# Patient Record
Sex: FEMALE | NOT HISPANIC OR LATINO | Employment: OTHER | URBAN - METROPOLITAN AREA
[De-identification: names, ages, dates, MRNs, and addresses within clinical notes are randomized per-mention and may not be internally consistent; named-entity substitution may affect disease eponyms.]

---

## 2017-01-23 ENCOUNTER — TRANSFERRED RECORDS (OUTPATIENT)
Dept: HEALTH INFORMATION MANAGEMENT | Facility: CLINIC | Age: 57
End: 2017-01-23

## 2017-03-21 ENCOUNTER — TRANSFERRED RECORDS (OUTPATIENT)
Dept: HEALTH INFORMATION MANAGEMENT | Facility: CLINIC | Age: 57
End: 2017-03-21

## 2017-04-11 ENCOUNTER — MEDICAL CORRESPONDENCE (OUTPATIENT)
Dept: HEALTH INFORMATION MANAGEMENT | Facility: CLINIC | Age: 57
End: 2017-04-11

## 2017-04-24 ENCOUNTER — TRANSFERRED RECORDS (OUTPATIENT)
Dept: HEALTH INFORMATION MANAGEMENT | Facility: CLINIC | Age: 57
End: 2017-04-24

## 2017-09-06 ENCOUNTER — TRANSFERRED RECORDS (OUTPATIENT)
Dept: HEALTH INFORMATION MANAGEMENT | Facility: CLINIC | Age: 57
End: 2017-09-06

## 2017-11-20 ENCOUNTER — MEDICAL CORRESPONDENCE (OUTPATIENT)
Dept: HEALTH INFORMATION MANAGEMENT | Facility: CLINIC | Age: 57
End: 2017-11-20

## 2017-12-05 ENCOUNTER — TRANSFERRED RECORDS (OUTPATIENT)
Dept: HEALTH INFORMATION MANAGEMENT | Facility: CLINIC | Age: 57
End: 2017-12-05

## 2018-01-15 ENCOUNTER — TRANSFERRED RECORDS (OUTPATIENT)
Dept: HEALTH INFORMATION MANAGEMENT | Facility: CLINIC | Age: 58
End: 2018-01-15

## 2018-01-27 ENCOUNTER — MEDICAL CORRESPONDENCE (OUTPATIENT)
Dept: HEALTH INFORMATION MANAGEMENT | Facility: CLINIC | Age: 58
End: 2018-01-27

## 2018-03-23 ENCOUNTER — TRANSFERRED RECORDS (OUTPATIENT)
Dept: HEALTH INFORMATION MANAGEMENT | Facility: CLINIC | Age: 58
End: 2018-03-23
Payer: MEDICARE

## 2018-03-23 LAB — NEGATIVE: NORMAL

## 2018-06-15 ENCOUNTER — TRANSFERRED RECORDS (OUTPATIENT)
Dept: HEALTH INFORMATION MANAGEMENT | Facility: CLINIC | Age: 58
End: 2018-06-15

## 2018-09-28 ENCOUNTER — TRANSFERRED RECORDS (OUTPATIENT)
Dept: HEALTH INFORMATION MANAGEMENT | Facility: CLINIC | Age: 58
End: 2018-09-28

## 2018-12-20 ENCOUNTER — TRANSFERRED RECORDS (OUTPATIENT)
Dept: HEALTH INFORMATION MANAGEMENT | Facility: CLINIC | Age: 58
End: 2018-12-20

## 2019-01-07 ENCOUNTER — TRANSFERRED RECORDS (OUTPATIENT)
Dept: HEALTH INFORMATION MANAGEMENT | Facility: CLINIC | Age: 59
End: 2019-01-07

## 2019-01-15 ENCOUNTER — TRANSFERRED RECORDS (OUTPATIENT)
Dept: HEALTH INFORMATION MANAGEMENT | Facility: CLINIC | Age: 59
End: 2019-01-15

## 2019-01-29 ENCOUNTER — TRANSFERRED RECORDS (OUTPATIENT)
Dept: HEALTH INFORMATION MANAGEMENT | Facility: CLINIC | Age: 59
End: 2019-01-29

## 2019-04-30 ENCOUNTER — TELEPHONE (OUTPATIENT)
Dept: PSYCHIATRY | Facility: CLINIC | Age: 59
End: 2019-04-30

## 2019-04-30 NOTE — TELEPHONE ENCOUNTER
"PSYCHIATRY CLINIC PHONE INTAKE     SERVICES REQUESTED / INTERESTED IN          Med Management    Presenting Problem and Brief History                              What would you like to be seen for? (brief description):  Since about 2008. Symptoms - usually sees psychologist every 2 weeks, hasn't been able to lately, was doing really well until she had a rash and dermatologist said it was from [facility]. Psychologist put her on something else and then something else, was on olanzapine, clonazpam, celexa, and vitamin D. When on olanzapine and clonazepam started seeing med manager at [facility] - didn't want to talk about clonazepam at all and then one day said they were going to put her on lithium instead of olanzapine (provider allowed half olanzapine but didn't tell her while going off olanzapine that she wouln't sleep even with clonazapem). In process on liquid clonazepam to start weaning off (started a few days ago). Not happy with lithium and is nervous to be offf of clonzazepam for sleep, melatonin sometimes helps. Gets \"people-y\" like going to a store and now today is tired. Gives herself 4 days to be \"lazy\", worked out a plan if she becomes manicy and find something constructive (yard work, clean house), used to dance. Hasn't seen psychiatrist since [place] (moved to [place] in October). Has been extremely stable for a few years but has moments and days.  Have you received a mental health diagnosis? Yes   Which one (s): Bipolar  Is there any history of developmental delay?  No   Are you currently seeing a mental health provider?  Yes            Who / month last seen:  On 5/9 appt with psychologist (private practice), 5/10 Dr. Chew in [facility] (first appt), [facility] has records too  Do you have mental health records elsewhere?  Yes  Will you sign a release so we can obtain them?  Yes    Have you ever been hospitalized for psychiatric reasons?  Yes  Describe:  " "Around 2007 - health and family stress, went to hospital and was diagnosed with bipolar and manic depressive    Do you have current thoughts of self-harm?  No    Do you currently have thoughts of harming others?  No       Substance Use History     Do you have any history of alcohol / illicit drug use?  No  Describe:    Have you ever received treatment for this?  No    Describe:       Social History     Does the patient have a guardian?  No    Name / number:   Have you had an ACT team in last 12 months?  No  Describe:    Do you have any current or past legal issues?  No  Describe:    OK to leave a detailed voicemail?  Yes    Medical/ Surgical History                                 There is no problem list on file for this patient.         Medications             No current outpatient medications on file.     Lithium  Weaning off clonazepam  celexa  Vitamin D  calcium    DISPOSITION      Completed phone screen with patient. Added to wait list, wants someone who is \"really good\", doesn't matter if resident or NP.     Melissa Finley,   "

## 2019-07-24 ENCOUNTER — TRANSFERRED RECORDS (OUTPATIENT)
Dept: HEALTH INFORMATION MANAGEMENT | Facility: CLINIC | Age: 59
End: 2019-07-24

## 2019-09-12 NOTE — TELEPHONE ENCOUNTER
Spoke to pt who provided updated information:    Pt stated she had a rash at one point, and her doctors felt it was the depakote she was taking. She was taken off of the depakote and tried several other medications, to replace the depakote, but nothing was working. She now take s olanzapine 25mg, but she's not happy with it. She took clonazepam in the past, which worked well, but was taken off of the clonazepam by her doctors since they were going to stop using this medication at that clinic. She takes hydroxyzine 1-2 25mg at night, and usually takes 2, because one isn't enough. She also takes citalopram 40mg.    Scheduled with Enzo No on 10/23/19 at 9:30am.

## 2019-10-01 ENCOUNTER — TRANSFERRED RECORDS (OUTPATIENT)
Dept: HEALTH INFORMATION MANAGEMENT | Facility: CLINIC | Age: 59
End: 2019-10-01

## 2019-10-23 ENCOUNTER — ALLIED HEALTH/NURSE VISIT (OUTPATIENT)
Dept: PSYCHIATRY | Facility: CLINIC | Age: 59
End: 2019-10-23
Attending: SOCIAL WORKER
Payer: MEDICARE

## 2019-10-23 ENCOUNTER — OFFICE VISIT (OUTPATIENT)
Dept: PSYCHIATRY | Facility: CLINIC | Age: 59
End: 2019-10-23
Attending: NURSE PRACTITIONER
Payer: MEDICARE

## 2019-10-23 VITALS — HEART RATE: 83 BPM | WEIGHT: 155 LBS | DIASTOLIC BLOOD PRESSURE: 78 MMHG | SYSTOLIC BLOOD PRESSURE: 114 MMHG

## 2019-10-23 DIAGNOSIS — F39 MOOD DISORDER (H): Primary | ICD-10-CM

## 2019-10-23 DIAGNOSIS — Z71.89 COUNSELING AND COORDINATION OF CARE: Primary | ICD-10-CM

## 2019-10-23 PROCEDURE — G0463 HOSPITAL OUTPT CLINIC VISIT: HCPCS | Mod: ZF

## 2019-10-23 RX ORDER — OLANZAPINE 2.5 MG/1
2.5 TABLET, FILM COATED ORAL AT BEDTIME
COMMUNITY
End: 2019-11-13

## 2019-10-23 RX ORDER — HYDROXYZINE HYDROCHLORIDE 25 MG/1
50 TABLET, FILM COATED ORAL AT BEDTIME
COMMUNITY
End: 2019-11-27

## 2019-10-23 RX ORDER — ALBUTEROL SULFATE 90 UG/1
2 AEROSOL, METERED RESPIRATORY (INHALATION) PRN
COMMUNITY
End: 2020-03-18

## 2019-10-23 RX ORDER — CITALOPRAM HYDROBROMIDE 40 MG/1
40 TABLET ORAL DAILY
COMMUNITY
End: 2019-11-27

## 2019-10-23 ASSESSMENT — PAIN SCALES - GENERAL: PAINLEVEL: MODERATE PAIN (5)

## 2019-10-23 NOTE — PATIENT INSTRUCTIONS
Thank you for coming to the PSYCHIATRY CLINIC.    Lab Testing:  If you had lab testing today and your results are reassuring or normal they will be mailed to you or sent through [employer] within 7 days.   If the lab tests need quick action we will call you with the results.  The phone number we will call with results is # [phone] (home) . If this is not the best number please call our clinic and change the number.    Medication Refills:  If you need any refills please call your pharmacy and they will contact us. Our fax number for refills is [phone]. Please allow three business for refill processing.   If you need to  your refill at a new pharmacy, please contact the new pharmacy directly. The new pharmacy will help you get your medications transferred.     Scheduling:  If you have any concerns about today's visit or wish to schedule another appointment please call our office during normal business hours [phone] (8-5:00 M-F)    Contact Us:  Please call [phone] during business hours (8-5:00 M-F).  If after clinic hours, or on the weekend, please call  [phone].    Financial Assistance [phone]  [facility] Billing [phone]  Central Billing Office, [facility]: [phone]  [facility] Billing [phone]  Medical Records [phone]      MENTAL HEALTH CRISIS NUMBERS:  [facility]:   [facility] - [phone]   Crisis Residence [facility] - [phone]   Walk-In Counseling [facility] [phone]   COPE 24/7 [place] Mobile Team for Adults - [[phone]]; Child - [[phone]]        [facility]:   [facility] - [phone]   Walk-in counseling [facility] - [phone]   Walk-in counseling [facility] - [phone]   Crisis Residence [facility] - [phone]   Urgent Care Adult Mental Health:   --Drop-in, 24/7 crisis line, and [place] Mobile Team  [[phone]]    CRISIS TEXT LINE: Text [phone] from anywhere, anytime, any crisis 24/7;    OR SEE [email or web address].org     Poison Control Center - [phone]    CHILD: [facility] needs assessment team - [phone]     [facility] - [phone]; or [facility] Lif[facility] - [phone]    If you have a medical emergency please call 911or go to the nearest ER.                    _____________________________________________    Again thank you for choosing PSYCHIATRY CLINIC and please let us know how we can best partner with you to improve you and your family's health.  You may be receiving a survey in the mail regarding this appointment. We would love to have your feedback, both positive and negative, so please fill out the survey and return it using the provided envelope. The survey is done by an external company, so your answers are anonymous.

## 2019-10-23 NOTE — PROGRESS NOTES
"     [facility] [place]  Psychiatry Clinic  MEDICAL DIAGNOSTIC ASSESSMENT (Incomplete.  See 11/13/19)     Referred by self-referred for evaluation of depression, anxiety and possible bipolar disorder.     History was provided by patient who was a fair and vague historian.    CARE TEAM:  PCP- Buddy Nolan   Therapist- Amirah Tejeda @ Private Practice                 Katie Griffiths is a 59 year old female who prefers the name Katie & pronouns she, her.      CHIEF COMPLAINT                                                           \" Cannot stay on medications.  Don't have the same get up and go \"     HISTORY OF PRESENT ILLNESS   [4, 4]      Psych critical item history: Hospitalized 3 times with most recent occurring in 2007 after overdose attempt.  Numerous medication trials.  Possible bipolar diagnosis    Unable to complete assessment due to complexity of history, her difficulty in recalling her psychiatric history, and the tangential nature of her thought process.  Katie also spent time sharing concerns about limited resources.  Social work was called for consultation.  ASSESSMENT    [m2, h3]     Bipolar I Disorder (Hx)     PLAN        [m2, h3]     1) PSYCHOTROPIC MEDICATIONS:  - continue with current medications.  Will request records for confirmation.      2) [place]  was [place] today:  will be checked next visit.    3) RTC: 2-3 weeks for completion of evaluation.  Will request records for medication reconciliation and collateral information    7) CRISIS NUMBERS:   Provided routinely in AVS   ONLY if a LILA PT: [facility] [phone] (clinic), [phone] (after hours)     TREATMENT RISK STATEMENT:  The risks, benefits, alternatives and potential adverse effects have been discussed and are understood by the pt. The pt understands the risks of using street drugs or alcohol. There are no medical contraindications, the pt agrees to treatment with the ability to do so. " The pt knows to call the clinic for any problems or to access emergency care if needed.  Medical and substance use concerns are documented above.  Psychotropic drug interaction check was done, including changes made today.    PROVIDER: ADELINA Lieberman CNP

## 2019-10-24 ENCOUNTER — TELEPHONE (OUTPATIENT)
Dept: PSYCHIATRY | Facility: CLINIC | Age: 59
End: 2019-10-24

## 2019-10-24 NOTE — TELEPHONE ENCOUNTER
Social Work   Incoming/Outgoing Call  [facility] Psychiatry Clinic    Outgoing Call To: Katie Griffiths    Reason for Call:  AGGIE following up on request for SW assistance. SW met briefly with patient on 10/23/19.    Response/Plan:  SW briefly assessed current situation and needs.     Katie is  from her  of 33 years. About 1 year ago, she moved to [place] to stay with her sister in [place]. Sometime in the past year, she moved down to the [facility] to assist her son. Her son was going through a divorce. Katie assisted with finances (paid back-rent and utilities of approximately 3 months) and provides childcare for their children. It appears divorce was finalized yesterday (10/23). Katie expressed frustration because her ex-daughter-in-law had previously expressed a need for her to watch the kids while she went to school and is now saying due to Katie's bipolar diagnosis, she is unstable and should not be watching the children. It appears there is currently 50/50 custody and as of today, Katie was watching the children.     There are multiple financial stressors for the family. Her son is paying child support based on current earnings. He works in construction and has periods where he is laid off. He also does not have a vehicle. He told Katie that she needs to pay rent or he will need to tell his ex that he can't care for the kids and get a small apartment for himself. Katie is currently paying the van loan to the vehicle the ex is driving. She also used her credit card to pay for  fees for the divorce.    Reviewed Katie's current finances. Katie provided information on current income, which includes alimony (ends in about 5 years or when her ex retires), social security, and part of her ex's  FPC. She has a large amount of credit card debt that she set up on a payment plan, medicare, and other insurances.     SW reviewed options. Patient could remind her son that she is paying money towards  household bills. SW suggested a sit down with her son to talk about finances. Katie finds it very difficult to talk with her son. She could also review medicare and identify a plan that covers more services in [place]. AGGIE explained [employer] as another option for health insurance. SW also suggested applying for subsidized housing in case she needs housing in the future.    Katie applied for [employer]. She has her assessment scheduled for next Wednesday at 9:00 am.    Patient requested phone follow up in 1-2 weeks. She will think about options in the mean time.      Will route to patient's current psychiatric provider(s) as an FYI.   Please call or EPIC message with any questions or concerns.    Kaley Kelley, [facility]  [phone]

## 2019-11-04 ENCOUNTER — TELEPHONE (OUTPATIENT)
Dept: PSYCHIATRY | Facility: CLINIC | Age: 59
End: 2019-11-04

## 2019-11-05 NOTE — PROGRESS NOTES
Social Work Consultation  [facility]ry Clinic      Patient Name:  Katie Griffiths  /Age:  1960 (59 year old)    Presenting SW Need(s)/ Reason for visit:  Requested by provider. Patient in need of supports.     Collaborated With:    -Katie Griffiths  -Enzo No, provider    Intervention:  Participated in the patient's psychiatry appt with Enzo No. SW intern Fannie Mariscal also present at meeting to observe.      SW met very briefly with Katie to introduce self. Katie reported spending $23 one way to get to clinic for appointment. She is trying to get [employer]. She is in need of multiple supports. SW made arrangements to call patient following day to further assess needs and identify next steps.    Resources Provided:  -none during today's meeting.    Social Work Assessment:  SW only met briefly with Katie and does not have enough information to make assessment or recommendations at this time.    Plan:  SW will call patient 10/24 to assess needs and identify resources. SW provided contact information.      Will route to patient's psychiatric provider(s) as an FYI.    Kaley Kelley, AMANDA, [facility]    This is a non-billable encounter as it was solely for the purposes of outreach and/or care coordination.

## 2019-11-07 NOTE — TELEPHONE ENCOUNTER
Social Work   Incoming Voicemail  [facility]ry Clinic    Incoming Voicemail From:  Katie Griffiths    Content of Voicemail:    Patient requesting call back.    Response/Plan:    SW returned call. She was able to call [employer]. Unfortunately she found out that if she switched health plans, she would likely have copays for services with specialists. She will likely keep current plan for at least one year. She did the [employer] assessment and was approved, which is a big relief for her transportation needs. She is interested in finding a 5 point restraint child seat so her 4 year old granddaughter can go with her on the [employer] bus.  AGGIE briefly searched for options and found 1 phone number of [phone] (with [employer]). Katie will continue to look into this option.    SW will attempt to check in with patient next time she is in clinic on 11/14/19.      Will route to patient's current psychiatric provider(s) as an FYI.   Please call or EPIC message with any questions or concerns.    Kaley Kelley, AMANDA, LICSW

## 2019-11-13 ENCOUNTER — ALLIED HEALTH/NURSE VISIT (OUTPATIENT)
Dept: PSYCHIATRY | Facility: CLINIC | Age: 59
End: 2019-11-13
Attending: SOCIAL WORKER
Payer: MEDICARE

## 2019-11-13 ENCOUNTER — OFFICE VISIT (OUTPATIENT)
Dept: PSYCHIATRY | Facility: CLINIC | Age: 59
End: 2019-11-13
Attending: NURSE PRACTITIONER
Payer: MEDICARE

## 2019-11-13 VITALS — SYSTOLIC BLOOD PRESSURE: 114 MMHG | HEART RATE: 106 BPM | WEIGHT: 157.2 LBS | DIASTOLIC BLOOD PRESSURE: 77 MMHG

## 2019-11-13 DIAGNOSIS — Z71.89 COUNSELING AND COORDINATION OF CARE: Primary | ICD-10-CM

## 2019-11-13 DIAGNOSIS — F39 MOOD DISORDER (H): Primary | ICD-10-CM

## 2019-11-13 PROCEDURE — G0463 HOSPITAL OUTPT CLINIC VISIT: HCPCS | Mod: ZF

## 2019-11-13 RX ORDER — OLANZAPINE 5 MG/1
5 TABLET ORAL AT BEDTIME
Qty: 30 TABLET | Refills: 0 | Status: SHIPPED | OUTPATIENT
Start: 2019-11-13 | End: 2019-12-11

## 2019-11-13 ASSESSMENT — PAIN SCALES - GENERAL: PAINLEVEL: NO PAIN (0)

## 2019-11-14 ENCOUNTER — TELEPHONE (OUTPATIENT)
Dept: PSYCHIATRY | Facility: CLINIC | Age: 59
End: 2019-11-14

## 2019-11-14 NOTE — TELEPHONE ENCOUNTER
On 11/13/2019 the patient signed an SEBASTIAN authorizing medical records to be released from [facility] to [facility]  for the purpose of continuing care. I faxed the request to [phone]. I sent the original to SEBASTIAN to scanning and kept a copy in psychiatry until scanning is complete.  Yee Mcfarland, CMA

## 2019-11-18 ENCOUNTER — DOCUMENTATION ONLY (OUTPATIENT)
Dept: CARE COORDINATION | Facility: CLINIC | Age: 59
End: 2019-11-18

## 2019-11-18 ENCOUNTER — TELEPHONE (OUTPATIENT)
Dept: PSYCHIATRY | Facility: CLINIC | Age: 59
End: 2019-11-18

## 2019-11-18 NOTE — TELEPHONE ENCOUNTER
Social Work   Incoming Voicemail  [facility] Psychiatry Clinic    Incoming Voicemail From:  Katie Griffiths    Content of Voicemail:    Patient requesting a call back.    Response/Plan:    SW returned call and left message with contact information and availability.      Will route to patient's current psychiatric provider(s) as an FYI.   Please call or EPIC message with any questions or concerns.    Kaley Kelley, AMANDA, LICSW

## 2019-11-19 NOTE — PROGRESS NOTES
Social Work Follow-Up  [facility]ry [facility]    Patient Name:  Katie Griffiths  /Age:  1960 (59 year old)    Reason for Follow-Up:  Patient left voicemail on writer's phone wondering if there were any primary care providers in same building as Psychiatry Clinic.    Data/Intervention:  -Met with patient during psychiatry appointment with Enzo No.  -Provided handouts on 3 possible PCP clinics: at [facility] on [facility] [place], [facility] [place], and [facility] across [place] from [facility]. SW reviewed pros and cons of each option. [facility] is within current system and also has specialty providers on site but is not at this site and farther from home. [facility] is within system (improved coordination) and close to home, but may not have as many specialty providers. [facility] is very close to Psychiatry clinic, but is not in [place] system. Katie is most interested in [facility] clinic and is able to follow up with making an appointment on her own.  -Patient did not identify other needs at this time.    Assessment:  Katie is able to call and make an appointment independently. She has demonstrated an ability to reach out to writer when she needs assistance.    Plan:  Katie will reach out to writer when she needs support or assistance.    Will route to patient's psychiatric provider(s) as an FYI.    Please call or EPIC message with any questions or concerns.    Kaley Kelley (Patty) MS, LICSW  [phone]    This is a non-billable encounter as it was solely for the purposes of outreach and/or care coordination.
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
## 2019-11-27 ENCOUNTER — OFFICE VISIT (OUTPATIENT)
Dept: PSYCHIATRY | Facility: CLINIC | Age: 59
End: 2019-11-27
Attending: NURSE PRACTITIONER
Payer: MEDICARE

## 2019-11-27 VITALS — SYSTOLIC BLOOD PRESSURE: 108 MMHG | DIASTOLIC BLOOD PRESSURE: 75 MMHG | WEIGHT: 162.2 LBS | HEART RATE: 103 BPM

## 2019-11-27 DIAGNOSIS — F39 MOOD DISORDER (H): Primary | ICD-10-CM

## 2019-11-27 PROCEDURE — G0463 HOSPITAL OUTPT CLINIC VISIT: HCPCS | Mod: ZF

## 2019-11-27 RX ORDER — HYDROXYZINE HYDROCHLORIDE 25 MG/1
50 TABLET, FILM COATED ORAL AT BEDTIME
Qty: 60 TABLET | Refills: 1 | Status: SHIPPED | OUTPATIENT
Start: 2019-11-27 | End: 2020-01-08

## 2019-11-27 RX ORDER — CITALOPRAM HYDROBROMIDE 40 MG/1
40 TABLET ORAL DAILY
Qty: 30 TABLET | Refills: 1 | Status: SHIPPED | OUTPATIENT
Start: 2019-11-27 | End: 2020-02-12

## 2019-11-27 ASSESSMENT — PAIN SCALES - GENERAL: PAINLEVEL: NO PAIN (0)

## 2019-11-27 NOTE — PROGRESS NOTES
[facility]  Psychiatry Clinic  PSYCHIATRIC PROGRESS NOTE       Katie Griffiths is a 59 year old female who prefers the name Katie and pronoun she, her.  Therapist: Amirah Tejeda @ Private Practice               PCP: Buddy Nolan  Other Providers: None    Pertinent Background:  See previous notes.  Psych critical item history includes Hospitalized 3 times with most recent occurring in 2007 after overdose attempt.  Numerous medication trials.  Possible bipolar diagnosis.     Interim History     [4, 4]     The patient is a good historian, reports good treatment adherence and was last seen 11/13/19.  Since the last visit, Increased HS Olanzapine to 5mg and Katie appears much more grounded.  Speech less pressured and mood more stable.  Thoughts more organized and linear.  Katie reports she also has noticed a significant difference.  She also reports she is sleeping much better.  Concerned about weight gain but reports she is very sedentary.      Recent Symptoms:   Depression:  depressed mood, anhedonia, low energy, appetite changes and weight changes  Elevated:  none  Psychosis:  none  Anxiety:  periodic worry  Panic Attack:  none  Trauma Related:  none     Recent Substance Use:  No changes reported        Social/ Family History      [1ea,1ea]            [per patient report]               Financial support-  social security disability  Children-  2 adult children  Living situation- Lives in house in [place] with son   Social/spiritial support-  limited/none  Feels safe at home-  Yes   Family history- None      Medical / Surgical History                               There is no problem list on file for this patient.      No past surgical history on file.     Medical Review of Systems         [2,10]   The remainder of the review of systems is noncontributory  Thyroid removed  Allergy    Quetiapine  Current Medications        Current Outpatient Medications   Medication Sig  Dispense Refill     albuterol (PROAIR HFA/PROVENTIL HFA/VENTOLIN HFA) 108 (90 Base) MCG/ACT inhaler Inhale 2 puffs into the lungs as needed for shortness of breath / dyspnea or wheezing       citalopram (CELEXA) 40 MG tablet Take 40 mg by mouth daily       hydrOXYzine (ATARAX) 25 MG tablet Take 50 mg by mouth At Bedtime Take two tabs at bedtime       OLANZapine (ZYPREXA) 5 MG tablet Take 1 tablet (5 mg) by mouth At Bedtime 30 tablet 0     thyroid (ARMOUR) 32.5 MG tablet Take 32.5 mg by mouth daily Nature-thyroid       Vitals         [3, 3]   /75   Pulse 103   Wt 73.6 kg (162 lb 3.2 oz)    Mental Status Exam        [9, 14 cog gs]     Alertness: alert  and oriented  Appearance: casually groomed  Behavior/Demeanor: cooperative, pleasant and calm, with good  eye contact   Speech: normal  Language: intact  Psychomotor: normal or unremarkable  Mood: 'ok  Affect: appropriate; was congruent to mood; was congruent to content  Thought Process/Associations: unremarkable  Thought Content:  Reports none;  Denies suicidal ideation and violent ideation  Perception:  Reports none;  Denies auditory hallucinations and visual hallucinations  Insight: adequate  Judgment: intact  Cognition: (6) does  appear grossly intact; formal cognitive testing was not done  Gait/Station and/or Muscle Strength/Tone: unremarkable    Labs and Data                          Rating Scales:    PHQ9    PHQ9 Today:  Not completed  No flowsheet data found.     Assessment      [m2, h3]     Unspecified Mood Disorder  Bipolar I Disorder (Hx)    [place] Prescription [place] [] was not checked today:  provider not managing any controlled medications.        Plan                                                                                                                     m2, h3     1) MEDICATION:  Continue Olanazapine 5mg at bedtime   Continue Celexa 40mg.  Consider taper/discontinue due to Bipolar Diagnosis  Continue [place] 32.5mg  (thyroid)  Continue Hydroxyzine 50mg at bedtime     2) THERAPY:  Continue with current therapist      RTC: 2 weeks    CRISIS NUMBERS:   Provided routinely in AVS.    Treatment Risk Statement:  The patient understands the risks, benefits, adverse effects and alternatives. Agrees to treatment with the capacity to do so. No medical contraindications to treatment. Agrees to call clinic for any problems. The patient understands to call 911 or go to the nearest ED if life threatening or urgent symptoms occur.     WHODAS 2.0  TODAY total score = N/A; [a 12-item WHODAS 2.0 assessment was not completed by the pt today and/or recorded in EPIC].       PROVIDER:  ADELINA Lieberman CNP

## 2019-12-11 ENCOUNTER — OFFICE VISIT (OUTPATIENT)
Dept: PSYCHIATRY | Facility: CLINIC | Age: 59
End: 2019-12-11
Attending: NURSE PRACTITIONER
Payer: MEDICARE

## 2019-12-11 VITALS — HEART RATE: 88 BPM | WEIGHT: 167 LBS | DIASTOLIC BLOOD PRESSURE: 74 MMHG | SYSTOLIC BLOOD PRESSURE: 116 MMHG

## 2019-12-11 DIAGNOSIS — F39 MOOD DISORDER (H): ICD-10-CM

## 2019-12-11 PROCEDURE — 93010 ELECTROCARDIOGRAM REPORT: CPT | Performed by: INTERNAL MEDICINE

## 2019-12-11 PROCEDURE — 93005 ELECTROCARDIOGRAM TRACING: CPT | Mod: ZF | Performed by: NURSE PRACTITIONER

## 2019-12-11 PROCEDURE — G0463 HOSPITAL OUTPT CLINIC VISIT: HCPCS | Mod: ZF

## 2019-12-11 RX ORDER — ZIPRASIDONE HYDROCHLORIDE 40 MG/1
40 CAPSULE ORAL 2 TIMES DAILY WITH MEALS
Qty: 60 CAPSULE | Refills: 0 | Status: SHIPPED | OUTPATIENT
Start: 2019-12-11 | End: 2020-01-08

## 2019-12-11 RX ORDER — OLANZAPINE 5 MG/1
2.5 TABLET ORAL AT BEDTIME
Qty: 30 TABLET | Refills: 0 | COMMUNITY
Start: 2019-12-11 | End: 2020-01-08 | Stop reason: ALTCHOICE

## 2019-12-11 ASSESSMENT — PAIN SCALES - GENERAL: PAINLEVEL: MILD PAIN (3)

## 2019-12-11 NOTE — PROGRESS NOTES
"         [facility]  Psychiatry Clinic  PSYCHIATRIC PROGRESS NOTE       Katie Griffiths is a 59 year old female who prefers the name Katie and pronoun she, her.  Therapist: Amirah Tejeda @ Private Practice               PCP: Buddy Nolan  Other Providers: None    Pertinent Background:  See previous notes.  Psych critical item history includes Hospitalized 3 times with most recent occurring in 2007 after overdose attempt.  Numerous medication trials.  Possible bipolar diagnosis.     Interim History     [4, 4]     The patient is a good historian, reports good treatment adherence and was last seen 11/27/19.  Since the last visit, Katie reports her mood is stable but she is describes \"not feeling good or bad.\"  Difficult to discern if emotional blunting or lack of hypomania.  Thoughts continue to be linear and organized.  Speech normal.  Also reports sleeping continues to improve. Previous trials include Lithium (not effective?), Depakote (rash), and Abilify (side effects).  Katie would like to change medications today due to possible emotional blunting and weight gain (5lbs in past 2 weeks).    11/27/19: Increased HS Olanzapine to 5mg and Katie appears much more grounded.  Speech less pressured and mood more stable.  Thoughts more organized and linear.  Katie reports she also has noticed a significant difference.  She also reports she is sleeping much better.  Concerned about weight gain but reports she is very sedentary.      Recent Symptoms:   Depression:  depressed mood, anhedonia, low energy, insomnia, appetite changes, weight changes, poor concentration /memory and feeling worthless  Elevated:  none  Psychosis:  none  Anxiety:  periodic worry  Panic Attack:  none  Trauma Related:  none     Recent Substance Use:  No changes reported        Social/ Family History      [1ea,1ea]            [per patient report]               Financial support-  social security " "disability  Children-  2 adult children  Living situation- Lives in house in [place] with son   Social/spiritial support-  limited/none  Feels safe at home-  Yes   Family history- None      Medical / Surgical History                                 Patient Active Problem List   Diagnosis     Bipolar disorder (H)     Hashimoto's thyroiditis     Incontinence of urine in female     Osteopenia     Reactive airway disease     Vitamin D deficiency     Overweight     Dizziness     Hyperlipidemia LDL goal <100     Pelvic floor dysfunction       Past Surgical History:   Procedure Laterality Date     partial thyroidectomy       TONSILLECTOMY       TUBAL LIGATION          Medical Review of Systems         [2,10]   The remainder of the review of systems is noncontributory  Thyroid removed  Allergy    Quetiapine  Current Medications        Current Outpatient Medications   Medication Sig Dispense Refill     albuterol (PROAIR HFA/PROVENTIL HFA/VENTOLIN HFA) 108 (90 Base) MCG/ACT inhaler Inhale 2 puffs into the lungs as needed for shortness of breath / dyspnea or wheezing       citalopram (CELEXA) 40 MG tablet Take 1 tablet (40 mg) by mouth daily 30 tablet 1     hydrOXYzine (ATARAX) 25 MG tablet Take 2 tablets (50 mg) by mouth At Bedtime Take two tabs at bedtime 60 tablet 1     OLANZapine (ZYPREXA) 5 MG tablet Take 0.5 tablets (2.5 mg) by mouth At Bedtime 30 tablet 0     thyroid (ARMOUR) 32.5 MG tablet Take 32.5 mg by mouth daily Nature-thyroid       ziprasidone (GEODON) 40 MG capsule Take 1 capsule (40 mg) by mouth 2 times daily (with meals) 60 capsule 0     Vitals         [3, 3]   /74   Pulse 88   Wt 75.8 kg (167 lb)    Mental Status Exam        [9, 14 cog gs]     Alertness: alert  and oriented  Appearance: casually groomed  Behavior/Demeanor: cooperative, pleasant and calm, with good  eye contact   Speech: regular rate and rhythm  Language: no problems  Psychomotor: normal or unremarkable  Mood: \"not good, not " "bad\"  Affect: appropriate; was congruent to mood; was congruent to content  Thought Process/Associations: unremarkable  Thought Content:  Reports none;  Denies suicidal ideation and violent ideation  Perception:  Reports none;  Denies auditory hallucinations and visual hallucinations  Insight: adequate  Judgment: intact  Cognition: (6) does  appear grossly intact; formal cognitive testing was not done  Gait/Station and/or Muscle Strength/Tone: unremarkable    Labs and Data                          Rating Scales:    PHQ9    PHQ9 Today:  21  PHQ-9 SCORE 12/18/2019   PHQ-9 Total Score MyChart 17 (Moderately severe depression)   PHQ-9 Total Score 17        Assessment      [m2, h3]     Unspecified Mood Disorder  Bipolar I Disorder (Hx)    [place] Prescription [place] [] was not checked today:  provider not managing any controlled medications.        Plan                                                                                                                     m2, h3     1) MEDICATION:  Decrease Olanazapine to 2.5mg at bedtime with plan to discontinue if Geodon effective  Continue Celexa 40mg.  Consider taper/discontinue due to Bipolar Diagnosis  Continue [place] 32.5mg (thyroid)  Continue Hydroxyzine 50mg at bedtime   Start Geodon 40mg BID with food    2) THERAPY:  Continue with current therapist      RTC: 1 month    CRISIS NUMBERS:   Provided routinely in AVS.    Treatment Risk Statement:  The patient understands the risks, benefits, adverse effects and alternatives. Agrees to treatment with the capacity to do so. No medical contraindications to treatment. Agrees to call clinic for any problems. The patient understands to call 911 or go to the nearest ED if life threatening or urgent symptoms occur.     WHODAS 2.0  TODAY total score = N/A; [a 12-item WHODAS 2.0 assessment was not completed by the pt today and/or recorded in EPIC].       PROVIDER:  ADELINA Lieberman CNP    "

## 2019-12-11 NOTE — PATIENT INSTRUCTIONS
Thank you for coming to the PSYCHIATRY CLINIC.    Lab Testing:  If you had lab testing today and your results are reassuring or normal they will be mailed to you or sent through [employer] within 7 days.   If the lab tests need quick action we will call you with the results.  The phone number we will call with results is # [phone] (home) . If this is not the best number please call our clinic and change the number.    Medication Refills:  If you need any refills please call your pharmacy and they will contact us. Our fax number for refills is [phone]. Please allow three business for refill processing.   If you need to  your refill at a new pharmacy, please contact the new pharmacy directly. The new pharmacy will help you get your medications transferred.     Scheduling:  If you have any concerns about today's visit or wish to schedule another appointment please call our office during normal business hours [phone] (8-5:00 M-F)    Contact Us:  Please call [phone] during business hours (8-5:00 M-F).  If after clinic hours, or on the weekend, please call  [phone].    Financial Assistance [phone]  [facility] Billing [phone]  Central Billing Office, [facility]: [phone]  [facility] Billing [phone]  Medical Records [phone]      MENTAL HEALTH CRISIS NUMBERS:  [facility]:   [facility] - [phone]   Crisis Residence [facility] - [phone]   Walk-In Counseling [facility] [phone]   COPE 24/7 [facility] Mobile Team for Adults - [[phone]]; Child - [[phone]]        [facility]:   [facility] - [phone]   Walk-in counseling [facility] - [phone]   Walk-in counseling [facility] - [phone]   Crisis Residence [facility] - [phone]   Urgent Care Adult Mental Health:   --Drop-in, 24/7 crisis line, and [place] [employer] Mobile Team  [[phone]]    CRISIS TEXT LINE: Text [phone] from anywhere, anytime, any crisis 24/7;    OR SEE www.crisistextline.org     Poison Control Center - [phone]    CHILD: [facility] needs assessment team - [phone]     [facility] - [phone]; or [facility] - [phone]    If you have a medical emergency please call 911or go to the nearest ER.                    _____________________________________________    Again thank you for choosing PSYCHIATRY CLINIC and please let us know how we can best partner with you to improve you and your family's health.  You may be receiving a survey in the mail regarding this appointment. We would love to have your feedback, both positive and negative, so please fill out the survey and return it using the provided envelope. The survey is done by an external company, so your answers are anonymous.

## 2019-12-12 LAB — INTERPRETATION ECG - MUSE: NORMAL

## 2019-12-15 ASSESSMENT — ENCOUNTER SYMPTOMS
DEPRESSION: 1
INCREASED ENERGY: 1
HOARSE VOICE: 1
ORTHOPNEA: 1
HEADACHES: 0
POLYDIPSIA: 1
POSTURAL DYSPNEA: 1
BLOOD IN STOOL: 0
STIFFNESS: 1
MYALGIAS: 1
HEMATURIA: 0
SORE THROAT: 1
DECREASED APPETITE: 0
SLEEP DISTURBANCES DUE TO BREATHING: 0
HEARTBURN: 0
SPEECH CHANGE: 1
SEIZURES: 0
SHORTNESS OF BREATH: 0
NECK MASS: 0
WEIGHT LOSS: 0
WHEEZING: 0
NAUSEA: 1
DISTURBANCES IN COORDINATION: 1
SINUS PAIN: 0
DECREASED LIBIDO: 1
DIARRHEA: 0
DIFFICULTY URINATING: 0
HALLUCINATIONS: 0
NECK PAIN: 0
VOMITING: 0
RECTAL PAIN: 0
NUMBNESS: 1
SYNCOPE: 0
BOWEL INCONTINENCE: 1
NERVOUS/ANXIOUS: 1
EXERCISE INTOLERANCE: 1
FATIGUE: 1
EYE PAIN: 1
TINGLING: 1
PANIC: 1
DIZZINESS: 1
CONSTIPATION: 1
HYPERTENSION: 1
MEMORY LOSS: 1
MUSCLE WEAKNESS: 1
DYSURIA: 0
ABDOMINAL PAIN: 1
WEAKNESS: 1
COUGH: 1
DYSPNEA ON EXERTION: 1
SMELL DISTURBANCE: 0
FEVER: 0
INSOMNIA: 1
TREMORS: 0
LIGHT-HEADEDNESS: 1
TASTE DISTURBANCE: 0
LEG PAIN: 1
BLOATING: 1
TROUBLE SWALLOWING: 0
NAIL CHANGES: 1
HYPOTENSION: 0
LOSS OF CONSCIOUSNESS: 0
SPUTUM PRODUCTION: 0
EYE IRRITATION: 1
JOINT SWELLING: 0
WEIGHT GAIN: 1
BACK PAIN: 1
HOT FLASHES: 0
HEMOPTYSIS: 0
ALTERED TEMPERATURE REGULATION: 1
DOUBLE VISION: 0
SINUS CONGESTION: 1
EYE REDNESS: 0
SKIN CHANGES: 0
COUGH DISTURBING SLEEP: 1
NIGHT SWEATS: 0
PALPITATIONS: 0
POOR WOUND HEALING: 0
FLANK PAIN: 1
SNORES LOUDLY: 0
POLYPHAGIA: 1
CHILLS: 0
EYE WATERING: 0
DECREASED CONCENTRATION: 1
JAUNDICE: 0
MUSCLE CRAMPS: 1
ARTHRALGIAS: 1
PARALYSIS: 0

## 2019-12-16 NOTE — PROGRESS NOTES
[facility]  Primary Care Center   Kim MCKEONADELINA Nassar CNP  12/18/2019      Chief Complaint:   Establish Care       History of Present Illness:   Katie Griffiths is a 59 year old female with a history of asthma, bipolar disorder, and Hashimoto's thyroiditis who presents alone to establish care. She has been following with Enzo No in psychiatry clinic here for her bipolar disorder. She moved from [place] to [facility] this past summer. She was previously seeing Dr. Nolan in [place].     Urinary incontinence: She has notes a problem with her bladder leaking. This has been worsening lately, noting she had to change her clothes three times yesterday because she was unable to make it to the bathroom in time. She does not feel the need to urinate until her bladder is overfull. She notes having the urge to urinate but sometimes cannot make it to the bathroom in time to empty her bladder. She denies pain with urination. She denies blood in her urine. She has tried Kegel exercises, but admits that she's not sure if she's doing these correctly so she hasn't done them regularly.. She notes some stool leakage but feels she is able to control this for the most part.    Weight: She would like to work on losing weight. She knows she should eat less but also knows some of her bipolar medications caused her to gain weight. She has not been exercising much lately. Of note, labs from earlier this year showed elevated cholesterol.     Rash: She previously had a rash that lasted over a year (while living in [place]). She saw a dermatologist and had a biopsy done in [place], [place]. It was thought the rash was due to an allergy to Depakote. Her medication was subsequently changed but the rash persisted. In [place], she was told she had scabies and was given some medication. The rash eventually resolved on its own.     Asthma: He asthma is triggered by smoke, perfumes, chemicals, pollen, and dust. She is able to treat her  "breathing problems with her inhaler but generally tries to avoid these triggers and doesn't need to use the albuterol.    Other concerns discussed:  1. Has Vitamin D supplement   2. Would like full skin check due to family history of \"things removed,\" she is unsure if these were actually skin cancer  3. Normal colonoscopy over 5 years ago in [place], [place] - no blood in her stool  4. Decreased libido d/t Celexa  5. Pap smear done in January 2019 - NIL with neg HPV (Care Everywhere)     Review of Systems:   Pertinent items are noted in HPI or as in patient entered ROS below, remainder of complete ROS is negative.   Answers for HPI/ROS submitted by the patient on 12/15/2019   General Symptoms: Yes  Skin Symptoms: Yes  HENT Symptoms: Yes  EYE SYMPTOMS: Yes  HEART SYMPTOMS: Yes  LUNG SYMPTOMS: Yes  INTESTINAL SYMPTOMS: Yes  URINARY SYMPTOMS: Yes  GYNECOLOGIC SYMPTOMS: Yes  BREAST SYMPTOMS: No  SKELETAL SYMPTOMS: Yes  BLOOD SYMPTOMS: No  NERVOUS SYSTEM SYMPTOMS: Yes  MENTAL HEALTH SYMPTOMS: Yes  Fever: No  Loss of appetite: No  Weight loss: No  Weight gain: Yes  Fatigue: Yes  Night sweats: No  Chills: No  Increased stress: Yes  Excessive hunger: Yes  Excessive thirst: Yes  Feeling hot or cold when others believe the temperature is normal: Yes  Loss of height: No  Post-operative complications: No  Surgical site pain: No  Hallucinations: No  Change in or Loss of Energy: Yes  Hyperactivity: Yes  Confusion: Yes  Changes in hair: No  Changes in moles/birth marks: No  Itching: Yes  Rashes: No  Changes in nails: Yes  Acne: Yes  Hair in places you don't want it: No  Change in facial hair: No  Warts: No  Non-healing sores: No  Scarring: Yes  Flaking of skin: No  Color changes of hands/feet in cold : No  Sun sensitivity: Yes  Skin thickening: No  Ear pain: No  Ear discharge: No  Hearing loss: No  Tinnitus: No  Nosebleeds: Yes  Congestion: Yes  Sinus pain: No  Trouble swallowing: No   Voice hoarseness: Yes  Mouth sores: Yes  Sore " throat: Yes  Tooth pain: No  Gum tenderness: No  Bleeding gums: No  Change in taste: No  Change in sense of smell: No  Dry mouth: Yes  Hearing aid used: No  Neck lump: No  Eye pain: Yes  Vision loss: Yes  Dry eyes: Yes  Watery eyes: No  Eye bulging: No  Double vision: No  Flashing of lights: No  Spots: No  Floaters: No  Redness: No  Crossed eyes: No  Tunnel Vision: No  Yellowing of eyes: No  Eye irritation: Yes  Cough: Yes  Sputum or phlegm: No  Coughing up blood: No  Difficulty breating or shortness of breath: No  Snoring: No  Wheezing: No  Difficulty breathing on exertion: Yes  Nighttime Cough: Yes  Difficulty breathing when lying flat: Yes  Chest pain or pressure: No  Fast or irregular heartbeat: No  Pain in legs with walking: Yes  Trouble breathing while lying down: Yes  Fingers or toes appear blue: No  High blood pressure: Yes  Low blood pressure: No  Fainting: No  Murmurs: No  Pacemaker: No  Varicose veins: Yes  Edema or swelling: No  Wake up at night with shortness of breath: No  Light-headedness: Yes  Exercise intolerance: Yes  Heart burn or indigestion: No  Nausea: Yes  Vomiting: No  Abdominal pain: Yes  Bloating: Yes  Constipation: Yes  Diarrhea: No  Blood in stool: No  Black stools: No  Rectal or Anal pain: No  Fecal incontinence: Yes  Yellowing of skin or eyes: No  Vomit with blood: No  Change in stools: Yes  Trouble holding urine or incontinence: Yes  Pain or burning: No  Trouble starting or stopping: Yes  Increased frequency of urination: Yes  Blood in urine: No  Decreased frequency of urination: No  Frequent nighttime urination: Yes  Flank pain: Yes  Difficulty emptying bladder: No  Back pain: Yes  Muscle aches: Yes  Neck pain: No  Swollen joints: No  Joint pain: Yes  Bone pain: No  Muscle cramps: Yes  Muscle weakness: Yes  Joint stiffness: Yes  Bone fracture: No  Trouble with coordination: Yes  Dizziness or trouble with balance: Yes  Fainting or black-out spells: No  Memory loss: Yes  Headache:  No  Seizures: No  Speech problems: Yes  Tingling: Yes  Tremor: No  Weakness: Yes  Difficulty walking: Yes  Paralysis: No  Numbness: Yes  Bleeding or spotting between periods: No  Heavy or painful periods: No  Irregular periods: No  Vaginal discharge: No  Hot flashes: No  Vaginal dryness: Yes  Genital ulcers: No  Reduced libido: Yes  Difficulty with sexual arousal: Yes  Post-menopausal bleeding: No  Nervous or Anxious: Yes  Depression: Yes  Trouble sleeping: Yes  Trouble thinking or concentrating: Yes  Mood changes: Yes  Panic attacks: Yes  If you checked off any problems, how difficult have these problems made it for you to do your work, take care of things at home, or get along with other people?: Very difficult  PHQ9 TOTAL SCORE: 17      Active Medications:      albuterol (PROAIR HFA/PROVENTIL HFA/VENTOLIN HFA) 108 (90 Base) MCG/ACT inhaler, Inhale 2 puffs into the lungs as needed for shortness of breath / dyspnea or wheezing, Disp: , Rfl:      citalopram (CELEXA) 40 MG tablet, Take 1 tablet (40 mg) by mouth daily, Disp: 30 tablet, Rfl: 1     hydrOXYzine (ATARAX) 25 MG tablet, Take 2 tablets (50 mg) by mouth At Bedtime Take two tabs at bedtime, Disp: 60 tablet, Rfl: 1     OLANZapine (ZYPREXA) 5 MG tablet, Take 0.5 tablets (2.5 mg) by mouth At Bedtime, Disp: 30 tablet, Rfl: 0     thyroid (ARMOUR) 32.5 MG tablet, Take 32.5 mg by mouth daily Nature-thyroid, Disp: , Rfl:      ziprasidone (GEODON) 40 MG capsule, Take 1 capsule (40 mg) by mouth 2 times daily (with meals), Disp: 60 capsule, Rfl: 0      Allergies:   Quetiapine      Past Medical History:  Bipolar disorder  Reactive airway disease  Vitamin D deficiency   Osteopenia  Hashimoto's thyroiditis  Degenerative arthritis of thoracic spine  Depression  Anxiety   Cardiac arrhythmia   Asthma  Hyperlipidemia    Pruritus   Superficial perivascular dermatitis  Allergic rhinitis      Past Surgical History:  Partial thyroidectomy, left   Tonsillectomy  Tubal  ligation    Family History:   Colon polyps - mother  Hyperlipidemia - father  Factor V Leiden deficiency - father (patient tested negative)  Eye disorder - father       Social History:   The patient is  with 2 children, a nonsmoker, and does consume alcohol (monthly or less).  She currently lives with her oldest son.      Physical Exam:   /73 (BP Location: Right arm, Patient Position: Sitting, Cuff Size: Adult Large)   Pulse 95   Wt 75.8 kg (167 lb)   SpO2 94%    Constitutional: Alert, oriented, pleasant, no acute distress  Head: Normocephalic, atraumatic  Eyes: Extra-ocular movements intact, pupils equally round and reactive bilaterally, no scleral icterus  Ears: tympanic membranes pearly gray with positive light reflex  ENT: Oropharynx clear, moist mucus membranes, good dentition, bluish discoloration over mid-lower lip  Neck: Supple, no lymphadenopathy, no thyromegaly  Cardiovascular: Regular rate and rhythm, no murmurs, rubs or gallops  Respiratory: Good air movement bilaterally, lungs clear, no wheezes/rales/rhonchi  GI: Abdomen soft, bowel sounds present, nondistended, nontender, no organomegaly or masses, no rebound/guarding  Psychiatric: normal mentation, affect and mood      Assessment and Plan:  Urinary incontinence, mixed  She has been experiencing worsening bladder leakage with urge and overflow incontinence. Will check labs to ensure no urinary tract infection, though suspect this will be negative. Encouraged her to continue with Kegel exercises to strength muscles. Educated her on how to do the exercises properly. Also suggested she try pelvic floor physical therapy, which she is interested in trying. If no improvement, will refer to Urology.   - UA with Micro reflex to Culture  - PHYSICAL THERAPY REFERRAL    Special screening for malignant neoplasms, colon  She had a normal colonoscopy over 5 years ago but expresses some concern as her mother had some polyps found previously.  "Discussed colonoscopy vs FIT test. She will try to find her old colonoscopy records for us. In the interim she will go ahead with the FIT test.   - Fecal cancer screen FIT    Encounter to establish care  Records reviewed from [facility].     Lip lesion  She has a family history of \"skin problems\" and would like to have a full skin exam. She was previously seen by ENT for a venous lake of lip, who recommended f/u with derm for possible laser therapy, referral to dermatology for further evaluation.  - DERMATOLOGY REFERRAL     Overweight  She would like to lose weight. Suggested she try incorporating exercise into her life as she admits to being very sedentary. Advised her to start slow with 10 minutes of walking daily and subsequently increase as she tolerates. Also suggested she limit her food intake and make healthy diet choices. She does have a family history of high cholesterol and had elevated lipids on labs from earlier this year. We will reassess her cholesterol levels in about 3-6 months and can consider adding medication at that time if needed.       Follow-up: Return to clinic in 3 months (around 3/18/2020).     Scribe Disclosure:  I, Syeda Chopra, am serving as a scribe to document services personally performed by ADELINA Rudolph CNP at this visit, based upon the provider's statements to me. All documentation has been reviewed by the aforementioned provider prior to being entered into the official medical record.     Portions of this medical record were completed by a scribe. UPON MY REVIEW AND AUTHENTICATION BY ELECTRONIC SIGNATURE, this confirms (a) I performed the applicable clinical services, and (b) the record is accurate.     ADELINA Rudolph CNP    "

## 2019-12-18 ENCOUNTER — OFFICE VISIT (OUTPATIENT)
Dept: INTERNAL MEDICINE | Facility: CLINIC | Age: 59
End: 2019-12-18
Payer: MEDICARE

## 2019-12-18 VITALS
SYSTOLIC BLOOD PRESSURE: 112 MMHG | WEIGHT: 167 LBS | OXYGEN SATURATION: 94 % | DIASTOLIC BLOOD PRESSURE: 73 MMHG | HEART RATE: 95 BPM

## 2019-12-18 DIAGNOSIS — E78.5 HYPERLIPIDEMIA LDL GOAL <100: ICD-10-CM

## 2019-12-18 DIAGNOSIS — K13.0 LIP LESION: ICD-10-CM

## 2019-12-18 DIAGNOSIS — N39.46 URINARY INCONTINENCE, MIXED: ICD-10-CM

## 2019-12-18 DIAGNOSIS — Z76.89 ENCOUNTER TO ESTABLISH CARE: ICD-10-CM

## 2019-12-18 DIAGNOSIS — Z12.11 SPECIAL SCREENING FOR MALIGNANT NEOPLASMS, COLON: ICD-10-CM

## 2019-12-18 DIAGNOSIS — N39.46 URINARY INCONTINENCE, MIXED: Primary | ICD-10-CM

## 2019-12-18 DIAGNOSIS — E66.3 OVERWEIGHT: ICD-10-CM

## 2019-12-18 PROBLEM — E55.9 VITAMIN D DEFICIENCY: Status: ACTIVE | Noted: 2019-12-18

## 2019-12-18 PROBLEM — F31.9 BIPOLAR DISORDER (H): Status: ACTIVE | Noted: 2019-12-18

## 2019-12-18 PROBLEM — J45.909 REACTIVE AIRWAY DISEASE: Status: ACTIVE | Noted: 2019-12-18

## 2019-12-18 PROBLEM — R42 DIZZINESS: Status: ACTIVE | Noted: 2019-12-18

## 2019-12-18 PROBLEM — E06.3 HASHIMOTO'S THYROIDITIS: Status: ACTIVE | Noted: 2019-12-18

## 2019-12-18 PROBLEM — R32 INCONTINENCE OF URINE IN FEMALE: Status: ACTIVE | Noted: 2019-12-18

## 2019-12-18 LAB
ALBUMIN UR-MCNC: NEGATIVE MG/DL
APPEARANCE UR: CLEAR
BILIRUB UR QL STRIP: NEGATIVE
COLOR UR AUTO: NORMAL
GLUCOSE UR STRIP-MCNC: NEGATIVE MG/DL
HGB UR QL STRIP: NEGATIVE
KETONES UR STRIP-MCNC: NEGATIVE MG/DL
LEUKOCYTE ESTERASE UR QL STRIP: NEGATIVE
NITRATE UR QL: NEGATIVE
PH UR STRIP: 6 PH (ref 5–7)
RBC #/AREA URNS AUTO: <1 /HPF (ref 0–2)
SOURCE: NORMAL
SP GR UR STRIP: 1.01 (ref 1–1.03)
UROBILINOGEN UR STRIP-MCNC: 0 MG/DL (ref 0–2)
WBC #/AREA URNS AUTO: <1 /HPF (ref 0–5)

## 2019-12-18 ASSESSMENT — PATIENT HEALTH QUESTIONNAIRE - PHQ9
10. IF YOU CHECKED OFF ANY PROBLEMS, HOW DIFFICULT HAVE THESE PROBLEMS MADE IT FOR YOU TO DO YOUR WORK, TAKE CARE OF THINGS AT HOME, OR GET ALONG WITH OTHER PEOPLE: VERY DIFFICULT
SUM OF ALL RESPONSES TO PHQ QUESTIONS 1-9: 17
SUM OF ALL RESPONSES TO PHQ QUESTIONS 1-9: 17

## 2019-12-18 ASSESSMENT — PAIN SCALES - GENERAL: PAINLEVEL: NO PAIN (0)

## 2019-12-18 NOTE — PATIENT INSTRUCTIONS
[facility] Medication Refill Request Information:  * Please contact your pharmacy regarding ANY request for medication refills.  ** [facility] Prescription Fax = [phone]  * Please allow 3 business days for routine medication refills.  * Please allow 5 business days for controlled substance medication refills.     [facility] Test Result notification information:  *You will be notified with in 7-10 days of your appointment day regarding the results of your test.  If you are on MyChart you will be notified as soon as the provider has reviewed the results and signed off on them.    [facility]: [phone]

## 2019-12-18 NOTE — NURSING NOTE
Chief Complaint   Patient presents with     Establish Care     pt here to establish care, possible referrals       Segundo Odonnell CMA, EMT at 8:39 AM on 12/18/2019.

## 2019-12-24 ENCOUNTER — THERAPY VISIT (OUTPATIENT)
Dept: PHYSICAL THERAPY | Facility: CLINIC | Age: 59
End: 2019-12-24
Attending: NURSE PRACTITIONER
Payer: MEDICARE

## 2019-12-24 ENCOUNTER — TELEPHONE (OUTPATIENT)
Dept: PSYCHIATRY | Facility: CLINIC | Age: 59
End: 2019-12-24

## 2019-12-24 ENCOUNTER — MEDICAL CORRESPONDENCE (OUTPATIENT)
Dept: HEALTH INFORMATION MANAGEMENT | Facility: CLINIC | Age: 59
End: 2019-12-24

## 2019-12-24 DIAGNOSIS — N39.46 URINARY INCONTINENCE, MIXED: ICD-10-CM

## 2019-12-24 DIAGNOSIS — M62.89 PELVIC FLOOR DYSFUNCTION: ICD-10-CM

## 2019-12-24 PROCEDURE — 97112 NEUROMUSCULAR REEDUCATION: CPT | Mod: GP | Performed by: PHYSICAL THERAPIST

## 2019-12-24 PROCEDURE — 97161 PT EVAL LOW COMPLEX 20 MIN: CPT | Mod: GP | Performed by: PHYSICAL THERAPIST

## 2019-12-24 PROCEDURE — 97535 SELF CARE MNGMENT TRAINING: CPT | Mod: GP | Performed by: PHYSICAL THERAPIST

## 2019-12-24 NOTE — TELEPHONE ENCOUNTER
On 12/24/2019, 16 pages of records were received from [facility]. This writer put a copy of the records in Enzo MICHAELThorsby's folder upfront and this was routed to Enzo, please shred your copy when finished.  I sent the original documents to scanning on 12/24/2019 and held a copy in Psychiatry until scanning is confirmed. Yee Mcfarland, CMA

## 2019-12-24 NOTE — PROGRESS NOTES
[facility] Initial Evaluation  Subjective:  The history is provided by the patient. No  was used.   Katie Griffiths being seen for incontinence .   Date of Onset: 12/24/19. Problem occurred: not sure  and reported as 1/10 on pain scale. General health as reported by patient is good. Health conditions: overweight, asthma, depression, incontinence, mnopausal, mental illness, osteoarthritis, overweight, thyroid problems, changes in bowel/bladder.  Medical allergies: none.  Surgeries include:  Other (tonsils, tubal ligation, lobe of thyroid out).  Current medications:  Anti-depressants and thyroid medication (anti-shayla).   Primary job tasks include:  Lifting/carrying and prolonged sitting.           Patient is Unemployed.                         History of current episode:    Onset date/MD order: 12/18/19, date of order.    CC/Present symptoms: Pt presents today with urinary urgency and incontinence. She reports the inability to stop her flow of urine. Pt repots urinary leakage starting a year ago. Her incontinence got worse with her change in medications. She notes some stool incontinence that happens 2-3x a week.  HPI/SMHx/Childbirth hx::2 vaginal deliveries, no complications  Conditioning is improving/unchanging/worsening: worsening    Current activity: minimal  Goals: reduce incontinence    Urination:  Do you leak on the way to the bathroom or with a strong urge to void? yes   Do you leak with cough,sneeze, jumping, running? yes  Any other activities that cause leaking?  Walking, laughing, coughing, lifting  Do you have triggers that make you feel you can't wait to go to the bathroom?  What are they? Not sure.  Type of pad and number used per day? Pt will occasionally need to change clothes 3x a day  When you leak what is the amount? large  How long can you delay the need to urinate? can't.   Do you feel excessive pressure in pelvic floor:no.  When? n/a  Frequency of  daytime urination:going every hour to 30 minutes  Frequency of nighttime urination:2-3x  Can you stop the flow of urine when on the toilet? no  Is the volume of urine passed usually:  (8sec rule= 250ml with average bladder storing 400-600ml) large  Do you strain to pass urine? no  Do you have a slow or hesitant urinary stream? no  Do you have difficulty initiating the urine stream? no  Is urination painful? no  How many bladder infections have you had in last 12 months? 0  Fluid intake(one glass is 8oz or one cup) 6 glasses/day, 4 caffinated glasses/day  0 alcohol glasses/day.    Bowel habits:  Frequency of bowel movements? 2 or more times a day  Consistancy of stool?  Charlotte Stool Scale type 1,2,3 and 5  Do you ignore the urge to defecate? no  Do you strain to pass stool? sometimes    Pelvic Pain:  Do you have any pelvic pain? Occasional low back pain  Are you sexually active? No, would like to be  Have you ever been worried for your physical safety? no  Have you practiced the PF(kegel) exercises for 4 or more weeks?no  Are you having regular exercise? no    Treatment/Education provided this session: please see flow sheet for details        Objective:  System                                 Pelvic Dysfunction Evaluation:        Flexibility:    Tightness present at:Adductors and Iliopsoas    Abdominal Wall:    Diastasis Recti:  Normal  Trigger Points:  NA    Pelvic Clock Exam:    Ischiocavernosis pain:  +  Bulbocavernosis pain:  -  Transverse Perineal:  -  Levator ANI:  -    SI Provocation:    Positive for: ASLR        External Assessment:    Skin Condition:  Normal    Bearing Down/Coughing:  Normal      Muscle Contraction/Perineal Mobility:  Slight lift, no urogential triangle descent  Internal Assessment:  Internal assessment pelvic: palpation: ttp bulbocavernosus, ischiocavernosus, and obterator internus bilaterally.  Sensory Exam:  Normal  Contraction/Grade:  Fair squeeze, definite lift (3)  Accessory Muscle  use-Abdominals:  Present  Accessory Muscle use-Gluteals:  Present  Accessory Muscle use-Adductors:  Absent  Symmetry of Contraction Response:  Symmetrical  SEMG Biofeedback:    Equipment:  Surface EMG  Surface electrode placement--Abdominals: transverse abdominis  Suraface electrode placement--Perianal:  Levator ani  Baseline EMG PM:  Supine and seated: 1.1mV, standin.1mV  Baseline EMG Abdominals:  Supine and seated: 1.1mV, standin-6.0mV  Peak pelvic muscle contraction:  Supine: 17mV, seated: 6.0mV, standin.0mV    EMG interpretation to fatigue:  3-5 seconds  Position:  Sitting, standing and supine                     General     ROS    Assessment/Plan:    Patient is a 59 year old female with pelvic complaints.    Patient has the following significant findings with corresponding treatment plan.                Diagnosis 1:  Pelvic floor dysfunction  Pain -  hot/cold therapy, US, electric stimulation, mechanical traction, manual therapy, STS, splint/taping/bracing/orthotics, self management, education, directional preference exercise and home program  Decreased ROM/flexibility - manual therapy, therapeutic exercise, therapeutic activity and home program  Decreased strength - therapeutic exercise, therapeutic activities and home program  Impaired muscle performance - biofeedback, electric stimulation, neuro re-education and home program    Therapy Evaluation Codes:   Cumulative Therapy Evaluation is: Low complexity.    Previous and current functional limitations:  (See Goal Flow Sheet for this information)    Short term and Long term goals: (See Goal Flow Sheet for this information)     Communication ability:  Patient appears to be able to clearly communicate and understand verbal and written communication and follow directions correctly.  Treatment Explanation - The following has been discussed with the patient:   RX ordered/plan of care  Anticipated outcomes  Possible risks and side effects  This patient  would benefit from PT intervention to resume normal activities.   Rehab potential is good.    Frequency:  1 X week, once daily  Duration:  for 8 weeks  Discharge Plan:  Achieve all LTG.  Independent in home treatment program.  Reach maximal therapeutic benefit.    Please refer to the daily flowsheet for treatment today, total treatment time and time spent performing 1:1 timed codes.

## 2019-12-24 NOTE — LETTER
DEPARTMENT OF HEALTH AND HUMAN SERVICES  CENTERS FOR MEDICARE & MEDICAID SERVICES    PLAN/UPDATED PLAN OF PROGRESS FOR OUTPATIENT REHABILITATION    PATIENTS NAME:  Katie Griffiths   : 1960  PROVIDER NUMBER:    [id number]  HICN:  [id number]   PROVIDER NAME: [place] [facility] PHYSICAL THERAPY  MEDICAL RECORD NUMBER: [id number]   START OF CARE DATE:  SOC Date: 19   TYPE:  PT  PRIMARY/TREATMENT DIAGNOSIS: (Pertinent Medical Diagnosis)     Pelvic floor dysfunction  Urinary incontinence, mixed    VISITS FROM START OF CARE:  Rxs Used: 1     [place] [facility] Initial Evaluation    Subjective:  The history is provided by the patient. No  was used.   Katie Griffiths being seen for incontinence .   Date of Onset: 19. Problem occurred: not sure  and reported as 1/10 on pain scale. General health as reported by patient is good. Health conditions: overweight, asthma, depression, incontinence, mnopausal, mental illness, osteoarthritis, overweight, thyroid problems, changes in bowel/bladder.  Medical allergies: none.  Surgeries include:  Other (tonsils, tubal ligation, lobe of thyroid out).  Current medications:  Anti-depressants and thyroid medication (anti-shayla).   Primary job tasks include:  Lifting/carrying and prolonged sitting.           Patient is Unemployed.              History of current episode:    Onset date/MD order: 19, date of order.    CC/Present symptoms: Pt presents today with urinary urgency and incontinence. She reports the inability to stop her flow of urine. Pt repots urinary leakage starting a year ago. Her incontinence got worse with her change in medications. She notes some stool incontinence that happens 2-3x a week.  HPI/SMHx/Childbirth hx::2 vaginal deliveries, no complications  Conditioning is improving/unchanging/worsening: worsening    Current activity: minimal  Goals: reduce  incontinence    Urination:  Do you leak on the way to the bathroom or with a strong urge to void? yes   Do you leak with cough,sneeze, jumping, running? yes  Any other activities that cause leaking?  Walking, laughing, coughing, lifting  Do you have triggers that make you feel you can't wait to go to the bathroom?  What are they? Not sure.  Type of pad and number used per day? Pt will occasionally need to change clothes 3x a day    PATIENTS NAME:  Katie Griffiths   : 1960        When you leak what is the amount? large  How long can you delay the need to urinate? can't.   Do you feel excessive pressure in pelvic floor:no.  When? n/a  Frequency of daytime urination:going every hour to 30 minutes  Frequency of nighttime urination:2-3x  Can you stop the flow of urine when on the toilet? no  Is the volume of urine passed usually:  (8sec rule= 250ml with average bladder storing 400-600ml) large  Do you strain to pass urine? no  Do you have a slow or hesitant urinary stream? no  Do you have difficulty initiating the urine stream? no  Is urination painful? no  How many bladder infections have you had in last 12 months? 0  Fluid intake(one glass is 8oz or one cup) 6 glasses/day, 4 caffinated glasses/day  0 alcohol glasses/day.    Bowel habits:  Frequency of bowel movements? 2 or more times a day  Consistancy of stool?  Marianna Stool Scale type 1,2,3 and 5  Do you ignore the urge to defecate? no  Do you strain to pass stool? sometimes    Pelvic Pain:  Do you have any pelvic pain? Occasional low back pain  Are you sexually active? No, would like to be  Have you ever been worried for your physical safety? no  Have you practiced the PF(kegel) exercises for 4 or more weeks?no  Are you having regular exercise? no    Treatment/Education provided this session: please see flow sheet for details    Objective:  System    Pelvic Dysfunction Evaluation:      Flexibility:    Tightness present at:Adductors and  Iliopsoas    Abdominal Wall:    Diastasis Recti:  Normal  Trigger Points:  NA    Pelvic Clock Exam:    Ischiocavernosis pain:  +  Bulbocavernosis pain:  -  Transverse Perineal:  -  Levator ANI:  -    PATIENTS NAME:  Katie Griffiths   : 1960      SI Provocation:    Positive for: ASLR    External Assessment:    Skin Condition:  Normal  Bearing Down/Coughing:  Normal  Muscle Contraction/Perineal Mobility:  Slight lift, no urogential triangle descent    Internal Assessment:  Internal assessment pelvic: palpation: ttp bulbocavernosus, ischiocavernosus, and obterator internus bilaterally.  Sensory Exam:  Normal  Contraction/Grade:  Fair squeeze, definite lift (3)  Accessory Muscle use-Abdominals:  Present  Accessory Muscle use-Gluteals:  Present  Accessory Muscle use-Adductors:  Absent  Symmetry of Contraction Response:  Symmetrical    SEMG Biofeedback:    Equipment:  Surface EMG  Surface electrode placement--Abdominals: transverse abdominis  Suraface electrode placement--Perianal:  Levator ani  Baseline EMG PM:  Supine and seated: 1.1mV, standin.1mV  Baseline EMG Abdominals:  Supine and seated: 1.1mV, standin-6.0mV  Peak pelvic muscle contraction:  Supine: 17mV, seated: 6.0mV, standin.0mV    EMG interpretation to fatigue:  3-5 seconds  Position:  Sitting, standing and supine         Assessment/Plan:    Patient is a 59 year old female with pelvic complaints.    Patient has the following significant findings with corresponding treatment plan.                Diagnosis 1:  Pelvic floor dysfunction  Pain -  hot/cold therapy, US, electric stimulation, mechanical traction, manual therapy, STS, splint/taping/bracing/orthotics, self management, education, directional preference exercise and home program  Decreased ROM/flexibility - manual therapy, therapeutic exercise, therapeutic activity and home program  Decreased strength - therapeutic exercise, therapeutic activities and home program  Impaired muscle  "performance - biofeedback, electric stimulation, neuro re-education and home program                        PATIENTS NAME:  Katie Griffiths   : 1960      Therapy Evaluation Codes:   Cumulative Therapy Evaluation is: Low complexity.    Previous and current functional limitations:  (See Goal Flow Sheet for this information)    Short term and Long term goals: (See Goal Flow Sheet for this information)     Communication ability:  Patient appears to be able to clearly communicate and understand verbal and written communication and follow directions correctly.  Treatment Explanation - The following has been discussed with the patient:   RX ordered/plan of care  Anticipated outcomes  Possible risks and side effects  This patient would benefit from PT intervention to resume normal activities.   Rehab potential is good.    Frequency:  1 X week, once daily  Duration:  for 8 weeks  Discharge Plan:  Achieve all LTG.  Independent in home treatment program.  Reach maximal therapeutic benefit.    Please refer to the daily flowsheet for treatment today, total treatment time and time spent performing 1:1 timed codes.               Caregiver Signature/Credentials _____________________________ Date ________        Chel Jerez DPT   I have reviewed and certified the need for these services and plan of treatment while under my care.              PHYSICIAN'S SIGNATURE:   _____________________________________  Date___________    ADELINA Ross CNP    Certification period:  Beginning of Cert date period: 19 to  End of Cert period date: 20     Functional Level Progress Report: Please see attached \"Goal Flow sheet for Functional level.\"    ____X____ Continue Services or       ________ DC Services                Service dates: From  SOC Date: 19 date to present  "

## 2020-01-08 ENCOUNTER — TELEPHONE (OUTPATIENT)
Dept: PSYCHIATRY | Facility: CLINIC | Age: 60
End: 2020-01-08

## 2020-01-08 ENCOUNTER — OFFICE VISIT (OUTPATIENT)
Dept: PSYCHIATRY | Facility: CLINIC | Age: 60
End: 2020-01-08
Attending: NURSE PRACTITIONER
Payer: MEDICARE

## 2020-01-08 VITALS — SYSTOLIC BLOOD PRESSURE: 97 MMHG | WEIGHT: 168 LBS | DIASTOLIC BLOOD PRESSURE: 67 MMHG | HEART RATE: 92 BPM

## 2020-01-08 DIAGNOSIS — F39 MOOD DISORDER (H): ICD-10-CM

## 2020-01-08 PROCEDURE — G0463 HOSPITAL OUTPT CLINIC VISIT: HCPCS | Mod: ZF

## 2020-01-08 RX ORDER — ZIPRASIDONE HYDROCHLORIDE 60 MG/1
60 CAPSULE ORAL 2 TIMES DAILY WITH MEALS
Qty: 60 CAPSULE | Refills: 0 | Status: SHIPPED | OUTPATIENT
Start: 2020-01-08 | End: 2020-02-04

## 2020-01-08 RX ORDER — HYDROXYZINE HYDROCHLORIDE 25 MG/1
50 TABLET, FILM COATED ORAL AT BEDTIME
Qty: 60 TABLET | Refills: 1 | Status: SHIPPED | OUTPATIENT
Start: 2020-01-08 | End: 2020-06-09

## 2020-01-08 ASSESSMENT — PAIN SCALES - GENERAL: PAINLEVEL: NO PAIN (0)

## 2020-01-08 NOTE — PATIENT INSTRUCTIONS
Thank you for coming to the PSYCHIATRY CLINIC.    Lab Testing:  If you had lab testing today and your results are reassuring or normal they will be mailed to you or sent through [employer] within 7 days.   If the lab tests need quick action we will call you with the results.  The phone number we will call with results is # [phone] (home) . If this is not the best number please call our clinic and change the number.    Medication Refills:  If you need any refills please call your pharmacy and they will contact us. Our fax number for refills is [phone]. Please allow three business for refill processing.   If you need to  your refill at a new pharmacy, please contact the new pharmacy directly. The new pharmacy will help you get your medications transferred.     Scheduling:  If you have any concerns about today's visit or wish to schedule another appointment please call our office during normal business hours [phone] (8-5:00 M-F)    Contact Us:  Please call [phone] during business hours (8-5:00 M-F).  If after clinic hours, or on the weekend, please call  [phone].    Financial Assistance [phone]  [facility] Billing [phone]  Central Billing Office, [facility]: [phone]  [place] Billing [phone]  Medical Records [phone]      MENTAL HEALTH CRISIS NUMBERS:  [facility]:   [facility] - [phone]   Crisis Residence [facility] - [phone]   Walk-In Counseling [facility] [phone]   COPE 24/7 [place] Mobile Team for Adults - [[phone]]; Child - [[phone]]        [facility]:   [facility] - [phone]   Walk-in counseling [facility] - [phone]   Walk-in counseling [facility] - [phone]   Crisis Residence [facility] - [phone]   Urgent Care Adult Mental Health:   --Drop-in, 24/7 crisis line, and [place] Mobile Team  [[phone]]    CRISIS TEXT LINE: Text [phone] from anywhere, anytime, any crisis 24/7;    OR SEE www.crisistextline.org     Poison Control Center - [phone]    CHILD: [facility] needs assessment team - [phone]     [facility] - [phone]; or [facility] Lif[facility] - [phone]    If you have a medical emergency please call 911or go to the nearest ER.                    _____________________________________________    Again thank you for choosing PSYCHIATRY CLINIC and please let us know how we can best partner with you to improve you and your family's health.  You may be receiving a survey in the mail regarding this appointment. We would love to have your feedback, both positive and negative, so please fill out the survey and return it using the provided envelope. The survey is done by an external company, so your answers are anonymous.

## 2020-01-08 NOTE — PROGRESS NOTES
"         [facility]  Psychiatry Clinic  PSYCHIATRIC PROGRESS NOTE       Katie Griffiths is a 59 year old female who prefers the name Katie and pronoun she, her.  Therapist: Amirah Tejeda @ Private Practice               PCP: Buddy Nolan  Other Providers: None    Pertinent Background:  See previous notes.  Psych critical item history includes Hospitalized 3 times with most recent occurring in 2007 after overdose attempt.  Numerous medication trials.  Possible bipolar diagnosis.     Interim History     [4, 4]     The patient is a good historian, reports good treatment adherence and was last seen 12/11/19.  Since the last visit, Katie's main concern today was weight gain.  Affect was quite bright today in spite of high PHQ-9 and reported persistent low mood. Reports positive holiday with her family.  Sleep continues to be inconsistent.  Mood stable with introduction of Geodon.  Will increase and discontinue Olanzapine.  Katie continues to consider a retrial of Depakote which was discontinued in past due to development of rash.      12/11/19: Katie reports her mood is stable but she is describes \"not feeling good or bad.\"  Difficult to discern if emotional blunting or lack of hypomania.  Thoughts continue to be linear and organized.  Speech normal.  Also reports sleeping continues to improve. Previous trials include Lithium (not effective?), Depakote (rash), and Abilify (side effects).  Katie would like to change medications today due to possible emotional blunting and weight gain (5lbs in past 2 weeks).      11/27/19: Increased HS Olanzapine to 5mg and Katie appears much more grounded.  Speech less pressured and mood more stable.  Thoughts more organized and linear.  Katie reports she also has noticed a significant difference.  She also reports she is sleeping much better.  Concerned about weight gain but reports she is very sedentary.      Recent Symptoms:   Depression:  depressed " mood, anhedonia, low energy, insomnia, appetite changes, weight changes, poor concentration /memory and feeling worthless  Elevated:  none  Psychosis:  none  Anxiety:  periodic worry  Panic Attack:  none  Trauma Related:  none     Recent Substance Use:  No changes reported        Social/ Family History      [1ea,1ea]            [per patient report]               Financial support-  social security disability  Children-  2 adult children  Living situation- Lives in house in [place] with son   Social/spiritial support-  limited/none  Feels safe at home-  Yes   Family history- None      Medical / Surgical History                                 Patient Active Problem List   Diagnosis     Bipolar disorder (H)     Hashimoto's thyroiditis     Incontinence of urine in female     Osteopenia     Reactive airway disease     Vitamin D deficiency     Overweight     Dizziness     Hyperlipidemia LDL goal <100     Pelvic floor dysfunction       Past Surgical History:   Procedure Laterality Date     partial thyroidectomy       TONSILLECTOMY       TUBAL LIGATION          Medical Review of Systems         [2,10]   The remainder of the review of systems is noncontributory  Thyroid removed  Allergy    Quetiapine  Current Medications        Current Outpatient Medications   Medication Sig Dispense Refill     albuterol (PROAIR HFA/PROVENTIL HFA/VENTOLIN HFA) 108 (90 Base) MCG/ACT inhaler Inhale 2 puffs into the lungs as needed for shortness of breath / dyspnea or wheezing       citalopram (CELEXA) 40 MG tablet Take 1 tablet (40 mg) by mouth daily 30 tablet 1     hydrOXYzine (ATARAX) 25 MG tablet Take 2 tablets (50 mg) by mouth At Bedtime Take two tabs at bedtime 60 tablet 1     OLANZapine (ZYPREXA) 5 MG tablet Take 0.5 tablets (2.5 mg) by mouth At Bedtime 30 tablet 0     thyroid (ARMOUR) 32.5 MG tablet Take 32.5 mg by mouth daily Nature-thyroid       ziprasidone (GEODON) 40 MG capsule Take 1 capsule (40 mg) by mouth 2 times daily (with  "meals) 60 capsule 0     Vitals         [3, 3]   BP 97/67   Pulse 92   Wt 76.2 kg (168 lb)    Mental Status Exam        [9, 14 cog gs]     Alertness: alert  and oriented  Appearance: casually groomed  Behavior/Demeanor: cooperative, pleasant and calm, with good  eye contact   Speech: normal.  Language: good  Psychomotor: normal or unremarkable  Mood: \"not good, not bad\"  Affect: appropriate; was congruent to mood; was congruent to content  Thought Process/Associations: unremarkable  Thought Content:  Reports none;  Denies suicidal ideation and violent ideation  Perception:  Reports none;  Denies auditory hallucinations and visual hallucinations  Insight: adequate  Judgment: intact  Cognition: (6) does  appear grossly intact; formal cognitive testing was not done  Gait/Station and/or Muscle Strength/Tone: unremarkable    Labs and Data                          Rating Scales:    PHQ9    PHQ9 Today:  21  PHQ-9 SCORE 12/18/2019   PHQ-9 Total Score MyChart 17 (Moderately severe depression)   PHQ-9 Total Score 17        Assessment      [m2, h3]     Unspecified Mood Disorder  Bipolar I Disorder (Hx)    [place] Prescription Monitoring Program [] was not checked today:  provider not managing any controlled medications.        Plan                                                                                                                     m2, h3     1) MEDICATION:  Disconitnue Olanazapine 2.5mg at bedtime due to weight gain.  Continue Celexa 40mg.  Consider taper/discontinue due to Bipolar Diagnosis.  Consider Wellbutrin trial  Continue [place] 32.5mg (thyroid)  Continue Hydroxyzine 50mg at bedtime   Increase Geodon to 60mg BID with food    2) THERAPY:  Continue with current therapist      RTC: 1 month    CRISIS NUMBERS:   Provided routinely in AVS.    Treatment Risk Statement:  The patient understands the risks, benefits, adverse effects and alternatives. Agrees to treatment with the capacity to do so. No medical " contraindications to treatment. Agrees to call clinic for any problems. The patient understands to call 911 or go to the nearest ED if life threatening or urgent symptoms occur.     WHODAS 2.0  TODAY total score = N/A; [a 12-item WHODAS 2.0 assessment was not completed by the pt today and/or recorded in EPIC].       PROVIDER:  ADELINA Lieberman CNP

## 2020-01-08 NOTE — TELEPHONE ENCOUNTER
On 10/23/2019 the patient signed an SEBASTIAN authorizing medical records to be released from [facility] and [facility]  to [facility]  for the purpose of continuing care. I faxed the request to [phone] and [phone]. I sent the original  SEBASTIAN to scanning and kept a copy in psychiatry until scanning is complete/ confirmed.

## 2020-01-09 ENCOUNTER — TELEPHONE (OUTPATIENT)
Dept: INTERNAL MEDICINE | Facility: CLINIC | Age: 60
End: 2020-01-09

## 2020-01-09 DIAGNOSIS — E06.3 HASHIMOTO'S THYROIDITIS: Primary | ICD-10-CM

## 2020-01-09 NOTE — TELEPHONE ENCOUNTER
[facility] Call Center    Phone Message    May a detailed message be left on voicemail: yes    Reason for Call: Medication Question or concern regarding medication   Prescription Clarification  Name of Medication: nature throid  Prescribing Provider: unknown outside provider in [place] - moving to another clinic    Pharmacy:    [id number] - [place], [place] - [address] AT [facility] [address]  [id number] - [place], [place] - [address] [place] [address] B   What on the order needs clarification? Sofía mentioned to patient she does not prescribe this medication - who can she ask to have this refilled? -  Pt does not need any now but need to identify a physician who can prescribe this.          Action Taken: Message routed to:  [facility]

## 2020-01-10 NOTE — TELEPHONE ENCOUNTER
Spoke to the patient to relay providers message. Patient states that she understands that she will need to make a change to levothyroxine in the future, however she is hesitant to make a change in that medication right now, as she is currently in the process of changing her pshyc medications. Patient is agreeable to scheduling with endocrinology to discuss what she will need to do for changes in her thyroid medications for the near future. Rowan Copeland LPN 1/10/2020 9:40 AM

## 2020-01-10 NOTE — TELEPHONE ENCOUNTER
"Referral provided to endocrinology; I previously discussed with Katie that I would not prescribe [place] or \"nature thyroid\", but can prescribe levothyroxine for her instead. Will need updated TSH to ensure appropriate dosing conversion.   Kim Gore, APRN CNP    "

## 2020-01-13 ENCOUNTER — THERAPY VISIT (OUTPATIENT)
Dept: PHYSICAL THERAPY | Facility: CLINIC | Age: 60
End: 2020-01-13
Payer: MEDICARE

## 2020-01-13 ENCOUNTER — TELEPHONE (OUTPATIENT)
Dept: PSYCHIATRY | Facility: CLINIC | Age: 60
End: 2020-01-13

## 2020-01-13 DIAGNOSIS — M62.89 PELVIC FLOOR DYSFUNCTION: Primary | ICD-10-CM

## 2020-01-13 PROCEDURE — 97140 MANUAL THERAPY 1/> REGIONS: CPT | Mod: GP | Performed by: PHYSICAL THERAPIST

## 2020-01-13 PROCEDURE — 97112 NEUROMUSCULAR REEDUCATION: CPT | Mod: GP | Performed by: PHYSICAL THERAPIST

## 2020-01-13 NOTE — TELEPHONE ENCOUNTER
Writer called pt to gather more information. She confirmed that she stopped the Zyprexa and increased her Geodon to 120 mg daily on Wednesday evening.     Since then, the pt reports feeling heavy and tired, but said she's not able to sleep. She will just lay in bed for hours and only sleeps approximately 4 hours per night. Pt confirms this all started with the discontinuation of Zyprexa and start of Geodon.    Pt is currently taking hydroxyzyine 50 mg nightly. She is not taking melatonin of Benadryl for sleep.    The pt denies any other possible SEs, such as tremor, GI upset, akathisia, dizziness or other neurologic concerns.     She denies increased energy, impulsive/risky behaviors, mood swings/irritability, or other shayla symptoms. Pt feels she has good insight as to when she's having a manic episode.    Writer agreed to discuss this with the provider further for recommendations. Suggested the use of melatonin in the meantime. Pt voiced understanding.

## 2020-01-13 NOTE — TELEPHONE ENCOUNTER
Social Work   Incoming/Outgoing Call  [facility]    Outgoing Call To: Katie Griffiths    Reason for Call:  AGGIE following up on request from Enzo No (see below).    Response/Plan:  Katie notes Enzo is recommending she get out more often and try to exercise. She would like to, but paying for transportation can be really difficult. She is using [employer] regularly (3 x today to get to medical appointments). She does have a car, but does not feel comfortable driving into the cities and mostly will use it for trips to a nearby grocery store. In addition, the car recently needed work and this cut into her monthly budget.     With Katie's permission, writer did review a few exercise options that were close to her home and were no to low cost. Katie requested that writer email her information on that activities. During conversation, Katie also identified she used to be very active in ballroom dance while living in [place].    AGGIE will also email Katie information on her local food shelf. Due to income from her and her son, she may not qualify for services. However, she often ends of feeding her grandchildren when she watches them (ex daughter in law has full custody), which also makes it difficult to manage financially each month.      Will route to patient's current psychiatric provider(s) as an FYI.   Please call or EPIC message with any questions or concerns.    AMANDA Schneider, [facility]  [phone]    ----- Message from ADELINA Coker CNP sent at 1/8/2020 10:55 AM [facility] -----  Hello!    Is there anyway we can help Katie find resources that may help her get to the gym or be more active on limited budget?      Thanks in advance    Enzo

## 2020-01-13 NOTE — TELEPHONE ENCOUNTER
[facility] Call Center    Phone Message    May a detailed message be left on voicemail: yes    Reason for Call: Symptoms or Concerns     If patient has red-flag symptoms, warm transfer to triage line    Current symptom or concern: Pt isn't sleeping    Symptoms have been present for:  Since the increase in dosage of the zyprasidone. She also stopped taking her olanzapine.     Are there any new or worsening symptoms? Yes: Pt needs suggestions on how to manage.       Action Taken: Other: [facility]

## 2020-01-13 NOTE — TELEPHONE ENCOUNTER
RECORDS RECEIVED FROM: [facility] / [facility]   DATE RECEIVED: 10/13/2020   NOTES (FOR ALL VISITS) STATUS DETAILS   OFFICE NOTES from referring provider Internal 12/18/2019 - ADELINA Guo CNP   OFFICE NOTES from other specialist N/A    ED NOTES N/A    OPERATIVE REPORT  (thyroid, pituitary, adrenal, parathyroid) N/A    MEDICATION LIST Internal    IMAGING      DEXASCAN N/A    MRI (BRAIN) N/A    XR (Chest) N/A    CT (HEAD/NECK/CHEST/ABDOMEN) N/A    NUCLEAR  N/A    ULTRASOUND (HEAD/NECK) N/A    LABS     DIABETES: HBGA1C, CREATININE, FASTING LIPIDS, MICROALBUMIN URINE, POTASSIUM, TSH, T4    THYROID: TSH, T4, CBC, THYRODLONULIN, TOTAL T3, FREE T4, CALCITONIN, CEA Internal   01/22/2020

## 2020-01-14 NOTE — TELEPHONE ENCOUNTER
Social Work   Incoming/Outgoing Email  [facility]    Correspondence with: Katie Verdugoradha    Reason for contact:  SW following up on community resources. Email requested by Katie.    Content:      Hi Katie,    Thanks for talking with me today.    Here are some of the resources I thought might be helpful:    [facility] options    Over 50 & Fit  looks like it meets Mon, Wed, Fri 9-10 am and cost is $16/year (but i would encourage you to check this out to make sure this is accurate)    https://[employer] mobile/senior-programs/      Free walking around the [place] indoor skating arena, it looks like times vary    https://www.[employer]/580/Indoor-Walking-Track  Indoor Walking Track  [place], [place] - Official Website  Overview The [facility] offers a free indoor walking track. The track is located around the indoor ice arena. Seven laps around the track is equal to one mile.  www.[employer]      You are correct that there are some income guidelines for food support, but you may be within range if also counting the kids.    Here's a link to a table that shows 200% fpg for different family sizes  http://www.healthreformbeyondthebasics.org/wp-content/uploads/2019/10/HDQJHENPZ-HUPVZ_Lleusd-Xaocjgvvu-and-Thresholds_CoverageYear2020.pdf  Coverage Year 2020 - Beyond the Basics    and here's info on food support in your area:    https://[employer].org/food-shelves/  Food Shelves - [place] [facility]  [facility] [email or web address]    I hope these help. Feel free to check in if you need to.        Will route to patient's current psychiatric provider(s) as an FYI.   Please call or EPIC message with any questions or concerns.    Kaley Kelley, MSW, LICSW

## 2020-01-15 NOTE — TELEPHONE ENCOUNTER
Discussed situation with the provider further. He agrees to the recommendation of melatonin and can prescribe this if needed.    Called the pt. She has tried melatonin 3 mg for the last two nights. Both times she took the melatonin at 7 pm and tried sleeping at 9 pm, but it was not effective. She's still only getting at most 4 hours of sleep per night. Pt is open to increasing the melatonin dose, but is not interested in other medication options at this time. Agreed to discuss this with the provider and will then call her back with additional recommendations.

## 2020-01-15 NOTE — TELEPHONE ENCOUNTER
Enzo No APRN CNP Labossiere, Laura, RN   Caller: Unspecified (2 days ago,  9:15 AM)             Yes.  Lets try 6mg        Writer called the pt and relayed the provider's recommendation. Requested a c/b on Friday after trying the increased dose for 2 nights. Pt agreed to this plan.

## 2020-01-17 ENCOUNTER — TELEPHONE (OUTPATIENT)
Dept: PSYCHIATRY | Facility: CLINIC | Age: 60
End: 2020-01-17

## 2020-01-17 NOTE — TELEPHONE ENCOUNTER
On 1/15/20, 29 faxed pages were received from [facility]. Original copies given to Enzo No. Message routed to provider.ALEJANDRO Mcgilll, EMT

## 2020-01-20 ENCOUNTER — THERAPY VISIT (OUTPATIENT)
Dept: PHYSICAL THERAPY | Facility: CLINIC | Age: 60
End: 2020-01-20
Payer: MEDICARE

## 2020-01-20 ENCOUNTER — TELEPHONE (OUTPATIENT)
Dept: PSYCHIATRY | Facility: CLINIC | Age: 60
End: 2020-01-20

## 2020-01-20 DIAGNOSIS — M62.89 PELVIC FLOOR DYSFUNCTION: Primary | ICD-10-CM

## 2020-01-20 PROCEDURE — 97530 THERAPEUTIC ACTIVITIES: CPT | Mod: GP | Performed by: PHYSICAL THERAPIST

## 2020-01-20 PROCEDURE — 97112 NEUROMUSCULAR REEDUCATION: CPT | Mod: GP | Performed by: PHYSICAL THERAPIST

## 2020-01-20 PROCEDURE — 97110 THERAPEUTIC EXERCISES: CPT | Mod: GP | Performed by: PHYSICAL THERAPIST

## 2020-01-20 ASSESSMENT — ENCOUNTER SYMPTOMS
ABDOMINAL PAIN: 1
EYE IRRITATION: 0
DECREASED LIBIDO: 1
FLANK PAIN: 0
VOMITING: 0
PALPITATIONS: 0
CHILLS: 0
NERVOUS/ANXIOUS: 1
STIFFNESS: 1
EYE PAIN: 1
EYE REDNESS: 0
HOARSE VOICE: 0
HYPERTENSION: 0
FATIGUE: 1
HALLUCINATIONS: 0
BLOATING: 0
MUSCLE CRAMPS: 1
FEVER: 0
CONSTIPATION: 1
DYSURIA: 0
BOWEL INCONTINENCE: 1
EXERCISE INTOLERANCE: 0
SINUS CONGESTION: 1
WEIGHT GAIN: 1
JOINT SWELLING: 0
DIFFICULTY URINATING: 0
SINUS PAIN: 0
POLYDIPSIA: 1
NIGHT SWEATS: 0
SYNCOPE: 0
HOT FLASHES: 0
LIGHT-HEADEDNESS: 1
ALTERED TEMPERATURE REGULATION: 0
MUSCLE WEAKNESS: 1
INCREASED ENERGY: 1
ARTHRALGIAS: 1
PANIC: 1
NECK MASS: 0
MYALGIAS: 1
POLYPHAGIA: 1
WEIGHT LOSS: 0
NAUSEA: 0
INSOMNIA: 1
DIARRHEA: 1
JAUNDICE: 0
SKIN CHANGES: 0
SMELL DISTURBANCE: 0
DECREASED APPETITE: 0
LEG PAIN: 1
TASTE DISTURBANCE: 0
HYPOTENSION: 0
ORTHOPNEA: 0
DECREASED CONCENTRATION: 1
NAIL CHANGES: 0
TROUBLE SWALLOWING: 0
RECTAL PAIN: 0
SORE THROAT: 0
BLOOD IN STOOL: 0
HEMATURIA: 0
HEARTBURN: 0
BACK PAIN: 0
DOUBLE VISION: 0
DEPRESSION: 1
EYE WATERING: 0
NECK PAIN: 0
POOR WOUND HEALING: 0
SLEEP DISTURBANCES DUE TO BREATHING: 0

## 2020-01-20 NOTE — TELEPHONE ENCOUNTER
On 1/15/20, 94 faxed pages were received from [facility]. Original copies were given to Enzo No. Message routed to provider.

## 2020-01-21 ENCOUNTER — TELEPHONE (OUTPATIENT)
Dept: PSYCHIATRY | Facility: CLINIC | Age: 60
End: 2020-01-21

## 2020-01-21 NOTE — TELEPHONE ENCOUNTER
[facility] Call Center    Phone Message    May a detailed message be left on voicemail: yes    Reason for Call: Other: Pt requested to leave message: She is only getting 4 hours of sleep while taking 6mg of  Melatonin, and she wants to know if she can increase to 9mg of melatonin, so she can get more sleep.     Action Taken: Other: [facility]

## 2020-01-21 NOTE — TELEPHONE ENCOUNTER
"Placed phone call to pt in order to gather additional information. She reports the following:    --Her primary concern at this point is poor sleep. She is experiencing difficulty falling asleep and staying asleep throughout the night. She is concerned that a continued lack of sleep will lead to worsening mental health concerns, including further depression. She takes hydroxyzine 50mg and melatonin 6mg at about 7p, but is still awake by 10p most night. She has a consistent bedtime routine. No caffeine use in the late afternoon/evening.     --Her mood is low at the moment. Her energy level during the day is \"non-existent.\" She denies concerns for shayla. She denies safety concerns.    --Olanzapine was discontinued at the previous appointment, which has improved her issues with incontinence. She is interested in Enzo's input regarding medication options. Writer agreed to discuss this with provider and follow-up with pt.  "

## 2020-01-21 NOTE — TELEPHONE ENCOUNTER
Message   Received: Today   Message Contents   Enzo No APRN CNP Moccio, Emily, RN   Caller: Unspecified (Today,  8:52 AM)             She can try 9mg.  Also, is she going to sleep at 10pm?  If 9mg does not work, we can try another medication      -Returned phone call to pt. She is agreeable with increasing the melatonin to 9mg and will contact clinic if things don't seem to be improving. She falls asleep between the 10:30-11p timeframe and usually is awake after about 4 hours of sleep.

## 2020-01-22 ENCOUNTER — PRE VISIT (OUTPATIENT)
Dept: ENDOCRINOLOGY | Facility: CLINIC | Age: 60
End: 2020-01-22

## 2020-01-22 ENCOUNTER — OFFICE VISIT (OUTPATIENT)
Dept: ENDOCRINOLOGY | Facility: CLINIC | Age: 60
End: 2020-01-22
Payer: MEDICARE

## 2020-01-22 VITALS — DIASTOLIC BLOOD PRESSURE: 71 MMHG | HEART RATE: 90 BPM | WEIGHT: 169.4 LBS | SYSTOLIC BLOOD PRESSURE: 106 MMHG

## 2020-01-22 DIAGNOSIS — E89.0 POSTOPERATIVE HYPOTHYROIDISM: Primary | ICD-10-CM

## 2020-01-22 DIAGNOSIS — E06.3 HASHIMOTO'S THYROIDITIS: ICD-10-CM

## 2020-01-22 DIAGNOSIS — F31.9 BIPOLAR AFFECTIVE DISORDER, REMISSION STATUS UNSPECIFIED (H): ICD-10-CM

## 2020-01-22 LAB — TSH SERPL DL<=0.005 MIU/L-ACNC: 3.21 MU/L (ref 0.4–4)

## 2020-01-22 ASSESSMENT — PAIN SCALES - GENERAL: PAINLEVEL: NO PAIN (0)

## 2020-01-22 NOTE — PROGRESS NOTES
- Endocrinology Initial Consultation -    Reason for visit/consult: hypothyroidism    Primary care provider: Kim Gore    HPI: A 60 yo female here for the evaluation for her hypothryodism.  Patient is a self-referral came here today.   Patient was diagnosed thyroid nodule in 2006 when she was in [place] she underwent left hemithyroidectomy at [facility] in [place].  Since then she has been on levothyroxine and she cannot recall the dose.   She was switched to the nature thyroid currently taking 32.5 mg daily for 5 years and she cannot recall why and where she started.   She moved to [place] in October 2018 and try to find a physician who can prescribe nature thyroid.  She also has bipolar disorder diagnosed 2006 and currently has been changing multiple medications.     Psychiatry medications has been changing, celexa, geodon, and hydroxyzine.   Dr. Geronimo in [facility].     She went to PMD, and was told not to Rx of Nature Thyroid, and found places potentially prescribe nature thyroid and found here.     Energy level: hard to say and not good, due to changing psychiatry medication  Dry skin: she used to have olanzapine, then recetnly changed to geodon, and currently dry skin.   Hair loss: no  Weight changes: gained weight with olanzapine, but not stopped olanzapine for 4 month  BM: regular last few days  Concentration: no  Family history of thyroid disease: mother+      Past Medical/Surgical History:  Past Medical History:   Diagnosis Date     Bipolar disorder (H)      Depression with anxiety      Hashimoto's thyroiditis      Hyperlipidemia      Mild intermittent asthma      S/P partial thyroidectomy      Superficial perivascular dermatitis      Past Surgical History:   Procedure Laterality Date     partial thyroidectomy       TONSILLECTOMY       TUBAL LIGATION         Allergies:  Allergies   Allergen Reactions      Quetiapine Rash       Current Medications   Current Outpatient Medications   Medication     albuterol (PROAIR HFA/PROVENTIL HFA/VENTOLIN HFA) 108 (90 Base) MCG/ACT inhaler     citalopram (CELEXA) 40 MG tablet     hydrOXYzine (ATARAX) 25 MG tablet     thyroid (ARMOUR) 32.5 MG tablet     ziprasidone (GEODON) 60 MG capsule     No current facility-administered medications for this visit.        Family History:  Family History   Problem Relation Age of Onset     Colon Polyps Mother      Eye Disorder Father      Factor V Leiden deficiency Father         pt tested negative     Hyperlipidemia Father        Social History:  Social History     Tobacco Use     Smoking status: Never Smoker     Smokeless tobacco: Never Used   Substance Use Topics     Alcohol use: Yes     Comment: occasional   lives with her son, Job: no job, on sliding scale insulin for mental     ROS:  Full review of systems taken with the help of the intake sheet. Otherwise a complete 14 point review of systems was taken and is negative unless stated in the history above.      Physical Exam:   Vitals: /71   Pulse 90   Wt 76.8 kg (169 lb 6.4 oz)   BMI= There is no height or weight on file to calculate BMI.   General: well appearing, no acute distress, pleasant and conversant,   Mental Status/neuro: alert and oriented  Face: symmetrical, normal facial color  Eyes: anicteric, PERRL, no proptosis or lid lag  Neck: suppler, no lymphadenopahty  Thyroid: palpable thryoid on the left, no nodule palpable, no bruits  Heart: regular rhythm, S1S2, no murmur appreciated  Lung: clear to auscultation bilaterally  Abdomen: soft, NT/ND, no hepatomegaly  Legs: no swelling or edema      Labs : I reviewed data from epic and extract and summarize the pertinent data here.     Lab Results   Component Value Date    TSH 3.21 01/22/2020         Assessment and Plan  59 year old female with hypothyroidism     # Hypothyrodism  Post operative,   Currently undercotrolled with Nature  thryodi 32.5 mg.  We had discussed that oru standard treatment is levothryxoine not nature thryoid. However given her psychiatry medication has been changing and she is concerned about changing thyroid medication, which I understand her point. Therefore I will rx her current dose of Nature thyroid 32.5 mg daily and we will consider to switch levothyroxine down the road.    RTC with me in 1 year    I spent 45 minutes with this patient face to face and explained the conditions and plans (more than 50% of time was counseling/coordination of care) . The patient understood and is satisfied with today's visit. Return to clinic with me in 1 year.         Mary Sawyer MD  Staff Physician  Endocrinology and Metabolism  License: [id number]

## 2020-01-22 NOTE — LETTER
1/22/2020       RE: Katie Griffiths  [address] [id number]     Dear Colleague,    Thank you for referring your patient, Katie Griffiths, to the [facility] ENDOCRINOLOGY at [facility]. Please see a copy of my visit note below.                                                                               - Endocrinology Initial Consultation -    Reason for visit/consult: hypothyroidism    Primary care provider: Kim Gore    HPI: A 60 yo female here for the evaluation for her hypothryodism.  Patient is a self-referral came here today.   Patient was diagnosed thyroid nodule in 2006 when she was in [place] she underwent left hemithyroidectomy at [facility] in [place].  Since then she has been on levothyroxine and she cannot recall the dose.   She was switched to the nature thyroid currently taking 32.5 mg daily for 5 years and she cannot recall why and where she started.   She moved to [place] in October 2018 and try to find a physician who can prescribe nature thyroid.  She also has bipolar disorder diagnosed 2006 and currently has been changing multiple medications.     Psychiatry medications has been changing, celexa, geodon, and hydroxyzine.   Dr. Geronimo in [facility].     She went to PMD, and was told not to Rx of Nature Thyroid, and found places potentially prescribe nature thyroid and found here.     Energy level: hard to say and not good, due to changing psychiatry medication  Dry skin: she used to have olanzapine, then recetnly changed to geodon, and currently dry skin.   Hair loss: no  Weight changes: gained weight with olanzapine, but not stopped olanzapine for 4 month  BM: regular last few days  Concentration: no  Family history of thyroid disease: mother+      Past Medical/Surgical History:  Past Medical History:   Diagnosis Date     Bipolar disorder (H)      Depression with anxiety      Hashimoto's thyroiditis       Hyperlipidemia      Mild intermittent asthma      S/P partial thyroidectomy      Superficial perivascular dermatitis      Past Surgical History:   Procedure Laterality Date     partial thyroidectomy       TONSILLECTOMY       TUBAL LIGATION         Allergies:  Allergies   Allergen Reactions     Quetiapine Rash       Current Medications   Current Outpatient Medications   Medication     albuterol (PROAIR HFA/PROVENTIL HFA/VENTOLIN HFA) 108 (90 Base) MCG/ACT inhaler     citalopram (CELEXA) 40 MG tablet     hydrOXYzine (ATARAX) 25 MG tablet     thyroid (ARMOUR) 32.5 MG tablet     ziprasidone (GEODON) 60 MG capsule     No current facility-administered medications for this visit.        Family History:  Family History   Problem Relation Age of Onset     Colon Polyps Mother      Eye Disorder Father      Factor V Leiden deficiency Father         pt tested negative     Hyperlipidemia Father        Social History:  Social History     Tobacco Use     Smoking status: Never Smoker     Smokeless tobacco: Never Used   Substance Use Topics     Alcohol use: Yes     Comment: occasional   lives with her son, Job: no job, on sliding scale insulin for mental     ROS:  Full review of systems taken with the help of the intake sheet. Otherwise a complete 14 point review of systems was taken and is negative unless stated in the history above.      Physical Exam:   Vitals: /71   Pulse 90   Wt 76.8 kg (169 lb 6.4 oz)   BMI= There is no height or weight on file to calculate BMI.   General: well appearing, no acute distress, pleasant and conversant,   Mental Status/neuro: alert and oriented  Face: symmetrical, normal facial color  Eyes: anicteric, PERRL, no proptosis or lid lag  Neck: suppler, no lymphadenopahty  Thyroid: palpable thryoid on the left, no nodule palpable, no bruits  Heart: regular rhythm, S1S2, no murmur appreciated  Lung: clear to auscultation bilaterally  Abdomen: soft, NT/ND, no hepatomegaly  Legs: no swelling or  edema      Labs : I reviewed data from epic and extract and summarize the pertinent data here.     Lab Results   Component Value Date    TSH 3.21 01/22/2020         Assessment and Plan  59 year old female with hypothyroidism     # Hypothyrodism  Post operative,   Currently undercotrolled with Nature thryodi 32.5 mg.  We had discussed that oru standard treatment is levothryxoine not nature thryoid. However given her psychiatry medication has been changing and she is concerned about changing thyroid medication, which I understand her point. Therefore I will rx her current dose of Nature thyroid 32.5 mg daily and we will consider to switch levothyroxine down the road.    RTC with me in 1 year    I spent 45 minutes with this patient face to face and explained the conditions and plans (more than 50% of time was counseling/coordination of care) . The patient understood and is satisfied with today's visit. Return to clinic with me in 1 year.         Mary Sawyer MD  Staff Physician  Endocrinology and Metabolism  License: [id number]

## 2020-01-29 ENCOUNTER — OFFICE VISIT (OUTPATIENT)
Dept: DERMATOLOGY | Facility: CLINIC | Age: 60
End: 2020-01-29
Payer: MEDICARE

## 2020-01-29 DIAGNOSIS — D22.9 MULTIPLE BENIGN NEVI: ICD-10-CM

## 2020-01-29 DIAGNOSIS — L81.4 SOLAR LENTIGO: Primary | ICD-10-CM

## 2020-01-29 DIAGNOSIS — L82.0 INFLAMED SEBORRHEIC KERATOSIS: ICD-10-CM

## 2020-01-29 DIAGNOSIS — L82.1 SEBORRHEIC KERATOSIS: ICD-10-CM

## 2020-01-29 DIAGNOSIS — R23.8 VENOUS LAKE: ICD-10-CM

## 2020-01-29 ASSESSMENT — PAIN SCALES - GENERAL: PAINLEVEL: NO PAIN (0)

## 2020-01-29 NOTE — PROGRESS NOTES
[facility] Dermatology Note    Dermatology Problem List:  1. Suspected Venous Lake of the Lip   - s/p cryo 1/29/2020  - consider biopsy at follow up if not resolved  2. Inflamed SK s/p cryo    Encounter Date: Jan 29, 2020    CC:  Chief Complaint   Patient presents with     Skin Check     Katie is here today for a skin check. Area of concern on lower lip-sometimes swells and becomes irritated.      History of Present Illness:  Ms. Katie Griffiths is a 59 year old female who is a new patient and presents for a skin check. She was seen at [facility] 2/2019 for a Venous Lake of the Lip and Dyshidrotic Eczema (started on triamcinolone 0.1% ointment). Today, she is concerned about the lesion on her lower lip which has been present for several years. It frequently puffs up, throbs with pain and then subsides. She rarely gets cold sores. There is another dark lesion on her left shoulder that is itchy and bothersome. The patient reports that she is regularly taking citalopram and hydroxyzine.     The patient otherwise denies any new or concerning lesions. No bleeding, painful, pruritic, or changing lesions. They report no personal history of skin cancer. There is no family history of skin cancer (AKs in grandparents).  No history of immunosuppression. Prior history of indoor tanning. They do use sunscreen and protective clothing when outdoors for sun protection. No occupational exposure to ultraviolet light or other forms of radiation. Patient worked indoor.  Health otherwise stable. No other skin concerns.     Past Medical History:   Patient Active Problem List   Diagnosis     Bipolar disorder (H)     Hashimoto's thyroiditis     Incontinence of urine in female     Osteopenia     Reactive airway disease     Vitamin D deficiency     Overweight     Dizziness     Hyperlipidemia LDL goal <100     Pelvic floor dysfunction     Past Medical History:   Diagnosis Date      Bipolar disorder (H)      Depression with anxiety      Hashimoto's thyroiditis      Hyperlipidemia      Mild intermittent asthma      S/P partial thyroidectomy      Superficial perivascular dermatitis      Past Surgical History:   Procedure Laterality Date     partial thyroidectomy       TONSILLECTOMY       TUBAL LIGATION         Social History:  Patient reports that she has never smoked. She has never used smokeless tobacco. She reports current alcohol use. She reports that she does not use drugs.    Family History:  Family History   Problem Relation Age of Onset     Colon Polyps Mother      Eye Disorder Father      Factor V Leiden deficiency Father         pt tested negative     Hyperlipidemia Father        Medications:  Current Outpatient Medications   Medication Sig Dispense Refill     albuterol (PROAIR HFA/PROVENTIL HFA/VENTOLIN HFA) 108 (90 Base) MCG/ACT inhaler Inhale 2 puffs into the lungs as needed for shortness of breath / dyspnea or wheezing       citalopram (CELEXA) 40 MG tablet Take 1 tablet (40 mg) by mouth daily 30 tablet 1     hydrOXYzine (ATARAX) 25 MG tablet Take 2 tablets (50 mg) by mouth At Bedtime Take two tabs at bedtime 60 tablet 1     thyroid (ARMOUR) 32.5 MG tablet Take 1 tablet (32.5 mg) by mouth daily Nature-thyroid 90 tablet 3     ziprasidone (GEODON) 60 MG capsule Take 1 capsule (60 mg) by mouth 2 times daily (with meals) 60 capsule 0       Allergies   Allergen Reactions     Quetiapine Rash       Review of Systems:  -Constitutional: Patient is otherwise feeling well, in usual state of health.   -Skin: As above in HPI. No additional skin concerns.    Physical exam:  Vitals: There were no vitals taken for this visit.  GEN: This is a well developed, well-nourished female in no acute distress, in a pleasant mood.    SKIN: Full skin, which includes the head/face, both arms, chest, back, abdomen,both legs, genitalia and/or groin buttocks, digits and/or nails, was examined.  - Lopez Type  II  - Scattered brown macules on sun exposed areas.  - Dome shaped bright red papules on the trunk and extremities.  - Multiple regular brown pigmented macules and papules are identified on the trunk and extremities.   - Waxy stuck on tan to brown papules on the trunk and extremities  - Tan to brown waxy stuck on papule with surrounding erythema on the left shoulder.   - Blue papule on the central lower lip, non-tender to palpation.   - No other lesions of concern on areas examined.     Impression/Plan:    1. Multiple clinically benign lesions: solar lentigines, nevi    Recommend sunscreens SPF #30 or greater, protective clothing and avoidance of tanning beds.    ABCDs of melanoma were discussed and self skin checks were advised.     2. Benign findings including: seborrheic keratoses, cherry angioma    No further intervention required. Patient to report changes.     Patient reassured of the benign nature of these lesions.    3. Suspected venous lake of the lip    Cryotherapy procedure note: After verbal consent and discussion of risks and benefits including but no limited to dyspigmentation/scar, blister, and pain, 1 was(were) treated with 1-2mm freeze border for 2 cycles with liquid nitrogen. Post cryotherapy instructions were provided.    If not resolved, will consider a biopsy at 6 week follow up.    4. Inflamed Seborrheic Keratosis    Cryotherapy procedure note: After verbal consent and discussion of risks and benefits including but no limited to dyspigmentation/scar, blister, and pain, 1 was(were) treated with 1-2mm freeze border for 2 cycles with liquid nitrogen. Post cryotherapy instructions were provided.    Follow-up in 6 week to recheck the lesion on the lip, earlier for new or changing lesions.     Staff Involved:  Scribe/Staff    Scribe Disclosure  I, Richa Flores, am serving as a scribe to document services personally performed by Dr. Oscar Ji MD, based on data collection and the provider's statements  to me.     Provider Disclosure:   The documentation recorded by the scribe accurately reflects the services I personally performed and the decisions made by me.    Oscar Ji MD    Department of Dermatology  [facility]: Phone: [phone], Fax:[phone]  [facility]: Phone: [phone] Fax: [phone]

## 2020-01-29 NOTE — LETTER
1/29/2020       RE: Katie Griffiths  [address] [id number]     Dear Colleague,    Thank you for referring your patient, Katie Griffiths, to the [facility] DERMATOLOGY at [facility]. Please see a copy of my visit note below.    [facility] Dermatology Note    Dermatology Problem List:  1. Suspected Venous Lake of the Lip   - s/p cryo 1/29/2020  - consider biopsy at follow up if not resolved  2. Inflamed SK s/p cryo    Encounter Date: Jan 29, 2020    CC:  Chief Complaint   Patient presents with     Skin Check     Katie is here today for a skin check. Area of concern on lower lip-sometimes swells and becomes irritated.      History of Present Illness:  Ms. Katie Griffiths is a 59 year old female who is a new patient and presents for a skin check. She was seen at [facility] 2/2019 for a Venous Lake of the Lip and Dyshidrotic Eczema (started on triamcinolone 0.1% ointment). Today, she is concerned about the lesion on her lower lip which has been present for several years. It frequently puffs up, throbs with pain and then subsides. She rarely gets cold sores. There is another dark lesion on her left shoulder that is itchy and bothersome. The patient reports that she is regularly taking citalopram and hydroxyzine.     The patient otherwise denies any new or concerning lesions. No bleeding, painful, pruritic, or changing lesions. They report no personal history of skin cancer. There is no family history of skin cancer (AKs in grandparents).  No history of immunosuppression. Prior history of indoor tanning. They do use sunscreen and protective clothing when outdoors for sun protection. No occupational exposure to ultraviolet light or other forms of radiation. Patient worked indoor.  Health otherwise stable. No other skin concerns.     Past Medical History:   Patient Active Problem List   Diagnosis      Bipolar disorder (H)     Hashimoto's thyroiditis     Incontinence of urine in female     Osteopenia     Reactive airway disease     Vitamin D deficiency     Overweight     Dizziness     Hyperlipidemia LDL goal <100     Pelvic floor dysfunction     Past Medical History:   Diagnosis Date     Bipolar disorder (H)      Depression with anxiety      Hashimoto's thyroiditis      Hyperlipidemia      Mild intermittent asthma      S/P partial thyroidectomy      Superficial perivascular dermatitis      Past Surgical History:   Procedure Laterality Date     partial thyroidectomy       TONSILLECTOMY       TUBAL LIGATION         Social History:  Patient reports that she has never smoked. She has never used smokeless tobacco. She reports current alcohol use. She reports that she does not use drugs.    Family History:  Family History   Problem Relation Age of Onset     Colon Polyps Mother      Eye Disorder Father      Factor V Leiden deficiency Father         pt tested negative     Hyperlipidemia Father        Medications:  Current Outpatient Medications   Medication Sig Dispense Refill     albuterol (PROAIR HFA/PROVENTIL HFA/VENTOLIN HFA) 108 (90 Base) MCG/ACT inhaler Inhale 2 puffs into the lungs as needed for shortness of breath / dyspnea or wheezing       citalopram (CELEXA) 40 MG tablet Take 1 tablet (40 mg) by mouth daily 30 tablet 1     hydrOXYzine (ATARAX) 25 MG tablet Take 2 tablets (50 mg) by mouth At Bedtime Take two tabs at bedtime 60 tablet 1     thyroid (ARMOUR) 32.5 MG tablet Take 1 tablet (32.5 mg) by mouth daily Nature-thyroid 90 tablet 3     ziprasidone (GEODON) 60 MG capsule Take 1 capsule (60 mg) by mouth 2 times daily (with meals) 60 capsule 0       Allergies   Allergen Reactions     Quetiapine Rash       Review of Systems:  -Constitutional: Patient is otherwise feeling well, in usual state of health.   -Skin: As above in HPI. No additional skin concerns.    Physical exam:  Vitals: There were no vitals taken  for this visit.  GEN: This is a well developed, well-nourished female in no acute distress, in a pleasant mood.    SKIN: Full skin, which includes the head/face, both arms, chest, back, abdomen,both legs, genitalia and/or groin buttocks, digits and/or nails, was examined.  - Lopez Type II  - Scattered brown macules on sun exposed areas.  - Dome shaped bright red papules on the trunk and extremities.  - Multiple regular brown pigmented macules and papules are identified on the trunk and extremities.   - Waxy stuck on tan to brown papules on the trunk and extremities  - Tan to brown waxy stuck on papule with surrounding erythema on the left shoulder.   - Blue papule on the central lower lip, non-tender to palpation.   - No other lesions of concern on areas examined.     Impression/Plan:    1. Multiple clinically benign lesions: solar lentigines, nevi    Recommend sunscreens SPF #30 or greater, protective clothing and avoidance of tanning beds.    ABCDs of melanoma were discussed and self skin checks were advised.     2. Benign findings including: seborrheic keratoses, cherry angioma    No further intervention required. Patient to report changes.     Patient reassured of the benign nature of these lesions.    3. Suspected venous lake of the lip    Cryotherapy procedure note: After verbal consent and discussion of risks and benefits including but no limited to dyspigmentation/scar, blister, and pain, 1 was(were) treated with 1-2mm freeze border for 2 cycles with liquid nitrogen. Post cryotherapy instructions were provided.    If not resolved, will consider a biopsy at 6 week follow up.    4. Inflamed Seborrheic Keratosis    Cryotherapy procedure note: After verbal consent and discussion of risks and benefits including but no limited to dyspigmentation/scar, blister, and pain, 1 was(were) treated with 1-2mm freeze border for 2 cycles with liquid nitrogen. Post cryotherapy instructions were provided.    Follow-up in  6 week to recheck the lesion on the lip, earlier for new or changing lesions.     Staff Involved:  Scribe/Staff    Scribe Disclosure  I, Richa Flores, am serving as a scribe to document services personally performed by Dr. Oscar Ji MD, based on data collection and the provider's statements to me.     Provider Disclosure:   The documentation recorded by the scribe accurately reflects the services I personally performed and the decisions made by me.    Oscar Ji MD    Department of Dermatology  [facility]: Phone: [phone], Fax:[phone]  [facility]: Phone: [phone] Fax: [phone]

## 2020-01-29 NOTE — NURSING NOTE
Chief Complaint   Patient presents with     Skin Check     Katie is here today for a skin check. Area of concern on lower lip-sometimes swells and becomes irritated.      Zuleima Terrazas LPN

## 2020-01-29 NOTE — PATIENT INSTRUCTIONS
Cryotherapy    What is it?    Use of a very cold liquid, such as liquid nitrogen, to freeze and destroy abnormal skin cells that need to be removed    What should I expect?    Tenderness and redness    A small blister that might grow and fill with dark purple blood. There may be crusting.    More than one treatment may be needed if the lesions do not go away.    How do I care for the treated area?    Gently wash the area with your hands when bathing.    Use a thin layer of Vaseline to help with healing. You may use a Band-Aid.     The area should heal within 7-10 days and may leave behind a pink or lighter color.     Do not use an antibiotic or Neosporin ointment.     You may take acetaminophen (Tylenol) for pain.     Call your Doctor if you have:    Severe pain    Signs of infection (warmth, redness, cloudy yellow drainage, and or a bad smell)    Questions or concerns    Who should I call with questions?       [facility]: [phone]       [facility]: [phone]       For urgent needs outside of business hours call the [facility] at [phone]        and ask for the dermatology resident on call

## 2020-02-03 ENCOUNTER — THERAPY VISIT (OUTPATIENT)
Dept: PHYSICAL THERAPY | Facility: CLINIC | Age: 60
End: 2020-02-03
Payer: MEDICARE

## 2020-02-03 ENCOUNTER — TELEPHONE (OUTPATIENT)
Dept: PSYCHIATRY | Facility: CLINIC | Age: 60
End: 2020-02-03

## 2020-02-03 DIAGNOSIS — M62.89 PELVIC FLOOR DYSFUNCTION: Primary | ICD-10-CM

## 2020-02-03 DIAGNOSIS — F39 MOOD DISORDER (H): Primary | ICD-10-CM

## 2020-02-03 PROCEDURE — 97110 THERAPEUTIC EXERCISES: CPT | Mod: GP | Performed by: PHYSICAL THERAPIST

## 2020-02-03 PROCEDURE — 97530 THERAPEUTIC ACTIVITIES: CPT | Mod: GP | Performed by: PHYSICAL THERAPIST

## 2020-02-03 NOTE — TELEPHONE ENCOUNTER
Writer called pt to gather more information. As recommended in the 1/21/20 encounter, pt increased her melatonin to 9 mg at bedtime, but this has not been effective.    Pt reports the increased melatonin dose was initially effective, but has not been effective the last three days. She continues to take this with hydroxyzine 50 mg at bedtime, but only gets at most 4 hours of sleep per day. She reports it's starting to drain her mentally.     The pt said she's feeling very sad and down, but denies any SI or safety concerns. She's open to trying another medication, as suggested in the 1/21 encounter of melatonin is not effective. Pt would like Rx sent to [facility] pharmacy in [place].     Message routed to provider

## 2020-02-03 NOTE — TELEPHONE ENCOUNTER
[facility] Call Center    Phone Message    May a detailed message be left on voicemail: no    Reason for Call: Symptoms or Concerns     If patient has red-flag symptoms, warm transfer to triage line    Current symptom or concern: Pt not sleeping    Symptoms have been present for:  3 day(s)    Has patient previously been seen for this? Yes    Are there any new or worsening symptoms? Yes: Pt reported having this problem for a long time, but its gotten worse in the last 3 days. She wants to sleep and can't keep going without it.       Action Taken: Other: [facility] Psych Pool   unable to perform/not tested secondary to pain

## 2020-02-04 DIAGNOSIS — F39 MOOD DISORDER (H): ICD-10-CM

## 2020-02-04 RX ORDER — TRAZODONE HYDROCHLORIDE 50 MG/1
50 TABLET, FILM COATED ORAL AT BEDTIME
Qty: 30 TABLET | Refills: 0 | Status: SHIPPED | OUTPATIENT
Start: 2020-02-04 | End: 2020-02-12 | Stop reason: ALTCHOICE

## 2020-02-04 RX ORDER — ZIPRASIDONE HYDROCHLORIDE 60 MG/1
60 CAPSULE ORAL 2 TIMES DAILY WITH MEALS
Qty: 60 CAPSULE | Refills: 0 | Status: SHIPPED | OUTPATIENT
Start: 2020-02-04 | End: 2020-03-04

## 2020-02-04 NOTE — TELEPHONE ENCOUNTER
Writer received VORB from the provider to start trazodone 50 mg nightly. Provider asked that writer tell the pt she should closely monitor for changes to mood and energy.    Called pt and relayed the provider's recommendation and provided education. She voiced understanding and agreed with this plan.

## 2020-02-04 NOTE — TELEPHONE ENCOUNTER
Received incoming phone call from the pt looking for an update. Asked that intake staff tell her that writer will try reaching out to the provider again for recommendations.

## 2020-02-05 ENCOUNTER — TELEPHONE (OUTPATIENT)
Dept: PSYCHIATRY | Facility: CLINIC | Age: 60
End: 2020-02-05

## 2020-02-05 ENCOUNTER — PATIENT OUTREACH (OUTPATIENT)
Dept: ENDOCRINOLOGY | Facility: CLINIC | Age: 60
End: 2020-02-05

## 2020-02-05 NOTE — TELEPHONE ENCOUNTER
"Patient called with a message for her care team:    \"I picked up trazodone last night, and took one and got zero sleep.\"  "

## 2020-02-05 NOTE — TELEPHONE ENCOUNTER
Enzo No APRN CNP Labossiere, Laura, RN   Caller: Unspecified (Today,  8:18 AM)             OK.  Now I may have made a mistake.  She felt more activated after taking Trazodone?   If so, increasing may make things worse.  Let me look at her medication history and I'll get back to you.  May switch to hydroxyzine        Discussed with the provider further. He would like to give 100 mg a try tonight. If it's not effective, he would then like to try hydroxyzine. Will wait for pt's update tomorrow.

## 2020-02-05 NOTE — TELEPHONE ENCOUNTER
Enzo No APRN CNP Labossiere, Laura, RN   Caller: Unspecified (Today,  8:18 AM)             She can try 100mg      Provider would also like to remind pt that exercise and sleep hygiene is very important as well. If the increase is not effective, provider will discuss the option of a sleep study with the pt.    Called pt and relayed all of the provider's recommendations. She voiced understanding and will increase the dose tonight. Pt said she didn't sleep one minute last night. She denies an increase in energy and continues to report depressive symptoms, no safety concerns. Asked that she c/b either way with an update. Pt agreed to this.

## 2020-02-05 NOTE — PROGRESS NOTES
Katie calls concerned that her thyroid  caused  her not  to sleep last night. She was here 1/20 to see Dr Sawyer  and is on  Thyroid 32.5 daily with normal TSH. She started the medication Trazadone 50 mg last night  and did not sleep all night. She did fine before this medication was added . She will call the prescriber of the  Trazadone  as the thyroid is within normal limits otherwise fine. Carrie Herrera RN on 2/5/2020 at 10:17 AM

## 2020-02-06 ENCOUNTER — TELEPHONE (OUTPATIENT)
Dept: PSYCHIATRY | Facility: CLINIC | Age: 60
End: 2020-02-06

## 2020-02-06 NOTE — TELEPHONE ENCOUNTER
"Enzo No APRN CNP Labossiere, Laura RN   Caller: Unspecified (Today,  8:12 AM)             Lets try 25-50mg PRN at bedtime     Writer called pt and confirmed she's still taking hydroxyzine 50 mg at bedtime plus the trazodone. Last night, she remembers feeling tired and may have slept, but cannot remember for sure. She remembers looking at the clock multiple times throughout the night. Agreed to discuss this with the provider further to see if he has any recommendations and will then call her back. Pt mentioned that her \"brain feels happier\" today.   "

## 2020-02-06 NOTE — TELEPHONE ENCOUNTER
See yesterday's telephone encounter (2/5) for additional information. Plan was to try trazodone 100 mg one night. If not effective, provider plans on switching to hydroxyzine. Will reach out to provider at this time and then f/up with the pt.

## 2020-02-06 NOTE — TELEPHONE ENCOUNTER
"-Writer received the following response from the provider:    \"Do we know how she is evaluating her sleep?  I think I am going to her stay at current medications until she comes in to the clinic. \"    -Called pt and relayed the provider's recommendation of waiting until the appt on 2/12. She voiced understanding and will try to start tracking her sleep consistently.   "

## 2020-02-06 NOTE — TELEPHONE ENCOUNTER
[facility] Call Center    Phone Message    May a detailed message be left on voicemail: yes     Reason for Call: Other: Pt stated she's still not sleeping. Please call her to discuss.      Action Taken: Other: [facility]    Travel Screening: Not Applicable

## 2020-02-10 ENCOUNTER — THERAPY VISIT (OUTPATIENT)
Dept: PHYSICAL THERAPY | Facility: CLINIC | Age: 60
End: 2020-02-10
Payer: MEDICARE

## 2020-02-10 DIAGNOSIS — M62.89 PELVIC FLOOR DYSFUNCTION: Primary | ICD-10-CM

## 2020-02-10 PROCEDURE — 97530 THERAPEUTIC ACTIVITIES: CPT | Mod: GP | Performed by: PHYSICAL THERAPIST

## 2020-02-10 PROCEDURE — 97110 THERAPEUTIC EXERCISES: CPT | Mod: GP | Performed by: PHYSICAL THERAPIST

## 2020-02-10 NOTE — PROGRESS NOTES
DISCHARGE REPORT    Progress reporting period is from 01132020 to [id number].      SUBJECTIVE  Pt. states that hot cocoa makes her void more often. Incontinence is ocassional and only enough to wet her underwear. The urge is no longer strong since her med change. nocturia 1 x last night which was the second best night she has had. she has too many appts. so would like to hold on PT. and continue with the exercises at home.    Current Pain level: 0/10.     Initial Pain level: 1/10.   Changes in function:  Yes (See Goal flowsheet attached for changes in current functional level)  Adverse reaction to treatment or activity: None    OBJECTIVE  Objective: PERF scale-4/75/7.  TrA 2/5.       ASSESSMENT/PLAN  Updated problem list and treatment plan:   Diagnosis 1:  LAINEY    STG/LTGs have been met or progress has been made towards goals:  Yes (See Goal flow sheet completed today.)  Assessment of Progress: The patient has met all of their long term goals.  Self Management Plans:  Patient is independent in a home treatment program.  Patient is independent in self management of symptoms.    Katie continues to require the following intervention to meet STG and LTG's:  PT intervention is no longer required to meet STG/LTG.    Recommendations:  This patient is ready to be discharged from therapy and continue their home treatment program.

## 2020-02-12 ENCOUNTER — OFFICE VISIT (OUTPATIENT)
Dept: PSYCHIATRY | Facility: CLINIC | Age: 60
End: 2020-02-12
Attending: NURSE PRACTITIONER
Payer: MEDICARE

## 2020-02-12 VITALS — SYSTOLIC BLOOD PRESSURE: 127 MMHG | HEART RATE: 111 BPM | DIASTOLIC BLOOD PRESSURE: 74 MMHG | WEIGHT: 168 LBS

## 2020-02-12 DIAGNOSIS — F39 MOOD DISORDER (H): Primary | ICD-10-CM

## 2020-02-12 PROCEDURE — G0463 HOSPITAL OUTPT CLINIC VISIT: HCPCS | Mod: ZF

## 2020-02-12 RX ORDER — CITALOPRAM HYDROBROMIDE 40 MG/1
40 TABLET ORAL DAILY
Qty: 30 TABLET | Refills: 1 | Status: SHIPPED | OUTPATIENT
Start: 2020-02-12 | End: 2020-03-04

## 2020-02-12 RX ORDER — MIRTAZAPINE 7.5 MG/1
7.5 TABLET, FILM COATED ORAL AT BEDTIME
Qty: 30 TABLET | Refills: 0 | Status: SHIPPED | OUTPATIENT
Start: 2020-02-12 | End: 2020-03-04

## 2020-02-12 ASSESSMENT — PAIN SCALES - GENERAL: PAINLEVEL: NO PAIN (0)

## 2020-02-12 NOTE — PROGRESS NOTES
"         [facility]  Psychiatry Clinic  PSYCHIATRIC PROGRESS NOTE       Katie Griffiths is a 59 year old female who prefers the name Katie and pronoun she, her.  Therapist: Amirah Tejeda [facility]               PCP: Buddy Nolan  Other Providers: None    Pertinent Background:  See previous notes.  Psych critical item history includes Hospitalized 3 times with most recent occurring in 2007 after overdose attempt.  Numerous medication trials.  Possible bipolar diagnosis.     Interim History     [4, 4]     The patient is a good historian, reports good treatment adherence and was last seen 1/8/20.  Since the last visit,  Katie shared that she is not sleeping.  She reports that she is getting 3 hours of sleep per night for past couple weeks. However, Katie did not look overly fatigued during appointment.  She also reports that her mood is quite low.  No concerns with shayla.  No goal directed behavior or impulsivity.  Katie\"s affect continues to be incongruent to her reported mood.  She is struggling to get household chores completed.  Continues to report sedentary lifestyle.  Educated on how exercise and activity can help with sleep.  Will stop Trazodone and start Mirtazapine for sleep.  If continues to be a problem, will refer to sleep medicine as sleep has been an issue for several years.  Also plan to switch Celexa as it does not appear to be managing her depression.      1/8/20: Katie's main concern today was weight gain.  Affect was quite bright today in spite of high PHQ-9 and reported persistent low mood. Reports positive holiday with her family.  Sleep continues to be inconsistent.  Mood stable with introduction of Geodon.  Will increase and discontinue Olanzapine.  Katie continues to consider a retrial of Depakote which was discontinued in past due to development of rash.      12/11/19: Katie reports her mood is stable but she is describes \"not feeling good or bad.\"  " Difficult to discern if emotional blunting or lack of hypomania.  Thoughts continue to be linear and organized.  Speech normal.  Also reports sleeping continues to improve. Previous trials include Lithium (not effective?), Depakote (rash), and Abilify (side effects).  Katie would like to change medications today due to possible emotional blunting and weight gain (5lbs in past 2 weeks).      11/27/19: Increased HS Olanzapine to 5mg and Katie appears much more grounded.  Speech less pressured and mood more stable.  Thoughts more organized and linear.  Katie reports she also has noticed a significant difference.  She also reports she is sleeping much better.  Concerned about weight gain but reports she is very sedentary.      Recent Symptoms:   Depression:  depressed mood, anhedonia, low energy, insomnia, appetite changes, weight changes, poor concentration /memory and feeling worthless  Elevated:  none  Psychosis:  none  Anxiety:  periodic worry  Panic Attack:  none  Trauma Related:  none     Recent Substance Use:  No changes reported        Social/ Family History      [1ea,1ea]            [per patient report]               Financial support-  social security disability  Children-  2 adult children  Living situation- Lives in house in [place] with son   Social/spiritial support-  limited/none  Feels safe at home-  Yes   Family history- None      Medical / Surgical History                                 Patient Active Problem List   Diagnosis     Bipolar disorder (H)     Hashimoto's thyroiditis     Incontinence of urine in female     Osteopenia     Reactive airway disease     Vitamin D deficiency     Overweight     Dizziness     Hyperlipidemia LDL goal <100       Past Surgical History:   Procedure Laterality Date     partial thyroidectomy       TONSILLECTOMY       TUBAL LIGATION          Medical Review of Systems         [2,10]   The remainder of the review of systems is noncontributory  Thyroid removed  Allergy     Quetiapine  Current Medications        Current Outpatient Medications   Medication Sig Dispense Refill     albuterol (PROAIR HFA/PROVENTIL HFA/VENTOLIN HFA) 108 (90 Base) MCG/ACT inhaler Inhale 2 puffs into the lungs as needed for shortness of breath / dyspnea or wheezing       citalopram (CELEXA) 40 MG tablet Take 1 tablet (40 mg) by mouth daily 30 tablet 1     hydrOXYzine (ATARAX) 25 MG tablet Take 2 tablets (50 mg) by mouth At Bedtime Take two tabs at bedtime 60 tablet 1     thyroid (ARMOUR) 32.5 MG tablet Take 1 tablet (32.5 mg) by mouth daily Nature-thyroid 90 tablet 3     traZODone (DESYREL) 50 MG tablet Take 1 tablet (50 mg) by mouth At Bedtime 30 tablet 0     ziprasidone (GEODON) 60 MG capsule Take 1 capsule (60 mg) by mouth 2 times daily (with meals). 60 capsule 0     Vitals         [3, 3]   /74   Pulse 111   Wt 76.2 kg (168 lb)    Mental Status Exam        [9, 14 cog gs]     Alertness: alert  and oriented  Appearance: casually groomed  Behavior/Demeanor: cooperative, pleasant and calm, with good  eye contact   Speech: regular rate and rhythm.  Language: good  Psychomotor: normal or unremarkable  Mood: depressed  Affect: appropriate; was congruent to mood; was congruent to content  Thought Process/Associations: unremarkable  Thought Content:  Reports none;  Denies suicidal ideation and violent ideation  Perception:  Reports none;  Denies auditory hallucinations and visual hallucinations  Insight: adequate  Judgment: intact  Cognition: (6) does  appear grossly intact; formal cognitive testing was not done  Gait/Station and/or Muscle Strength/Tone: unremarkable    Labs and Data                          Rating Scales:    PHQ9    PHQ9 Today:  18  PHQ-9 SCORE 12/18/2019   PHQ-9 Total Score MyChart 17 (Moderately severe depression)   PHQ-9 Total Score 17        Assessment      [m2, h3]     Unspecified Mood Disorder  Bipolar I Disorder (Hx)    [place] Prescription Monitoring Program [] was not checked  today:  provider not managing any controlled medications.        Plan                                                                                                                     m2, h3     1) MEDICATION:  Continue Celexa 40mg.  Consider taper/discontinue due to Bipolar Diagnosis.  Consider Wellbutrin trial  Continue [place] 32.5mg (thyroid)  Continue Hydroxyzine 50mg at bedtime   Continue Geodon 60mg BID with food  Start Mirtazapine 7.5mg at bedtime for sleep and mood.  Monitor for mood elevation   Discontinue Trazodone    2) THERAPY:  Continue with current therapist      RTC: 2 weeks    CRISIS NUMBERS:   Provided routinely in AVS.    Treatment Risk Statement:  The patient understands the risks, benefits, adverse effects and alternatives. Agrees to treatment with the capacity to do so. No medical contraindications to treatment. Agrees to call clinic for any problems. The patient understands to call 911 or go to the nearest ED if life threatening or urgent symptoms occur.     WHODAS 2.0  TODAY total score = N/A; [a 12-item WHODAS 2.0 assessment was not completed by the pt today and/or recorded in EPIC].       PROVIDER:  ADELINA Lieberman CNP

## 2020-03-04 ENCOUNTER — OFFICE VISIT (OUTPATIENT)
Dept: PSYCHIATRY | Facility: CLINIC | Age: 60
End: 2020-03-04
Attending: NURSE PRACTITIONER
Payer: MEDICARE

## 2020-03-04 ENCOUNTER — OFFICE VISIT (OUTPATIENT)
Dept: DERMATOLOGY | Facility: CLINIC | Age: 60
End: 2020-03-04
Payer: MEDICARE

## 2020-03-04 VITALS — WEIGHT: 169.4 LBS | SYSTOLIC BLOOD PRESSURE: 107 MMHG | DIASTOLIC BLOOD PRESSURE: 73 MMHG | HEART RATE: 87 BPM

## 2020-03-04 DIAGNOSIS — F39 MOOD DISORDER (H): ICD-10-CM

## 2020-03-04 DIAGNOSIS — R23.8 VENOUS LAKE: Primary | ICD-10-CM

## 2020-03-04 PROCEDURE — G0463 HOSPITAL OUTPT CLINIC VISIT: HCPCS | Mod: ZF

## 2020-03-04 RX ORDER — CITALOPRAM HYDROBROMIDE 10 MG/1
TABLET ORAL
Qty: 7 TABLET | Refills: 0 | Status: SHIPPED | OUTPATIENT
Start: 2020-03-04 | End: 2020-03-31 | Stop reason: ALTCHOICE

## 2020-03-04 RX ORDER — ZIPRASIDONE HYDROCHLORIDE 60 MG/1
60 CAPSULE ORAL 2 TIMES DAILY WITH MEALS
Qty: 60 CAPSULE | Refills: 0 | Status: SHIPPED | OUTPATIENT
Start: 2020-03-04 | End: 2020-03-31

## 2020-03-04 RX ORDER — MIRTAZAPINE 7.5 MG/1
7.5 TABLET, FILM COATED ORAL AT BEDTIME
Qty: 30 TABLET | Refills: 0 | Status: SHIPPED | OUTPATIENT
Start: 2020-03-04 | End: 2020-03-31

## 2020-03-04 RX ORDER — CITALOPRAM HYDROBROMIDE 40 MG/1
TABLET ORAL
Qty: 30 TABLET | Refills: 1 | COMMUNITY
Start: 2020-03-04 | End: 2020-03-31 | Stop reason: ALTCHOICE

## 2020-03-04 RX ORDER — BUPROPION HYDROCHLORIDE 150 MG/1
150 TABLET ORAL EVERY MORNING
Qty: 30 TABLET | Refills: 0 | Status: SHIPPED | OUTPATIENT
Start: 2020-03-04 | End: 2020-03-31

## 2020-03-04 ASSESSMENT — PAIN SCALES - GENERAL
PAINLEVEL: NO PAIN (0)
PAINLEVEL: NO PAIN (0)

## 2020-03-04 NOTE — LETTER
3/4/2020       RE: Katie Griffiths  [address] [address]     Dear Colleague,    Thank you for referring your patient, Katie Griffiths, to the [facility] DERMATOLOGY at [facility]. Please see a copy of my visit note below.    [facility] Dermatology Note    Dermatology Problem List:  1. Suspected venous lake of the lip   - s/p cryo 1/29/2020  - will follow up for biopsy  2. Inflamed SK s/p cryo    Encounter Date: Mar 4, 2020    CC:  Chief Complaint   Patient presents with     Derm Problem     Katie is here to get a spot on the upoer lip rechecked - states that it has stayed the same     History of Present Illness:  Ms. Katie Griffiths is a 59 year old female who presents as a follow up. Last seen one month ago for cryotherapy of a suspected venous lake of the lip.     Today, the patient reports no changes to the lip lesion following her last cryotherapy treatment. She has not seen any drainage in the past month but does report throbbing pain and drainage in the past which is the most bothersome aspect of the lesion. The patient is unsure as to whether she would like the lesion removed and will follow up at a later date to pursue further treatment. Health otherwise stable. No other skin concerns.     Past Medical History:   Patient Active Problem List   Diagnosis     Bipolar disorder (H)     Hashimoto's thyroiditis     Incontinence of urine in female     Osteopenia     Reactive airway disease     Vitamin D deficiency     Overweight     Dizziness     Hyperlipidemia LDL goal <100     Past Medical History:   Diagnosis Date     Bipolar disorder (H)      Depression with anxiety      Hashimoto's thyroiditis      Hyperlipidemia      Mild intermittent asthma      S/P partial thyroidectomy      Superficial perivascular dermatitis      Past Surgical History:   Procedure Laterality Date     partial thyroidectomy       TONSILLECTOMY        TUBAL LIGATION         Social History:  Patient reports that she has never smoked. She has never used smokeless tobacco. She reports current alcohol use. She reports that she does not use drugs.    Family History:  Family History   Problem Relation Age of Onset     Colon Polyps Mother      Eye Disorder Father      Factor V Leiden deficiency Father         pt tested negative     Hyperlipidemia Father        Medications:  Current Outpatient Medications   Medication Sig Dispense Refill     albuterol (PROAIR HFA/PROVENTIL HFA/VENTOLIN HFA) 108 (90 Base) MCG/ACT inhaler Inhale 2 puffs into the lungs as needed for shortness of breath / dyspnea or wheezing       buPROPion (WELLBUTRIN XL) 150 MG 24 hr tablet Take 1 tablet (150 mg) by mouth every morning 30 tablet 0     citalopram (CELEXA) 10 MG tablet Take 1 tablet (10mg) for a week then discontinue 7 tablet 0     citalopram (CELEXA) 40 MG tablet Take 1/2 tablet (20mg) for week then decrease to 10mg tabs 30 tablet 1     hydrOXYzine (ATARAX) 25 MG tablet Take 2 tablets (50 mg) by mouth At Bedtime Take two tabs at bedtime 60 tablet 1     mirtazapine (REMERON) 7.5 MG tablet Take 1 tablet (7.5 mg) by mouth At Bedtime 30 tablet 0     thyroid (ARMOUR) 32.5 MG tablet Take 1 tablet (32.5 mg) by mouth daily Nature-thyroid 90 tablet 3     ziprasidone (GEODON) 60 MG capsule Take 1 capsule (60 mg) by mouth 2 times daily (with meals) 60 capsule 0       Allergies   Allergen Reactions     Quetiapine Rash       Review of Systems:  -Constitutional: Patient is otherwise feeling well, in usual state of health.   -Skin: As above in HPI. No additional skin concerns.    Physical exam:  Vitals: There were no vitals taken for this visit.  GEN: This is a well developed, well-nourished female in no acute distress, in a pleasant mood.    SKIN: Full skin, which includes the head/face, both arms, chest, back, abdomen,both legs, genitalia and/or groin buttocks, digits and/or nails, was examined.  -  Lopez Type II  - Blue papule on the central lower lip, non-tender to palpation.   - No other lesions of concern on areas examined.     Impression/Plan:    1. Suspected venous lake of the lip    Discussed removal of the lesion via biopsy but the patient deferred today and will follow up in two weeks if she elects to pursue the procedure.      Follow-up in two weeks, earlier for new or changing lesions.     Staff Involved:  Scribe/Staff    Scribe Disclosure  I, Richa Flores, am serving as a scribe to document services personally performed by Dr. Oscar Ji MD, based on data collection and the provider's statements to me.     Provider Disclosure:   The documentation recorded by the scribe accurately reflects the services I personally performed and the decisions made by me.    Oscar Ji MD    Department of Dermatology  [facility]: Phone: [phone], Fax:[phone]  [facility]: Phone: [phone] Fax: [phone]

## 2020-03-04 NOTE — PROGRESS NOTES
"         [facility]  Psychiatry Clinic  PSYCHIATRIC PROGRESS NOTE       Katie Griffiths is a 59 year old female who prefers the name Katie and pronoun she, her.  Therapist: Amirah Tejeda @ Private Practice               PCP: Buddy Nolan  Other Providers: None    Pertinent Background:  See previous notes.  Psych critical item history includes Hospitalized 3 times with most recent occurring in 2007 after overdose attempt.  Numerous medication trials.  Possible bipolar diagnosis.     Interim History     [4, 4]     The patient is a good historian, reports good treatment adherence and was last seen 2/12/20.  Since the last visit, when asked how she is doing, Katie again starts laughing.  Mood is stable overall but low.  Continues to endorse anhedonia, energy, and low mood. Sleep has improved since starting Remeron.  Reports sleeping 5 hours per night which results in continued daytime tiredness.    Continues to report that Celexa is not helpful and possible causing sedation    2/12/20: Katie shared that she is not sleeping.  She reports that she is getting 3 hours of sleep per night for past couple weeks. However, Katie did not look overly fatigued during appointment.  She also reports that her mood is quite low.  No concerns with shayla.  No goal directed behavior or impulsivity.  Katie\"s affect continues to be incongruent to her reported mood.  She is struggling to get household chores completed.  Continues to report sedentary lifestyle.  Educated on how exercise and activity can help with sleep.  Will stop Trazodone and start Mirtazapine for sleep.  If continues to be a problem, will refer to sleep medicine as sleep has been an issue for several years.  Also plan to switch Celexa as it does not appear to be managing her depression.      1/8/20: Katie's main concern today was weight gain.  Affect was quite bright today in spite of high PHQ-9 and reported persistent low mood. Reports " "positive holiday with her family.  Sleep continues to be inconsistent.  Mood stable with introduction of Geodon.  Will increase and discontinue Olanzapine.  Katie continues to consider a retrial of Depakote which was discontinued in past due to development of rash.      12/11/19: Katie reports her mood is stable but she is describes \"not feeling good or bad.\"  Difficult to discern if emotional blunting or lack of hypomania.  Thoughts continue to be linear and organized.  Speech normal.  Also reports sleeping continues to improve. Previous trials include Lithium (not effective?), Depakote (rash), and Abilify (side effects).  Katie would like to change medications today due to possible emotional blunting and weight gain (5lbs in past 2 weeks).      11/27/19: Increased HS Olanzapine to 5mg and Katie appears much more grounded.  Speech less pressured and mood more stable.  Thoughts more organized and linear.  Katie reports she also has noticed a significant difference.  She also reports she is sleeping much better.  Concerned about weight gain but reports she is very sedentary.      Recent Symptoms:   Depression:  depressed mood, anhedonia, low energy, insomnia, appetite changes, weight changes and poor concentration /memory  Elevated:  none  Psychosis:  none  Anxiety:  periodic worry  Panic Attack:  none  Trauma Related:  none     Recent Substance Use:  No changes reported        Social/ Family History      [1ea,1ea]            [per patient report]               Financial support-  social security disability  Children-  2 adult children  Living situation- Lives in house in [place] with son   Social/spiritial support-  limited/none  Feels safe at home-  Yes   Family history- None      Medical / Surgical History                                 Patient Active Problem List   Diagnosis     Bipolar disorder (H)     Hashimoto's thyroiditis     Incontinence of urine in female     Osteopenia     Reactive airway disease     Vitamin " D deficiency     Overweight     Dizziness     Hyperlipidemia LDL goal <100       Past Surgical History:   Procedure Laterality Date     partial thyroidectomy       TONSILLECTOMY       TUBAL LIGATION          Medical Review of Systems         [2,10]   The remainder of the review of systems is noncontributory  Thyroid removed  Allergy    Quetiapine  Current Medications        Current Outpatient Medications   Medication Sig Dispense Refill     albuterol (PROAIR HFA/PROVENTIL HFA/VENTOLIN HFA) 108 (90 Base) MCG/ACT inhaler Inhale 2 puffs into the lungs as needed for shortness of breath / dyspnea or wheezing       citalopram (CELEXA) 40 MG tablet Take 1 tablet (40 mg) by mouth daily 30 tablet 1     hydrOXYzine (ATARAX) 25 MG tablet Take 2 tablets (50 mg) by mouth At Bedtime Take two tabs at bedtime 60 tablet 1     mirtazapine (REMERON) 7.5 MG tablet Take 1 tablet (7.5 mg) by mouth At Bedtime 30 tablet 0     thyroid (ARMOUR) 32.5 MG tablet Take 1 tablet (32.5 mg) by mouth daily Nature-thyroid 90 tablet 3     ziprasidone (GEODON) 60 MG capsule Take 1 capsule (60 mg) by mouth 2 times daily (with meals). 60 capsule 0     Vitals         [3, 3]   /73   Pulse 87   Wt 76.8 kg (169 lb 6.4 oz)    Mental Status Exam        [9, 14 cog gs]     Alertness: alert  and oriented  Appearance: casually groomed  Behavior/Demeanor: cooperative, pleasant and calm, with good  eye contact   Speech: normal.  Language: good  Psychomotor: normal or unremarkable  Mood: depressed  Affect: full range; was congruent to mood; was congruent to content  Thought Process/Associations: unremarkable  Thought Content:  Reports none;  Denies suicidal ideation and violent ideation  Perception:  Reports none;  Denies auditory hallucinations and visual hallucinations  Insight: adequate  Judgment: intact  Cognition: (6) does  appear grossly intact; formal cognitive testing was not done  Gait/Station and/or Muscle Strength/Tone: unremarkable    Labs and Data                           Rating Scales:    PHQ9    PHQ9 Today:  17  PHQ-9 SCORE 12/18/2019   PHQ-9 Total Score MyChart 17 (Moderately severe depression)   PHQ-9 Total Score 17        Assessment      [m2, h3]     Unspecified Mood Disorder  Bipolar I Disorder (Hx)    [place] Prescription Monitoring Program [] was not checked today:  provider not managing any controlled medications.        Plan                                                                                                                     m2, h3     1) MEDICATION:  Decrease Celexa to 20mg for a week, 10mg the following week then discotinue  Continue [place] 32.5mg (thyroid)  Discontinue Hydroxyzine 50mg at bedtime   Continue Geodon 60mg BID with food  Continue Mirtazapine 7.5mg at bedtime for sleep and mood.  Monitor for mood elevation   Start Wellbutrin XL 150mg daily    2) THERAPY:  Continue with current therapist      RTC: 1 month    CRISIS NUMBERS:   Provided routinely in AVS.    Treatment Risk Statement:  The patient understands the risks, benefits, adverse effects and alternatives. Agrees to treatment with the capacity to do so. No medical contraindications to treatment. Agrees to call clinic for any problems. The patient understands to call 911 or go to the nearest ED if life threatening or urgent symptoms occur.     WHODAS 2.0  TODAY total score = N/A; [a 12-item WHODAS 2.0 assessment was not completed by the pt today and/or recorded in EPIC].       PROVIDER:  ADELINA Lieberman CNP

## 2020-03-04 NOTE — PATIENT INSTRUCTIONS
Thank you for coming to the PSYCHIATRY CLINIC.    Lab Testing:  If you had lab testing today and your results are reassuring or normal they will be mailed to you or sent through [facility] within 7 days.   If the lab tests need quick action we will call you with the results.  The phone number we will call with results is # [phone] (home) . If this is not the best number please call our clinic and change the number.    Medication Refills:  If you need any refills please call your pharmacy and they will contact us. Our fax number for refills is [phone]. Please allow three business for refill processing.   If you need to  your refill at a new pharmacy, please contact the new pharmacy directly. The new pharmacy will help you get your medications transferred.     Scheduling:  If you have any concerns about today's visit or wish to schedule another appointment please call our office during normal business hours [phone] (8-5:00 M-F)    Contact Us:  Please call [phone] during business hours (8-5:00 M-F).  If after clinic hours, or on the weekend, please call  [phone].    Financial Assistance [phone]  [facility] Billing [phone]  Central Billing Office, [facility]: [phone]  [facility]illing [phone]  Medical Records [phone]      MENTAL HEALTH CRISIS NUMBERS:  [facility]:   [facility] - [phone]   Crisis Residence [facility] - [phone]   Walk-In Counseling [facility] [phone]   COPE 24/7 [place] Mobile Team for Adults - [[phone]]; Child - [[phone]]        [facility]:   [facility] - [phone]   Walk-in counseling [facility] - [phone]   Walk-in counseling [facility] - [phone]   Crisis Residence [facility] - [phone]   Urgent Care Adult Mental Health:   --Drop-in, 24/7 crisis line, and [place] Mobile Team  [[phone]]    CRISIS TEXT LINE: Text [phone] from anywhere, anytime, any crisis 24/7;    OR SEE www.crisistextline.org     National Suicide Prevention Lifeline: 215-125-TSAZ (253-135-2865)    Poison Control Center - [phone]    CHILD: [facility] needs assessment team - [phone]     [facility] Lifeline - [phone]; or [facility] Lifeline - [phone]    If you have a medical emergency please call 911or go to the nearest ER.                    _____________________________________________    Again thank you for choosing PSYCHIATRY CLINIC and please let us know how we can best partner with you to improve you and your family's health.  You may be receiving a survey regarding this appointment. We would love to have your feedback, both positive and negative. The survey is done by an external company, so your answers are anonymous.

## 2020-03-04 NOTE — PROGRESS NOTES
[facility] Dermatology Note    Dermatology Problem List:  1. Suspected venous lake of the lip   - s/p cryo 1/29/2020  - will follow up for biopsy  2. Inflamed SK s/p cryo    Encounter Date: Mar 4, 2020    CC:  Chief Complaint   Patient presents with     Derm Problem     Katie is here to get a spot on the upoer lip rechecked - states that it has stayed the same     History of Present Illness:  Ms. Katie rGiffiths is a 59 year old female who presents as a follow up. Last seen one month ago for cryotherapy of a suspected venous lake of the lip.     Today, the patient reports no changes to the lip lesion following her last cryotherapy treatment. She has not seen any drainage in the past month but does report throbbing pain and drainage in the past which is the most bothersome aspect of the lesion. The patient is unsure as to whether she would like the lesion removed and will follow up at a later date to pursue further treatment. Health otherwise stable. No other skin concerns.     Past Medical History:   Patient Active Problem List   Diagnosis     Bipolar disorder (H)     Hashimoto's thyroiditis     Incontinence of urine in female     Osteopenia     Reactive airway disease     Vitamin D deficiency     Overweight     Dizziness     Hyperlipidemia LDL goal <100     Past Medical History:   Diagnosis Date     Bipolar disorder (H)      Depression with anxiety      Hashimoto's thyroiditis      Hyperlipidemia      Mild intermittent asthma      S/P partial thyroidectomy      Superficial perivascular dermatitis      Past Surgical History:   Procedure Laterality Date     partial thyroidectomy       TONSILLECTOMY       TUBAL LIGATION         Social History:  Patient reports that she has never smoked. She has never used smokeless tobacco. She reports current alcohol use. She reports that she does not use drugs.    Family History:  Family History   Problem Relation Age of Onset     Colon Polyps Mother       Eye Disorder Father      Factor V Leiden deficiency Father         pt tested negative     Hyperlipidemia Father        Medications:  Current Outpatient Medications   Medication Sig Dispense Refill     albuterol (PROAIR HFA/PROVENTIL HFA/VENTOLIN HFA) 108 (90 Base) MCG/ACT inhaler Inhale 2 puffs into the lungs as needed for shortness of breath / dyspnea or wheezing       buPROPion (WELLBUTRIN XL) 150 MG 24 hr tablet Take 1 tablet (150 mg) by mouth every morning 30 tablet 0     citalopram (CELEXA) 10 MG tablet Take 1 tablet (10mg) for a week then discontinue 7 tablet 0     citalopram (CELEXA) 40 MG tablet Take 1/2 tablet (20mg) for week then decrease to 10mg tabs 30 tablet 1     hydrOXYzine (ATARAX) 25 MG tablet Take 2 tablets (50 mg) by mouth At Bedtime Take two tabs at bedtime 60 tablet 1     mirtazapine (REMERON) 7.5 MG tablet Take 1 tablet (7.5 mg) by mouth At Bedtime 30 tablet 0     thyroid (ARMOUR) 32.5 MG tablet Take 1 tablet (32.5 mg) by mouth daily Nature-thyroid 90 tablet 3     ziprasidone (GEODON) 60 MG capsule Take 1 capsule (60 mg) by mouth 2 times daily (with meals) 60 capsule 0       Allergies   Allergen Reactions     Quetiapine Rash       Review of Systems:  -Constitutional: Patient is otherwise feeling well, in usual state of health.   -Skin: As above in HPI. No additional skin concerns.    Physical exam:  Vitals: There were no vitals taken for this visit.  GEN: This is a well developed, well-nourished female in no acute distress, in a pleasant mood.    SKIN: Full skin, which includes the head/face, both arms, chest, back, abdomen,both legs, genitalia and/or groin buttocks, digits and/or nails, was examined.  - Lopez Type II  - Blue papule on the central lower lip, non-tender to palpation.   - No other lesions of concern on areas examined.     Impression/Plan:    1. Suspected venous lake of the lip    Discussed removal of the lesion via biopsy but the patient deferred today and will follow up in  two weeks if she elects to pursue the procedure.      Follow-up in two weeks, earlier for new or changing lesions.     Staff Involved:  Scribe/Staff    Scribe Disclosure  I, Richa Flores, am serving as a scribe to document services personally performed by Dr. Oscar Ji MD, based on data collection and the provider's statements to me.     Provider Disclosure:   The documentation recorded by the scribe accurately reflects the services I personally performed and the decisions made by me.    Oscar Ji MD    Department of Dermatology  [facility]: Phone: [phone], Fax:[phone]  [facility]: Phone: [phone] Fax: [phone]

## 2020-03-11 ENCOUNTER — HEALTH MAINTENANCE LETTER (OUTPATIENT)
Age: 60
End: 2020-03-11

## 2020-03-11 ENCOUNTER — TELEPHONE (OUTPATIENT)
Dept: PSYCHIATRY | Facility: CLINIC | Age: 60
End: 2020-03-11

## 2020-03-11 NOTE — TELEPHONE ENCOUNTER
Health Call Center    Phone Message    May a detailed message be left on voicemail: yes     Reason for Call: Other:   Patient was referred for a sleep study by [facility] No but the patient has not yet received a call from that clinic. Patient is wondering if she can skip the consult and just do the sleep study.      Action Taken: Message routed to:  Other: Nursing pool    Travel Screening: Not Applicable

## 2020-03-16 NOTE — TELEPHONE ENCOUNTER
Writer reviewed pt's chart and confirmed the provider entered the sleep referral in early March. Tried calling the sleep center at [phone]. Sat on hold for approximately 15 mins.    Called the pt and informed her that order has been entered. Provided center's phone number and encouraged her to call to schedule an appt. Explained that a consult is likely needed before the sleep study. She voiced understanding.

## 2020-03-18 ENCOUNTER — OFFICE VISIT (OUTPATIENT)
Dept: DERMATOLOGY | Facility: CLINIC | Age: 60
End: 2020-03-18
Payer: MEDICARE

## 2020-03-18 ENCOUNTER — OFFICE VISIT (OUTPATIENT)
Dept: INTERNAL MEDICINE | Facility: CLINIC | Age: 60
End: 2020-03-18
Payer: MEDICARE

## 2020-03-18 VITALS
DIASTOLIC BLOOD PRESSURE: 79 MMHG | HEART RATE: 85 BPM | SYSTOLIC BLOOD PRESSURE: 126 MMHG | OXYGEN SATURATION: 95 % | WEIGHT: 169 LBS

## 2020-03-18 DIAGNOSIS — D48.5 NEOPLASM OF UNCERTAIN BEHAVIOR OF SKIN: Primary | ICD-10-CM

## 2020-03-18 DIAGNOSIS — J45.20 MILD INTERMITTENT ASTHMA WITHOUT COMPLICATION: ICD-10-CM

## 2020-03-18 DIAGNOSIS — J30.2 SEASONAL ALLERGIC RHINITIS, UNSPECIFIED TRIGGER: Primary | ICD-10-CM

## 2020-03-18 PROCEDURE — 88305 TISSUE EXAM BY PATHOLOGIST: CPT | Mod: TC | Performed by: DERMATOLOGY

## 2020-03-18 PROCEDURE — 88341 IMHCHEM/IMCYTCHM EA ADD ANTB: CPT | Mod: TC | Performed by: DERMATOLOGY

## 2020-03-18 PROCEDURE — 88342 IMHCHEM/IMCYTCHM 1ST ANTB: CPT | Mod: TC | Performed by: DERMATOLOGY

## 2020-03-18 RX ORDER — ALBUTEROL SULFATE 90 UG/1
2 AEROSOL, METERED RESPIRATORY (INHALATION) PRN
Qty: 18 G | Refills: 1 | Status: SHIPPED | OUTPATIENT
Start: 2020-03-18 | End: 2021-10-06

## 2020-03-18 RX ORDER — CETIRIZINE HYDROCHLORIDE 10 MG/1
10 TABLET ORAL DAILY
Qty: 90 TABLET | Refills: 1 | Status: SHIPPED | OUTPATIENT
Start: 2020-03-18 | End: 2020-11-10

## 2020-03-18 ASSESSMENT — PAIN SCALES - GENERAL
PAINLEVEL: NO PAIN (0)
PAINLEVEL: NO PAIN (0)

## 2020-03-18 NOTE — NURSING NOTE
Chief Complaint   Patient presents with     Recheck Medication     pt here for follow up and allergy med       Segundo Odonnell CMA, EMT at 8:20 AM on 3/18/2020.

## 2020-03-18 NOTE — PROGRESS NOTES
[facility] Dermatology Note    Dermatology Problem List:  1. 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 Inflamed SK s/p cryo    Encounter Date: Mar 18, 2020    CC:  Chief Complaint   Patient presents with     Derm Problem     Katie is here today for Lip Biopsy.      History of Present Illness:  Ms. Katie Griffiths is a 59 year old female who presents as a follow up. Last seen one month ago for cryotherapy of a suspected venous lake of the lip.     Today, the patient reports no changes to the lip lesion. She is interested in biopsy and cautery of the lesions today. Health otherwise stable. No other skin concerns.     Past Medical History:   Patient Active Problem List   Diagnosis     Bipolar disorder (H)     Hashimoto's thyroiditis     Incontinence of urine in female     Osteopenia     Reactive airway disease     Vitamin D deficiency     Overweight     Dizziness     Hyperlipidemia LDL goal <100     Past Medical History:   Diagnosis Date     Bipolar disorder (H)      Depression with anxiety      Hashimoto's thyroiditis      Hyperlipidemia      Mild intermittent asthma      S/P partial thyroidectomy      Superficial perivascular dermatitis      Past Surgical History:   Procedure Laterality Date     partial thyroidectomy       TONSILLECTOMY       TUBAL LIGATION         Social History:  Patient reports that she has never smoked. She has never used smokeless tobacco. She reports current alcohol use. She reports that she does not use drugs.    Family History:  Family History   Problem Relation Age of Onset     Colon Polyps Mother      Eye Disorder Father      Factor V Leiden deficiency Father         pt tested negative     Hyperlipidemia Father        Medications:  Current Outpatient Medications   Medication Sig Dispense Refill     albuterol (PROAIR HFA/PROVENTIL HFA/VENTOLIN HFA) 108 (90 Base) MCG/ACT inhaler Inhale 2 puffs into the lungs as  "needed for shortness of breath / dyspnea or wheezing 18 g 1     buPROPion (WELLBUTRIN XL) 150 MG 24 hr tablet Take 1 tablet (150 mg) by mouth every morning 30 tablet 0     cetirizine (ZYRTEC) 10 MG tablet Take 1 tablet (10 mg) by mouth daily 90 tablet 1     citalopram (CELEXA) 10 MG tablet Take 1 tablet (10mg) for a week then discontinue 7 tablet 0     citalopram (CELEXA) 40 MG tablet Take 1/2 tablet (20mg) for week then decrease to 10mg tabs 30 tablet 1     hydrOXYzine (ATARAX) 25 MG tablet Take 2 tablets (50 mg) by mouth At Bedtime Take two tabs at bedtime 60 tablet 1     mirtazapine (REMERON) 7.5 MG tablet Take 1 tablet (7.5 mg) by mouth At Bedtime 30 tablet 0     thyroid (ARMOUR) 32.5 MG tablet Take 1 tablet (32.5 mg) by mouth daily Nature-thyroid 90 tablet 3     ziprasidone (GEODON) 60 MG capsule Take 1 capsule (60 mg) by mouth 2 times daily (with meals) 60 capsule 0       Allergies   Allergen Reactions     Quetiapine Rash       Review of Systems:  -Constitutional: Patient is otherwise feeling well, in usual state of health.   -Skin: As above in HPI. No additional skin concerns.    Physical exam:  Vitals: There were no vitals taken for this visit.  GEN: This is a well developed, well-nourished female in no acute distress, in a pleasant mood.    SKIN: Full skin, which includes the head/face, both arms, chest, back, abdomen,both legs, genitalia and/or groin buttocks, digits and/or nails, was examined.  - Lopez Type II  - Blue papule on the central lower lip, non-tender to palpation.   - No other lesions of concern on areas examined.     Impression/Plan:    1. Neoplasm of uncertain behavior of lip. Suspected venous lake. Discussed risks/benefits of biopsy including but not limited to pain with numbing agent, bleeding/infection, scarring, and possibility that her \"throbbing\" sensation may not improve. She would like to proceed with biopsy today.     PROCEDURE NOTE  Shave biopsy of lip:  After discussion of " benefits and risks including but not limited to bleeding/bruising, pain/swelling, infection, scar, incomplete removal, nerve damage/numbness, recurrence, and non-diagnostic biopsy, written consent, verbal consent and photographs were obtained. Time-out was performed. The area was cleaned with isopropyl alcohol. 0.5mL of 1% lidocaine with epinephrine was injected to obtain adequate anesthesia of the lesion on the central lower mucosal lip. A shave biopsy was performed using a chalazion clamp. Hemostasis was achieved with electrocautery. Vaseline was applied. The patient tolerated the procedure and no complications were noted. The patient was provided with verbal and written post care instructions.     Follow-up in two weeks, earlier for new or changing lesions.     Staff Involved:  Scribe/Staff    Scribe Disclosure  I, Richa Flores, am serving as a scribe to document services personally performed by Dr. Oscar Ji MD, based on data collection and the provider's statements to me.     Provider Disclosure:   The documentation recorded by the scribe accurately reflects the services I personally performed and the decisions made by me.    Oscar Ji MD    Department of Dermatology  [facility]: Phone: [phone], Fax:[phone]  [facility]: Phone: [phone] Fax: [phone]

## 2020-03-18 NOTE — PROGRESS NOTES
Katie Griffiths is a 59 year old female who comes in for    CC: follow-up and seasonal allergies  HPI:    Allergies--nasal congestion and drainage. Hasn't taken anything yet this season. Per chart review, previously took Zyrtec with good results. Would like to restart this. Allergies can worsen her asthma if she is not careful.    Asthma well controlled recently, only used albuterol once since moving here last fall. Tries to stay away from chemicals like bleach, knows triggers of perfumes/colognes, smoke. If she can stay away/avoid triggers, then doesn't need the albuterol.    Urinary incontinence--stopped pelvic floor PT, felt she had too many appointments with everything else, but notes it was helping. Stopped olanzapine and urinary symptoms improved as well. Not completely gone but no where near where it was.     Following with Enzo No in psychiatry. So far meds feel like they're helping, but feels she's not sleeping as much as she needs to be. Sleep study ordered but on hold d/t COVID. Feels like she's sleeping only 4-5 hr, varies by night. Has done different meds to try to help with that. But no longer feeling like she needs to sleep all day d/t depression. Not where she wants it to be, but better than it was. Was previously on Depakote and Clonazapam, which felt like it worked, but given a possible allergic reaction to Depakote and knows clonazapam no longer recommended for long-term treatment, has had trouble getting back to that place in terms of psych med control.    Thyroid--will transition to levothyroxine in ~1 year, working with Dr. Sawyer, wanted to avoid changing to levothyroxine while all the medications with psych.    Seeing derm for lip lesion today.    Other issues discussed today:     Patient Active Problem List   Diagnosis     Bipolar disorder (H)     Hashimoto's thyroiditis     Incontinence of urine in female     Osteopenia     Reactive airway disease     Vitamin D deficiency      Overweight     Dizziness     Hyperlipidemia LDL goal <100       Current Outpatient Medications   Medication Sig Dispense Refill     albuterol (PROAIR HFA/PROVENTIL HFA/VENTOLIN HFA) 108 (90 Base) MCG/ACT inhaler Inhale 2 puffs into the lungs as needed for shortness of breath / dyspnea or wheezing       buPROPion (WELLBUTRIN XL) 150 MG 24 hr tablet Take 1 tablet (150 mg) by mouth every morning 30 tablet 0     citalopram (CELEXA) 10 MG tablet Take 1 tablet (10mg) for a week then discontinue 7 tablet 0     citalopram (CELEXA) 40 MG tablet Take 1/2 tablet (20mg) for week then decrease to 10mg tabs 30 tablet 1     hydrOXYzine (ATARAX) 25 MG tablet Take 2 tablets (50 mg) by mouth At Bedtime Take two tabs at bedtime 60 tablet 1     mirtazapine (REMERON) 7.5 MG tablet Take 1 tablet (7.5 mg) by mouth At Bedtime 30 tablet 0     thyroid (ARMOUR) 32.5 MG tablet Take 1 tablet (32.5 mg) by mouth daily Nature-thyroid 90 tablet 3     ziprasidone (GEODON) 60 MG capsule Take 1 capsule (60 mg) by mouth 2 times daily (with meals) 60 capsule 0         ALLERGIES: Quetiapine    PAST MEDICAL HX:   Past Medical History:   Diagnosis Date     Bipolar disorder (H)      Depression with anxiety      Hashimoto's thyroiditis      Hyperlipidemia      Mild intermittent asthma      S/P partial thyroidectomy      Superficial perivascular dermatitis        PAST SURGICAL HX:   Past Surgical History:   Procedure Laterality Date     partial thyroidectomy       TONSILLECTOMY       TUBAL LIGATION         IMMUNIZATION HX:   There is no immunization history on file for this patient.    SOCIAL HX:   Social History     Social History Narrative    2 children, lives with oldest son in [place].    Youngest son in [employer] in [place] with 2 granddaughters.       ROS:   CONSTITUTIONAL: no fatigue, no unexpected change in weight  SKIN: no worrisome rashes, no worrisome moles, no worrisome lesions  EYES: no acute vision problems or changes  ENT:see HPI  RESP:  no significant cough, no shortness of breath  CV: no chest pain, no palpitations, no new or worsening peripheral edema    OBJECTIVE:  /79 (BP Location: Right arm, Patient Position: Sitting, Cuff Size: Adult Regular)   Pulse 85   Wt 76.7 kg (169 lb)   SpO2 95%    Wt Readings from Last 1 Encounters:   03/18/20 76.7 kg (169 lb)     Constitutional: no distress, comfortable, pleasant, well-groomed  Eyes: anicteric, conjunctiva pink, normal extra-ocular movements   Ears, Nose and Throat:  nose clear and free of lesions, throat clear, mucosa pink and moist.   Neck: supple with full range of motion, no thyromegaly.   Cardiovascular: regular rate and rhythm, normal S1 and S2, no murmurs, rubs or gallops  Respiratory: clear to auscultation with good air movement bilaterally, no wheezes or crackles, non-labored  Psychological: appropriate mood, demonstrates intact judgment and logical thought process  LYMPH: no cervical, supraclavicular, or infraclavicular nodes.    ASSESSMENT/PLAN:    1. Seasonal allergic rhinitis, unspecified trigger  Refill provided for Cetirizine, recommended starting now before peak allergy season.  - cetirizine (ZYRTEC) 10 MG tablet; Take 1 tablet (10 mg) by mouth daily  Dispense: 90 tablet; Refill: 1    2. Mild intermittent asthma without complication  Asthma well controlled as long as she avoids her triggers. Refill provided to have available for future use, though she does not need at this time.   - albuterol (PROAIR HFA/PROVENTIL HFA/VENTOLIN HFA) 108 (90 Base) MCG/ACT inhaler; Inhale 2 puffs into the lungs as needed for shortness of breath / dyspnea or wheezing  Dispense: 18 g; Refill: 1    FOLLOW UP: Return in about 8 months (around 11/18/2020) for Physical Exam. due for pap/pelvic and colonoscopy, sooner as needed for any changes or concerns    ADELINA Rudolph CNP

## 2020-03-18 NOTE — LETTER
3/18/2020       RE: Katie Griffiths  [address] [address]     Dear Colleague,    Thank you for referring your patient, Katie Griffiths, to the [facility]. Please see a copy of my visit note below.    [facility] Dermatology Note    Dermatology Problem List:  1. 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 Inflamed SK s/p cryo    Encounter Date: Mar 18, 2020    CC:  Chief Complaint   Patient presents with     Derm Problem     Katie is here today for Lip Biopsy.      History of Present Illness:  Ms. Katie Griffiths is a 59 year old female who presents as a follow up. Last seen one month ago for cryotherapy of a suspected venous lake of the lip.     Today, the patient reports no changes to the lip lesion. She is interested in biopsy and cautery of the lesions today. Health otherwise stable. No other skin concerns.     Past Medical History:   Patient Active Problem List   Diagnosis     Bipolar disorder (H)     Hashimoto's thyroiditis     Incontinence of urine in female     Osteopenia     Reactive airway disease     Vitamin D deficiency     Overweight     Dizziness     Hyperlipidemia LDL goal <100     Past Medical History:   Diagnosis Date     Bipolar disorder (H)      Depression with anxiety      Hashimoto's thyroiditis      Hyperlipidemia      Mild intermittent asthma      S/P partial thyroidectomy      Superficial perivascular dermatitis      Past Surgical History:   Procedure Laterality Date     partial thyroidectomy       TONSILLECTOMY       TUBAL LIGATION         Social History:  Patient reports that she has never smoked. She has never used smokeless tobacco. She reports current alcohol use. She reports that she does not use drugs.    Family History:  Family History   Problem Relation Age of Onset     Colon Polyps Mother      Eye Disorder  Father      Factor V Leiden deficiency Father         pt tested negative     Hyperlipidemia Father        Medications:  Current Outpatient Medications   Medication Sig Dispense Refill     albuterol (PROAIR HFA/PROVENTIL HFA/VENTOLIN HFA) 108 (90 Base) MCG/ACT inhaler Inhale 2 puffs into the lungs as needed for shortness of breath / dyspnea or wheezing 18 g 1     buPROPion (WELLBUTRIN XL) 150 MG 24 hr tablet Take 1 tablet (150 mg) by mouth every morning 30 tablet 0     cetirizine (ZYRTEC) 10 MG tablet Take 1 tablet (10 mg) by mouth daily 90 tablet 1     citalopram (CELEXA) 10 MG tablet Take 1 tablet (10mg) for a week then discontinue 7 tablet 0     citalopram (CELEXA) 40 MG tablet Take 1/2 tablet (20mg) for week then decrease to 10mg tabs 30 tablet 1     hydrOXYzine (ATARAX) 25 MG tablet Take 2 tablets (50 mg) by mouth At Bedtime Take two tabs at bedtime 60 tablet 1     mirtazapine (REMERON) 7.5 MG tablet Take 1 tablet (7.5 mg) by mouth At Bedtime 30 tablet 0     thyroid (ARMOUR) 32.5 MG tablet Take 1 tablet (32.5 mg) by mouth daily Nature-thyroid 90 tablet 3     ziprasidone (GEODON) 60 MG capsule Take 1 capsule (60 mg) by mouth 2 times daily (with meals) 60 capsule 0       Allergies   Allergen Reactions     Quetiapine Rash       Review of Systems:  -Constitutional: Patient is otherwise feeling well, in usual state of health.   -Skin: As above in HPI. No additional skin concerns.    Physical exam:  Vitals: There were no vitals taken for this visit.  GEN: This is a well developed, well-nourished female in no acute distress, in a pleasant mood.    SKIN: Full skin, which includes the head/face, both arms, chest, back, abdomen,both legs, genitalia and/or groin buttocks, digits and/or nails, was examined.  - Lopez Type II  - Blue papule on the central lower lip, non-tender to palpation.   - No other lesions of concern on areas examined.     Impression/Plan:    1. Neoplasm of uncertain behavior of lip. Suspected  "venous lake. Discussed risks/benefits of biopsy including but not limited to pain with numbing agent, bleeding/infection, scarring, and possibility that her \"throbbing\" sensation may not improve. She would like to proceed with biopsy today.     PROCEDURE NOTE  Shave biopsy of lip:  After discussion of benefits and risks including but not limited to bleeding/bruising, pain/swelling, infection, scar, incomplete removal, nerve damage/numbness, recurrence, and non-diagnostic biopsy, written consent, verbal consent and photographs were obtained. Time-out was performed. The area was cleaned with isopropyl alcohol. 0.5mL of 1% lidocaine with epinephrine was injected to obtain adequate anesthesia of the lesion on the central lower mucosal lip. A shave biopsy was performed using a chalazion clamp. Hemostasis was achieved with electrocautery. Vaseline was applied. The patient tolerated the procedure and no complications were noted. The patient was provided with verbal and written post care instructions.     Follow-up in two weeks, earlier for new or changing lesions.     Staff Involved:  Scribe/Staff    Scribe Disclosure  I, Richa Flores, am serving as a scribe to document services personally performed by Dr. Oscar iJ MD, based on data collection and the provider's statements to me.     Provider Disclosure:   The documentation recorded by the scribe accurately reflects the services I personally performed and the decisions made by me.    Oscar Ji MD    Department of Dermatology  [facility]: Phone: [phone], Fax:[phone]  [facility]: Phone: [phone] Fax: [phone]              "

## 2020-03-18 NOTE — PATIENT INSTRUCTIONS

## 2020-03-18 NOTE — NURSING NOTE
Chief Complaint   Patient presents with     Derm Problem     Katie is here today for Lip Biopsy.      Zuleima Terrazas LPN

## 2020-03-18 NOTE — PATIENT INSTRUCTIONS
[facility] Medication Refill Request Information:  * Please contact your pharmacy regarding ANY request for medication refills.  ** [facility] Prescription Fax = [phone]  * Please allow 3 business days for routine medication refills.  * Please allow 5 business days for controlled substance medication refills.     [facility] Test Result notification information:  *You will be notified with in 7-10 days of your appointment day regarding the results of your test.  If you are on MyChart you will be notified as soon as the provider has reviewed the results and signed off on them.     [facility]: [phone]

## 2020-03-20 LAB — COPATH REPORT: NORMAL

## 2020-03-23 NOTE — RESULT ENCOUNTER NOTE
"Can you call patient and let her know that:    (1) The pathology report was non-specific, but did not show evidence of a vascular lesion like a venous lake on the lip.   (2) I think it certainly possible that your symptoms of a \"pulsating sensation\" could be related to what is called a \"caliber persistent labial artery\" or simply a \"big\" labial artery (the artery that runs through your lower lip).   (3) I'd like you to let things heal up for a few weeks and to schedule a follow-up for about 3 weeks via telephone for us to check-in again on if it symptoms changed.   (4) If you're still experiencing that \"pulsating sensation\", we could then pursue some ultrasound imaging of your lip. We also do have a plastic surgeon at [facility] who can do surgeries for patient with \"caliber persistent labial arteries\" and we could refer you to him if the ultrasound is consistent.     Please make phone follow-up with me in 3 weeks with plan as above.     Thanks!    Oscar Ji MD  Pronouns: he/him/his    Department of Dermatology  [facility]: Phone: [phone], Fax:[phone]  [facility]: Phone: [phone] Fax: [phone]  "

## 2020-03-31 ENCOUNTER — VIRTUAL VISIT (OUTPATIENT)
Dept: PSYCHIATRY | Facility: CLINIC | Age: 60
End: 2020-03-31
Attending: NURSE PRACTITIONER
Payer: MEDICARE

## 2020-03-31 DIAGNOSIS — F39 MOOD DISORDER (H): ICD-10-CM

## 2020-03-31 RX ORDER — BUPROPION HYDROCHLORIDE 300 MG/1
300 TABLET ORAL EVERY MORNING
Qty: 30 TABLET | Refills: 1 | Status: SHIPPED | OUTPATIENT
Start: 2020-03-31 | End: 2020-05-28

## 2020-03-31 RX ORDER — ZIPRASIDONE HYDROCHLORIDE 60 MG/1
60 CAPSULE ORAL 2 TIMES DAILY WITH MEALS
Qty: 60 CAPSULE | Refills: 1 | Status: SHIPPED | OUTPATIENT
Start: 2020-03-31 | End: 2020-06-09

## 2020-03-31 RX ORDER — MIRTAZAPINE 7.5 MG/1
7.5 TABLET, FILM COATED ORAL AT BEDTIME
Qty: 30 TABLET | Refills: 1 | Status: SHIPPED | OUTPATIENT
Start: 2020-03-31 | End: 2020-05-29

## 2020-03-31 NOTE — PROGRESS NOTES
"         [facility]  Psychiatry Clinic  PSYCHIATRIC PROGRESS NOTE       Katie Griffiths is a 59 year old female who prefers the name Katie and pronoun she, her.  Therapist: Amirah Tejeda @ [facility]               PCP: Buddy Nolan  Other Providers: None    Pertinent Background:  See previous notes.  Psych critical item history includes Hospitalized 3 times with most recent occurring in 2007 after overdose attempt.  Numerous medication trials.  Possible bipolar diagnosis.     Interim History     [4, 4]     The patient is a good historian, reports good treatment adherence and was last seen 3/4/20.  Since the last visit, when asked how she is doing, Katie reports that switch from Celexa to Wellbutrin has been beneficial in regards to mood, energy, and motivation.  No concerns with elevated mood.  Sleep continues to be an issue.  Did not worsen with addition of Wellbutrin.  Has sleep study in 3 months.  Regarding psychosocial stressors, Katie is babysitting her grandchildren as their mother is sick and is going through bankruptcy.       3/4/20: Katie again starts laughing.  Mood is stable overall but low.  Continues to endorse anhedonia, energy, and low mood. Sleep has improved since starting Remeron.  Reports sleeping 5 hours per night which results in continued daytime tiredness.    Continues to report that Celexa is not helpful and possible causing sedation    2/12/20: Katie shared that she is not sleeping.  She reports that she is getting 3 hours of sleep per night for past couple weeks. However, Katie did not look overly fatigued during appointment.  She also reports that her mood is quite low.  No concerns with shayla.  No goal directed behavior or impulsivity.  Katie\"s affect continues to be incongruent to her reported mood.  She is struggling to get household chores completed.  Continues to report sedentary lifestyle.  Educated on how exercise and activity can help with " "sleep.  Will stop Trazodone and start Mirtazapine for sleep.  If continues to be a problem, will refer to sleep medicine as sleep has been an issue for several years.  Also plan to switch Celexa as it does not appear to be managing her depression.      1/8/20: Katie's main concern today was weight gain.  Affect was quite bright today in spite of high PHQ-9 and reported persistent low mood. Reports positive holiday with her family.  Sleep continues to be inconsistent.  Mood stable with introduction of Geodon.  Will increase and discontinue Olanzapine.  Katie continues to consider a retrial of Depakote which was discontinued in past due to development of rash.      12/11/19: Katie reports her mood is stable but she is describes \"not feeling good or bad.\"  Difficult to discern if emotional blunting or lack of hypomania.  Thoughts continue to be linear and organized.  Speech normal.  Also reports sleeping continues to improve. Previous trials include Lithium (not effective?), Depakote (rash), and Abilify (side effects).  Katie would like to change medications today due to possible emotional blunting and weight gain (5lbs in past 2 weeks).      11/27/19: Increased HS Olanzapine to 5mg and Katie appears much more grounded.  Speech less pressured and mood more stable.  Thoughts more organized and linear.  Katie reports she also has noticed a significant difference.  She also reports she is sleeping much better.  Concerned about weight gain but reports she is very sedentary.      Recent Symptoms:   Depression:  depressed mood, anhedonia, low energy and insomnia  Elevated:  none  Psychosis:  none  Anxiety:  periodic worry  Panic Attack:  none  Trauma Related:  none     Recent Substance Use:  No changes reported        Social/ Family History      [1ea,1ea]            [per patient report]               Financial support-  social security disability  Children-  2 adult children  Living situation- Lives in house in [place] with son "   Social/spiritial support-  limited/none  Feels safe at home-  Yes   Family history- None      Medical / Surgical History                                 Patient Active Problem List   Diagnosis     Bipolar disorder (H)     Hashimoto's thyroiditis     Incontinence of urine in female     Osteopenia     Reactive airway disease     Vitamin D deficiency     Overweight     Dizziness     Hyperlipidemia LDL goal <100       Past Surgical History:   Procedure Laterality Date     partial thyroidectomy       TONSILLECTOMY       TUBAL LIGATION          Medical Review of Systems         [2,10]   The remainder of the review of systems is noncontributory  Thyroid removed  Allergy    Quetiapine  Current Medications        Current Outpatient Medications   Medication Sig Dispense Refill     albuterol (PROAIR HFA/PROVENTIL HFA/VENTOLIN HFA) 108 (90 Base) MCG/ACT inhaler Inhale 2 puffs into the lungs as needed for shortness of breath / dyspnea or wheezing 18 g 1     buPROPion (WELLBUTRIN XL) 150 MG 24 hr tablet Take 1 tablet (150 mg) by mouth every morning 30 tablet 0     cetirizine (ZYRTEC) 10 MG tablet Take 1 tablet (10 mg) by mouth daily 90 tablet 1     citalopram (CELEXA) 10 MG tablet Take 1 tablet (10mg) for a week then discontinue 7 tablet 0     citalopram (CELEXA) 40 MG tablet Take 1/2 tablet (20mg) for week then decrease to 10mg tabs 30 tablet 1     hydrOXYzine (ATARAX) 25 MG tablet Take 2 tablets (50 mg) by mouth At Bedtime Take two tabs at bedtime 60 tablet 1     mirtazapine (REMERON) 7.5 MG tablet Take 1 tablet (7.5 mg) by mouth At Bedtime 30 tablet 0     thyroid (ARMOUR) 32.5 MG tablet Take 1 tablet (32.5 mg) by mouth daily Nature-thyroid 90 tablet 3     ziprasidone (GEODON) 60 MG capsule Take 1 capsule (60 mg) by mouth 2 times daily (with meals) 60 capsule 0     Vitals         [3, 3]   There were no vitals taken for this visit.   Mental Status Exam        [9, 14 cog gs]     Alertness: alert  and oriented  Appearance:  "unable to asses  Behavior/Demeanor: cooperative and pleasant, with unable to assess eye contact   Speech: normal.  Language: good  Psychomotor: normal or unremarkable  Mood: \"ok, stable\"  Affect: unable to assess; was congruent to mood; was congruent to content  Thought Process/Associations: unremarkable  Thought Content:  Reports none;  Denies suicidal ideation and violent ideation  Perception:  Reports none;  Denies auditory hallucinations and visual hallucinations  Insight: adequate  Judgment: intact  Cognition: (6) does  appear grossly intact; formal cognitive testing was not done  Gait/Station and/or Muscle Strength/Tone: unable to assess    Labs and Data                          Rating Scales:    PHQ9    PHQ9 Today:  Not completed  PHQ-9 SCORE 12/18/2019   PHQ-9 Total Score MyChart 17 (Moderately severe depression)   PHQ-9 Total Score 17        Assessment      [m2, h3]     Unspecified Mood Disorder  Bipolar I Disorder (Hx)    [place] Prescription Monitoring Program [] was not checked today:  provider not managing any controlled medications.        Plan                                                                                                                     m2, h3     1) MEDICATION:  Continue [place] 32.5mg (thyroid)  Discontinue Hydroxyzine 50mg at bedtime   Continue Geodon 60mg BID with food  Continue Mirtazapine 7.5mg at bedtime for sleep and mood.  Monitor for mood elevation and weight gain  Increase Wellbutrin XL to 300mg daily    2) THERAPY:  Continue with current therapist      RTC: 1 month    CRISIS NUMBERS:   Provided routinely in AVS.    Treatment Risk Statement:  The patient understands the risks, benefits, adverse effects and alternatives. Agrees to treatment with the capacity to do so. No medical contraindications to treatment. Agrees to call clinic for any problems. The patient understands to call 911 or go to the nearest ED if life threatening or urgent symptoms occur.     WHODAS 2.0  " "TODAY total score = N/A; [a 12-item WHODAS 2.0 assessment was not completed by the pt today and/or recorded in EPIC].       PROVIDER:  ADELINA Lieberman CNP    TELEPHONE VISIT  Katie Griffiths is a 59 year old pt. who is being evaluated via a billable telephone visit.      The patient has been notified of the following:    We have found that certain health care needs can be provided without the need for a physical exam. This service lets us provide the care you need with a short phone conversation. If a prescription is necessary we can send it directly to your pharmacy. If lab work is needed we can place an order for that and you can then stop by our lab to have the test done at a later time.     Telephone visits are billed at different rates depending on your insurance coverage. During this emergency period, for some insurers they may be billed the same as an in-person visit. Please reach out to your insurance provider with any questions.   If during the course of the call the it appears that a telephone visit is not appropriate, you will not be charged for this service.\"    Patient has given verbal consent for a telephone visit?:  Yes   How would the pt like to obtain the AVS?:  not requested  AVS SmartPhrase [PsychAVS] has been placed in 'Patient Instructions':  unknown     Start Time:  1:45pm          End Time:  2:00pm            "

## 2020-04-02 ENCOUNTER — DOCUMENTATION ONLY (OUTPATIENT)
Dept: CARE COORDINATION | Facility: CLINIC | Age: 60
End: 2020-04-02

## 2020-04-10 ENCOUNTER — TELEPHONE (OUTPATIENT)
Dept: DERMATOLOGY | Facility: CLINIC | Age: 60
End: 2020-04-10

## 2020-04-10 NOTE — TELEPHONE ENCOUNTER
Informed patient that due to the COVID-19 virus, we are minimizing all non-emergent appointments and would like to offer her a teledermatology visit.     Patient declined a teledermatology visit, stating she has no concerns at this time. Patient will call back at a later time to reschedule. Advised her to contact clinic with any questions or concerns.   Clear

## 2020-05-06 ENCOUNTER — VIRTUAL VISIT (OUTPATIENT)
Dept: PSYCHIATRY | Facility: CLINIC | Age: 60
End: 2020-05-06
Attending: NURSE PRACTITIONER
Payer: MEDICARE

## 2020-05-06 DIAGNOSIS — F39 MOOD DISORDER (H): Primary | ICD-10-CM

## 2020-05-06 NOTE — PROGRESS NOTES
[facility]  Psychiatry Clinic  PSYCHIATRIC PROGRESS NOTE       Katie Griffiths is a 59 year old female who prefers the name Katie and pronoun she, her.  Therapist: Amirah Tejeda @ [facility]               PCP: Buddy Nolan  Other Providers: None    Pertinent Background:  See previous notes.  Psych critical item history includes Hospitalized 3 times with most recent occurring in 2007 after overdose attempt.  Numerous medication trials.  Possible bipolar diagnosis.     Interim History     [4, 4]     The patient is a good historian, reports good treatment adherence and was last seen 3/31/20.  Since the last visit, Katie continues to endorse stress regarding bankruptcy.  She has phone hearing with  at the end of the month of May.  Katie continues to report lack of activity which may be attributable to lifestyle changes required during pandemic, but does report overall improvement with increase in Wellbutrin.  Sleep also continues to be an issue but she also reports it continues to improve.  Sleep medicine consultation in 2 months. No concerns with elevated mood.  Katie did not want to adjust her medications     3/31/20: when asked how she is doing, Katie reports that switch from Celexa to Wellbutrin has been beneficial in regards to mood, energy, and motivation.  No concerns with elevated mood.  Sleep continues to be an issue.  Did not worsen with addition of Wellbutrin.  Has sleep study in 3 months.  Regarding psychosocial stressors, Katie is babysitting her grandchildren as their mother is sick and is going through bankruptcy.       3/4/20: Katie again starts laughing.  Mood is stable overall but low.  Continues to endorse anhedonia, energy, and low mood. Sleep has improved since starting Remeron.  Reports sleeping 5 hours per night which results in continued daytime tiredness.    Continues to report that Celexa is not helpful and possible causing  "sedation    2/12/20: Katie shared that she is not sleeping.  She reports that she is getting 3 hours of sleep per night for past couple weeks. However, Katie did not look overly fatigued during appointment.  She also reports that her mood is quite low.  No concerns with shayla.  No goal directed behavior or impulsivity.  Katie\"s affect continues to be incongruent to her reported mood.  She is struggling to get household chores completed.  Continues to report sedentary lifestyle.  Educated on how exercise and activity can help with sleep.  Will stop Trazodone and start Mirtazapine for sleep.  If continues to be a problem, will refer to sleep medicine as sleep has been an issue for several years.  Also plan to switch Celexa as it does not appear to be managing her depression.      1/8/20: Katie's main concern today was weight gain.  Affect was quite bright today in spite of high PHQ-9 and reported persistent low mood. Reports positive holiday with her family.  Sleep continues to be inconsistent.  Mood stable with introduction of Geodon.  Will increase and discontinue Olanzapine.  Katie continues to consider a retrial of Depakote which was discontinued in past due to development of rash.      12/11/19: Katie reports her mood is stable but she is describes \"not feeling good or bad.\"  Difficult to discern if emotional blunting or lack of hypomania.  Thoughts continue to be linear and organized.  Speech normal.  Also reports sleeping continues to improve. Previous trials include Lithium (not effective?), Depakote (rash), and Abilify (side effects).  Katie would like to change medications today due to possible emotional blunting and weight gain (5lbs in past 2 weeks).      11/27/19: Increased HS Olanzapine to 5mg and Katie appears much more grounded.  Speech less pressured and mood more stable.  Thoughts more organized and linear.  Katie reports she also has noticed a significant difference.  She also reports she is sleeping much " better.  Concerned about weight gain but reports she is very sedentary.      Recent Symptoms:   Depression:  depressed mood, anhedonia, low energy and insomnia  Elevated:  none  Psychosis:  none  Anxiety:  periodic worry  Panic Attack:  none  Trauma Related:  none     Recent Substance Use:  No changes reported        Social/ Family History      [1ea,1ea]            [per patient report]               Financial support-  social security disability  Children-  2 adult children  Living situation- Lives in house in [place] with son   Social/spiritial support-  limited/none  Feels safe at home-  Yes   Family history- None      Medical / Surgical History                                 Patient Active Problem List   Diagnosis     Bipolar disorder (H)     Hashimoto's thyroiditis     Incontinence of urine in female     Osteopenia     Reactive airway disease     Vitamin D deficiency     Overweight     Dizziness     Hyperlipidemia LDL goal <100       Past Surgical History:   Procedure Laterality Date     partial thyroidectomy       TONSILLECTOMY       TUBAL LIGATION          Medical Review of Systems         [2,10]   The remainder of the review of systems is noncontributory  Thyroid removed  Allergy    Quetiapine  Current Medications        Current Outpatient Medications   Medication Sig Dispense Refill     albuterol (PROAIR HFA/PROVENTIL HFA/VENTOLIN HFA) 108 (90 Base) MCG/ACT inhaler Inhale 2 puffs into the lungs as needed for shortness of breath / dyspnea or wheezing 18 g 1     buPROPion (WELLBUTRIN XL) 300 MG 24 hr tablet Take 1 tablet (300 mg) by mouth every morning 30 tablet 1     cetirizine (ZYRTEC) 10 MG tablet Take 1 tablet (10 mg) by mouth daily 90 tablet 1     hydrOXYzine (ATARAX) 25 MG tablet Take 2 tablets (50 mg) by mouth At Bedtime Take two tabs at bedtime 60 tablet 1     mirtazapine (REMERON) 7.5 MG tablet Take 1 tablet (7.5 mg) by mouth At Bedtime 30 tablet 1     thyroid (ARMOUR) 32.5 MG tablet Take 1 tablet  "(32.5 mg) by mouth daily Nature-thyroid 90 tablet 3     ziprasidone (GEODON) 60 MG capsule Take 1 capsule (60 mg) by mouth 2 times daily (with meals) 60 capsule 1     Vitals         [3, 3]   There were no vitals taken for this visit.   Mental Status Exam        [9, 14 cog gs]     Alertness: alert  and oriented  Appearance: unable to asses  Behavior/Demeanor: cooperative and pleasant, with unable to assess eye contact   Speech: regular rate and rhythm.  Language: no obvious problem  Psychomotor: normal or unremarkable  Mood: \"ok\"  Affect: unable to assess; was congruent to mood; was congruent to content  Thought Process/Associations: unremarkable  Thought Content:  Reports none;  Denies suicidal ideation and violent ideation  Perception:  Reports none;  Denies auditory hallucinations and visual hallucinations  Insight: adequate  Judgment: intact  Cognition: (6) does  appear grossly intact; formal cognitive testing was not done  Gait/Station and/or Muscle Strength/Tone: unable to assess    Labs and Data                          Rating Scales:    PHQ9    PHQ9 Today:  Not completed  PHQ-9 SCORE 12/18/2019   PHQ-9 Total Score MyChart 17 (Moderately severe depression)   PHQ-9 Total Score 17        Assessment      [m2, h3]     Unspecified Mood Disorder  Bipolar I Disorder (Hx)    [place] Prescription Monitoring [place] [] was not checked today:  provider not managing any controlled medications.        Plan                                                                                                                     m2, h3     1) MEDICATION:  Continue [place] 32.5mg (thyroid)  Discontinue Hydroxyzine 50mg at bedtime   Continue Geodon 60mg BID with food  Continue Mirtazapine 7.5mg at bedtime for sleep and mood.  Monitor for mood elevation and weight gain  Continue Wellbutrin XL 300mg daily    2) THERAPY:  Continue with current therapist      RTC: 1 month    CRISIS NUMBERS:   Provided routinely in AVS.    Treatment Risk " "Statement:  The patient understands the risks, benefits, adverse effects and alternatives. Agrees to treatment with the capacity to do so. No medical contraindications to treatment. Agrees to call clinic for any problems. The patient understands to call 911 or go to the nearest ED if life threatening or urgent symptoms occur.     WHODAS 2.0  TODAY total score = N/A; [a 12-item WHODAS 2.0 assessment was not completed by the pt today and/or recorded in EPIC].       PROVIDER:  ADELINA Lieberman CNP    TELEPHONE VISIT  Katie Griffiths is a 59 year old pt. who is being evaluated via a billable telephone visit.      The patient has been notified of the following:    We have found that certain health care needs can be provided without the need for a physical exam. This service lets us provide the care you need with a short phone conversation. If a prescription is necessary we can send it directly to your pharmacy. If lab work is needed we can place an order for that and you can then stop by our lab to have the test done at a later time.     Telephone visits are billed at different rates depending on your insurance coverage. During this emergency period, for some insurers they may be billed the same as an in-person visit. Please reach out to your insurance provider with any questions.   If during the course of the call the it appears that a telephone visit is not appropriate, you will not be charged for this service.\"    Patient has given verbal consent for a telephone visit?:  Yes   How would the pt like to obtain the AVS?:  not requested  AVS SmartPhrase [PsychAVS] has been placed in 'Patient Instructions':  unknown     Start Time:  10:54am          End Time:  11:10am  Patient did not answer first attempt          "

## 2020-05-26 DIAGNOSIS — F39 MOOD DISORDER (H): ICD-10-CM

## 2020-05-28 RX ORDER — BUPROPION HYDROCHLORIDE 300 MG/1
300 TABLET ORAL EVERY MORNING
Qty: 30 TABLET | Refills: 0 | Status: SHIPPED | OUTPATIENT
Start: 2020-05-28 | End: 2020-06-09

## 2020-05-28 NOTE — TELEPHONE ENCOUNTER
buPROPion (WELLBUTRIN XL) 300 MG   Last refilled: 3/31/20  Qty: 30  : 1       Last seen: 5/6/20  RTC:1  MOS  Cancel: 0    No-show: 0  Next appt: 6/9/20  Refill decision: Refilled for 30 days per protocol.

## 2020-05-29 ENCOUNTER — TELEPHONE (OUTPATIENT)
Dept: PSYCHIATRY | Facility: CLINIC | Age: 60
End: 2020-05-29

## 2020-05-29 DIAGNOSIS — F39 MOOD DISORDER (H): ICD-10-CM

## 2020-05-29 RX ORDER — MIRTAZAPINE 7.5 MG/1
7.5 TABLET, FILM COATED ORAL AT BEDTIME
Qty: 30 TABLET | Refills: 0 | Status: SHIPPED | OUTPATIENT
Start: 2020-05-29 | End: 2020-06-09

## 2020-05-29 NOTE — TELEPHONE ENCOUNTER
[facility] Call Center    Phone Message    May a detailed message be left on voicemail: yes     Reason for Call: Medication Refill Request    Has the patient contacted the pharmacy for the refill? Yes   Name of medication being requested: Bupropion 300mg  Provider who prescribed the medication: Enzo No  Pharmacy: [id number] - [place] [[place]], [address]  Date medication is needed: 5/31         Action Taken: Message routed to:  Other: P [facility] [place]    Travel Screening: Not Applicable

## 2020-05-29 NOTE — TELEPHONE ENCOUNTER
Reviewed pt's chart. 30 d/s filled yesterday by the refill team and sent to preferred pharmacy. Pt said she also needs a refill of mirtazapine, but is good to go on other medications. Agreed to send 30 d/s of mirtazapine to pharmacy as well to avoid any lapses in medication.

## 2020-06-08 ENCOUNTER — TELEPHONE (OUTPATIENT)
Dept: PSYCHIATRY | Facility: CLINIC | Age: 60
End: 2020-06-08

## 2020-06-08 NOTE — TELEPHONE ENCOUNTER
Social Work   Incoming Voicemail  [facility]    Incoming Voicemail From:  Katie Griffiths    Content of Voicemail:    Katie is requesting assistance. She needs a place to stay and needs help in less than 30 days. She has looked at homeless shelters on line and thinks they are only for families or men.    Response/Plan:    SW returned call and talked directly with Katie. Her son does not want her around the home when he is there with his kids or when he has company. He wants her to leave or to stay in the basement, which is minimally furnished. She reports her son cannot stand her. She is physically safe, but is holding in her emotions and feels it is making her physically sick. She has looked on line for apartments in [place], but cannot afford any of them. She is aware of the long waitlists for subsidized apartments. She is also going through bankruptcy.    SW asked patient to identify what she can currently afford for rent. She is unsure and will work on this week. Plan was created to talk by phone on 6/12/20 at 1 pm. SW will review possible options at that time.      Will route to patient's current psychiatric provider(s) as an FYI.   Please call or EPIC message with any questions or concerns.    Kaley Kelley, AMANDA, LICSW

## 2020-06-09 ENCOUNTER — VIRTUAL VISIT (OUTPATIENT)
Dept: PSYCHIATRY | Facility: CLINIC | Age: 60
End: 2020-06-09
Attending: NURSE PRACTITIONER
Payer: MEDICARE

## 2020-06-09 DIAGNOSIS — F39 MOOD DISORDER (H): ICD-10-CM

## 2020-06-09 DIAGNOSIS — F41.9 ANXIETY: Primary | ICD-10-CM

## 2020-06-09 RX ORDER — MIRTAZAPINE 7.5 MG/1
7.5 TABLET, FILM COATED ORAL AT BEDTIME
Qty: 30 TABLET | Refills: 0 | Status: SHIPPED | OUTPATIENT
Start: 2020-06-09 | End: 2020-07-07

## 2020-06-09 RX ORDER — ZIPRASIDONE HYDROCHLORIDE 60 MG/1
60 CAPSULE ORAL 2 TIMES DAILY WITH MEALS
Qty: 60 CAPSULE | Refills: 1 | Status: SHIPPED | OUTPATIENT
Start: 2020-06-09 | End: 2020-07-07

## 2020-06-09 RX ORDER — BUPROPION HYDROCHLORIDE 300 MG/1
300 TABLET ORAL EVERY MORNING
Qty: 30 TABLET | Refills: 0 | Status: SHIPPED | OUTPATIENT
Start: 2020-06-09 | End: 2020-07-07

## 2020-06-09 NOTE — PROGRESS NOTES
[facility]  Psychiatry Clinic  PSYCHIATRIC PROGRESS NOTE       Katie Griffiths is a 59 year old female who prefers the name Katie and pronoun she, her.  Therapist: Amirah Tejeda @ Private Practice               PCP: Buddy Nolan  Other Providers: None    Pertinent Background:  See previous notes.  Psych critical item history includes Hospitalized 3 times with most recent occurring in 2007 after overdose attempt.  Numerous medication trials.  Possible bipolar diagnosis.     Interim History     [4, 4]     The patient is a good historian, reports good treatment adherence and was last seen 5/6/20.  Since the last visit,  Katie is currently trying to maintain housing.  Reports her current situation living with her son has been extremely stressful.  Currently looking for apartments in [place] and [place].  She working with [facility] social work team.  Katie is very hesitant to change her medications.  She would like to obtain better grasp of her housing before making medication adjustments. Will consult with PCP about possible Propranolol trial.       5/6/20:  Katie continues to endorse stress regarding bankruptcy.  She has phone hearing with  at the end of the month of May.  Katie continues to report lack of activity which may be attributable to lifestyle changes required during pandemic, but does report overall improvement with increase in Wellbutrin.  Sleep also continues to be an issue but she also reports it continues to improve.  Sleep medicine consultation in 2 months. No concerns with elevated mood.  Katie did not want to adjust her medications     3/31/20: when asked how she is doing, Katie reports that switch from Celexa to Wellbutrin has been beneficial in regards to mood, energy, and motivation.  No concerns with elevated mood.  Sleep continues to be an issue.  Did not worsen with addition of Wellbutrin.  Has sleep study in 3 months.  Regarding psychosocial  "stressors, Katie is babysitting her grandchildren as their mother is sick and is going through bankruptcy.       3/4/20: Katie again starts laughing.  Mood is stable overall but low.  Continues to endorse anhedonia, energy, and low mood. Sleep has improved since starting Remeron.  Reports sleeping 5 hours per night which results in continued daytime tiredness.    Continues to report that Celexa is not helpful and possible causing sedation    2/12/20: Katie shared that she is not sleeping.  She reports that she is getting 3 hours of sleep per night for past couple weeks. However, Katie did not look overly fatigued during appointment.  She also reports that her mood is quite low.  No concerns with shayla.  No goal directed behavior or impulsivity.  Katie\"s affect continues to be incongruent to her reported mood.  She is struggling to get household chores completed.  Continues to report sedentary lifestyle.  Educated on how exercise and activity can help with sleep.  Will stop Trazodone and start Mirtazapine for sleep.  If continues to be a problem, will refer to sleep medicine as sleep has been an issue for several years.  Also plan to switch Celexa as it does not appear to be managing her depression.      1/8/20: Katie's main concern today was weight gain.  Affect was quite bright today in spite of high PHQ-9 and reported persistent low mood. Reports positive holiday with her family.  Sleep continues to be inconsistent.  Mood stable with introduction of Geodon.  Will increase and discontinue Olanzapine.  Katie continues to consider a retrial of Depakote which was discontinued in past due to development of rash.      12/11/19: Katie reports her mood is stable but she is describes \"not feeling good or bad.\"  Difficult to discern if emotional blunting or lack of hypomania.  Thoughts continue to be linear and organized.  Speech normal.  Also reports sleeping continues to improve. Previous trials include Lithium (not effective?), " Depakote (rash), and Abilify (side effects).  Katie would like to change medications today due to possible emotional blunting and weight gain (5lbs in past 2 weeks).      11/27/19: Increased HS Olanzapine to 5mg and Katie appears much more grounded.  Speech less pressured and mood more stable.  Thoughts more organized and linear.  Katie reports she also has noticed a significant difference.  She also reports she is sleeping much better.  Concerned about weight gain but reports she is very sedentary.      Recent Symptoms:   Depression:  depressed mood, anhedonia, low energy and insomnia  Elevated:  none  Psychosis:  none  Anxiety:  nervous/overwhelmed  Panic Attack:  none  Trauma Related:  none     Recent Substance Use:  No changes reported        Social/ Family History      [1ea,1ea]            [per patient report]               Financial support-  social security disability  Children-  2 adult children  Living situation- Lives in house in [place] with son   Social/spiritial support-  limited/none  Feels safe at home-  Yes   Family history- None      Medical / Surgical History                                 Patient Active Problem List   Diagnosis     Bipolar disorder (H)     Hashimoto's thyroiditis     Incontinence of urine in female     Osteopenia     Reactive airway disease     Vitamin D deficiency     Overweight     Dizziness     Hyperlipidemia LDL goal <100       Past Surgical History:   Procedure Laterality Date     partial thyroidectomy       TONSILLECTOMY       TUBAL LIGATION          Medical Review of Systems         [2,10]   The remainder of the review of systems is noncontributory  Thyroid removed  Allergy    Quetiapine  Current Medications        Current Outpatient Medications   Medication Sig Dispense Refill     albuterol (PROAIR HFA/PROVENTIL HFA/VENTOLIN HFA) 108 (90 Base) MCG/ACT inhaler Inhale 2 puffs into the lungs as needed for shortness of breath / dyspnea or wheezing 18 g 1     buPROPion  "(WELLBUTRIN XL) 300 MG 24 hr tablet Take 1 tablet (300 mg) by mouth every morning. 30 tablet 0     cetirizine (ZYRTEC) 10 MG tablet Take 1 tablet (10 mg) by mouth daily 90 tablet 1     hydrOXYzine (ATARAX) 25 MG tablet Take 2 tablets (50 mg) by mouth At Bedtime Take two tabs at bedtime 60 tablet 1     mirtazapine (REMERON) 7.5 MG tablet Take 1 tablet (7.5 mg) by mouth At Bedtime 30 tablet 0     thyroid (ARMOUR) 32.5 MG tablet Take 1 tablet (32.5 mg) by mouth daily Nature-thyroid 90 tablet 3     ziprasidone (GEODON) 60 MG capsule Take 1 capsule (60 mg) by mouth 2 times daily (with meals) 60 capsule 1     Vitals         [3, 3]   There were no vitals taken for this visit.   Mental Status Exam        [9, 14 cog gs]     Alertness: alert  and oriented  Appearance: unable to asses  Behavior/Demeanor: cooperative and pleasant, with unable to assess eye contact   Speech: slowed.  Language: no obvious problem  Psychomotor: normal or unremarkable  Mood: \"overwhelmed\"  Affect: unable to assess; was congruent to mood; was congruent to content  Thought Process/Associations: unremarkable  Thought Content:  Reports none;  Denies suicidal ideation and violent ideation  Perception:  Reports none;  Denies auditory hallucinations and visual hallucinations  Insight: adequate  Judgment: intact  Cognition: (6) does  appear grossly intact; formal cognitive testing was not done  Gait/Station and/or Muscle Strength/Tone: unable to assess    Labs and Data                          Rating Scales:    PHQ9    PHQ9 Today:  Not completed  PHQ-9 SCORE 12/18/2019   PHQ-9 Total Score MyChart 17 (Moderately severe depression)   PHQ-9 Total Score 17        Assessment      [m2, h3]     Unspecified Mood Disorder  Bipolar I Disorder (Hx)    [place] Prescription Monitoring Program [] was not checked today:  provider not managing any controlled medications.        Plan                                                                                              "                        m2, h3     1) MEDICATION:  Continue [place] 32.5mg (thyroid)  Discontinue Hydroxyzine 50mg at bedtime   Continue Geodon 60mg BID with food  Continue Mirtazapine 7.5mg at bedtime for sleep and mood.  Monitor for mood elevation and weight gain  Continue Wellbutrin XL 300mg daily    2) THERAPY:  Continue with current therapist      RTC: 1 month    CRISIS NUMBERS:   Provided routinely in AVS.    Treatment Risk Statement:  The patient understands the risks, benefits, adverse effects and alternatives. Agrees to treatment with the capacity to do so. No medical contraindications to treatment. Agrees to call clinic for any problems. The patient understands to call 911 or go to the nearest ED if life threatening or urgent symptoms occur.     WHODAS 2.0  TODAY total score = N/A; [a 12-item WHODAS 2.0 assessment was not completed by the pt today and/or recorded in EPIC].       PROVIDER:  ADELINA Lieberman CNP        TELEPHONE VISIT  Katie Griffiths is a 59 year old pt. who is being evaluated via a billable telephone visit.      The patient has been notified of the following:    We have found that certain health care needs can be provided without the need for a physical exam. This service lets us provide the care you need with a short phone conversation. If a prescription is necessary we can send it directly to your pharmacy. If lab work is needed we can place an order for that and you can then stop by our lab to have the test done at a later time. Insurers are generally covering virtual visits as they would in-office visits so billing should not be different than normal.  If for some reason you do get billed incorrectly, you should contact the billing office to correct it and that number is in the AVS .    Patient has given verbal consent for a telephone visit?:  Yes   How would the pt like to obtain the AVS?:  not requested  AVS SmartPhrase [PsychAVS] has been placed in 'Patient Instructions':   unknown     Start Time:  10:37am          End Time:  10:57am

## 2020-06-10 ENCOUNTER — TELEPHONE (OUTPATIENT)
Dept: PSYCHIATRY | Facility: CLINIC | Age: 60
End: 2020-06-10

## 2020-06-10 NOTE — TELEPHONE ENCOUNTER
Received second incoming phone call from the pt. She is calling to f/up on her request. Pt confirmed that she did not sleep at all last night. She cannot remember the name of the medication. Explained that the provider has appointments throughout the day, but writer will reach out to him and then call the pt back with recommendations as soon as he responds. She voiced understanding.

## 2020-06-10 NOTE — TELEPHONE ENCOUNTER
Enzo No APRN CNP Labossiere, Laura, RN    Caller: Unspecified (Today,  8:06 AM)               Rey Marquez,     No problem.  The medication I was referring to was Propranolol.  I am waiting to hear back from PCP.  She is using albuterol inhaler.  Unsure if asthma but need to confirm to ensure if Propranolol is safe before prescribing.  Unfortunately, she'll have to wait until I hear back     Enzo

## 2020-06-10 NOTE — TELEPHONE ENCOUNTER
"[facility]    Phone Message    May a detailed message be left on voicemail: yes     Reason for Call: Medication Question or concern regarding medication   Prescription Clarification  Name of Medication: \"Starts with a G\" patient could not remember name of medication. Says that she discussed it with Enzo at yesterday's visit.  Prescribing Provider: Enzo No   Pharmacy: n/a   What on the order needs clarification? Patient had declined this medication that Enzo was discussing with her at yesterdays visit, but after not getting any sleep last night Katie feels that she might need the medication. She is hoping for a call back to see about starting that med.          Action Taken: Message routed to:  Other: [facility]    Travel Screening: Not Applicable                                                                        "

## 2020-06-12 ENCOUNTER — TELEPHONE (OUTPATIENT)
Dept: PSYCHIATRY | Facility: CLINIC | Age: 60
End: 2020-06-12

## 2020-06-12 RX ORDER — PROPRANOLOL HYDROCHLORIDE 10 MG/1
10 TABLET ORAL 2 TIMES DAILY PRN
Qty: 60 TABLET | Refills: 0 | Status: SHIPPED | OUTPATIENT
Start: 2020-06-12 | End: 2020-07-30 | Stop reason: ALTCHOICE

## 2020-06-12 NOTE — TELEPHONE ENCOUNTER
"Social Work   Incoming/Outgoing Call  [facility] Psychiatry Clinic    Outgoing Call To: Katie Griffiths    Reason for Call:  AGGIE following up on request for housing resources.    Response/Plan:  SW checked on progress. AGGIE previously asked Katie to create a budget so that she had an idea of how much she could spend in rent.  Katie shared that her parents would like her to live closer to them. There is a condo for sale near them that is reasonably priced.  It appears that she has not completed homework request, as she states that she thinks she can afford to spend a third of her monthly income after taking out what she pays for health insurance and other needs. AGGIE reviewed that guidelines typically encourage families to spend no more than a third of income, however this should be made on an individual basis based on her current expenses and bills. Sw reviewed plan to identify current income (Katie is able to do this), then create a list of monthly expenses and subtract this amount, plus some \"just in case\" cushion and this is approximately how much she can spend. Katie continues to express anxiety with identifying an amount.    AGGIE offered support and empathic listening. Katie was able to obtain an application for an apartment and another is on the way. In addition to being worried at completing applications, she is also worried about what will happen if she gets approved and moves. AGGIE reviewed current situation, in which she is living in her son's unfinished basement, watching his children, and he is now pressuring her to pay for rent. She appeared able to identify that she would have less stress living independently.     AGGIE will follow up with Katie on 6/16/20 at 1:00 pm. As Katie expressed interest in buying the home near her parents, AGGIE asked her to check into the possibility with a local bank. She is also try to complete an application on her own. AGGIE will check in during the phone visit and support her in completing an " application if needed.      Will route to patient's current psychiatric provider(s) as an FYI.   Please call or EPIC message with any questions or concerns.    AMANDA Schneider, [facility] Care[facility]  [phone]

## 2020-06-16 ENCOUNTER — TELEPHONE (OUTPATIENT)
Dept: PSYCHIATRY | Facility: CLINIC | Age: 60
End: 2020-06-16

## 2020-06-16 NOTE — TELEPHONE ENCOUNTER
Enzo No APRN CNP Labossiere, Laura, RN    Caller: Unspecified (Today,  8:14 AM)               Rey Marquez,     She has an appointment next week with sleep medicine.  And yes were are running out of options.     Thanks     Enzo        Discussed pt's concerns further with the provider. At this time, he does not want to prescribe any other medications. Pt has appt with sleep medicine on Monday, 6/22. Until then, he would like to remind the pt to engage in some exercise, which can help with sleep. Otherwise, he would like to defer to sleep medicine regarding medications.

## 2020-06-16 NOTE — TELEPHONE ENCOUNTER
Social Work   Incoming/Outgoing Call  [facility] Psychiatry [facility]    Outgoing Call To: Katie Griffiths    Reason for Call:  AGGIE following up on housing needs, planned call.    Response/Plan:  Katie reports frustration with current sleep issues. Propanolol has not been helpful and she noted clinic is asking her to wait until she sees sleep clinician next week. SW provided empathic listening. Writer also suggested CBT for insomnia. Patient feels this will just be another provider telling her not to watch tv before bed, etc. SW reviewed that CBT-I involves working with thoughts, emotions, and actions that may be impeding one's ability to rest. Katie's current stressors may also be impacting sleep. SW reviewed clinic notes; Katie has appointment with sleep clinic scheduled within 1 week.    Katie reports on-going stressors with her son. She has decided that she is leaving his home this week. She will bring her belongings up to [place] then stay with her parents.    Katie received two housing applications. She does not qualify for one due to her income and credit score. She completed almost all of the other one except info on her car (she needs license plate number and son has the car). AGGIE encouraged her to write a note stating she will provide information upon lease.    SW reviewed [employer] as a possible resource. Katie is requesting writer email her the information. She gave writer verbal permission to send information.    Writer will call patient in 2 weeks to check in.      Will route to patient's current psychiatric provider(s) as an FYI.   Please call or EPIC message with any questions or concerns.    AMANDA Schneider, [facility]  [phone]

## 2020-06-16 NOTE — TELEPHONE ENCOUNTER
Writer called pt and relayed the provider's recommendations. She voiced understanding and agreed to meet with sleep medicine for further recommendations.

## 2020-06-16 NOTE — TELEPHONE ENCOUNTER
[facility] Call Center    Phone Message    May a detailed message be left on voicemail: yes     Reason for Call: Medication Question or concern regarding medication   Prescription Clarification  Name of Medication: Propranolol  Prescribing Provider: Enzo No   Pharmacy: n/a   What on the order needs clarification? Patient would like call back. She reports picking up Propranolol on Friday, and took it that night and did not sleep at all. Saturday she took Propranolol again, slept only a little bit. Sunday she took it again, slept only a little bit. Monday (yesterda) she took it again, and could not sleep at all. She would like a call back to discuss this as she really feels she needs sleep, and this is not helping.          Action Taken: Message routed to:  Other: [facility] [employer] [place]    Travel Screening: Not Applicable

## 2020-06-18 ENCOUNTER — TELEPHONE (OUTPATIENT)
Dept: PSYCHIATRY | Facility: CLINIC | Age: 60
End: 2020-06-18

## 2020-06-19 NOTE — PROGRESS NOTES
"Katie Griffiths is a 59 year old female who is being evaluated via a billable telephone visit.      The patient has been notified of following:     \"This telephone visit will be conducted via a call between you and your physician/provider. We have found that certain health care needs can be provided without the need for a physical exam.  This service lets us provide the care you need with a short phone conversation.  If a prescription is necessary we can send it directly to your pharmacy.  If lab work is needed we can place an order for that and you can then stop by our lab to have the test done at a later time.    Telephone visits are billed at different rates depending on your insurance coverage. During this emergency period, for some insurers they may be billed the same as an in-person visit.  Please reach out to your insurance provider with any questions.    If during the course of the call the physician/provider feels a telephone visit is not appropriate, you will not be charged for this service.\"    Patient has given verbal consent for Telephone visit?  {YES-NO  Default Yes:4444::\"Yes\"}    What phone number would you like to be contacted at? ***    How would you like to obtain your AVS? {AVS Preference:764326}    Phone call duration: *** minutes    {signature options:004551}     Referral #  Creation Date  Referral Status  Status Update     [id number]  03/04/2020  Authorized  04/23/2020: Status History    Status Reason  Referral Type  Referral Reasons  Referral Class    No Approval Necessary - Patient Tracking  none  none  Internal    To Specialty  To Provider  To Location/Place of Service  To Department    none  none  none  none    To Vendor  Referred By  By Location/Place of Service  By Department    none  Enzo No APRN [facility]    Priority  Start Date  Expiration Date  Referral Entered By    Routine  03/04/2020 03/04/2021  Oneal, " ADELINA Saucedo CNP    Visits Requested  Visits Authorized  Visits Completed  Visits Scheduled    1  1   1    Procedure Information     Procedure Details   Procedure  Modifiers  Provider  Requested  Approved    [id number] - SLEEP EVALUATION & MANAGEMENT REFERRAL - ADULT -[facility] [phone] (Age 15 and up)  None   1  1    Diagnosis Information     Diagnosis    F39 (ICD-10-CM) - Mood disorder (H)    Referral Notes   Number of Notes: 3   Type  Date  User  Summary  Attachment    FSC  05/26/2020  8:07 AM  Galina Ken  -  -    Note     Re-verified insurance; verified insurance is active for current month.               Type  Date  User  Summary  Attachment    FSC  04/23/2020  8:54 AM  Galina Ken  SLEEP EVALUATION & MANAGEMENT REFERRAL - ADULT  -    Note     PB DOS:                                 6/22/2020  Payor:                                     MEDICARE/MEDICARE     FSC Clearance Note  Visit Description:                  SLEEP EVALUATION & MANAGEMENT REFERRAL - ADULT     Benefits Contact:                  CATHLEEN-Verified  Benefits Phone:                      N/A  Reference Number:                N/A     Assigned Clinic:                      No assigned [facility]  Referral Number:                    N/A  Referral Date Range:              N/A  Number of Visits:                    N/A     Procedures  Actigraphy:                              Non-Covered     Oximetry:                                 No Policy           HST/PSG:                               No Auth, See Policy  MSLT/MWT:                            No Policy           WatchPat:                               No Auth, See Policy                        [facility]   Outpatient Sleep Medicine Consultation  ***        Name: *** MRN# ***   Age: *** Date of Birth: ***      Date of Consultation: ***  Consultation is requested by: Enzo Pizarro        Reason for Sleep Consult:      ***          Assessment and Plan:    *** reports that this  *** year history of difficulty with ***.  This has greatly impacted ***, and reports difficulty with driving ***or impact on work performance ***  3/31/20: when asked how she is doing, Katie reports that switch from Celexa to Wellbutrin has been beneficial in regards to mood, energy, and motivation.  No concerns with elevated mood.  Sleep continues to be an issue.  Did not worsen with addition of Wellbutrin.  Has sleep study in 3 months.  Regarding psychosocial stressors, Katie is babysitting her grandchildren as their mother is sick and is going through [place]    SCALES       SLEEP APNEA: Stopbang score ***       INSOMNIA:  Insomnia severity score: ***       SLEEPINESS: [place] sleepiness scale: ***       BELA 7: **     ***It's my impression that *** has a *** pretest probability of *** with symptoms of ***, in the setting of ***.  And the following diagnosis and plan has been developed...    Summary Sleep Diagnoses:      Snoring : ***    Obstructive sleep apnea ***    Insomnia :***    Hypertension***    Obesity: ***      Summary Recommendations:      ***Split-night sleep study with ***transcutaneous CO2 monitoring and baseline venous blood gas for possible hypoventilation ***or HST with stop bang score of ***.    Sleep diaries and follow up, or ***overnight oximetry or ***actigraphy or recommended.***    Treatment therapies and final decision through my chart communication ***or with virtual visit *** or face to face visit ***after results of testing. ***     Summary Counseling:  See instructions     Counseling included a comprehensive review of pathophysiology of ***possible obstructive sleep apnea and hypoventilation syndrome.***Diagnostic and therapeutic strategies as well as risks of inadequate therapy or no treatment were discussed.  *** Because of risk of hypoventilation we have recommended polysomnography with CO2 monitoring for management with bilevel positive airway pressure support if she has nocturnal  hypercapnia. ***  Cessation of sleep habits alcohol before bedtime avoidance of more than 3 caffeinated beverages      *** was provided with educational materials in instructions which were reviewed with this visit.                History of Present Illness:        *** reports ***    (snoing, snorting, gasping, waking up with panic or inc in HR, sleeping separately, sleep better if elevated or in chair, unable to lie flat and breathe with sleeping)      SLEEP COMPLAINTS: DENIES THE FOLLOWING: (unless indicated in RED):      Signs/symptoms:    Morning headaches or confusion-***  GERD or aspiration:yes***  Pain yes***  Bruxism: yes***  Parasomnia:  sw  yes***: confusional arousals  yes***  Narcolepsy:  Cataplexy  yes***, sleep paralysis yes*** , hallucinations  Yes***  RLS: : yes***      SLEEP-WAKE SCHEDULE:   Enters bed on weekdays***, exits bed on weekdays ***; enters bed at weekends ***exits bed on weekends  ***SLEEP LATENCY ***min(s)  and one awakening for bathroom with ***mins to return to sleep  Naps***  Sleep schedule is *** regular, ***irregular ***  Work schedule is ***  Estimated total sleep time ***  Naps:  Time of day ***, length ***, frequency ***   Activities in bed:  ***    Denies the following unless in RED:   Behaviors in bed:  clock watching *** to do list ***, worry about health or ability to complete next day's tasks ***  Housework *** employment ***anxiety or rumination,***waking up with heart pounding or racing***    Cardio-respiratory    Coexisting Lung disease:  ***           Coexisting Heart disease:  ***                        Social, personal, family History and use of sleep aids or substances that affect sleep:     *** lives with ***.  Employed as *** or is a student ***. Sleep is disrupted by environmental factors from noise,***pets,***, children***, bedpartner***  Has bed partner been sleeping separately because of snoring or:  ***    Sleep Family Hx: ***        Snoring ***        RLS-  "***   SWAPNA - ***  Insomnia - ***  Parasomnia - ***  Chemical History: ***     Tobacco: ***      Uses {CAFFEINE INTAKE:885371}. Last caffeine intake is usually before ***    Supplements for wakefulness: ***    EtOH: *** {CAGE SCREEN FOR ETOH ABUSE:006494}  Recreational Drugs: ***   Sleep aids: no,***yes:***         Review of Systems:     A complete 10 point review of systems was negative other than HPI or as commented below:   *** has gained {NUMBERS 0-5,5-10,10-15:984840} pounds {TIMEFRAME:520936}.      {SLEEP ROS QUESTIONS:580211::\"CONSTITUTIONAL: NEGATIVE for weight gain/loss, fever, chills, sweats or night sweats, drug allergies.\",\"EYES: NEGATIVE for changes in vision, blind spots, double vision.\",\"ENT: NEGATIVE for ear pain, sore throat, sinus pain, post-nasal drip, runny nose, bloody nose\",\"CARDIAC: NEGATIVE for fast heartbeats or fluttering in chest, chest pain or pressure, breathlessness when lying flat, swollen legs or swollen feet.\",\"NEUROLOGIC: NEGATIVE headaches, weakness or numbness in the arms or legs.\",\"DERMATOLOGIC: NEGATIVE for rashes, new moles or change in mole(s)\",\"PULMONARY: NEGATIVE SOB at rest, SOB with activity, dry cough, productive cough, coughing up blood, wheezing or whistling when breathing.  \",\"GASTROINTESTINAL: NEGATIVE for nausea or vomitting, loose or watery stools, fat or grease in stools, constipation, abdominal pain, bowel movements black in color or blood noted.\",\"GENITOURINARY: NEGATIVE for pain during urination, blood in urine, urinating more frequently than usual, irregular menstrual periods.\",\"MUSCULOSKELETAL: NEGATIVE for muscle pain, bone or joint pain, swollen joints.\",\"ENDOCRINE: NEGATIVE for increased thirst or urination, diabetes.\",\"LYMPHATIC: NEGATIVE for swollen lymph nodes, lumps or bumps in the breasts or nipple discharge.\"}         Physical Examination:     [place] Total Score 6/10/2020   Total score - [place] 12     Neck Circumference: *** inches/cm "   Constitutional: . ***Awake, alert, cooperative, dressed casually, good eye contact, comfortably sitting in a chair, in no apparent distress  Mood: ***euthymic; affect congruent with full range and intensity.  Attention/Concentration:  Normal   Eyes: No icterus.  ENT: Mallampati Class: {DIANA NUMERAL I-IV:540051}.   Tonsillar Stage: {NUMBERS 1-4:070047}  *** Retrognathia, micrognathia, small and crowded oropharynx,  low-lying soft palate macroglossia, tonsillar hypertrophy, elongated uvula, turbinate hypertrophy, deviated nasal septum, poor dentition***  Cardiovascular: ***Regular S1 and S2, no gallops or murmurs. No carotid bruits  Neck: ***Supple, no thyroid enlargement.   Pulmonary:  ***Chest symmetric, lungs clear bilaterally and no crackles, wheezes or rales  Extremities:  ***No pedal edema.  Muscle/joint: ***Strength and tone normal   Skin:  ***No rash or significant lesions.   Gait ***Normal.  Neurologic: ***Alert, oriented x3, no focal neurological deficit, cranial nerves grossly normal                      A/p:

## 2020-06-22 ENCOUNTER — VIRTUAL VISIT (OUTPATIENT)
Dept: SLEEP MEDICINE | Facility: CLINIC | Age: 60
End: 2020-06-22
Attending: NURSE PRACTITIONER
Payer: MEDICARE

## 2020-06-22 DIAGNOSIS — F41.9 ANXIETY: ICD-10-CM

## 2020-06-22 DIAGNOSIS — F51.05 INSOMNIA DISORDER, WITH NON-SLEEP DISORDER MENTAL COMORBIDITY, EPISODIC: Primary | ICD-10-CM

## 2020-06-22 PROCEDURE — 99443 ZZC PHYSICIAN TELEPHONE EVALUATION 21-30 MIN: CPT | Performed by: NURSE PRACTITIONER

## 2020-06-22 NOTE — PROGRESS NOTES
"Katie Griffiths is a 59 year old female who is being evaluated via a billable telephone visit.      The patient has been notified of following:     \"This telephone visit will be conducted via a call between you and your physician/provider. We have found that certain health care needs can be provided without the need for a physical exam.  This service lets us provide the care you need with a short phone conversation.  If a prescription is necessary we can send it directly to your pharmacy.  If lab work is needed we can place an order for that and you can then stop by our lab to have the test done at a later time.    Telephone visits are billed at different rates depending on your insurance coverage. During this emergency period, for some insurers they may be billed the same as an in-person visit.  Please reach out to your insurance provider with any questions.    If during the course of the call the physician/provider feels a telephone visit is not appropriate, you will not be charged for this service.\"    Patient has given verbal consent for Telephone visit?  Yes    What phone number would you like to be contacted at? Home phone    How would you like to obtain your AVS? MyChart    Phone call duration: 60 minutes    ADELINA Zayas CNP     Referral #  Creation Date  Referral Status  Status Update     [id number]  03/04/2020  Authorized  04/23/2020: Status History    Status Reason  Referral Type  Referral Reasons  Referral Class    No Approval Necessary - Patient Tracking  none  none  Internal    To Specialty  To Provider  To Location/Place of Service  To Department    none  none  none  none    To Vendor  Referred By  By Location/Place of Service  By Department    none  Enzo No APRN CNP  [facility] PSY[facility]    Priority  Start Date  Expiration Date  Referral Entered By    Routine  03/04/2020 03/04/2021  Enzo No APRN CNP    Visits " Requested  Visits Authorized  Visits Completed  Visits Scheduled    1  1   1    Procedure Information     Procedure Details   Procedure  Modifiers  Provider  Requested  Approved    [id number] - SLEEP EVALUATION & MANAGEMENT REFERRAL - ADULT -[facility] [place] [phone] (Age 15 and up)  None   1  1    Diagnosis Information     Diagnosis    F39 (ICD-10-CM) - Mood disorder (H)    Referral Notes   Number of Notes: 3   Type  Date  User  Summary  Attachment    FSC  05/26/2020  8:07 AM  Galina Ken  -  -    Note     Re-verified insurance; verified insurance is active for current month.               Type  Date  User  Summary  Attachment    FSC  04/23/2020  8:54 AM  Galina Ken  SLEEP EVALUATION & MANAGEMENT REFERRAL - ADULT  -    Note     PB DOS:                                 6/22/2020  Payor:                                     MEDICARE/MEDICARE     FSC Clearance Note  Visit Description:                  SLEEP EVALUATION & MANAGEMENT REFERRAL - ADULT     Benefits Contact:                  CATHLEEN-Verified  Benefits Phone:                      N/A  Reference Number:                N/A     Assigned Clinic:                      No assigned [facility]  Referral Number:                    N/A  Referral Date Range:              N/A  Number of Visits:                    N/A     Procedures  Actigraphy:                              Non-Covered     Oximetry:                                 No Policy           HST/PSG:                               No Auth, See Policy  MSLT/MWT:                            No Policy           WatchPat:                               No Auth, See Policy                        Sleep Center   Outpatient Sleep Medicine Consultation  6/22/2020        Name: Katie Paulino MRN# [id number]   Age:  59 YOB: 1960      Date of Consultation: 3/4/2020  Consultation is requested by: Enzo No        Reason for Sleep Consult:    Difficulty with initiating and maintaining  sleep            Assessment and Plan:    Kaite reports that this past year, with excessive stress, has noted increased difficulty with initiation and maintenance.  This has greatly impacted her ongoing mental health concerns, and reports this difficulty coincides with a worsening of anxiety during this difficult time of needing to move out of her son's home and to her parents.  She was a support to her son and his children, and now is moving in with her parents.  She feels residing with parents will provide her with support.    From Epic records of ADELINA Corbett, CNP:  3/31/20: when asked how she is doing, Katie reports that switch from Celexa to Wellbutrin has been beneficial in regards to mood, energy, and motivation.  No concerns with elevated mood.  Sleep continues to be an issue.  Did not worsen with addition of Wellbutrin.  Has sleep study in 3 months (consultation).  Regarding psychosocial stressors, Katie is babysitting her grandchildren as their mother is sick and is going through bankruptcy    Dif with sleep after 2008, off of clonazapam, prob since it was d/c'd: for insomnia.  Was on depakote and clonazepam without regular problems, rash -depokote, now dif with medication management.        SCALES       SLEEP APNEA: Stopbang score 2/8       INSOMNIA:  Insomnia severity score: 28/28       SLEEPINESS: Elrosa sleepiness scale: 0/24       BELA 7:     It's my impression that Katie has a low pretest probability of charley, but currently is struggling with anxiety  with symptoms of difficulty falling asleep after a wakeup, with a stressfull family situation in the setting of bipolar disease..  And the following diagnosis and plan has been developed...    Summary Sleep Diagnoses:      Insomnia: with anxiety, in the setting of recent change in antidepressant med to Wellbutrin-which has improved depression, but does not allow herself to        Sleep at this time.    Anxiety: using methods to reduce anxiety while  lying in bed, but doesn't result in sleep    No studies:     Summary Recommendations:        Sleep diaries and actigraphy are recommended.    Treatment therapies and final decisions through my chart communication or after results of testing.      Summary Counseling:  See instructions     Counseling included a comprehensive review of the role of anxiety and perception of not sleeping, however, light stages of sleep maybe occurring.  May benefit from use actigraphy to identify periods of sleep from wakefulness.  Discussed CBTI components, utilizing the sleep drive to enhance sleep efficiency: falling and staying asleep.  Techniques such as being out of bed to improve stimulus control if not sleepy.      Katie was provided with educational materials in instructions which were reviewed with this visit.  I did agree to update Enzo Aldo re: her loss of appetite, and perception of no sleep and lack of feeling sleepy. This was performed through my chart.  I did pass on that atenolol was not working, and she wondered about clonazepam.  We did discuss that with her degree of anxiety, it's unlikely that she'd be able to follow CBTI behavioral restrictions at this time.  She is able to have sleep diaries and actigraphy to quantify and differentiate sleep periods from wake once this device is approved for use according to Covid 19 recommendations.                History of Present Illness:        Katie reports on/off difficulties with sleep initiation.  Did report improvement in depression with increase of wellbutrin, but as time nears for her move from her son's home to her parents she has had increased difficulties both with falling asleep and re-falling back asleep with an early wake.    (snoing, snorting, gasping, waking up with panic or inc in HR, sleeping separately, sleep better if elevated or in chair, unable to lie flat and breathe with sleeping)      SLEEP COMPLAINTS: DENIES THE FOLLOWING: (unless indicated in  RED):    Signs/symptoms:    Morning headaches or confusion-no  GERD or aspiration:no  Pain no  Bruxism: clench in past- had guard  Parasomnia:  sw  no: confusional arousals  No, sleep eating: no, not eating at all- recommend. Updating Enzo No  Narcolepsy:  Cataplexy  , sleep paralysis  , hallucinations    RLS: : yes: ft  move, sometimes hands, can occur anytime of day.  Denies an urge they have to move, then movement takes urge away, they just move on own      SLEEP-WAKE SCHEDULE:   Enters bed on weekdays 9pm,( 10-10:30Pm, -12A , wakeup 2 or 2:30 exits bed on weekdays 5A or gets no sleep.  When sleep is done 2:30-3:30 she will stay there and try to be comfortable  SLEEP LATENCY 60-90min(s)  and one awakening for bathroom with occasional inability to get to return to sleep    Zone out 1/2 to 2 hrs-no sleep  Naps: no,   Sleep schedule is  irregular   Work schedule is n/a  Estimated total sleep time  ~4 hrs  Naps:    Activities in bed:  Trying to get comfortable-got to turn  Toss and turn then fall can lay still ,  4 couple hrs (daytime med wellbutrin)-force measures,breathing tech, zoning,   Denies the following unless in RED:   Behaviors in bed:  clock watching only for journal,  to do list , worry about health or ability to complete next day's tasks   Housework ,  anxiety or rumination,  waking up with heart pounding or racing-no    Cardio-respiratory    Coexisting Lung disease:  asthma           Coexisting Heart disease:   propanol- not helping          Social, personal, family History and use of sleep aids or substances that affect sleep:     Katie lives with son, care of kids, moving out on Friday, then moving to parent house, twin bed in den , thinks that this will not be quiet.  Review of chart indicates Katie has gone through bankrupcy. Sleep is disrupted by environmental factors from  + children  Has bed partner been sleeping separately because of snoring or:  no    Sleep Family Hx: no        Snoring       "   RLS-    SWAPNA -   Insomnia -   Parasomnia -   Chemical History:      Tobacco:      Uses 3-4. Last caffeine intake is usually before 9A, or 10!    Supplements for wakefulness:     EtOH:    Recreational Drugs:   Sleep aids:  Yes:mirtazepine, quite awhile, guafenesine, hx of use of clonazepam.  Atenolol is not helping with her sleep    Prev tired on clexa         Review of Systems:     A complete 10 point review of systems was negative other than HPI or as commented below if in BOLD  Reports little appetite recently      {SLEEP ROS QUESTIONS:[id number]::\"CONSTITUTIONAL: NEGATIVE for weight gain/loss, fever, chills, sweats or night sweats, drug allergies.\",\"EYES: NEGATIVE for changes in vision, blind spots, double vision.\",\"ENT: NEGATIVE for ear pain, sore throat, sinus pain, post-nasal drip, runny nose, bloody nose\",\"CARDIAC: NEGATIVE for fast heartbeats or fluttering in chest, chest pain or pressure, breathlessness when lying flat, swollen legs or swollen feet.\",\"NEUROLOGIC: NEGATIVE headaches, weakness or numbness in the arms or legs.\",\"DERMATOLOGIC: NEGATIVE for rashes, new moles or change in mole(s)\",\"PULMONARY: NEGATIVE SOB at rest, SOB with activity, dry cough, productive cough, coughing up blood, wheezing or whistling when breathing.  \",\"GASTROINTESTINAL: NEGATIVE for nausea or vomitting, loose or watery stools, fat or grease in stools, constipation, abdominal pain, bowel movements black in color or blood noted.\",\"GENITOURINARY: NEGATIVE appetite, feels nauseous with this for pain during urination, blood in urine, urinating more frequently than usual, irregular menstrual periods.\",\"MUSCULOSKELETAL: NEGATIVE for muscle pain, bone or joint pain, swollen joints.\",\"ENDOCRINE: NEGATIVE for increased thirst or urination, diabetes.  MH: depression, anxiety, has used techniques to address thoughts that bring on stress, added relaxation techniques that have worked on stress in past- texting           Physical Examination: "     Shumway Total Score 6/10/2020   Total score - Shumway 12   ESS 0/24 on 6/22/2020  VIRAJ scale 28/28  Physical Examination: last vitals hr 85, 126/79, sp02 95%  Vitals: There were no vitals taken for this visit.  BMI= There is no height or weight on file to calculate BMI.  Constitutional: . Awake, alert, cooperative-reluctantly at times,  Mood:  Flat to euthymic; affect congruent with full range and intensity.  Attention/Concentration:  Normal   Neurologic: Alert, oriented x3, no focal neurological deficit, cranial nerves grossly normal  Allergies:    Allergies   Allergen Reactions     Quetiapine Rash     Valproic Acid Rash     Drug rash       Medications:    Current Outpatient Medications   Medication Sig Dispense Refill     albuterol (PROAIR HFA/PROVENTIL HFA/VENTOLIN HFA) 108 (90 Base) MCG/ACT inhaler Inhale 2 puffs into the lungs as needed for shortness of breath / dyspnea or wheezing 18 g 1     buPROPion (WELLBUTRIN XL) 300 MG 24 hr tablet Take 1 tablet (300 mg) by mouth every morning 30 tablet 0     cetirizine (ZYRTEC) 10 MG tablet Take 1 tablet (10 mg) by mouth daily 90 tablet 1     mirtazapine (REMERON) 7.5 MG tablet Take 1 tablet (7.5 mg) by mouth At Bedtime 30 tablet 0     propranolol (INDERAL) 10 MG tablet Take 1 tablet (10 mg) by mouth 2 times daily as needed (anxiety) 60 tablet 0     thyroid (ARMOUR) 32.5 MG tablet Take 1 tablet (32.5 mg) by mouth daily Nature-thyroid 90 tablet 3     ziprasidone (GEODON) 60 MG capsule Take 1 capsule (60 mg) by mouth 2 times daily (with meals) 60 capsule 1       Problem List:  Patient Active Problem List    Diagnosis Date Noted     Bipolar disorder (H) 12/18/2019     Priority: Medium     Hashimoto's thyroiditis 12/18/2019     Priority: Medium     Incontinence of urine in female 12/18/2019     Priority: Medium     Osteopenia 12/18/2019     Priority: Medium     Reactive airway disease 12/18/2019     Priority: Medium     Vitamin D deficiency 12/18/2019     Priority: Medium      Overweight 12/18/2019     Priority: Medium     Dizziness 12/18/2019     Priority: Medium     Occasional dizziness; negative cardiac work-up in [facility] [place] per pt report       Hyperlipidemia LDL goal <100 12/18/2019     Priority: Medium        Past Medical/Surgical History:  Past Medical History:   Diagnosis Date     Bipolar disorder (H)      Depression with anxiety      Hashimoto's thyroiditis      Hyperlipidemia      Mild intermittent asthma      S/P partial thyroidectomy      Superficial perivascular dermatitis      Past Surgical History:   Procedure Laterality Date     partial thyroidectomy       TONSILLECTOMY       TUBAL LIGATION                 CC: Enzo No

## 2020-06-22 NOTE — Clinical Note
Please note she wants to tell you the atenolol is not working.  My concerns in this case is could her anxiety and little sleep fuel a manic episode?  Very tough situation.  I recommended some behavioral measures, actigraphy down the road if we wish to quantify and id sleep.  Shi

## 2020-06-25 ENCOUNTER — TELEPHONE (OUTPATIENT)
Dept: PSYCHIATRY | Facility: CLINIC | Age: 60
End: 2020-06-25

## 2020-06-25 NOTE — TELEPHONE ENCOUNTER
[facility]    Phone Message    May a detailed message be left on voicemail: yes     Reason for Call: Other: Request for Therapy Referral      Patient wanted to discuss a therapy referral with Enzo.  She has been receiving therapy services since 2008 and has been with her current therapist for the past year.  Patient says she feels like this year has been really tough and all she is doing with therapy is maintaining.  She is hoping a new provider would mean a positive improvement.    Patient's preference is to schedule with a provider in the same building as the Psychiatry Clinic, as it is a familiar location for the patient.      Writer added patient to Psychiatry clinic therapy wait list. Patient is aware that therapy visits are currently being scheduled as video/telehealth visits.  Message routed to provider/nurse to ask if provider has specific recommendations for patient regarding therapy referral.        Action Taken: Message routed to:  Other: Alma Starr, RNCC; Cathy Aparicio    Travel Screening: Not Applicable                                                                         [facility]    Progress Note    Avinash Wallis Patient Status:  Inpatient    1937 MRN [id number]   Location [facility] 5SW/SE Attending Devaughn Guzman MD   Hosp Day # 4 PCP Kush Lopez MD     Subjective:  Sitting in bed type     Hyperkalemia    Patient is a 80year old female with Type 2 DM who is admitted with genearlized weakness, ESTHELA and leukocytosis.    Endocrinology is consulted for inpatient DM management.      PLAN:  - Levemir 10 daily  - Novolog 5 along with CF wit

## 2020-06-25 NOTE — PATIENT INSTRUCTIONS
Rey Wilson,    It was good to meet you in clinic the other day.    This is a stressful time for you, and sleep at stressful times is often very challenging.    One technique that may help with your sleep is to do the following:    Cover the clock: being aware   of the time when not sleeping adds to anxiety and frustration of not sleeping    Be out of bed if not sleepy.  Unwind in a chair, read, do puzzles that distract your mind from stressors.  Use some of those techniques you were describing to me while out of bed-progressive muscle relaxation, to help with the mental distraction.    If you wish to move forward with actigraphy ( the fancy movement watch to wear while keeping a sleep diary) let me know and I'll put you on the wait list.    Best regards,    ADELINA Camarena, CNP-BC  Sleep Medicine

## 2020-06-30 ENCOUNTER — TELEPHONE (OUTPATIENT)
Dept: PSYCHIATRY | Facility: CLINIC | Age: 60
End: 2020-06-30

## 2020-06-30 NOTE — TELEPHONE ENCOUNTER
"Social Work   Incoming/Outgoing Call  [facility]    Outgoing Call To: Katie Griffiths    Reason for Call:  SW doing biweekly check in, as requested by patient.    Response/Plan:  Katie reports she is doing a little better this week. Last week was \"horrible.\" She is now at her parents and this has helped some.     She received an application for [facility] apartments (in [place]) and has questions about filling out the application. Writer assisted her in answering some of her questions.     SW will follow up with Katie in 2 weeks to check in, provide support, and provide resources as necessary, per patient request.      Will route to patient's current psychiatric provider(s) as an FYI.   Please call or EPIC message with any questions or concerns.    AMANDA Schneider, [facility]  [phone]      "

## 2020-07-01 ENCOUNTER — TELEPHONE (OUTPATIENT)
Dept: PSYCHIATRY | Facility: CLINIC | Age: 60
End: 2020-07-01

## 2020-07-01 NOTE — TELEPHONE ENCOUNTER
Idalia Lyle Helen, PhD LP; Alma Suazo, CAROLYNE; Cathy Aparicio; Jolie Watts, LMFT    Caller: Unspecified (6 days ago,  3:32 PM)               The patient has been added to the wait list. I called and let her know that this was Enzo's recommendation and that we would remove her from the general therapy wait list. Since she has Medicare, she would have to wait for a faculty PsyD/PhD to have an opening.

## 2020-07-01 NOTE — TELEPHONE ENCOUNTER
"[facility] Call Center    Phone Message    May a detailed message be left on voicemail: yes     Reason for Call: Symptoms or Concerns     If patient has red-flag symptoms, warm transfer to triage line    Current symptom or concern: Cannot fall asleep/restlessness    Symptoms have been present for:  3 week(s) it has been especially bad, but when asked how long she has had trouble sleeping patient reported \"Ohh for about the last year if I am being honest. Ever since they took me off clonazepam\"     Are there any new or worsening symptoms? Yes: Patient reports getting almost no sleep. \"We need to find a medicine that will help me sleep. I dont know what we tried in the past, if we already tried something....but we need to find something.\"    Patient also reports having moved and needs any medication that Enzo would be willing to prescribe for this sent to a new pharmacy as she moved. It is the [facility] on [address], [place], [address]      Action Taken: Message routed to:  Other: [facility]    Travel Screening: Not Applicable                                                                        "

## 2020-07-01 NOTE — TELEPHONE ENCOUNTER
Enzo No APRN CNP Labossiere, Laura, RN    Caller: Unspecified (Today,  8:13 AM)               Rey Marquez,     I don't have any recommendations at this time.  I'd prefer to read sleep medicine write up and review her chart.   I also would like to have the time to discuss pros and cons of possible sleep aids.  I also would like to see her at least try the recommendations provided by us and sleep medicine.     Thanks        Called the pt and relayed the provider's message. She voiced understanding and agrees with the plan. Will update her preferred pharmacy.

## 2020-07-07 ENCOUNTER — VIRTUAL VISIT (OUTPATIENT)
Dept: PSYCHIATRY | Facility: CLINIC | Age: 60
End: 2020-07-07
Attending: NURSE PRACTITIONER
Payer: MEDICARE

## 2020-07-07 DIAGNOSIS — F39 MOOD DISORDER (H): ICD-10-CM

## 2020-07-07 RX ORDER — BUPROPION HYDROCHLORIDE 300 MG/1
300 TABLET ORAL EVERY MORNING
Qty: 30 TABLET | Refills: 0 | Status: SHIPPED | OUTPATIENT
Start: 2020-07-07 | End: 2020-08-18

## 2020-07-07 RX ORDER — ZIPRASIDONE HYDROCHLORIDE 60 MG/1
60 CAPSULE ORAL 2 TIMES DAILY WITH MEALS
Qty: 60 CAPSULE | Refills: 1 | Status: SHIPPED | OUTPATIENT
Start: 2020-07-07 | End: 2020-08-18

## 2020-07-07 RX ORDER — MIRTAZAPINE 7.5 MG/1
7.5 TABLET, FILM COATED ORAL AT BEDTIME
Qty: 30 TABLET | Refills: 0 | Status: SHIPPED | OUTPATIENT
Start: 2020-07-07 | End: 2020-07-30

## 2020-07-07 ASSESSMENT — PAIN SCALES - GENERAL: PAINLEVEL: NO PAIN (0)

## 2020-07-07 NOTE — PATIENT INSTRUCTIONS
Thank you for coming to the PSYCHIATRY CLINIC.    Lab Testing:  If you had lab testing today and your results are reassuring or normal they will be mailed to you or sent through [employer] within 7 days. If the lab tests need quick action we will call you with the results. The phone number we will call with results is # [phone] (home) . If this is not the best number please call our clinic and change the number.    Medication Refills:  If you need any refills please call your pharmacy and they will contact us. Our fax number for refills is [phone]. Please allow three business for refill processing. If you need to  your refill at a new pharmacy, please contact the new pharmacy directly. The new pharmacy will help you get your medications transferred.     Scheduling:  If you have any concerns about today's visit or wish to schedule another appointment please call our office during normal business hours [phone] (8-5:00 M-F)    Contact Us:  Please call [phone] during business hours (8-5:00 M-F).  If after clinic hours, or on the weekend, please call  [phone].    Financial Assistance [phone]  [facility] Billing [phone]  Central Billing Office, [facility]: [phone]  [place] Billing [phone]  Medical Records [phone]      MENTAL HEALTH CRISIS NUMBERS:  For a medical emergency please call  911 or go to the nearest ER.     [facility]:   [facility] -[phone]   Crisis Residence [facility] -[phone]   Walk-In Counseling [facility] -[phone]   COPE 24/7 [place] Mobile Team -[phone] (adults)/[phone] (child)  CHILD: [facility] needs assessment team - [phone]      [facility]:   [facility] - [phone]   Walk-in counseling [facility] - [phone]   Walk-in counseling [facility] - [phone]   Crisis Residence [facility] - [phone]  Urgent  [facility] Adult Mental Hqulpm-[phone] mobile unit/ 24/7 crisis line    National Crisis Numbers:   National Suicide Prevention Lifeline: 4-413-912-TALK (840-081-5255)  Poison Control Center - 5-525-199-6116  eSNF/resources for a list of additional resources (SOS)  Trans Lifeline a hotline for transgender people 3-554-328-1665  The Tae Project a hotline for LGBT youth 1-169.990.9089  Crisis Text Line: For any crisis 24/7   To: 676166  see www.crisistextline.org  - IF MAKING A CALL FEELS TOO HARD, send a text!         Again thank you for choosing PSYCHIATRY CLINIC and please let us know how we can best partner with you to improve you and your family's health.    You may be receiving a survey regarding this appointment. We would love to have your feedback, both positive and negative. The survey is done by an external company, so your answers are anonymous.

## 2020-07-07 NOTE — PROGRESS NOTES
"Video- Visit Details  Type of service:  video visit for medication management  Time of service:    Date:  07/07/2020    Video Start Time:  10:36 AM        Video End Time:  11:01am    Reason for video visit:  Patient unable to travel due to Covid-19  Originating Site (patient location):  [facility]   Location- Friend or family home  Distant Site (provider location):  [facility]  Mode of Communication:  Video Conference via [email or web address]  Consent:  Patient has given verbal consent for video visit?: Yes     VIDEO VISIT  Nelda Griffiths is a 59 year old patient who is being evaluated via a billable video visit.      The patient has been notified of following:   \"This video visit will be conducted via a call between you and your physician/provider. We have found that certain health care needs can be provided without the need for an in-person physical exam. This service lets us provide the care you need with a video conversation. If a prescription is necessary we can send it directly to your pharmacy. If lab work is needed we can place an order for that and you can then stop by our lab to have the test done at a later time. Insurers are generally covering virtual visits as they would in-office visits so billing should not be different than normal.  If for some reason you do get billed incorrectly, you should contact the billing office to correct it and that number is in the AVS .    Patient has given verbal consent for video visit?:  Yes    How would you like to obtain your AVS?:  Radha    Patient would prefer that any video invitations be sent by: Send to e-mail at: [email or web address]      AVS SmartPhrase [PsychAVS] has been placed in 'Patient Instructions':  Yes              [facility]  Psychiatry Clinic  PSYCHIATRIC PROGRESS NOTE       Nelda Griffiths is a 59 year old female who prefers the name Nelda and pronoun she, her.  Therapist: Amirah Tejeda @  " "Practice               PCP: Buddy Nolan  Other Providers: None    Pertinent Background:  See previous notes.  Psych critical item history includes Hospitalized 3 times with most recent occurring in 2007 after overdose attempt.  Numerous medication trials.  Possible bipolar diagnosis.     Interim History     [4, 4]     The patient is a good historian, reports good treatment adherence and was last seen 6/9/20.  Since the last visit,  Katie reports she is \"hanging in there.\"  Depression and anxiety persist.  Mood stable.  She is residing at her parent's home for the past couple days.  She using office and sleeping on twin bed. She is looking for independent living but is running into issues with her income, age, and credit history. Completed sleep consultation and behavioral change recommendations were given.  Unclear if followed recommendations.  Stress has decreased since then due to moving in with her parents but still persists due to housing issue.  Advised to take Propranolol when feels stressed.  Continues to endorse sedentary lifestyle.  Advised to go for walks in morning and early evening to develop \"sleep appetite.\"  She agreed.  She also does not want to start any sleep aids that may lead to drowsiness and weight gain.  Mood, motivation, and energy low.  Recommended increase Wellbutrin to 450mg but she again is hesitant.      New Address:  [facility] [address]  [place] [address]    6/9/20: Katie is currently trying to maintain housing.  Reports her current situation living with her son has been extremely stressful.  Currently looking for apartments in [facility] [place].  She working with [facility] social work team.  Katie is very hesitant to change her medications.  She would like to obtain better grasp of her housing before making medication adjustments. Will consult with PCP about possible Propranolol trial.       5/6/20:  Katie continues to endorse stress regarding bankruptcy.  She has phone hearing " "with  at the end of the month of May.  Katie continues to report lack of activity which may be attributable to lifestyle changes required during pandemic, but does report overall improvement with increase in Wellbutrin.  Sleep also continues to be an issue but she also reports it continues to improve.  Sleep medicine consultation in 2 months. No concerns with elevated mood.  Katie did not want to adjust her medications     3/31/20: when asked how she is doing, Katie reports that switch from Celexa to Wellbutrin has been beneficial in regards to mood, energy, and motivation.  No concerns with elevated mood.  Sleep continues to be an issue.  Did not worsen with addition of Wellbutrin.  Has sleep study in 3 months.  Regarding psychosocial stressors, Katie is babysitting her grandchildren as their mother is sick and is going through bankruptcy.       3/4/20: Katie again starts laughing.  Mood is stable overall but low.  Continues to endorse anhedonia, energy, and low mood. Sleep has improved since starting Remeron.  Reports sleeping 5 hours per night which results in continued daytime tiredness.    Continues to report that Celexa is not helpful and possible causing sedation    2/12/20: Katie shared that she is not sleeping.  She reports that she is getting 3 hours of sleep per night for past couple weeks. However, Katie did not look overly fatigued during appointment.  She also reports that her mood is quite low.  No concerns with shayla.  No goal directed behavior or impulsivity.  Katie\"s affect continues to be incongruent to her reported mood.  She is struggling to get household chores completed.  Continues to report sedentary lifestyle.  Educated on how exercise and activity can help with sleep.  Will stop Trazodone and start Mirtazapine for sleep.  If continues to be a problem, will refer to sleep medicine as sleep has been an issue for several years.  Also plan to switch Celexa as it does not appear to be managing her " "depression.      1/8/20: Katie's main concern today was weight gain.  Affect was quite bright today in spite of high PHQ-9 and reported persistent low mood. Reports positive holiday with her family.  Sleep continues to be inconsistent.  Mood stable with introduction of Geodon.  Will increase and discontinue Olanzapine.  Katie continues to consider a retrial of Depakote which was discontinued in past due to development of rash.      12/11/19: Katie reports her mood is stable but she is describes \"not feeling good or bad.\"  Difficult to discern if emotional blunting or lack of hypomania.  Thoughts continue to be linear and organized.  Speech normal.  Also reports sleeping continues to improve. Previous trials include Lithium (not effective?), Depakote (rash), and Abilify (side effects).  Katie would like to change medications today due to possible emotional blunting and weight gain (5lbs in past 2 weeks).      11/27/19: Increased HS Olanzapine to 5mg and Katie appears much more grounded.  Speech less pressured and mood more stable.  Thoughts more organized and linear.  Katie reports she also has noticed a significant difference.  She also reports she is sleeping much better.  Concerned about weight gain but reports she is very sedentary.      Recent Symptoms:   Depression:  depressed mood, anhedonia, low energy and insomnia  Elevated:  none  Psychosis:  none  Anxiety:  nervous/overwhelmed  Panic Attack:  none  Trauma Related:  none     Recent Substance Use:  No changes reported        Social/ Family History      [1ea,1ea]            [per patient report]               Financial support-  social security disability  Children-  2 adult children  Living situation- Lives in house [place] with son   Social/spiritial support-  limited/none  Feels safe at home-  Yes   Family history- None      Medical / Surgical History                                 Patient Active Problem List   Diagnosis     Bipolar disorder (H)     " "Hashimoto's thyroiditis     Incontinence of urine in female     Osteopenia     Reactive airway disease     Vitamin D deficiency     Overweight     Dizziness     Hyperlipidemia LDL goal <100       Past Surgical History:   Procedure Laterality Date     partial thyroidectomy       TONSILLECTOMY       TUBAL LIGATION          Medical Review of Systems         [2,10]   The remainder of the review of systems is noncontributory  Thyroid removed  Allergy    Quetiapine and Valproic acid  Current Medications        Current Outpatient Medications   Medication Sig Dispense Refill     albuterol (PROAIR HFA/PROVENTIL HFA/VENTOLIN HFA) 108 (90 Base) MCG/ACT inhaler Inhale 2 puffs into the lungs as needed for shortness of breath / dyspnea or wheezing 18 g 1     buPROPion (WELLBUTRIN XL) 300 MG 24 hr tablet Take 1 tablet (300 mg) by mouth every morning 30 tablet 0     cetirizine (ZYRTEC) 10 MG tablet Take 1 tablet (10 mg) by mouth daily 90 tablet 1     mirtazapine (REMERON) 7.5 MG tablet Take 1 tablet (7.5 mg) by mouth At Bedtime 30 tablet 0     propranolol (INDERAL) 10 MG tablet Take 1 tablet (10 mg) by mouth 2 times daily as needed (anxiety) 60 tablet 0     thyroid (ARMOUR) 32.5 MG tablet Take 1 tablet (32.5 mg) by mouth daily Nature-thyroid 90 tablet 3     ziprasidone (GEODON) 60 MG capsule Take 1 capsule (60 mg) by mouth 2 times daily (with meals) 60 capsule 1     Vitals         [3, 3]   There were no vitals taken for this visit.   Mental Status Exam        [9, 14 cog gs]     Alertness: alert  and oriented  Appearance: casually groomed  Behavior/Demeanor: cooperative, pleasant and calm, with good  eye contact   Speech: normal.  Language: no obvious problem  Psychomotor: normal or unremarkable  Mood: worried and \"overwhelmed\"  Affect: appropriate; was congruent to mood; was congruent to content  Thought Process/Associations: unremarkable  Thought Content:  Reports none;  Denies suicidal ideation and violent ideation  Perception:  " Reports none;  Denies auditory hallucinations and visual hallucinations  Insight: adequate  Judgment: intact  Cognition: (6) does  appear grossly intact; formal cognitive testing was not done  Gait/Station and/or Muscle Strength/Tone: unable to assess    Labs and Data                          Rating Scales:    PHQ9    PHQ9 Today:  Not completed  PHQ-9 SCORE 12/18/2019   PHQ-9 Total Score MyChart 17 (Moderately severe depression)   PHQ-9 Total Score 17        Assessment      [m2, h3]     Unspecified Mood Disorder  Bipolar I Disorder (Hx)    [place] Prescription Monitoring Program [] was not checked today:  provider not managing any controlled medications.        Plan                                                                                                                     m2, h3     1) MEDICATION:  Continue [place] 32.5mg (thyroid)  Discontinue Hydroxyzine 50mg at bedtime   Continue Geodon 60mg BID with food  Continue Mirtazapine 7.5mg at bedtime for sleep and mood.  Monitor for mood elevation and weight gain  Continue Wellbutrin XL 300mg daily.  Consider increasing dose to 450mg  Continue Propranolol 10mg BID PRN for anxiety.      2) THERAPY:  Continue with current therapist      RTC: 1 month    CRISIS NUMBERS:   Provided routinely in AVS.    Treatment Risk Statement:  The patient understands the risks, benefits, adverse effects and alternatives. Agrees to treatment with the capacity to do so. No medical contraindications to treatment. Agrees to call clinic for any problems. The patient understands to call 911 or go to the nearest ED if life threatening or urgent symptoms occur.     WHODAS 2.0  TODAY total score = N/A; [a 12-item WHODAS 2.0 assessment was not completed by the pt today and/or recorded in EPIC].       PROVIDER:  ADELINA Lieberman CNP

## 2020-07-08 ENCOUNTER — TELEPHONE (OUTPATIENT)
Dept: PSYCHIATRY | Facility: CLINIC | Age: 60
End: 2020-07-08

## 2020-07-08 NOTE — TELEPHONE ENCOUNTER
Health Call Center    Phone Message    May a detailed message be left on voicemail: yes     Reason for Call: Form or Letter   Type or form/letter needing completion: Verification of Disability Form  Provider: Enzo No  Date form needed: ASAP      Patient called to follow up on form she had previously sent.  Patient was looking at the form and is not sure it is the correct form.  She found the form online but it seems to be for someone living dependently and she wants one filled out that indicates she lives independently.    Katie would appreciate a call back at [phone].       Action Taken: Message routed to:  Other: ERI Farias    Travel Screening: Not Applicable

## 2020-07-09 NOTE — TELEPHONE ENCOUNTER
Enzo No APRN CNP Labossiere, Laura, RN    Caller: Unspecified (Yesterday,  4:33 PM)               Definitely next week.  Not sure exactly when though        Will ask fellow RNCC to print forms and place in Enzo No's folder for completion next week.

## 2020-07-09 NOTE — TELEPHONE ENCOUNTER
Writer received incoming phone call from the pt. She confirmed that she receives social security disability for her Bipolar Disorder. Confirmed that the forms are correct and provider will likely complete next week. Once complete, pt asked that forms are e-mailed to her at [employer] and mailed to her home address. Writer voiced understanding.

## 2020-07-09 NOTE — TELEPHONE ENCOUNTER
Jocy Pena, [facility]  Alma Suazo, CAROLYNE; Enzo No, APRN CNP    Caller: Unspecified (Yesterday,  4:33 PM)               So Katie is applying for affordable housing through [facility] based on her having a disability. Some housing programs offer vouchers for people with disabilities that need help affording adequate housing based on their disability. Often this is due to a physical disability, but sometimes mental illness can qualify someone, too. I'm not familiar with Katie's case, so I can't verify whether her mental illness would qualify for a housing voucher based on disability. But if you and Enzo feel that, were Katie to live in more adequate housing, her symptoms would be better managed, it's certainly worth applying. Basically all that's needed from you would be to check yes or no on the questions listed on the form. Katie will need to sign the bottom, which is her consenting to her information being shared with housing agencies. I believe you can just return the form to Katie once it's completed so she can sign, but it's a little unclear to me who this form needs to be sent to after that. If Katie isn't sure, she can call [facility] at [phone] to ask about who's helping her with her application.     I hope that helps!     Darline

## 2020-07-09 NOTE — TELEPHONE ENCOUNTER
Based on the information provided by Darline in the previous message, the form is likely correct. Will reach out to provider to confirm that he received the form and can complete it on behalf of the pt.     Called pt. No answer. LVM with information from Darline's message. Requested a c/b if she'd like to discuss things further.

## 2020-07-14 NOTE — TELEPHONE ENCOUNTER
Enzo No APRN CNP Labossiere, Laura, RN    Caller: Unspecified (6 days ago,  4:33 PM)               I'll come into clinic on Friday afternoon to complete

## 2020-07-15 ENCOUNTER — TELEPHONE (OUTPATIENT)
Dept: PSYCHIATRY | Facility: CLINIC | Age: 60
End: 2020-07-15

## 2020-07-21 ENCOUNTER — TELEPHONE (OUTPATIENT)
Dept: PSYCHIATRY | Facility: CLINIC | Age: 60
End: 2020-07-21

## 2020-07-21 NOTE — TELEPHONE ENCOUNTER
Enzo No, APRN Alma Sharp, RN    Caller: Unspecified (Today,  8:18 AM)               Rey Marquez     I'm going to keep reiterating the same thing.  She needs to make changes to improve sleep.  Definitely no on the Klonopin.  If she would like resources for another provider, we can refer her back to social work.       Thanks

## 2020-07-21 NOTE — TELEPHONE ENCOUNTER
[facility] Call Center    Phone Message    May a detailed message be left on voicemail: yes     Reason for Call: Other: Pt called frustrated about her lack of sleep. She want to get back on clonazapam. She stopped taking it a year and half ago and ever since then she hasn't been sleeping well. She can fall asleep but wakes up an hour and half later and can't fall back to sleep. She used to take 0.5mg of clonazapam and it helped with sleep.      Action Taken: Other: [facility]    Travel Screening: Not Applicable

## 2020-07-21 NOTE — TELEPHONE ENCOUNTER
"Enzo No APRN CNP Labossiere, Laura, RN    Caller: Unspecified (Today,  8:18 AM)               Hi Alma,     Yep.  No Klonopin.  She really needs to help herself sleep. Daily activity and following recommendations from sleep medicine.  She can also try Unisom?        Writer reviewed sleep medicine's recommendations further. Called the pt back and relayed the provider's recommendations, along with sleep medicine's recommendations. Pt became frustrated and said, \"I was on Klonopin for 13 years and it was the only thing that has worked for me. What am I supposed to do?\" Writer voiced understanding. Suggested Unisom, which she is uninterested in at this time. Asked if she has been implementing the recommendations from sleep medicine and Enzo No, which she said she is doing and it's not helpful. Pt asked if there's another provider she can see who will be willing to prescribe clonazepam. Explained that this is unlikely due to the risks and side effects of Klonopin. Pt said, \"There has to be somebody.\" Agreed to chat with provider further, but informed her at this time, he will not be prescribing Klonopin.  "

## 2020-07-21 NOTE — TELEPHONE ENCOUNTER
Gisselle Rincon Laura, RN    Phone Number: [phone]               Katie, would like a call back regarding the earlier conversation today. This is all the info given.     Thanks!

## 2020-07-21 NOTE — TELEPHONE ENCOUNTER
"Writer called pt back and reiterated the provider's recommendations. She continued to share her frustrations and ask for Klonopin. Numerous times, writer informed her that Enzo will not be prescribing this. She then asked to see another provider at this clinic. Agreed to reach out to Dr. Pulliam to see if the pt can be transferred to the residency clinic, but explained to the pt, a provider in the residency clinic likely won't prescribe Klonopin either due to the risks and addictive quality of the med. Pt said, \"But every medication has risks. It's worth a shot. I have not slept well for one year. I need sleep!\" Also suggested outside resources, which the pt refused because she doesn't want change. Lastly, writer suggested reaching out to sleep medicine again, which she agreed to. Phone number provided for this clinic.  "

## 2020-07-24 ENCOUNTER — TELEPHONE (OUTPATIENT)
Dept: PSYCHIATRY | Facility: CLINIC | Age: 60
End: 2020-07-24

## 2020-07-24 ENCOUNTER — TELEPHONE (OUTPATIENT)
Dept: SLEEP MEDICINE | Facility: CLINIC | Age: 60
End: 2020-07-24

## 2020-07-24 NOTE — TELEPHONE ENCOUNTER
Called and explained to pt that we do not have any capacity to transfer her care to another provider w/in our clinic. I offered to send her a list of external resources and suggested contacting her insurance company for recommendations to other clinics. Pt stated she would like to keep her care with Enzo for now. I also reminded the pt that when calling the clinic she should expect a call back w/in 2-3 business days.

## 2020-07-24 NOTE — TELEPHONE ENCOUNTER
Patient called today.    Patient left a message for  [facility] on July 21 and hasn't heard back yet.    Please contact patient today.    Thank you.    Central Scheduling  Lizzeth RAM

## 2020-07-24 NOTE — TELEPHONE ENCOUNTER
Out of office today, but did reach out to Katie after review of chart and understanding that behavioral measures have not worked to this point which would include:    Previous methods to reduce anxiety while in bed, have not been helpful.  It appears from review of chart, that clonazepam is the drug of her choice.  I did leave a voice message that actigraphy/with sleep diaries to be completed has recently been returned to our practice.    Requested she my chart me if she wishes to pursue this diagnostic intervention.   I also told her I'd collaborate with Enzo Carlson on this diagnositic device information that would be available, and if she kept any sleep diaries to this point to my chart them to me.

## 2020-07-25 ENCOUNTER — TELEPHONE (OUTPATIENT)
Dept: PSYCHIATRY | Facility: CLINIC | Age: 60
End: 2020-07-25

## 2020-07-25 ENCOUNTER — MYC MEDICAL ADVICE (OUTPATIENT)
Dept: SLEEP MEDICINE | Facility: CLINIC | Age: 60
End: 2020-07-25

## 2020-07-25 NOTE — TELEPHONE ENCOUNTER
"  -----------------------------------------------------------------------------------------------------------  Brief Psychiatry Phone note      Katie Griffiths MRN# [id number]   Age: 59 year old   Clinic Provider: Enzo No YOB: 1960   PCP: Kim Gore            Phone note:     Received phone call from Tunde Wilson. She reported difficulty to sleep that has lasted for a year and a half  that it's been getting worse over the past couple of weeks and has gotten distressing. She reported that she's scared that she's \"going to become manic soon\" because she hasn't been sleeping. She endorses feeling anxious, and constant thoughts about getting her sleep fixed. She denied suicidal ideations.    I suggested using melatonin but patient declined. She mentioned that she had used Klonopin in the past which was helpful but her outpatient provider didn't want to re-prescribe that for her. Due to the distress, she was feeling and the increasing anxiety over becoming manic, I suggested that  she come to the ED. She seemed agreeable to that and was going to speak with her parents for a ride to the hospital. She stated that she might try some relaxation techniques tonight and if they don't work, might present at the ED tomorrow and speak with her outpatient mental health provider on Monday.           Assessment and Plan:   Katie Griffiths is a 59 year old female with a history of Bipolar disorder with chronic history of poor sleep which has worsened in the last few weeks.    Plan  -Patient was encouraged to come to the ED because of increasing anxiety about getting manic.  =============================================================================  Aleks MD Madalyn, MPH  PGY-2 Psychiatry Resident  Pager: [phone]          "

## 2020-07-25 NOTE — TELEPHONE ENCOUNTER
Today Katie Griffiths reports continued difficulty sleeping can't turn mind off.  Is in a dilema about  Day or night if my brain is overloaded i lay down and stay put. My fear is that if i stay out of bed at night i will go full blown manic Like before. Lack of sleep is not only making it hard at night it is hard being overtired during the day and keeping shayla  away.    Do sleep but not very much, time out of bed head hurt, tired, 2-3    W/anxiety=go lay down attempt to relax to fall again, 1-2.           GAD7 score: 16/21  BELA-7    Over the last 2 weeks, how often have you been bothered by the following problems?   Not at all -0       Several days -1                  More than half the days-2   Nearly every day -3    1. Feeling nervous, anxious or on edge=3      2. Not being able to stop or control worrying=3      3. Worrying too much about different things=3      4. Trouble relaxing=3      5. Being so restless that it is hard to sit still=varies,3       6. Becoming easily annoyed or irritable=1      7. Feeling afraid as if something awful might happen=3        Total____16___    Cut points for:   Mild Anxiety =  5  Moderate= 10  Severe=  15      .Over the last two weeks, how often have you been bothered by any the following symptoms?  Please use the following scale;  0 = Not at all  1 = Several days but less than half  2 = More than half of the days  3 = Nearly every day    1) Little interest or pleasure in doing things [ 3   ]  2) Feeling down, depressed, or hopeless.[ 3   ]  3) Trouble falling or staying asleep, or sleeping too much [  3  ]  4) Feeling tired or having little energy.[  3  ]  5) Poor appetite or overeating [  3  ]  6) Feeling bad about yourself - or that you are a failure or have let yourself or your family down [ 3   ]  7) Trouble concentrating on things, such as reading the newspaper or watching television [ 3   ] 2007-reading stopped  8) Moving or speaking so slowly that other people  could have noticed. Or the opposite-being fidgety or restless that you have been moving around a lot more than usual [   1 ]    9) Thoughts that you would be better off dead, or of hurting yourself in some way [ 0   ]      Score: 23/27 for phq9  Finally, how difficult have these problems made it for you to do your work, take care of things at home, or get along with other people?  Not difficult at all, somewhat difficult, very difficult or extremely difficult?  Very difficult.      Call to resident psychiatry [phone] re: signs/symptoms, unable to sleep for long with brain not wanting to stop.  Goal is a medication for sleep according to pt, however she does agree that getting mind to stop may help with sleep.    Pt given # above as directed, and indicated she would call.

## 2020-07-26 ENCOUNTER — MYC MEDICAL ADVICE (OUTPATIENT)
Dept: INTERNAL MEDICINE | Facility: CLINIC | Age: 60
End: 2020-07-26

## 2020-07-26 ENCOUNTER — HOSPITAL ENCOUNTER (EMERGENCY)
Facility: CLINIC | Age: 60
Discharge: HOME | End: 2020-07-26
Attending: EMERGENCY MEDICINE | Admitting: EMERGENCY MEDICINE
Payer: MEDICARE

## 2020-07-26 VITALS
HEART RATE: 87 BPM | DIASTOLIC BLOOD PRESSURE: 64 MMHG | TEMPERATURE: 96.8 F | OXYGEN SATURATION: 97 % | RESPIRATION RATE: 16 BRPM | SYSTOLIC BLOOD PRESSURE: 125 MMHG

## 2020-07-26 DIAGNOSIS — G47.00 INSOMNIA, UNSPECIFIED TYPE: ICD-10-CM

## 2020-07-26 LAB
ALBUMIN SERPL-MCNC: 4.4 G/DL (ref 3.4–5)
ALP SERPL-CCNC: 100 U/L (ref 40–150)
ALT SERPL W P-5'-P-CCNC: 24 U/L (ref 0–50)
AMPHETAMINES UR QL SCN: NEGATIVE
ANION GAP SERPL CALCULATED.3IONS-SCNC: 5 MMOL/L (ref 3–14)
AST SERPL W P-5'-P-CCNC: 15 U/L (ref 0–45)
BARBITURATES UR QL: NEGATIVE
BASOPHILS # BLD AUTO: 0.1 10E9/L (ref 0–0.2)
BASOPHILS NFR BLD AUTO: 1.1 %
BENZODIAZ UR QL: NEGATIVE
BILIRUB SERPL-MCNC: 0.5 MG/DL (ref 0.2–1.3)
BUN SERPL-MCNC: 11 MG/DL (ref 7–30)
CALCIUM SERPL-MCNC: 9.5 MG/DL (ref 8.5–10.1)
CANNABINOIDS UR QL SCN: NEGATIVE
CHLORIDE SERPL-SCNC: 111 MMOL/L (ref 94–109)
CO2 SERPL-SCNC: 25 MMOL/L (ref 20–32)
COCAINE UR QL: NEGATIVE
CREAT SERPL-MCNC: 0.71 MG/DL (ref 0.52–1.04)
DIFFERENTIAL METHOD BLD: ABNORMAL
EOSINOPHIL # BLD AUTO: 0.1 10E9/L (ref 0–0.7)
EOSINOPHIL NFR BLD AUTO: 1.1 %
ERYTHROCYTE [DISTWIDTH] IN BLOOD BY AUTOMATED COUNT: 13.4 % (ref 10–15)
ETHANOL SERPL-MCNC: <0.01 G/DL
ETHANOL UR QL SCN: NEGATIVE
GFR SERPL CREATININE-BSD FRML MDRD: >90 ML/MIN/{1.73_M2}
GLUCOSE SERPL-MCNC: 116 MG/DL (ref 70–99)
HCT VFR BLD AUTO: 47.2 % (ref 35–47)
HGB BLD-MCNC: 15.9 G/DL (ref 11.7–15.7)
IMM GRANULOCYTES # BLD: 0 10E9/L (ref 0–0.4)
IMM GRANULOCYTES NFR BLD: 0.2 %
LYMPHOCYTES # BLD AUTO: 1.1 10E9/L (ref 0.8–5.3)
LYMPHOCYTES NFR BLD AUTO: 17.4 %
MCH RBC QN AUTO: 30.8 PG (ref 26.5–33)
MCHC RBC AUTO-ENTMCNC: 33.7 G/DL (ref 31.5–36.5)
MCV RBC AUTO: 91 FL (ref 78–100)
MONOCYTES # BLD AUTO: 0.7 10E9/L (ref 0–1.3)
MONOCYTES NFR BLD AUTO: 11.5 %
NEUTROPHILS # BLD AUTO: 4.4 10E9/L (ref 1.6–8.3)
NEUTROPHILS NFR BLD AUTO: 68.7 %
NRBC # BLD AUTO: 0 10*3/UL
NRBC BLD AUTO-RTO: 0 /100
OPIATES UR QL SCN: NEGATIVE
PLATELET # BLD AUTO: 270 10E9/L (ref 150–450)
POTASSIUM SERPL-SCNC: 4.4 MMOL/L (ref 3.4–5.3)
PROT SERPL-MCNC: 8.3 G/DL (ref 6.8–8.8)
RBC # BLD AUTO: 5.17 10E12/L (ref 3.8–5.2)
SODIUM SERPL-SCNC: 141 MMOL/L (ref 133–144)
TSH SERPL DL<=0.005 MIU/L-ACNC: 2.93 MU/L (ref 0.4–4)
WBC # BLD AUTO: 6.4 10E9/L (ref 4–11)

## 2020-07-26 PROCEDURE — 80320 DRUG SCREEN QUANTALCOHOLS: CPT | Performed by: FAMILY MEDICINE

## 2020-07-26 PROCEDURE — 80053 COMPREHEN METABOLIC PANEL: CPT | Performed by: EMERGENCY MEDICINE

## 2020-07-26 PROCEDURE — 80320 DRUG SCREEN QUANTALCOHOLS: CPT | Performed by: EMERGENCY MEDICINE

## 2020-07-26 PROCEDURE — 84443 ASSAY THYROID STIM HORMONE: CPT | Performed by: EMERGENCY MEDICINE

## 2020-07-26 PROCEDURE — 99285 EMERGENCY DEPT VISIT HI MDM: CPT | Mod: 25 | Performed by: EMERGENCY MEDICINE

## 2020-07-26 PROCEDURE — 99284 EMERGENCY DEPT VISIT MOD MDM: CPT | Mod: Z6 | Performed by: EMERGENCY MEDICINE

## 2020-07-26 PROCEDURE — 85025 COMPLETE CBC W/AUTO DIFF WBC: CPT | Performed by: EMERGENCY MEDICINE

## 2020-07-26 PROCEDURE — 80307 DRUG TEST PRSMV CHEM ANLYZR: CPT | Performed by: FAMILY MEDICINE

## 2020-07-26 PROCEDURE — 90791 PSYCH DIAGNOSTIC EVALUATION: CPT

## 2020-07-26 NOTE — ED AVS SNAPSHOT
[facility], [place], Emergency Department  [address]  [facility] [address]  Phone:  [phone]  Fax:  [phone]                                    Katie Griffiths   MRN: [id number]    Department:  [facility], Emergency Department   Date of Visit:  7/26/2020           After Visit Summary Signature Page    I have received my discharge instructions, and my questions have been answered. I have discussed any challenges I see with this plan with the nurse or doctor.    ..........................................................................................................................................  Patient/Patient Representative Signature      ..........................................................................................................................................  Patient Representative Print Name and Relationship to Patient    ..................................................               ................................................  Date                                   Time    ..........................................................................................................................................  Reviewed by Signature/Title    ...................................................              ..............................................  Date                                               Time          22EPIC Rev 08/18

## 2020-07-26 NOTE — ED PROVIDER NOTES
[facility] EMERGENCY DEPARTMENT ([facility])     July 26, 2020    History     Chief Complaint   Patient presents with     Insomnia     pt reported having problem with sleeping for almost a  year now since moving to [place], pt requesting for psychiatric evaluation     HPI  Katie Griffiths is a 59 year old female with a past medical history significant for bipolar disorder, depression with anxiety, mild intermittent asthma, Hashimoto's thyroid, osteopenia, hyperlipidemia and s/p partial thyroidectomy who presents to the Emergency Department for evaluation of insomnia. Patient reports being unable to sleep properly for the past 1.5 years. She states she moved from [place] to [place] and she states that over the past year and a half she has had trouble sleeping as the providers in [place] would not prescribe the Klonopin that she had previously been on for almost 14 years.  She states that she has trouble turning off her brain and lays in bed worrying.  She states her main fear is that if she does not improve her sleep she could develop shayla.  At this point she does not feel that she is manic.  She denies any suicidal thoughts, plan, or intent.  She denies any homicidal ideation.  She denies any illicit drug or alcohol use.  She states that she is feeling anxious but again not manic.  She states that she feels very tired during the day.  She does follow with a psychiatrist at the [facility] psychiatry clinic.  She was also referred to sleep medicine.  It appears that she has had multiple different medication trials and has had multiple different side effects and has not found a combination that she feels is been managing her symptoms well.  She is now been living with her elderly parents.  She is asking that I prescribe her Klonopin or change her medications.  She denies any recent illness.  She denies any fever, cough, chest pain, shortness of breath, abdominal pain, nausea, vomiting, diarrhea, body aches,  numbness, tingling, or weakness.  She states that she is able to function throughout the day.  She denies any known COVID-19 exposure.  She states she has been talking with both her sleep specialist and her psychiatrist but feels like she is not getting anywhere.  She states she is taking all of her medications as prescribed.    PAST MEDICAL HISTORY:   Past Medical History:   Diagnosis Date     Bipolar disorder (H)      Depression with anxiety      Hashimoto's thyroiditis      Hyperlipidemia      Mild intermittent asthma      S/P partial thyroidectomy      Superficial perivascular dermatitis        PAST SURGICAL HISTORY:   Past Surgical History:   Procedure Laterality Date     partial thyroidectomy       TONSILLECTOMY       TUBAL LIGATION         Past medical history, past surgical history, medications, and allergies were reviewed with the patient. Additional pertinent items: None    FAMILY HISTORY:   Family History   Problem Relation Age of Onset     Colon Polyps Mother      Eye Disorder Father      Factor V Leiden deficiency Father         pt tested negative     Hyperlipidemia Father        SOCIAL HISTORY:   Social History     Tobacco Use     Smoking status: Never Smoker     Smokeless tobacco: Never Used   Substance Use Topics     Alcohol use: Yes     Comment: occasional     Social history was reviewed with the patient. Additional pertinent items: None      Discharge Medication List as of 7/26/2020 12:20 PM      CONTINUE these medications which have NOT CHANGED    Details   albuterol (PROAIR HFA/PROVENTIL HFA/VENTOLIN HFA) 108 (90 Base) MCG/ACT inhaler Inhale 2 puffs into the lungs as needed for shortness of breath / dyspnea or wheezing, Disp-18 g,R-1, E-PrescribePharmacy may dispense brand covered by insurance (Proair, or proventil or ventolin or generic albuterol inhaler)      cetirizine (ZYRTEC) 10 MG tablet Take 1 tablet (10 mg) by mouth daily, Disp-90 tablet,R-1, E-Prescribe      thyroid (ARMOUR) 32.5 MG  tablet Take 1 tablet (32.5 mg) by mouth daily Nature-thyroid, Disp-90 tablet, R-3, E-Prescribe      buPROPion (WELLBUTRIN XL) 300 MG 24 hr tablet Take 1 tablet (300 mg) by mouth every morning, Disp-30 tablet,R-0, E-Prescribe      mirtazapine (REMERON) 7.5 MG tablet Take 1 tablet (7.5 mg) by mouth At Bedtime, Disp-30 tablet,R-0, E-Prescribe      propranolol (INDERAL) 10 MG tablet Take 1 tablet (10 mg) by mouth 2 times daily as needed (anxiety), Disp-60 tablet,R-0, E-Prescribe      ziprasidone (GEODON) 60 MG capsule Take 1 capsule (60 mg) by mouth 2 times daily (with meals), Disp-60 capsule,R-1, E-Prescribe                Allergies   Allergen Reactions     Quetiapine Rash     Valproic Acid Rash     Drug rash        Review of Systems  A complete review of systems was performed with pertinent positives and negatives noted in the HPI, and all other systems negative.    Physical Exam   BP: 126/84  Pulse: 87  RETIRE: Heart Rate: 113  Temp: 98.1  F (36.7  C)  Resp: 16  SpO2: 98 %      Physical Exam  Vitals signs reviewed.   Constitutional:       General: She is not in acute distress.     Appearance: She is well-developed. She is not ill-appearing.   HENT:      Head: Normocephalic and atraumatic.      Mouth/Throat:      Mouth: Mucous membranes are moist.      Pharynx: No oropharyngeal exudate or posterior oropharyngeal erythema.   Eyes:      Extraocular Movements: Extraocular movements intact.      Conjunctiva/sclera: Conjunctivae normal.      Pupils: Pupils are equal, round, and reactive to light.   Neck:      Musculoskeletal: Normal range of motion and neck supple. No neck rigidity.   Cardiovascular:      Rate and Rhythm: Normal rate and regular rhythm.      Pulses: Normal pulses.      Heart sounds: Normal heart sounds. No murmur.   Pulmonary:      Effort: Pulmonary effort is normal. No respiratory distress.      Breath sounds: Normal breath sounds. No wheezing or rales.   Abdominal:      General: Bowel sounds are normal.  There is no distension.      Palpations: Abdomen is soft. There is no mass.      Tenderness: There is no abdominal tenderness. There is no guarding or rebound.   Musculoskeletal: Normal range of motion.   Skin:     General: Skin is warm and dry.      Capillary Refill: Capillary refill takes less than 2 seconds.      Findings: No rash.   Neurological:      General: No focal deficit present.      Mental Status: She is alert and oriented to person, place, and time.      GCS: GCS eye subscore is 4. GCS verbal subscore is 5. GCS motor subscore is 6.      Cranial Nerves: No cranial nerve deficit.      Sensory: No sensory deficit.      Motor: No weakness.      Gait: Gait normal.   Psychiatric:         Attention and Perception: Attention and perception normal.         Speech: Speech normal. Speech is not rapid and pressured or tangential.         Behavior: Behavior normal.         Cognition and Memory: Cognition and memory normal.         Judgment: Judgment normal.      Comments: Calm and cooperative here.  Denies any suicidal homicidal ideation.  Become slightly anxious when talking about her issues.         ED Course        Procedures                         Medications - No data to display   Labs Ordered and Resulted from Time of ED Arrival Up to the Time of Departure from the ED   COMPREHENSIVE METABOLIC PANEL - Abnormal; Notable for the following components:       Result Value    Chloride 111 (*)     Glucose 116 (*)     All other components within normal limits   CBC WITH PLATELETS DIFFERENTIAL - Abnormal; Notable for the following components:    Hemoglobin 15.9 (*)     Hematocrit 47.2 (*)     All other components within normal limits   DRUG ABUSE SCREEN 6 CHEM DEP URINE ([facility])   TSH WITH FREE T4 REFLEX   ALCOHOL ETHYL            Assessments & Plan (with Medical Decision Making)   On exam, patient is overall well-appearing and in no acute distress.  When discussing her symptoms she appears slightly anxious but overall  is calm and cooperative and does not have any signs of shayla here.  She denies any suicidal or homicidal ideation.  She also denies any illicit drug use or alcohol use.  It sounds as though her main concern is a change in medications.  She believes that her interrupted sleep was related to her Klonopin being stopped when she moved to [place] a year and a half ago.  This is been a chronic issue for the last year and a half.  She does follow with psychiatry as well as sleep medicine.  It appears by my record review through epic that she has been on multiple different medication trials that she does not feel have been found to have a combination that is working to her liking.  She has been on trazodone previously and was then stopped on this as she was then started on mirtazapine and that these could interact with each other.  She has had multiple different issues with previous side effects with medication changes.  She is denying any medical complaints here.  She reports she is compliant with her medications.  We did obtain laboratory studies here which were largely unremarkable.  TSH is within normal limits at 2.93.  Alcohol level was 0.  Drug screen was negative.  CBC and CMP are largely unremarkable.  Her vital signs here are within normal limits and she is afebrile.  I do not feel that there is a medical cause to this at this time and that she should continue to follow-up with her sleep medicine and psychiatry providers.  I did have the behavioral health  evaluate the patient who agreed that this is a chronic issue and that she needs to follow-up with her psychiatry providers to have a medication adjustment if they feel needed.  I discussed with the patient that as I am not a psychiatrist and I would not be following her it would not be appropriate for me to be adjusting her medications especially as she has had multiple different trials of sleep medications and had side effects and interactions with her  other medications.  We discussed that we could offer her a second opinion with a different psychiatrist and she was given an appointment for this and was in agreement with this plan.  She is also trying to start DBT which may be helpful.  At this time she does not have any signs of shayla and the dec  agreed she does not have any indication for psychiatric admission at this time.  She was able to contract for safety and was in agreement with this plan.  She was discharged and advised to follow-up closely with her outpatient providers.  She voiced understanding and was comfortable with this plan.  She was given indications for return to the emergency department.  She was discharged home in stable condition.  We did suggest melatonin but she declined this.    I have reviewed the nursing notes.    I have reviewed the findings, diagnosis, plan and need for follow up with the patient.    Discharge Medication List as of 7/26/2020 12:20 PM          Final diagnoses:   Insomnia, unspecified type       7/26/2020   [facility], [place], EMERGENCY DEPARTMENT     Mary Kay Queen MD  08/23/20 1000

## 2020-07-26 NOTE — DISCHARGE INSTRUCTIONS
Follow up with the psychiatrist appointment tomorrow at 10 am as discussed with our . Follow up with your sleep specialist.     Return to the ED if you develop any new or worsening concerns including thoughts of harming yourself or others.
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
## 2020-07-27 ENCOUNTER — MYC MEDICAL ADVICE (OUTPATIENT)
Dept: INTERNAL MEDICINE | Facility: CLINIC | Age: 60
End: 2020-07-27

## 2020-07-27 ENCOUNTER — TELEPHONE (OUTPATIENT)
Dept: PSYCHIATRY | Facility: CLINIC | Age: 60
End: 2020-07-27

## 2020-07-27 NOTE — TELEPHONE ENCOUNTER
[facility] Call Center    Phone Message    May a detailed message be left on voicemail: yes     Reason for Call: Medication Question or concern regarding medication   Prescription Clarification  Name of Medication: mirtazapine  Prescribing Provider: ADELINA Corbett [facility]   Pharmacy: [facility] [address] [place] [address] N.   What on the order needs clarification? Patient was seen at ER on 7/26/20 for insomnia and was sent to another psychiatrist for a second opinion. That provider recommended the patient increase mirtazapine from 7.5mg to 15mg and if that does not help by 7/31/20 then the patient should try trazodone.    Patient is wanting Enzo's opinion on this because she would like to continue her care with him. She also said she had been prescribed trazodone in the past but is unable to remember why she stopped. She would not like to start trazodone again until she can be reminded why it was stopped last time.          Action Taken: Message routed to:  Other: Nursing pool    Travel Screening: Not Applicable

## 2020-07-30 ENCOUNTER — VIRTUAL VISIT (OUTPATIENT)
Dept: PSYCHIATRY | Facility: CLINIC | Age: 60
End: 2020-07-30
Attending: NURSE PRACTITIONER
Payer: MEDICARE

## 2020-07-30 DIAGNOSIS — F39 MOOD DISORDER (H): ICD-10-CM

## 2020-07-30 DIAGNOSIS — F41.9 ANXIETY: Primary | ICD-10-CM

## 2020-07-30 RX ORDER — GABAPENTIN 100 MG/1
CAPSULE ORAL
Qty: 150 CAPSULE | Refills: 0 | Status: SHIPPED | OUTPATIENT
Start: 2020-07-30 | End: 2020-08-18

## 2020-07-30 RX ORDER — MIRTAZAPINE 15 MG/1
15 TABLET, FILM COATED ORAL AT BEDTIME
Qty: 30 TABLET | Refills: 0 | Status: SHIPPED | OUTPATIENT
Start: 2020-07-30 | End: 2020-08-18

## 2020-07-30 NOTE — PROGRESS NOTES
"VIDEO VISIT  Katie Griffiths is a 59 year old patient who is being evaluated via a billable video visit.      The patient has been notified of following:   \"We have found that certain health care needs can be provided without the need for an in-person physical exam. This service lets us provide the care you need with a video conversation. If a prescription is necessary we can send it directly to your pharmacy. If lab work is needed we can place an order for that and you can then stop by our lab to have the test done at a later time. Insurers are generally covering virtual visits as they would in-office visits so billing should not be different than normal.  If for some reason you do get billed incorrectly, you should contact the billing office to correct it and that number is in the AVS .    Patient has given verbal consent for video visit?: Yes   How would you like to obtain your AVS?: not requested  AVS SmartPhrase [PsychAVS] has been placed in 'Patient Instructions': N/A      Video- Visit Details  Type of service:  video visit for medication management  Time of service:    Date:  07/30/2020    Video Start Time:  11:02 AM        Video End Time:  11:26am    Reason for video visit:  Patient unable to travel due to Covid-19  Originating Site (patient location):  [facility]   Location- parked car  Distant Site (provider location):  Remote location  Mode of Communication:  Video Conference via Doxy[employer]me  Consent:  Patient has given verbal consent for video visit?: Yes     Video- Visit Details  Type of service:  video visit for medication management  Time of service:    Date:  07/30/2020    Video Start Time:  11:03 AM        Video End Time:  11:01am    Reason for video visit:  Patient unable to travel due to Covid-19  Originating Site (patient location):  [facility]   Location- Friend or family home  Distant Site (provider location):  [facility]  Mode of Communication:  Video Conference via " "Doxy.me  Consent:  Patient has given verbal consent for video visit?: Yes     VIDEO VISIT  Katie Griffiths is a 59 year old patient who is being evaluated via a billable video visit.      The patient has been notified of following:   \"This video visit will be conducted via a call between you and your physician/provider. We have found that certain health care needs can be provided without the need for an in-person physical exam. This service lets us provide the care you need with a video conversation. If a prescription is necessary we can send it directly to your pharmacy. If lab work is needed we can place an order for that and you can then stop by our lab to have the test done at a later time. Insurers are generally covering virtual visits as they would in-office visits so billing should not be different than normal.  If for some reason you do get billed incorrectly, you should contact the billing office to correct it and that number is in the AVS .    Patient has given verbal consent for video visit?:  Yes    How would you like to obtain your AVS?:  Radha    Patient would prefer that any video invitations be sent by: Send to e-mail at: [email or web address]      AVS SmartPhrase [PsychAVS] has been placed in 'Patient Instructions':  Yes              [facility]  Psychiatry Clinic  PSYCHIATRIC PROGRESS NOTE       Katie Griffiths is a 59 year old female who prefers the name Katie and pronoun she, her.  Therapist: Amirah Tejeda @ [facility]               PCP: Buddy Nolan  Other Providers: None    Pertinent Background:  See previous notes.  Psych critical item history includes Hospitalized 3 times with most recent occurring in 2007 after overdose attempt.  Numerous medication trials.  Possible bipolar diagnosis.     Interim History     [4, 4]     The patient is a good historian, reports good treatment adherence and was last seen 7/7/20.  Since the last " "visit,  Katie reports she is \"hanging in there.\"  Trazodone reports trazodone 100mg was not effective and led to diarrhea.  Discussed possible hypomania episode as she was frequently calling various providers at odd hours and having difficulty sleeping.  Does not appear to meet criteria at this time but will continue to monitor.  Will try to increase dose of Mirtazapine dose and see if helps.  Also will start Gabapentin for anxiety and sleep.  Katie continues to be quite sedentary during day.  Fortunately she was on a day trip to [place] today with her parents.  Advised to follow sleep medicine recommendations    7/7/20: Katie reports she is \"hanging in there.\"  Depression and anxiety persist.  Mood stable.  She is residing at her parent's home for the past couple days.  She using office and sleeping on twin bed. She is looking for independent living but is running into issues with her income, age, and credit history. Completed sleep consultation and behavioral change recommendations were given.  Unclear if followed recommendations.  Stress has decreased since then due to moving in with her parents but still persists due to housing issue.  Advised to take Propranolol when feels stressed.  Continues to endorse sedentary lifestyle.  Advised to go for walks in morning and early evening to develop \"sleep appetite.\"  She agreed.  She also does not want to start any sleep aids that may lead to drowsiness and weight gain.  Mood, motivation, and energy low.  Recommended increase Wellbutrin to 450mg but she again is hesitant.      New Address:  [facility] [id number]  [place] [address]    6/9/20: Katie is currently trying to maintain housing.  Reports her current situation living with her son has been extremely stressful.  Currently looking for apartments in [facility] [place].  She working with [facility] social work team.  Katie is very hesitant to change her medications.  She would like to obtain better grasp of her housing " "before making medication adjustments. Will consult with PCP about possible Propranolol trial.       5/6/20:  Katie continues to endorse stress regarding bankruptcy.  She has phone hearing with  at the end of the month of May.  Katie continues to report lack of activity which may be attributable to lifestyle changes required during pandemic, but does report overall improvement with increase in Wellbutrin.  Sleep also continues to be an issue but she also reports it continues to improve.  Sleep medicine consultation in 2 months. No concerns with elevated mood.  Katie did not want to adjust her medications     3/31/20: when asked how she is doing, Katie reports that switch from Celexa to Wellbutrin has been beneficial in regards to mood, energy, and motivation.  No concerns with elevated mood.  Sleep continues to be an issue.  Did not worsen with addition of Wellbutrin.  Has sleep study in 3 months.  Regarding psychosocial stressors, Katie is babysitting her grandchildren as their mother is sick and is going through bankruptcy.       3/4/20: Katie again starts laughing.  Mood is stable overall but low.  Continues to endorse anhedonia, energy, and low mood. Sleep has improved since starting Remeron.  Reports sleeping 5 hours per night which results in continued daytime tiredness.    Continues to report that Celexa is not helpful and possible causing sedation    2/12/20: Katie shared that she is not sleeping.  She reports that she is getting 3 hours of sleep per night for past couple weeks. However, Katie did not look overly fatigued during appointment.  She also reports that her mood is quite low.  No concerns with shayla.  No goal directed behavior or impulsivity.  Katie\"s affect continues to be incongruent to her reported mood.  She is struggling to get household chores completed.  Continues to report sedentary lifestyle.  Educated on how exercise and activity can help with sleep.  Will stop Trazodone and start Mirtazapine " "for sleep.  If continues to be a problem, will refer to sleep medicine as sleep has been an issue for several years.  Also plan to switch Celexa as it does not appear to be managing her depression.      1/8/20: Katie's main concern today was weight gain.  Affect was quite bright today in spite of high PHQ-9 and reported persistent low mood. Reports positive holiday with her family.  Sleep continues to be inconsistent.  Mood stable with introduction of Geodon.  Will increase and discontinue Olanzapine.  Katie continues to consider a retrial of Depakote which was discontinued in past due to development of rash.      12/11/19: Katie reports her mood is stable but she is describes \"not feeling good or bad.\"  Difficult to discern if emotional blunting or lack of hypomania.  Thoughts continue to be linear and organized.  Speech normal.  Also reports sleeping continues to improve. Previous trials include Lithium (not effective?), Depakote (rash), and Abilify (side effects).  Katie would like to change medications today due to possible emotional blunting and weight gain (5lbs in past 2 weeks).      11/27/19: Increased HS Olanzapine to 5mg and Katie appears much more grounded.  Speech less pressured and mood more stable.  Thoughts more organized and linear.  Katie reports she also has noticed a significant difference.  She also reports she is sleeping much better.  Concerned about weight gain but reports she is very sedentary.      Recent Symptoms:   Depression:  depressed mood, anhedonia, low energy and insomnia  Elevated:  none  Psychosis:  none  Anxiety:  nervous/overwhelmed and frequent worry  Panic Attack:  none  Trauma Related:  none     Recent Substance Use:  No changes reported        Social/ Family History      [1ea,1ea]            [per patient report]               Financial support-  social security disability  Children-  2 adult children  Living situation- Lives in house in [place] with son   Social/spiritial " support-  limited/none  Feels safe at home-  Yes   Family history- None      Medical / Surgical History                                 Patient Active Problem List   Diagnosis     Bipolar disorder (H)     Hashimoto's thyroiditis     Incontinence of urine in female     Osteopenia     Reactive airway disease     Vitamin D deficiency     Overweight     Dizziness     Hyperlipidemia LDL goal <100       Past Surgical History:   Procedure Laterality Date     partial thyroidectomy       TONSILLECTOMY       TUBAL LIGATION          Medical Review of Systems         [2,10]   The remainder of the review of systems is noncontributory  Thyroid removed  Allergy    Quetiapine and Valproic acid  Current Medications        Current Outpatient Medications   Medication Sig Dispense Refill     albuterol (PROAIR HFA/PROVENTIL HFA/VENTOLIN HFA) 108 (90 Base) MCG/ACT inhaler Inhale 2 puffs into the lungs as needed for shortness of breath / dyspnea or wheezing 18 g 1     buPROPion (WELLBUTRIN XL) 300 MG 24 hr tablet Take 1 tablet (300 mg) by mouth every morning 30 tablet 0     cetirizine (ZYRTEC) 10 MG tablet Take 1 tablet (10 mg) by mouth daily 90 tablet 1     mirtazapine (REMERON) 7.5 MG tablet Take 1 tablet (7.5 mg) by mouth At Bedtime 30 tablet 0     propranolol (INDERAL) 10 MG tablet Take 1 tablet (10 mg) by mouth 2 times daily as needed (anxiety) 60 tablet 0     thyroid (ARMOUR) 32.5 MG tablet Take 1 tablet (32.5 mg) by mouth daily Nature-thyroid 90 tablet 3     traZODone (DESYREL) 50 MG tablet Take 1-2 tablets ( mg) by mouth nightly as needed for sleep 60 tablet 0     ziprasidone (GEODON) 60 MG capsule Take 1 capsule (60 mg) by mouth 2 times daily (with meals) 60 capsule 1     Vitals         [3, 3]   There were no vitals taken for this visit.   Mental Status Exam        [9, 14 cog gs]     Alertness: alert  and oriented  Appearance: casually groomed  Behavior/Demeanor: cooperative, pleasant and calm, with good  eye contact  "  Speech: regular rate and rhythm.  Language: intact  Psychomotor: normal or unremarkable  Mood: worried and \"overwhelmed\"  Affect: appropriate; was congruent to mood; was congruent to content  Thought Process/Associations: unremarkable  Thought Content:  Reports none;  Denies suicidal ideation and violent ideation  Perception:  Reports none;  Denies auditory hallucinations and visual hallucinations  Insight: adequate  Judgment: intact  Cognition: (6) does  appear grossly intact; formal cognitive testing was not done  Gait/Station and/or Muscle Strength/Tone: unable to assess    Labs and Data                          Rating Scales:    PHQ9    PHQ9 Today:  Not completed  PHQ-9 SCORE 12/18/2019   PHQ-9 Total Score MyChart 17 (Moderately severe depression)   PHQ-9 Total Score 17        Assessment      [m2, h3]     Unspecified Mood Disorder  Bipolar I Disorder (Hx)    [place] Prescription Monitoring Program [] was not checked today:  provider not managing any controlled medications.        Plan                                                                                                                     m2, h3     1) MEDICATION:  Continue Finley 32.5mg (thyroid)  Continue Geodon 60mg BID with food  Increase Mirtazapine to 15mg at bedtime for sleep and mood.Monitor for mood elevation and weight gain  Continue Wellbutrin XL 300mg daily.  Consider increasing dose to 450mg  Continue Propranolol 10mg BID PRN for anxiety.    Start Gabapentin 100 in morning and afternoon.  Take 300mg at bedtime     2) THERAPY:  Continue with current therapist      RTC: 1 month.  Katie will check in in 2 weeks to report on efficacy of Gabapentin and if experiences symptoms associated with hypomania    CRISIS NUMBERS:   Provided routinely in AVS.    Treatment Risk Statement:  The patient understands the risks, benefits, adverse effects and alternatives. Agrees to treatment with the capacity to do so. No medical contraindications to treatment. " Agrees to call clinic for any problems. The patient understands to call 911 or go to the nearest ED if life threatening or urgent symptoms occur.     WHODAS 2.0  TODAY total score = N/A; [a 12-item WHODAS 2.0 assessment was not completed by the pt today and/or recorded in EPIC].       PROVIDER:  ADELINA Lieberman CNP

## 2020-08-08 ENCOUNTER — MYC MEDICAL ADVICE (OUTPATIENT)
Dept: ENDOCRINOLOGY | Facility: CLINIC | Age: 60
End: 2020-08-08

## 2020-08-10 NOTE — TELEPHONE ENCOUNTER
Hi Katie,    Levothryoxine 50 mcg is the standard conversion but we will do blood work before appt.    Mary Sawyer MD  ===View-only below this line===      ----- Message -----       From:Katie Griffiths       Sent:8/8/2020 11:14 AM CDT         To:Mary Sawyer MD    Subject:Question about medications    Dear Mary Sawyer MD,  I have 46 nature throid 32.5mg tablet. it does have a refill. I am writting now to [facility] you time to figure out how much synthroid i should be on. Any records from [facility]  have supposedly been transferred to [facility].  You had asked what synthroid i had been on and I dont remember what synthroid or how much i used to be on.  I have recently been put on gabapentin which could make thyroid levels lower. i was put on it after the last thyroid blood test done at [facility]. Let me know when if i should make an appt. for 45 days from now when current nature throid is used up  or longer.   Thank You   Sincerely  Katie Tunde

## 2020-08-18 ENCOUNTER — VIRTUAL VISIT (OUTPATIENT)
Dept: PSYCHIATRY | Facility: CLINIC | Age: 60
End: 2020-08-18
Attending: NURSE PRACTITIONER
Payer: MEDICARE

## 2020-08-18 DIAGNOSIS — F39 MOOD DISORDER (H): ICD-10-CM

## 2020-08-18 DIAGNOSIS — F41.9 ANXIETY: ICD-10-CM

## 2020-08-18 RX ORDER — BUPROPION HYDROCHLORIDE 300 MG/1
300 TABLET ORAL EVERY MORNING
Qty: 30 TABLET | Refills: 0 | Status: SHIPPED | OUTPATIENT
Start: 2020-08-18 | End: 2020-09-15

## 2020-08-18 RX ORDER — ZIPRASIDONE HYDROCHLORIDE 60 MG/1
60 CAPSULE ORAL 2 TIMES DAILY WITH MEALS
Qty: 60 CAPSULE | Refills: 1 | Status: SHIPPED | OUTPATIENT
Start: 2020-08-18 | End: 2020-10-14

## 2020-08-18 RX ORDER — GABAPENTIN 300 MG/1
CAPSULE ORAL
Qty: 150 CAPSULE | Refills: 0 | Status: SHIPPED | OUTPATIENT
Start: 2020-08-18 | End: 2020-09-15

## 2020-08-18 RX ORDER — MIRTAZAPINE 15 MG/1
15 TABLET, FILM COATED ORAL AT BEDTIME
Qty: 30 TABLET | Refills: 0 | Status: SHIPPED | OUTPATIENT
Start: 2020-08-18 | End: 2020-09-15

## 2020-08-18 ASSESSMENT — PAIN SCALES - GENERAL: PAINLEVEL: NO PAIN (0)

## 2020-08-18 NOTE — PATIENT INSTRUCTIONS
Thank you for coming to the PSYCHIATRY CLINIC.    Lab Testing:  If you had lab testing today and your results are reassuring or normal they will be mailed to you or sent through [facility] within 7 days. If the lab tests need quick action we will call you with the results. The phone number we will call with results is # [phone] (home) . If this is not the best number please call our clinic and change the number.    Medication Refills:  If you need any refills please call your pharmacy and they will contact us. Our fax number for refills is [phone]. Please allow three business for refill processing. If you need to  your refill at a new pharmacy, please contact the new pharmacy directly. The new pharmacy will help you get your medications transferred.     Scheduling:  If you have any concerns about today's visit or wish to schedule another appointment please call our office during normal business hours [phone] (8-5:00 M-F)    Contact Us:  Please call [phone] during business hours (8-5:00 M-F).  If after clinic hours, or on the weekend, please call  [phone].    Financial Assistance [phone]  [facility] Billing [phone]  Central Billing Office, [facility]: [phone]  [facility] Billing [phone]  Medical Records [phone]      MENTAL HEALTH CRISIS NUMBERS:  For a medical emergency please call  911 or go to the nearest ER.     [facility]:   [facility] -[phone]   Crisis Residence [facility]idence -[phone]   Walk-In Counseling [facility] -[phone]   COPE 24/7 [place] Mobile Team -[phone] (adults)/[phone] (child)  CHILD: [facility] needs assessment team - [phone]      [facility]:   [facility] - [phone]   Walk-in counseling [facility] - [phone]   Walk-in counseling [facility] - [phone]   Crisis Residence [facility] - [phone]  Urgent  [facility] Adult Mental Ufvjzi-[phone] mobile unit/ 24/7 crisis line    National Crisis Numbers:   National Suicide Prevention Lifeline: 0-904-734-TALK (855-120-2619)  Poison Control Center - 2-102-236-0554  [facility]o/resources for a list of additional resources (SOS)  Trans Lifeline a hotline for transgender people 6-481-474-5206  The [employer] a hotline for LGBT youth 1-540.845.7637  Crisis Text Line: For any crisis 24/7   To: 312434  see www.crisistextline.org  - IF MAKING A CALL FEELS TOO HARD, send a text!         Again thank you for choosing PSYCHIATRY CLINIC and please let us know how we can best partner with you to improve you and your family's health.    You may be receiving a survey regarding this appointment. We would love to have your feedback, both positive and negative. The survey is done by an external company, so your answers are anonymous.

## 2020-08-18 NOTE — PROGRESS NOTES
"Video- Visit Details  Type of service:  video visit for medication management  Time of service:    Date:  08/18/2020    Video Start Time:  8:37am        Video End Time:  8:47am    Reason for video visit:  Patient unable to travel due to Covid-19  Originating Site (patient location):  [facility]   Location- Patient's home  Distant Site (provider location):  Remote location  Mode of Communication:  Video Conference via [email or web address]  Consent:  Patient has given verbal consent for video visit?: Yes     VIDEO VISIT  Katie Griffiths is a 59 year old patient who is being evaluated via a billable video visit.      The patient has been notified of following:   \"This video visit will be conducted via a call between you and your physician/provider. We have found that certain health care needs can be provided without the need for an in-person physical exam. This service lets us provide the care you need with a video conversation. If a prescription is necessary we can send it directly to your pharmacy. If lab work is needed we can place an order for that and you can then stop by our lab to have the test done at a later time. Insurers are generally covering virtual visits as they would in-office visits so billing should not be different than normal.  If for some reason you do get billed incorrectly, you should contact the billing office to correct it and that number is in the AVS .    Video Conference to be completed via:  [email or web address]    Patient has given verbal consent for video visit?:  Yes    Patient would prefer that any video invitations be sent by: Text to cell phone: [phone]      How would patient like to obtain AVS?:  Skimo TV    AVS SmartPhrase [PsychAVS] has been placed in 'Patient Instructions':  Yes        [facility]  Psychiatry Clinic  PSYCHIATRIC PROGRESS NOTE       Katie Griffiths is a 59 year old female who prefers the name Katie and pronoun she, her.  Therapist: Amirah Tejeda [facility]               PCP: Buddy Nolan  Other Providers: None    Pertinent Background:  See previous notes.  Psych critical item history includes Hospitalized 3 times with most recent occurring in 2007 after overdose attempt.  Numerous medication trials.  Possible bipolar diagnosis.     Interim History     [4, 4]     The patient is a good historian, reports good treatment adherence and was last seen 7/30/20.  Since the last visit,  Katie reports she is continuing \"hanging in there.\"  Katie reports that gabapentin has been helpful with sleep and she is \"tossing and turning less.\"  She believes sleep duration may have increased.  Gabapentin also appears to be helping with daytime anxiety.   Mood stable.  No significant concerns with depression or mood fluctuations.     7/30/20: Katie reports she is \"hanging in there.\"  Trazodone reports trazodone 100mg was not effective and led to diarrhea.  Discussed possible hypomania episode as she was frequently calling various providers at odd hours and having difficulty sleeping.  Does not appear to meet criteria at this time but will continue to monitor.  Will try to increase dose of Mirtazapine dose and see if helps.  Also will start Gabapentin for anxiety and sleep.  Katie continues to be quite sedentary during day.  Fortunately she was on a day trip to [place] today with her parents.  Advised to follow sleep medicine recommendations    7/7/20: Katie reports she is \"hanging in there.\"  Depression and anxiety persist.  Mood stable.  She is residing at her parent's home for the past couple days.  She using office and sleeping on twin bed. She is looking for independent living but is running into issues with her income, age, and credit history. Completed sleep consultation and behavioral change recommendations were given.  Unclear if followed recommendations.  Stress has decreased since then due to moving in with her parents but still persists due to housing issue. " " Advised to take Propranolol when feels stressed.  Continues to endorse sedentary lifestyle.  Advised to go for walks in morning and early evening to develop \"sleep appetite.\"  She agreed.  She also does not want to start any sleep aids that may lead to drowsiness and weight gain.  Mood, motivation, and energy low.  Recommended increase Wellbutrin to 450mg but she again is hesitant.      [facility]  [id number]  [place] [address]    6/9/20: Katie is currently trying to maintain housing.  Reports her current situation living with her son has been extremely stressful.  Currently looking for apartments in [place] and [place].  She working with [facility] social work team.  Katie is very hesitant to change her medications.  She would like to obtain better grasp of her housing before making medication adjustments. Will consult with PCP about possible Propranolol trial.       5/6/20:  Katie continues to endorse stress regarding bankruptcy.  She has phone hearing with  at the end of the month of May.  Katie continues to report lack of activity which may be attributable to lifestyle changes required during pandemic, but does report overall improvement with increase in Wellbutrin.  Sleep also continues to be an issue but she also reports it continues to improve.  Sleep medicine consultation in 2 months. No concerns with elevated mood.  Katie did not want to adjust her medications     3/31/20: when asked how she is doing, Katie reports that switch from Celexa to Wellbutrin has been beneficial in regards to mood, energy, and motivation.  No concerns with elevated mood.  Sleep continues to be an issue.  Did not worsen with addition of Wellbutrin.  Has sleep study in 3 months.  Regarding psychosocial stressors, Katie is babysitting her grandchildren as their mother is sick and is going through bankruptcy.       3/4/20: Katie again starts laughing.  Mood is stable overall but low.  Continues to endorse anhedonia, energy, " "and low mood. Sleep has improved since starting Remeron.  Reports sleeping 5 hours per night which results in continued daytime tiredness.    Continues to report that Celexa is not helpful and possible causing sedation    2/12/20: Katie shared that she is not sleeping.  She reports that she is getting 3 hours of sleep per night for past couple weeks. However, Katie did not look overly fatigued during appointment.  She also reports that her mood is quite low.  No concerns with shayla.  No goal directed behavior or impulsivity.  Katie\"s affect continues to be incongruent to her reported mood.  She is struggling to get household chores completed.  Continues to report sedentary lifestyle.  Educated on how exercise and activity can help with sleep.  Will stop Trazodone and start Mirtazapine for sleep.  If continues to be a problem, will refer to sleep medicine as sleep has been an issue for several years.  Also plan to switch Celexa as it does not appear to be managing her depression.      1/8/20: Katie's main concern today was weight gain.  Affect was quite bright today in spite of high PHQ-9 and reported persistent low mood. Reports positive holiday with her family.  Sleep continues to be inconsistent.  Mood stable with introduction of Geodon.  Will increase and discontinue Olanzapine.  Katie continues to consider a retrial of Depakote which was discontinued in past due to development of rash.      12/11/19: Katie reports her mood is stable but she is describes \"not feeling good or bad.\"  Difficult to discern if emotional blunting or lack of hypomania.  Thoughts continue to be linear and organized.  Speech normal.  Also reports sleeping continues to improve. Previous trials include Lithium (not effective?), Depakote (rash), and Abilify (side effects).  Katie would like to change medications today due to possible emotional blunting and weight gain (5lbs in past 2 weeks).      11/27/19: Increased HS Olanzapine to 5mg and Katie " appears much more grounded.  Speech less pressured and mood more stable.  Thoughts more organized and linear.  Katie reports she also has noticed a significant difference.  She also reports she is sleeping much better.  Concerned about weight gain but reports she is very sedentary.      Recent Symptoms:   Depression:  depressed mood, anhedonia, low energy and insomnia  Elevated:  none  Psychosis:  none  Anxiety:  nervous/overwhelmed  Panic Attack:  none  Trauma Related:  none     Recent Substance Use:  No changes reported        Social/ Family History      [1ea,1ea]            [per patient report]               Financial support-  social security disability  Children-  2 adult children  Living situation- Lives in house in [place] with son   Social/spiritial support-  limited/none  Feels safe at home-  Yes   Family history- None      Medical / Surgical History                                 Patient Active Problem List   Diagnosis     Bipolar disorder (H)     Hashimoto's thyroiditis     Incontinence of urine in female     Osteopenia     Reactive airway disease     Vitamin D deficiency     Overweight     Dizziness     Hyperlipidemia LDL goal <100       Past Surgical History:   Procedure Laterality Date     partial thyroidectomy       TONSILLECTOMY       TUBAL LIGATION          Medical Review of Systems         [2,10]   The remainder of the review of systems is noncontributory  Thyroid removed  Allergy    Quetiapine and Valproic acid  Current Medications        Current Outpatient Medications   Medication Sig Dispense Refill     albuterol (PROAIR HFA/PROVENTIL HFA/VENTOLIN HFA) 108 (90 Base) MCG/ACT inhaler Inhale 2 puffs into the lungs as needed for shortness of breath / dyspnea or wheezing 18 g 1     buPROPion (WELLBUTRIN XL) 300 MG 24 hr tablet Take 1 tablet (300 mg) by mouth every morning 30 tablet 0     cetirizine (ZYRTEC) 10 MG tablet Take 1 tablet (10 mg) by mouth daily 90 tablet 1     gabapentin (NEURONTIN) 100  "MG capsule Take 1 capsule (100mg) in morning and 1 capsule (100mg) in afternoon and 3 capsules (300mg) near bedtime 150 capsule 0     gabapentin (NEURONTIN) 300 MG capsule Take 2 capsules (600 mg) by mouth At Bedtime 60 capsule 0     mirtazapine (REMERON) 15 MG tablet Take 1 tablet (15 mg) by mouth At Bedtime 30 tablet 0     thyroid (ARMOUR) 32.5 MG tablet Take 1 tablet (32.5 mg) by mouth daily Nature-thyroid 90 tablet 3     ziprasidone (GEODON) 60 MG capsule Take 1 capsule (60 mg) by mouth 2 times daily (with meals) 60 capsule 1     Vitals         [3, 3]   There were no vitals taken for this visit.   Mental Status Exam        [9, 14 cog gs]     Alertness: alert  and oriented  Appearance: casually groomed  Behavior/Demeanor: cooperative, pleasant and calm, with good  eye contact   Speech: normal.  Language: no problems  Psychomotor: normal or unremarkable  Mood: \"ok\"  Affect: appropriate; was congruent to mood; was congruent to content  Thought Process/Associations: unremarkable  Thought Content:  Reports none;  Denies suicidal ideation and violent ideation  Perception:  Reports none;  Denies auditory hallucinations and visual hallucinations  Insight: adequate  Judgment: intact  Cognition: (6) does  appear grossly intact; formal cognitive testing was not done  Gait/Station and/or Muscle Strength/Tone: unable to assess    Labs and Data                          Rating Scales:    PHQ9    PHQ9 Today:  Not completed  PHQ-9 SCORE 12/18/2019   PHQ-9 Total Score MyChart 17 (Moderately severe depression)   PHQ-9 Total Score 17        Assessment      [m2, h3]     Unspecified Mood Disorder  Bipolar I Disorder (Hx)    [place] Prescription Monitoring Program [] was not checked today:  provider not managing any controlled medications.        Plan                                                                                                                     m2, h3     1) MEDICATION:  Continue Kirksey 32.5mg (thyroid)  Continue " Geodon 60mg BID with food  Continue Mirtazapine 15mg at bedtime for sleep and mood.  Continue Wellbutrin XL 300mg daily.  Consider increasing dose to 450mg  Continue Propranolol 10mg BID PRN for anxiety.    Increase Gabapentin to 300 in morning and afternoon.  Take 900mg at bedtime     2) THERAPY:  Continue with current therapist      RTC: 1 month.  Katie will check in in 2 weeks to report on efficacy of Gabapentin and if experiences symptoms associated with hypomania    CRISIS NUMBERS:   Provided routinely in AVS.    Treatment Risk Statement:  The patient understands the risks, benefits, adverse effects and alternatives. Agrees to treatment with the capacity to do so. No medical contraindications to treatment. Agrees to call clinic for any problems. The patient understands to call 911 or go to the nearest ED if life threatening or urgent symptoms occur.     WHODAS 2.0  TODAY total score = N/A; [a 12-item WHODAS 2.0 assessment was not completed by the pt today and/or recorded in EPIC].       PROVIDER:  ADELINA Lieberman CNP

## 2020-08-20 RX ORDER — TRAZODONE HYDROCHLORIDE 50 MG/1
50-100 TABLET, FILM COATED ORAL
Qty: 60 TABLET | Refills: 0 | OUTPATIENT
Start: 2020-08-20

## 2020-09-15 ENCOUNTER — VIRTUAL VISIT (OUTPATIENT)
Dept: PSYCHIATRY | Facility: CLINIC | Age: 60
End: 2020-09-15
Attending: NURSE PRACTITIONER
Payer: MEDICARE

## 2020-09-15 DIAGNOSIS — F39 MOOD DISORDER (H): ICD-10-CM

## 2020-09-15 DIAGNOSIS — F41.9 ANXIETY: ICD-10-CM

## 2020-09-15 RX ORDER — BUPROPION HYDROCHLORIDE 300 MG/1
300 TABLET ORAL EVERY MORNING
Qty: 30 TABLET | Refills: 0 | Status: SHIPPED | OUTPATIENT
Start: 2020-09-15 | End: 2020-10-14

## 2020-09-15 RX ORDER — MIRTAZAPINE 15 MG/1
15 TABLET, FILM COATED ORAL AT BEDTIME
Qty: 30 TABLET | Refills: 0 | Status: SHIPPED | OUTPATIENT
Start: 2020-09-15 | End: 2020-10-14

## 2020-09-15 RX ORDER — GABAPENTIN 300 MG/1
CAPSULE ORAL
Qty: 180 CAPSULE | Refills: 0 | Status: SHIPPED | OUTPATIENT
Start: 2020-09-15 | End: 2020-10-14

## 2020-09-15 ASSESSMENT — PAIN SCALES - GENERAL: PAINLEVEL: EXTREME PAIN (8)

## 2020-09-15 NOTE — PATIENT INSTRUCTIONS
Thank you for coming to the PSYCHIATRY CLINIC.    Lab Testing:  If you had lab testing today and your results are reassuring or normal they will be mailed to you or sent through [employer] within 7 days. If the lab tests need quick action we will call you with the results. The phone number we will call with results is # [phone] (home) . If this is not the best number please call our clinic and change the number.    Medication Refills:  If you need any refills please call your pharmacy and they will contact us. Our fax number for refills is [phone]. Please allow three business for refill processing. If you need to  your refill at a new pharmacy, please contact the new pharmacy directly. The new pharmacy will help you get your medications transferred.     Scheduling:  If you have any concerns about today's visit or wish to schedule another appointment please call our office during normal business hours [phone] (8-5:00 M-F)    Contact Us:  Please call [phone] during business hours (8-5:00 M-F).  If after clinic hours, or on the weekend, please call  [phone].    Financial Assistance [phone]  [facility] Billing [phone]  Central Billing Office, [facility]: [phone]  [facility] Billing [phone]  Medical Records [phone]      MENTAL HEALTH CRISIS NUMBERS:  For a medical emergency please call  911 or go to the nearest ER.     [facility]:   [facility] -[phone]   Crisis Residence [facility] -[phone]   Walk-In Counseling [facility] -[phone]   COPE 24/7 [place] Mobile Team -[phone] (adults)/[phone] (child)  CHILD: [facility] needs assessment team - [phone]      Psychiatric:   [facility] - [phone]   Walk-in counseling [facility] - [phone]   Walk-in counseling [facility] - [phone]   Crisis Residence [facility] - [phone]  Urgent  [facility] Adult [facility]-[phone] mobile unit/ 24/7 crisis line    National Crisis Numbers:   National Suicide Prevention Lifeline: 6-945-776-TALK (499-692-4342)  Poison Control Center - 0-632-442-6337  [employer]etr[employer]/resources for a list of additional resources (SOS)  Trans Lifeline a hotline for transgender people 8-015-884-6655  The [employer] Project a hotline for LGBT youth 1-453.626.7675  Crisis Text Line: For any crisis 24/7   To: 963578  see www.crisistextline.org  - IF MAKING A CALL FEELS TOO HARD, send a text!         Again thank you for choosing PSYCHIATRY CLINIC and please let us know how we can best partner with you to improve you and your family's health.    You may be receiving a survey regarding this appointment. We would love to have your feedback, both positive and negative. The survey is done by an external company, so your answers are anonymous.

## 2020-09-15 NOTE — PROGRESS NOTES
"Video- Visit Details  Type of service:  video visit for medication management  Time of service:    Date:  09/15/2020    Video Start Time:  10:38 AM        Video End Time:  10:57am    Reason for video visit:  Patient unable to travel due to Covid-19  Originating Site (patient location):  [facility]   Location- Patient's home  Distant Site (provider location):  Remote location  Mode of Communication:  Video Conference via [email or web address]  Consent:  Patient has given verbal consent for video visit?: Yes     VIDEO VISIT  Katie Griffiths is a 60 year old patient who is being evaluated via a billable video visit.      The patient has been notified of following:   \"This video visit will be conducted via a call between you and your physician/provider. We have found that certain health care needs can be provided without the need for an in-person physical exam. This service lets us provide the care you need with a video conversation. If a prescription is necessary we can send it directly to your pharmacy. If lab work is needed we can place an order for that and you can then stop by our lab to have the test done at a later time. Insurers are generally covering virtual visits as they would in-office visits so billing should not be different than normal.  If for some reason you do get billed incorrectly, you should contact the billing office to correct it and that number is in the AVS .    Video Conference to be completed via:  [email or web address]    Patient has given verbal consent for video visit?:  Yes    Patient would prefer that any video invitations be sent by: Text to cell phone: [phone]      How would patient like to obtain AVS?:  Dayjet    AVS SmartPhrase [PsychAVS] has been placed in 'Patient Instructions':  Yes    Video- Visit Details  Type of service:  video visit for medication management  Time of service:    Date:  09/15/2020    Video Start Time:  8:37am        Video End Time:  8:47am    Reason for video visit:  Patient " "unable to travel due to Covid-19  Originating Site (patient location):  [facility]   Location- Patient's home  Distant Site (provider location):  Remote location  Mode of Communication:  Video Conference via [email or web address]  Consent:  Patient has given verbal consent for video visit?: Yes     VIDEO VISIT  Katie Griffiths is a 59 year old patient who is being evaluated via a billable video visit.      The patient has been notified of following:   \"This video visit will be conducted via a call between you and your physician/provider. We have found that certain health care needs can be provided without the need for an in-person physical exam. This service lets us provide the care you need with a video conversation. If a prescription is necessary we can send it directly to your pharmacy. If lab work is needed we can place an order for that and you can then stop by our lab to have the test done at a later time. Insurers are generally covering virtual visits as they would in-office visits so billing should not be different than normal.  If for some reason you do get billed incorrectly, you should contact the billing office to correct it and that number is in the AVS .    Video Conference to be completed via:  Doxy.me    Patient has given verbal consent for video visit?:  Yes    Patient would prefer that any video invitations be sent by: Text to cell phone: [phone]      How would patient like to obtain AVS?:  [employer]    AVS SmartPhrase [PsychAVS] has been placed in 'Patient Instructions':  Yes        [facility]  Psychiatry Clinic  PSYCHIATRIC PROGRESS NOTE       Katie Griffiths is a 60 year old female who prefers the name Katie and pronoun she, her.  Therapist: Amirah Tejeda @ [facility]               PCP: Buddy Nolan  Other Providers: None    Pertinent Background:  See previous notes.  Psych critical item history includes Hospitalized 3 times with most recent occurring " "in 2007 after overdose attempt.  Numerous medication trials.  Possible bipolar diagnosis.     Interim History     [4, 4]     The patient is a good historian, reports good treatment adherence and was last seen 8/18/20.  Since the last visit,  Katie continues to report \"I am hanging in there.\" Sleep continues to be main concern. Katie reports she has been waking at 2am and unable to fall back asleep for the past 2 nights.  Katie does report that Gabapentin is helpful in that she can relax when she initially lays down for bed. \"I'm getting beneficial sleep more often than I am not.\"   Continues to endorse periodic low mood and anxiety.  Katie also has not used propranolol for PRN anxiety will discontinue. She does endorse fairly constant worry about possible homelessness.  Katie also brought up Celexa as it was helpful in management of anxiety; however in May she stated it was not effective and maybe causing sedation.  Continues to see therapist regularly.     8/18/20: Katie reports she is continuing \"hanging in there.\"  Katie reports that gabapentin has been helpful with sleep and she is \"tossing and turning less.\"  She believes sleep duration may have increased.  Gabapentin also appears to be helping with daytime anxiety.   Mood stable.  No significant concerns with depression or mood fluctuations.     7/30/20: Katie reports she is \"hanging in there.\"  Trazodone reports trazodone 100mg was not effective and led to diarrhea.  Discussed possible hypomania episode as she was frequently calling various providers at odd hours and having difficulty sleeping.  Does not appear to meet criteria at this time but will continue to monitor.  Will try to increase dose of Mirtazapine dose and see if helps.  Also will start Gabapentin for anxiety and sleep.  Katie continues to be quite sedentary during day.  Fortunately she was on a day trip to [place] today with her parents.  Advised to follow sleep medicine recommendations    7/7/20: Katie " "reports she is \"hanging in there.\"  Depression and anxiety persist.  Mood stable.  She is residing at her parent's home for the past couple days.  She using office and sleeping on twin bed. She is looking for independent living but is running into issues with her income, age, and credit history. Completed sleep consultation and behavioral change recommendations were given.  Unclear if followed recommendations.  Stress has decreased since then due to moving in with her parents but still persists due to housing issue.  Advised to take Propranolol when feels stressed.  Continues to endorse sedentary lifestyle.  Advised to go for walks in morning and early evening to develop \"sleep appetite.\"  She agreed.  She also does not want to start any sleep aids that may lead to drowsiness and weight gain.  Mood, motivation, and energy low.  Recommended increase Wellbutrin to 450mg but she again is hesitant.      New Address:  [facility] [address]  [place] [address]    6/9/20: Katie is currently trying to maintain housing.  Reports her current situation living with her son has been extremely stressful.  Currently looking for apartments in [place].  She working with [facility] social work team.  Katie is very hesitant to change her medications.  She would like to obtain better grasp of her housing before making medication adjustments. Will consult with PCP about possible Propranolol trial.       5/6/20:  Katie continues to endorse stress regarding bankruptcy.  She has phone hearing with  at the end of the month of May.  Katie continues to report lack of activity which may be attributable to lifestyle changes required during pandemic, but does report overall improvement with increase in Wellbutrin.  Sleep also continues to be an issue but she also reports it continues to improve.  Sleep medicine consultation in 2 months. No concerns with elevated mood.  Katie did not want to adjust her medications     3/31/20: when asked " "how she is doing, Katie reports that switch from Celexa to Wellbutrin has been beneficial in regards to mood, energy, and motivation.  No concerns with elevated mood.  Sleep continues to be an issue.  Did not worsen with addition of Wellbutrin.  Has sleep study in 3 months.  Regarding psychosocial stressors, Katie is babysitting her grandchildren as their mother is sick and is going through bankruptcy.       3/4/20: Katie again starts laughing.  Mood is stable overall but low.  Continues to endorse anhedonia, energy, and low mood. Sleep has improved since starting Remeron.  Reports sleeping 5 hours per night which results in continued daytime tiredness.    Continues to report that Celexa is not helpful and possible causing sedation    2/12/20: Katie shared that she is not sleeping.  She reports that she is getting 3 hours of sleep per night for past couple weeks. However, Katie did not look overly fatigued during appointment.  She also reports that her mood is quite low.  No concerns with shayla.  No goal directed behavior or impulsivity.  Katie\"s affect continues to be incongruent to her reported mood.  She is struggling to get household chores completed.  Continues to report sedentary lifestyle.  Educated on how exercise and activity can help with sleep.  Will stop Trazodone and start Mirtazapine for sleep.  If continues to be a problem, will refer to sleep medicine as sleep has been an issue for several years.  Also plan to switch Celexa as it does not appear to be managing her depression.      1/8/20: Katie's main concern today was weight gain.  Affect was quite bright today in spite of high PHQ-9 and reported persistent low mood. Reports positive holiday with her family.  Sleep continues to be inconsistent.  Mood stable with introduction of Geodon.  Will increase and discontinue Olanzapine.  Katie continues to consider a retrial of Depakote which was discontinued in past due to development of rash.      12/11/19: Katie " "reports her mood is stable but she is describes \"not feeling good or bad.\"  Difficult to discern if emotional blunting or lack of hypomania.  Thoughts continue to be linear and organized.  Speech normal.  Also reports sleeping continues to improve. Previous trials include Lithium (not effective?), Depakote (rash), and Abilify (side effects).  Katie would like to change medications today due to possible emotional blunting and weight gain (5lbs in past 2 weeks).      11/27/19: Increased HS Olanzapine to 5mg and Katie appears much more grounded.  Speech less pressured and mood more stable.  Thoughts more organized and linear.  Katie reports she also has noticed a significant difference.  She also reports she is sleeping much better.  Concerned about weight gain but reports she is very sedentary.      Recent Symptoms:   Depression:  depressed mood, anhedonia, low energy and insomnia  Elevated:  none  Psychosis:  none  Anxiety:  nervous/overwhelmed  Panic Attack:  none  Trauma Related:  none     Recent Substance Use:  No changes reported        Social/ Family History      [1ea,1ea]            [per patient report]               Financial support-  social security disability  Children-  2 adult children  Living situation- Lives in house in [place] with son   Social/spiritial support-  limited/none  Feels safe at home-  Yes   Family history- None      Medical / Surgical History                                 Patient Active Problem List   Diagnosis     Bipolar disorder (H)     Hashimoto's thyroiditis     Incontinence of urine in female     Osteopenia     Reactive airway disease     Vitamin D deficiency     Overweight     Dizziness     Hyperlipidemia LDL goal <100       Past Surgical History:   Procedure Laterality Date     partial thyroidectomy       TONSILLECTOMY       TUBAL LIGATION          Medical Review of Systems         [2,10]   The remainder of the review of systems is noncontributory  Thyroid removed  Allergy  " "  Quetiapine and Valproic acid  Current Medications        Current Outpatient Medications   Medication Sig Dispense Refill     albuterol (PROAIR HFA/PROVENTIL HFA/VENTOLIN HFA) 108 (90 Base) MCG/ACT inhaler Inhale 2 puffs into the lungs as needed for shortness of breath / dyspnea or wheezing 18 g 1     buPROPion (WELLBUTRIN XL) 300 MG 24 hr tablet Take 1 tablet (300 mg) by mouth every morning 30 tablet 0     cetirizine (ZYRTEC) 10 MG tablet Take 1 tablet (10 mg) by mouth daily 90 tablet 1     gabapentin (NEURONTIN) 300 MG capsule Take 1 capsule (300mg) in morning and 1 capsule (300mg) in afternoon and 3 capsules (900mg) near bedtime 150 capsule 0     mirtazapine (REMERON) 15 MG tablet Take 1 tablet (15 mg) by mouth At Bedtime 30 tablet 0     thyroid (ARMOUR) 32.5 MG tablet Take 1 tablet (32.5 mg) by mouth daily Nature-thyroid 90 tablet 3     ziprasidone (GEODON) 60 MG capsule Take 1 capsule (60 mg) by mouth 2 times daily (with meals) 60 capsule 1     Vitals         [3, 3]   There were no vitals taken for this visit.   Mental Status Exam        [9, 14 cog gs]     Alertness: alert  and oriented  Appearance: casually groomed  Behavior/Demeanor: cooperative, pleasant and calm, with good  eye contact   Speech: regular rate and rhythm.  Language: intact  Psychomotor: normal or unremarkable  Mood: \"ok\"  Affect: appropriate; was congruent to mood; was congruent to content  Thought Process/Associations: unremarkable  Thought Content:  Reports none;  Denies suicidal ideation and violent ideation  Perception:  Reports none;  Denies auditory hallucinations and visual hallucinations  Insight: adequate  Judgment: intact  Cognition: (6) does  appear grossly intact; formal cognitive testing was not done  Gait/Station and/or Muscle Strength/Tone: unable to assess    Labs and Data                          Rating Scales:    PHQ9    PHQ9 Today:  Not completed  PHQ-9 SCORE 12/18/2019   PHQ-9 Total Score MyChart 17 (Moderately severe " depression)   PHQ-9 Total Score 17        Assessment      [m2, h3]     Unspecified Mood Disorder  Bipolar I Disorder (Hx)    [place] Prescription Monitoring Program [] was not checked today:  provider not managing any controlled medications.        Plan                                                                                                                     m2, h3     1) MEDICATION:  Continue [place] 32.5mg (thyroid)  Continue Geodon 60mg BID with food  Continue Mirtazapine 15mg at bedtime for sleep and mood.  Continue Wellbutrin XL 300mg daily.  Consider increasing dose to 450mg  Continue Propranolol 10mg BID PRN for anxiety.    Continue Gabapentin to 300 in morning and afternoon.  Increase to 1200mg at bedtime     2) THERAPY:  Continue with current therapist      RTC: 1 month.    CRISIS NUMBERS:   Provided routinely in AVS.    Treatment Risk Statement:  The patient understands the risks, benefits, adverse effects and alternatives. Agrees to treatment with the capacity to do so. No medical contraindications to treatment. Agrees to call clinic for any problems. The patient understands to call 911 or go to the nearest ED if life threatening or urgent symptoms occur.     WHODAS 2.0  TODAY total score = N/A; [a 12-item WHODAS 2.0 assessment was not completed by the pt today and/or recorded in EPIC].       PROVIDER:  ADELINA Lieberman CNP

## 2020-09-17 ENCOUNTER — TELEPHONE (OUTPATIENT)
Dept: ENDOCRINOLOGY | Facility: CLINIC | Age: 60
End: 2020-09-17

## 2020-09-17 NOTE — TELEPHONE ENCOUNTER
Name of Medication: Nature Throid (recall happening at this time)  Prescribing Provider: Dr. Sawyer              Pharmacy: [facility] in [place]              What on the order needs clarification? Pharmacy will be reaching out to provider and the question is: Can this pt be switched to generic synthroid?  Please review and let pt and her pharmacy know. Thank you.    Sent to provider for review. jayshree Cassidy RN on 9/17/2020 at 3:29 PM

## 2020-09-17 NOTE — TELEPHONE ENCOUNTER
[facility] Call Center    Phone Message    May a detailed message be left on voicemail: yes     Reason for Call: Medication Question or concern regarding medication   Prescription Clarification  Name of Medication: Nature Throid (recall happening at this time)  Prescribing Provider: Dr. Sawyer   Pharmacy: [facility] in [place]   What on the order needs clarification? Pharmacy will be reaching out to provider and the question is: Can this pt be switched to generic synthroid?  Please review and let pt and her pharmacy know. Thank you.          Action Taken: Message routed to:  [facility] ([facility]):  Endocrine    Travel Screening: Not Applicable

## 2020-09-21 ENCOUNTER — MYC MEDICAL ADVICE (OUTPATIENT)
Dept: ENDOCRINOLOGY | Facility: CLINIC | Age: 60
End: 2020-09-21

## 2020-09-21 DIAGNOSIS — E06.3 HYPOTHYROIDISM DUE TO HASHIMOTO'S THYROIDITIS: Primary | ICD-10-CM

## 2020-09-22 DIAGNOSIS — E06.3 HASHIMOTO'S THYROIDITIS: Primary | ICD-10-CM

## 2020-09-22 RX ORDER — LEVOTHYROXINE SODIUM 50 UG/1
50 TABLET ORAL DAILY
Qty: 90 TABLET | Refills: 1 | Status: SHIPPED | OUTPATIENT
Start: 2020-09-22 | End: 2020-11-02

## 2020-09-23 NOTE — TELEPHONE ENCOUNTER
Levothyroxine was sent in yesterday to replace armour not available. Labs in 2 months. Carrie Herrera RN on 9/23/2020 at 2:48 PM

## 2020-10-11 DIAGNOSIS — F39 MOOD DISORDER (H): ICD-10-CM

## 2020-10-11 DIAGNOSIS — F41.9 ANXIETY: ICD-10-CM

## 2020-10-14 ENCOUNTER — VIRTUAL VISIT (OUTPATIENT)
Dept: PSYCHIATRY | Facility: CLINIC | Age: 60
End: 2020-10-14
Attending: NURSE PRACTITIONER
Payer: MEDICARE

## 2020-10-14 DIAGNOSIS — F39 MOOD DISORDER (H): ICD-10-CM

## 2020-10-14 DIAGNOSIS — F41.9 ANXIETY: ICD-10-CM

## 2020-10-14 PROCEDURE — 99213 OFFICE O/P EST LOW 20 MIN: CPT | Mod: 95 | Performed by: NURSE PRACTITIONER

## 2020-10-14 RX ORDER — GABAPENTIN 300 MG/1
CAPSULE ORAL
Qty: 180 CAPSULE | Refills: 0 | OUTPATIENT
Start: 2020-10-14

## 2020-10-14 RX ORDER — BUPROPION HYDROCHLORIDE 300 MG/1
300 TABLET ORAL EVERY MORNING
Qty: 30 TABLET | Refills: 0 | Status: SHIPPED | OUTPATIENT
Start: 2020-10-14 | End: 2020-11-10

## 2020-10-14 RX ORDER — GABAPENTIN 300 MG/1
CAPSULE ORAL
Qty: 180 CAPSULE | Refills: 0 | Status: SHIPPED | OUTPATIENT
Start: 2020-10-14 | End: 2020-11-10

## 2020-10-14 RX ORDER — MIRTAZAPINE 15 MG/1
TABLET, FILM COATED ORAL
Qty: 30 TABLET | Refills: 0 | OUTPATIENT
Start: 2020-10-14

## 2020-10-14 RX ORDER — ZIPRASIDONE HYDROCHLORIDE 60 MG/1
CAPSULE ORAL
Qty: 60 CAPSULE | Refills: 0 | OUTPATIENT
Start: 2020-10-14

## 2020-10-14 RX ORDER — ZIPRASIDONE HYDROCHLORIDE 60 MG/1
60 CAPSULE ORAL 2 TIMES DAILY WITH MEALS
Qty: 60 CAPSULE | Refills: 1 | Status: SHIPPED | OUTPATIENT
Start: 2020-10-14 | End: 2020-12-09

## 2020-10-14 RX ORDER — BUPROPION HYDROCHLORIDE 300 MG/1
TABLET ORAL
Qty: 30 TABLET | Refills: 0 | OUTPATIENT
Start: 2020-10-14

## 2020-10-14 RX ORDER — MIRTAZAPINE 30 MG/1
30 TABLET, FILM COATED ORAL AT BEDTIME
Qty: 30 TABLET | Refills: 0 | Status: SHIPPED | OUTPATIENT
Start: 2020-10-14 | End: 2020-11-10

## 2020-10-14 ASSESSMENT — PAIN SCALES - GENERAL: PAINLEVEL: MODERATE PAIN (5)

## 2020-10-14 NOTE — PATIENT INSTRUCTIONS
Thank you for coming to the [facility] [place] [facility].    Lab Testing:  If you had lab testing today and your results are reassuring or normal they will be mailed to you or sent through [employer] within 7 days. If the lab tests need quick action we will call you with the results. The phone number we will call with results is # [phone] (home) . If this is not the best number please call our clinic and change the number.    Medication Refills:  If you need any refills please call your pharmacy and they will contact us. Our fax number for refills is [phone]. Please allow three business for refill processing. If you need to  your refill at a new pharmacy, please contact the new pharmacy directly. The new pharmacy will help you get your medications transferred.     Scheduling:  If you have any concerns about today's visit or wish to schedule another appointment please call our office during normal business hours [phone] (8-5:00 M-F)    Contact Us:  Please call [phone] during business hours (8-5:00 M-F).  If after clinic hours, or on the weekend, please call  [phone].    Financial Assistance [phone]  [facility] Billing [phone]  Central Billing Office, [facility]: [phone]  [place] Billing [phone]  Medical Records [phone]      MENTAL HEALTH CRISIS NUMBERS:  For a medical emergency please call  911 or go to the nearest ER.     [facility]:   [facility] -[phone]   Crisis Residence [facility] -[phone]   Walk-In Counseling [facility] -[phone]   COPE 24/7 [place] Mobile Team -[phone] (adults)/[phone] (child)  CHILD: [facility] needs assessment team - [phone]      [facility]:   [facility] - [phone]   Walk-in counseling [facility] - [phone]   Walk-in counseling [facility] - [phone]   Crisis Residence [facility]  [facility] - [phone]  Urgent Care Adult Mental Jsqnqt-[phone] mobile unit/ 24/7 crisis line    National Crisis Numbers:   National Suicide Prevention Lifeline: 5-609-850-TALK (502-993-6471)  Poison Control Center - 9-063-751-2345  hereO/resources for a list of additional resources (SOS)  Trans Lifeline a hotline for transgender people 8-415-675-7873  [employer] a hotline for LGBT youth 5-514-808-0449  Crisis Text Line: For any crisis 24/7   To: 375124  see www.crisistextline.org  - IF MAKING A CALL FEELS TOO HARD, send a text!         Again thank you for choosing [facility] [place] [facility] and please let us know how we can best partner with you to improve you and your family's health.    You may be receiving a survey regarding this appointment. We would love to have your feedback, both positive and negative. The survey is done by an external company, so your answers are anonymous.

## 2020-10-14 NOTE — PROGRESS NOTES
"Video- Visit Details  Type of service:  video visit for medication management  Time of service:    Date:  10/14/2020    Video Start Time:  8:20 AM  Technical issues delayed start   Video End Time:  8:34am    Reason for video visit:  Patient unable to travel due to Covid-19  Originating Site (patient location):  [facility]   Location- Patient's home  Distant Site (provider location):  Remote location  Mode of Communication:  Video Conference via [email or web address]  Consent:  Patient has given verbal consent for video visit?: Yes     VIDEO VISIT  Katie Griffiths is a 60 year old patient who is being evaluated via a billable video visit.      The patient has been notified of following:   \"This video visit will be conducted via a call between you and your physician/provider. We have found that certain health care needs can be provided without the need for an in-person physical exam. This service lets us provide the care you need with a video conversation. If a prescription is necessary we can send it directly to your pharmacy. If lab work is needed we can place an order for that and you can then stop by our lab to have the test done at a later time. Insurers are generally covering virtual visits as they would in-office visits so billing should not be different than normal.  If for some reason you do get billed incorrectly, you should contact the billing office to correct it and that number is in the AVS .    Video Conference to be completed via:  [email or web address]    Patient has given verbal consent for video visit?:  Yes    Patient would prefer that any video invitations be sent by: Text to cell phone: [phone]      How would patient like to obtain AVS?:  [employer]    AVS SmartPhrase [PsychAVS] has been placed in 'Patient Instructions':  Yes             [facility]  Psychiatry Clinic  PSYCHIATRIC PROGRESS NOTE       Katie Griffiths is a 60 year old female who prefers the name Katie and pronoun she, " "her.  Therapist: Amirah Tejeda @ [facility]               PCP: Buddy Nolan  Other Providers: None    Pertinent Background:  See previous notes.  Psych critical item history includes Hospitalized 3 times with most recent occurring in 2007 after overdose attempt.  Numerous medication trials.  Possible bipolar diagnosis.     Interim History     [4, 4]     The patient is a good historian, reports good treatment adherence and was last seen 9/15/20.  Since the last visit,  Katie reports again \"I am hanging in there.\" Persistent low mood persists.  Reports she had to switch thyroid medications due to her's being recalled.  Sleep initially worsened with new drug but she is now sleeping 5-6 hours per night.  Katie reports that her housing is more stable than at last appointment.  Anxiety has improved as a result.  Katie also reports she has been more active as she is helping her father prepare his boat for winter.  She also continues to see her therapist regularly.      9/15/20: Katie continues to report \"I am hanging in there.\" Sleep continues to be main concern. Katie reports she has been waking at 2am and unable to fall back asleep for the past 2 nights.  Katie does report that Gabapentin is helpful in that she can relax when she initially lays down for bed. \"I'm getting beneficial sleep more often than I am not.\"   Continues to endorse periodic low mood and anxiety.  Katie also has not used propranolol for PRN anxiety will discontinue. She does endorse fairly constant worry about possible homelessness.  Katie also brought up Celexa as it was helpful in management of anxiety; however in May she stated it was not effective and maybe causing sedation.  Continues to see therapist regularly.     8/18/20: Katie reports she is continuing \"hanging in there.\"  Katie reports that gabapentin has been helpful with sleep and she is \"tossing and turning less.\"  She believes sleep duration may have increased.  Gabapentin also " "appears to be helping with daytime anxiety.   Mood stable.  No significant concerns with depression or mood fluctuations.     7/30/20: Katie reports she is \"hanging in there.\"  Trazodone reports trazodone 100mg was not effective and led to diarrhea.  Discussed possible hypomania episode as she was frequently calling various providers at odd hours and having difficulty sleeping.  Does not appear to meet criteria at this time but will continue to monitor.  Will try to increase dose of Mirtazapine dose and see if helps.  Also will start Gabapentin for anxiety and sleep.  Katie continues to be quite sedentary during day.  Fortunately she was on a day trip to [email or web address] today with her parents.  Advised to follow sleep medicine recommendations    7/7/20: Katie reports she is \"hanging in there.\"  Depression and anxiety persist.  Mood stable.  She is residing at her parent's home for the past couple days.  She using office and sleeping on twin bed. She is looking for independent living but is running into issues with her income, age, and credit history. Completed sleep consultation and behavioral change recommendations were given.  Unclear if followed recommendations.  Stress has decreased since then due to moving in with her parents but still persists due to housing issue.  Advised to take Propranolol when feels stressed.  Continues to endorse sedentary lifestyle.  Advised to go for walks in morning and early evening to develop \"sleep appetite.\"  She agreed.  She also does not want to start any sleep aids that may lead to drowsiness and weight gain.  Mood, motivation, and energy low.  Recommended increase Wellbutrin to 450mg but she again is hesitant.      New Address:  [facility] [id number]  [place] [address]    6/9/20: Katie is currently trying to maintain housing.  Reports her current situation living with her son has been extremely stressful.  Currently looking for apartments in [facility].  She working with [facility] " "social work team.  Katie is very hesitant to change her medications.  She would like to obtain better grasp of her housing before making medication adjustments. Will consult with PCP about possible Propranolol trial.       5/6/20:  Katie continues to endorse stress regarding bankruptcy.  She has phone hearing with  at the end of the month of May.  Katie continues to report lack of activity which may be attributable to lifestyle changes required during pandemic, but does report overall improvement with increase in Wellbutrin.  Sleep also continues to be an issue but she also reports it continues to improve.  Sleep medicine consultation in 2 months. No concerns with elevated mood.  Katie did not want to adjust her medications     3/31/20: when asked how she is doing, Katie reports that switch from Celexa to Wellbutrin has been beneficial in regards to mood, energy, and motivation.  No concerns with elevated mood.  Sleep continues to be an issue.  Did not worsen with addition of Wellbutrin.  Has sleep study in 3 months.  Regarding psychosocial stressors, Katie is babysitting her grandchildren as their mother is sick and is going through bankruptcy.       3/4/20: Katie again starts laughing.  Mood is stable overall but low.  Continues to endorse anhedonia, energy, and low mood. Sleep has improved since starting Remeron.  Reports sleeping 5 hours per night which results in continued daytime tiredness.    Continues to report that Celexa is not helpful and possible causing sedation    2/12/20: Katie shared that she is not sleeping.  She reports that she is getting 3 hours of sleep per night for past couple weeks. However, Katie did not look overly fatigued during appointment.  She also reports that her mood is quite low.  No concerns with shayla.  No goal directed behavior or impulsivity.  Katie\"s affect continues to be incongruent to her reported mood.  She is struggling to get household chores completed.  Continues to report " "sedentary lifestyle.  Educated on how exercise and activity can help with sleep.  Will stop Trazodone and start Mirtazapine for sleep.  If continues to be a problem, will refer to sleep medicine as sleep has been an issue for several years.  Also plan to switch Celexa as it does not appear to be managing her depression.      1/8/20: Katie's main concern today was weight gain.  Affect was quite bright today in spite of high PHQ-9 and reported persistent low mood. Reports positive holiday with her family.  Sleep continues to be inconsistent.  Mood stable with introduction of Geodon.  Will increase and discontinue Olanzapine.  Katie continues to consider a retrial of Depakote which was discontinued in past due to development of rash.      12/11/19: Katie reports her mood is stable but she is describes \"not feeling good or bad.\"  Difficult to discern if emotional blunting or lack of hypomania.  Thoughts continue to be linear and organized.  Speech normal.  Also reports sleeping continues to improve. Previous trials include Lithium (not effective?), Depakote (rash), and Abilify (side effects).  Katie would like to change medications today due to possible emotional blunting and weight gain (5lbs in past 2 weeks).      11/27/19: Increased HS Olanzapine to 5mg and Katie appears much more grounded.  Speech less pressured and mood more stable.  Thoughts more organized and linear.  Katie reports she also has noticed a significant difference.  She also reports she is sleeping much better.  Concerned about weight gain but reports she is very sedentary.      Recent Symptoms:   Depression:  depressed mood, anhedonia, low energy and insomnia  Elevated:  none  Psychosis:  none  Anxiety:  nervous/overwhelmed and occasional worry  Panic Attack:  none  Trauma Related:  none     Recent Substance Use:  No changes reported        Social/ Family History      [1ea,1ea]            [per patient report]               Financial support-  social " security disability  Children-  2 adult children  Living situation- Lives in house in [place] with son   Social/spiritial support-  limited/none  Feels safe at home-  Yes   Family history- None      Medical / Surgical History                                 Patient Active Problem List   Diagnosis     Bipolar disorder (H)     Hashimoto's thyroiditis     Incontinence of urine in female     Osteopenia     Reactive airway disease     Vitamin D deficiency     Overweight     Dizziness     Hyperlipidemia LDL goal <100       Past Surgical History:   Procedure Laterality Date     partial thyroidectomy       TONSILLECTOMY       TUBAL LIGATION          Medical Review of Systems         [2,10]   The remainder of the review of systems is noncontributory  Thyroid removed  Allergy    Quetiapine and Valproic acid  Current Medications        Current Outpatient Medications   Medication Sig Dispense Refill     albuterol (PROAIR HFA/PROVENTIL HFA/VENTOLIN HFA) 108 (90 Base) MCG/ACT inhaler Inhale 2 puffs into the lungs as needed for shortness of breath / dyspnea or wheezing 18 g 1     buPROPion (WELLBUTRIN XL) 300 MG 24 hr tablet Take 1 tablet (300 mg) by mouth every morning 30 tablet 0     cetirizine (ZYRTEC) 10 MG tablet Take 1 tablet (10 mg) by mouth daily 90 tablet 1     gabapentin (NEURONTIN) 300 MG capsule Take 1 capsule (300mg) in morning and 1 capsule (300mg) in afternoon and 4 capsules (1200mg) near bedtime 180 capsule 0     levothyroxine (SYNTHROID/LEVOTHROID) 50 MCG tablet Take 1 tablet (50 mcg) by mouth daily 90 tablet 1     mirtazapine (REMERON) 15 MG tablet Take 1 tablet (15 mg) by mouth At Bedtime 30 tablet 0     thyroid (ARMOUR) 32.5 MG tablet Take 1 tablet (32.5 mg) by mouth daily Nature-thyroid 90 tablet 3     ziprasidone (GEODON) 60 MG capsule Take 1 capsule (60 mg) by mouth 2 times daily (with meals) 60 capsule 1     Vitals         [3, 3]   There were no vitals taken for this visit.   Mental Status Exam        [9,  "14 cog gs]     Alertness: alert  and oriented  Appearance: casually groomed  Behavior/Demeanor: cooperative, pleasant and calm, with good  eye contact   Speech: normal.  Language: intact  Psychomotor: normal or unremarkable  Mood: \"ok\"  Affect: appropriate; was congruent to mood; was congruent to content  Thought Process/Associations: unremarkable  Thought Content:  Reports none;  Denies suicidal ideation and violent ideation  Perception:  Reports none;  Denies auditory hallucinations and visual hallucinations  Insight: adequate  Judgment: intact  Cognition: (6) does  appear grossly intact; formal cognitive testing was not done  Gait/Station and/or Muscle Strength/Tone: unable to assess    Labs and Data                          Rating Scales:    PHQ9    PHQ9 Today:  Not completed  PHQ-9 SCORE 12/18/2019   PHQ-9 Total Score MyChart 17 (Moderately severe depression)   PHQ-9 Total Score 17        Assessment      [m2, h3]     Unspecified Mood Disorder  Bipolar I Disorder (Hx)    [place] Prescription Monitoring Program [] was not checked today:  provider not managing any controlled medications.        Plan                                                                                                                     m2, h3     1) MEDICATION:  Continue Geodon 60mg BID with food  Increase Mirtazapine to 30mg at bedtime for sleep and mood.  Continue Wellbutrin XL 300mg daily.  Consider increasing dose to 450mg   Continue Gabapentin to 300 in morning and afternoon.  Continue 1200mg at bedtime   Continue  Levothyroxine 50mcg (prescribed by another provider).     2) THERAPY:  Continue with current therapist      RTC: 1 month.    CRISIS NUMBERS:   Provided routinely in AVS.    Treatment Risk Statement:  The patient understands the risks, benefits, adverse effects and alternatives. Agrees to treatment with the capacity to do so. No medical contraindications to treatment. Agrees to call clinic for any problems. The patient " understands to call 911 or go to the nearest ED if life threatening or urgent symptoms occur.     WHODAS 2.0  TODAY total score = N/A; [a 12-item WHODAS 2.0 assessment was not completed by the pt today and/or recorded in EPIC].       PROVIDER:  ADELINA Lieberman CNP

## 2020-10-29 NOTE — PROGRESS NOTES
"hashimoto's thyroiditis  Bernardo Almendarez, [facility]    Katie Griffiths is a 60 year old female who is being evaluated via a billable video visit.      The patient has been notified of following:     \"This video visit will be conducted via a call between you and your physician/provider. We have found that certain health care needs can be provided without the need for an in-person physical exam.  This service lets us provide the care you need with a video conversation.  If a prescription is necessary we can send it directly to your pharmacy.  If lab work is needed we can place an order for that and you can then stop by our lab to have the test done at a later time.    Video visits are billed at different rates depending on your insurance coverage.  Please reach out to your insurance provider with any questions.    If during the course of the call the physician/provider feels a video visit is not appropriate, you will not be charged for this service.\"    Patient has given verbal consent for Video visit? Yes  How would you like to obtain your AVS? MyChart  If you are dropped from the video visit, the video invite should be resent to: Text to cell phone: [phone]  Will anyone else be joining your video visit? No        Video-Visit Details    Type of service:  Video Visit    Video Start Time: 2:30 pm  End; 3:00 pm    Originating Location (pt. Location): Home    Distant Location (provider location):  [facility] [place]     Platform used for Video Visit: [facility]                                                                               - Endocrinology Follow up -    Reason for visit/consult: hypothyroidism    Primary care provider: Kim Gore      Assessment and Plan  A 60 year old female with hypothyroidism     # Hypothyrodism  Post operative,   Previously was undercotrolled with Nature thryodi 32.5 mg.  Switched from Nature to LT4 50 mcg and has been doing well. Lab showed " good range of normal. TSH 2.7, free T4 1.1    - continue current LT4 05 mcg    - recheck TFTs in 6 month    # hair loss      RTC with me in 1 year        Mary Sawyer MD  Staff Physician  Endocrinology and Metabolism  License: [id number]    Interval History as of 11/2/2020 : Patient has been doing well. Switched from Nature to LT4 50 mcg and has been doing well. Lab showed good range of normal. TSH 2.7, free T4 1.1    HPI: A 60 yo female here for the evaluation for her hypothryodism.  Patient is a self-referral came here today.   Patient was diagnosed thyroid nodule in 2006 when she was in [place] she underwent left hemithyroidectomy at [facility] in [place].  Since then she has been on levothyroxine and she cannot recall the dose.   She was switched to the nature thyroid currently taking 32.5 mg daily for 5 years and she cannot recall why and where she started.   She moved to [place] in October 2018 and try to find a physician who can prescribe nature thyroid.  She also has bipolar disorder diagnosed 2006 and currently has been changing multiple medications.     Psychiatry medications has been changing, celexa, geodon, and hydroxyzine.   Dr. Geronimo in [facility].     She went to PMD, and was told not to Rx of Nature Thyroid, and found places potentially prescribe nature thyroid and found here.     Energy level: hard to say and not good, due to changing psychiatry medication  Dry skin: she used to have olanzapine, then recetnly changed to geodon, and currently dry skin.   Hair loss: no  Weight changes: gained weight with olanzapine, but not stopped olanzapine for 4 month  BM: regular last few days  Concentration: no  Family history of thyroid disease: mother+      Past Medical/Surgical History:  Past Medical History:   Diagnosis Date     Bipolar disorder (H)      Depression with anxiety      Hashimoto's thyroiditis      Hyperlipidemia      Mild intermittent asthma      S/P partial thyroidectomy       Superficial perivascular dermatitis      Past Surgical History:   Procedure Laterality Date     partial thyroidectomy       TONSILLECTOMY       TUBAL LIGATION         Allergies:  Allergies   Allergen Reactions     Quetiapine Rash     Valproic Acid Rash     Drug rash       Current Medications   Current Outpatient Medications   Medication     albuterol (PROAIR HFA/PROVENTIL HFA/VENTOLIN HFA) 108 (90 Base) MCG/ACT inhaler     buPROPion (WELLBUTRIN XL) 300 MG 24 hr tablet     gabapentin (NEURONTIN) 300 MG capsule     levothyroxine (SYNTHROID/LEVOTHROID) 50 MCG tablet     mirtazapine (REMERON) 30 MG tablet     thyroid (ARMOUR) 32.5 MG tablet     ziprasidone (GEODON) 60 MG capsule     cetirizine (ZYRTEC) 10 MG tablet     No current facility-administered medications for this visit.        Family History:  Family History   Problem Relation Age of Onset     Colon Polyps Mother      Eye Disorder Father      Factor V Leiden deficiency Father         pt tested negative     Hyperlipidemia Father        Social History:  Social History     Tobacco Use     Smoking status: Never Smoker     Smokeless tobacco: Never Used   Substance Use Topics     Alcohol use: Yes     Comment: occasional   lives with her son, Job: no job, on sliding scale insulin for mental     ROS:  Full review of systems taken with the help of the intake sheet. Otherwise a complete 14 point review of systems was taken and is negative unless stated in the history above.      Physical Exam:   Vitals: There were no vitals taken for this visit.  BMI= There is no height or weight on file to calculate BMI.   General: well appearing, no acute distress, pleasant and conversant,   Mental Status/neuro: alert and oriented  Face: symmetrical, normal facial color  Eyes: anicteric, no proptosis or lid lag  Resp: no acute distress      Labs : I reviewed data from epic and extract and summarize the pertinent data here.     Lab Results   Component Value Date    TSH 3.21 01/22/2020

## 2020-10-30 DIAGNOSIS — E06.3 HASHIMOTO'S THYROIDITIS: ICD-10-CM

## 2020-10-30 LAB
T4 FREE SERPL-MCNC: 1.1 NG/DL (ref 0.76–1.46)
TSH SERPL DL<=0.005 MIU/L-ACNC: 2.7 MU/L (ref 0.4–4)

## 2020-10-30 PROCEDURE — 84439 ASSAY OF FREE THYROXINE: CPT | Performed by: PATHOLOGY

## 2020-10-30 PROCEDURE — 84443 ASSAY THYROID STIM HORMONE: CPT | Performed by: PATHOLOGY

## 2020-10-30 PROCEDURE — 36415 COLL VENOUS BLD VENIPUNCTURE: CPT | Performed by: PATHOLOGY

## 2020-11-02 ENCOUNTER — VIRTUAL VISIT (OUTPATIENT)
Dept: ENDOCRINOLOGY | Facility: CLINIC | Age: 60
End: 2020-11-02
Payer: MEDICARE

## 2020-11-02 DIAGNOSIS — E06.3 HYPOTHYROIDISM DUE TO HASHIMOTO'S THYROIDITIS: Primary | ICD-10-CM

## 2020-11-02 DIAGNOSIS — E89.0 STATUS POST THYROIDECTOMY: ICD-10-CM

## 2020-11-02 PROCEDURE — 99214 OFFICE O/P EST MOD 30 MIN: CPT | Mod: 95 | Performed by: INTERNAL MEDICINE

## 2020-11-02 RX ORDER — LEVOTHYROXINE SODIUM 50 UG/1
50 TABLET ORAL DAILY
Qty: 90 TABLET | Refills: 3 | Status: SHIPPED | OUTPATIENT
Start: 2020-11-02 | End: 2021-10-17

## 2020-11-02 NOTE — LETTER
"11/2/2020       RE: Katie Griffiths  [address]  [address]  [facility] [address]     Dear Colleague,    Thank you for referring your patient, Katie Griffiths, to the [facility] ENDOCRINOLOGY [facility] [place] at [facility]. Please see a copy of my visit note below.    hashimoto's thyroiditis  Bernardo Almendarez, NED    Katie Griffiths is a 60 year old female who is being evaluated via a billable video visit.      The patient has been notified of following:     \"This video visit will be conducted via a call between you and your physician/provider. We have found that certain health care needs can be provided without the need for an in-person physical exam.  This service lets us provide the care you need with a video conversation.  If a prescription is necessary we can send it directly to your pharmacy.  If lab work is needed we can place an order for that and you can then stop by our lab to have the test done at a later time.    Video visits are billed at different rates depending on your insurance coverage.  Please reach out to your insurance provider with any questions.    If during the course of the call the physician/provider feels a video visit is not appropriate, you will not be charged for this service.\"    Patient has given verbal consent for Video visit? Yes  How would you like to obtain your AVS? MyChart  If you are dropped from the video visit, the video invite should be resent to: Text to cell phone: [phone]  Will anyone else be joining your video visit? No        Video-Visit Details    Type of service:  Video Visit    Video Start Time: 2:30 pm  End; 3:00 pm    Originating Location (pt. Location): Home    Distant Location (provider location):  [facility] ENDOCRINOLOGY [facility] [place]     Platform used for Video Visit: [facility]                                                                               - Endocrinology Follow up " -    Reason for visit/consult: hypothyroidism    Primary care provider: Kim Gore      Assessment and Plan  A 60 year old female with hypothyroidism     # Hypothyrodism  Post operative,   Previously was undercotrolled with Nature thryodi 32.5 mg.  Switched from Nature to LT4 50 mcg and has been doing well. Lab showed good range of normal. TSH 2.7, free T4 1.1    - continue current LT4 05 mcg    - recheck TFTs in 6 month    # hair loss      RTC with me in 1 year        Mary Sawyer MD  Staff Physician  Endocrinology and Metabolism  License: [id number]    Interval History as of 11/2/2020 : Patient has been doing well. Switched from Nature to LT4 50 mcg and has been doing well. Lab showed good range of normal. TSH 2.7, free T4 1.1    HPI: A 58 yo female here for the evaluation for her hypothryodism.  Patient is a self-referral came here today.   Patient was diagnosed thyroid nodule in 2006 when she was in [place] she underwent left hemithyroidectomy at [facility] [place].  Since then she has been on levothyroxine and she cannot recall the dose.   She was switched to the nature thyroid currently taking 32.5 mg daily for 5 years and she cannot recall why and where she started.   She moved to [place] in October 2018 and try to find a physician who can prescribe nature thyroid.  She also has bipolar disorder diagnosed 2006 and currently has been changing multiple medications.     Psychiatry medications has been changing, celexa, geodon, and hydroxyzine.   Dr. Geronimo in [facility].     She went to PMD, and was told not to Rx of Nature Thyroid, and found places potentially prescribe nature thyroid and found here.     Energy level: hard to say and not good, due to changing psychiatry medication  Dry skin: she used to have olanzapine, then recetnly changed to geodon, and currently dry skin.   Hair loss: no  Weight changes: gained weight with olanzapine, but not stopped olanzapine for 4 month  BM:  regular last few days  Concentration: no  Family history of thyroid disease: mother+      Past Medical/Surgical History:  Past Medical History:   Diagnosis Date     Bipolar disorder (H)      Depression with anxiety      Hashimoto's thyroiditis      Hyperlipidemia      Mild intermittent asthma      S/P partial thyroidectomy      Superficial perivascular dermatitis      Past Surgical History:   Procedure Laterality Date     partial thyroidectomy       TONSILLECTOMY       TUBAL LIGATION         Allergies:  Allergies   Allergen Reactions     Quetiapine Rash     Valproic Acid Rash     Drug rash       Current Medications   Current Outpatient Medications   Medication     albuterol (PROAIR HFA/PROVENTIL HFA/VENTOLIN HFA) 108 (90 Base) MCG/ACT inhaler     buPROPion (WELLBUTRIN XL) 300 MG 24 hr tablet     gabapentin (NEURONTIN) 300 MG capsule     levothyroxine (SYNTHROID/LEVOTHROID) 50 MCG tablet     mirtazapine (REMERON) 30 MG tablet     thyroid (ARMOUR) 32.5 MG tablet     ziprasidone (GEODON) 60 MG capsule     cetirizine (ZYRTEC) 10 MG tablet     No current facility-administered medications for this visit.        Family History:  Family History   Problem Relation Age of Onset     Colon Polyps Mother      Eye Disorder Father      Factor V Leiden deficiency Father         pt tested negative     Hyperlipidemia Father        Social History:  Social History     Tobacco Use     Smoking status: Never Smoker     Smokeless tobacco: Never Used   Substance Use Topics     Alcohol use: Yes     Comment: occasional   lives with her son, Job: no job, on sliding scale insulin for mental     ROS:  Full review of systems taken with the help of the intake sheet. Otherwise a complete 14 point review of systems was taken and is negative unless stated in the history above.      Physical Exam:   Vitals: There were no vitals taken for this visit.  BMI= There is no height or weight on file to calculate BMI.   General: well appearing, no acute  distress, pleasant and conversant,   Mental Status/neuro: alert and oriented  Face: symmetrical, normal facial color  Eyes: anicteric, no proptosis or lid lag  Resp: no acute distress    Labs : I reviewed data from epic and extract and summarize the pertinent data here.     Lab Results   Component Value Date    TSH 3.21 01/22/2020   Mary Sawyer MD

## 2020-11-07 DIAGNOSIS — F41.9 ANXIETY: ICD-10-CM

## 2020-11-07 DIAGNOSIS — F39 MOOD DISORDER (H): ICD-10-CM

## 2020-11-10 ENCOUNTER — VIRTUAL VISIT (OUTPATIENT)
Dept: INTERNAL MEDICINE | Facility: CLINIC | Age: 60
End: 2020-11-10
Payer: MEDICARE

## 2020-11-10 ENCOUNTER — VIRTUAL VISIT (OUTPATIENT)
Dept: PSYCHIATRY | Facility: CLINIC | Age: 60
End: 2020-11-10
Attending: NURSE PRACTITIONER
Payer: MEDICARE

## 2020-11-10 DIAGNOSIS — N39.46 URINARY INCONTINENCE, MIXED: Primary | ICD-10-CM

## 2020-11-10 DIAGNOSIS — F39 MOOD DISORDER (H): ICD-10-CM

## 2020-11-10 DIAGNOSIS — M25.511 RIGHT SHOULDER PAIN, UNSPECIFIED CHRONICITY: ICD-10-CM

## 2020-11-10 DIAGNOSIS — F41.9 ANXIETY: ICD-10-CM

## 2020-11-10 PROCEDURE — 99213 OFFICE O/P EST LOW 20 MIN: CPT | Mod: 95 | Performed by: NURSE PRACTITIONER

## 2020-11-10 RX ORDER — BUPROPION HYDROCHLORIDE 300 MG/1
300 TABLET ORAL EVERY MORNING
Qty: 30 TABLET | Refills: 0 | Status: SHIPPED | OUTPATIENT
Start: 2020-11-10 | End: 2020-12-09

## 2020-11-10 RX ORDER — MIRTAZAPINE 15 MG/1
15 TABLET, FILM COATED ORAL AT BEDTIME
Qty: 30 TABLET | Refills: 0 | Status: SHIPPED | OUTPATIENT
Start: 2020-11-10 | End: 2020-11-17

## 2020-11-10 RX ORDER — GABAPENTIN 300 MG/1
CAPSULE ORAL
Qty: 180 CAPSULE | Refills: 0 | Status: SHIPPED | OUTPATIENT
Start: 2020-11-10 | End: 2020-12-09

## 2020-11-10 ASSESSMENT — PAIN SCALES - GENERAL: PAINLEVEL: NO PAIN (0)

## 2020-11-10 NOTE — PROGRESS NOTES
"Katie Griffiths is a 60 year old female who is being evaluated via a billable video visit.      The patient has been notified of following:     \"This video visit will be conducted via a call between you and your physician/provider. We have found that certain health care needs can be provided without the need for an in-person physical exam.  This service lets us provide the care you need with a video conversation.  If a prescription is necessary we can send it directly to your pharmacy.  If lab work is needed we can place an order for that and you can then stop by our lab to have the test done at a later time.    Video visits are billed at different rates depending on your insurance coverage.  Please reach out to your insurance provider with any questions.    If during the course of the call the physician/provider feels a video visit is not appropriate, you will not be charged for this service.\"    Patient has given verbal consent for Video visit? Yes  How would you like to obtain your AVS? MyChart  If you are dropped from the video visit, the video invite should be resent to: Send to e-mail at: [email or web address]  Will anyone else be joining your video visit? No      Subjective     Katie Griffiths is a 60 year old female who presents today via video visit for the following health issues:    HPI      Looking for PT--for \"female problems\". Jan/Feb--had gone in to PT for incontinence, had to stop going d/t mental health. Still has problems for urinary incontinence.   Thinks mostly a muscle problem.  Notices more with stress incontinence (coughing, sneezing, etc), but also has some occasional episodes of urge incontinence.   Was helping in Jan, but had to stop d/t mental health. Feels she is in a better place now from a mental health perspective and now would like to proceed with pelvic floor PT again.  No pelvic pain. No increased frequency/urgency.     R Shoulder injury about 4 months ago, was " "painful for 2.5-3 months, still not completely healed. Lifted something the wrong way, was concerned that she \"ripped something in there\". Can still be painful at rest, otherwise mostly sore with trying to sleep on the right side, otherwise just guards it. Would mostly like it noted in her chart in case it doesn't continue to get better on its own.     Video Start Time: 1:07 PM    Review of Systems   Constitutional, HEENT, cardiovascular, pulmonary, gi and gu systems are negative, except as otherwise noted.      Objective           Vitals:  No vitals were obtained today due to virtual visit.    Physical Exam     GENERAL: Healthy, alert and no distress  EYES: Eyes grossly normal to inspection.  No discharge or erythema, or obvious scleral/conjunctival abnormalities.  RESP: No audible wheeze, cough, or visible cyanosis.  No visible retractions or increased work of breathing.    SKIN: Visible skin clear. No significant rash, abnormal pigmentation or lesions.  NEURO: Cranial nerves grossly intact.  Mentation and speech appropriate for age.  PSYCH: Mentation appears normal, affect normal/bright, judgement and insight intact, normal speech and appearance well-groomed.            Assessment & Plan     Urinary incontinence, mixed  Previously working with Viviane Jesus, PT, at [facility], would like to return there if possible; had been making improvements prior to having to stop PT earlier this year. Feels she is in a better place from a psychological standpoint and would like to proceed.  - PHYSICAL THERAPY REFERRAL    Right shoulder pain, unspecified chronicity  Reports R shoulder injury several months ago, still not back at baseline. Is open to working with PT on this as well.   - PHYSICAL THERAPY REFERRAL          Return if symptoms worsen or fail to improve.    ADELINA [facility]olph [facility] INTERNAL MEDICINE [place]      Video-Visit Details    Type of service:  Video Visit    Video " End Time:1:25 PM    Originating Location (pt. Location): Home    Distant Location (provider location):  [facility] INTERNAL MEDICINE [place]     Platform used for Video Visit: [employer]

## 2020-11-10 NOTE — PROGRESS NOTES
"VIDEO VISIT  Katie Griffiths is a 60 year old patient who is being evaluated via a billable video visit.      The patient has been notified of following:   \"This video visit will be conducted via a call between you and your physician/provider. We have found that certain health care needs can be provided without the need for an in-person physical exam. This service lets us provide the care you need with a video conversation. If a prescription is necessary we can send it directly to your pharmacy. If lab work is needed we can place an order for that and you can then stop by our lab to have the test done at a later time. Insurers are generally covering virtual visits as they would in-office visits so billing should not be different than normal.  If for some reason you do get billed incorrectly, you should contact the billing office to correct it and that number is in the AVS .    Video Conference to be completed via:  [email or web address]    Patient has given verbal consent for video visit?:  Yes    Patient would prefer that any video invitations be sent by: Text to cell phone: [phone]      How would patient like to obtain AVS?:  Embrace    AVS SmartPhrase [PsychAVS] has been placed in 'Patient Instructions':  Yes    "

## 2020-11-10 NOTE — PATIENT INSTRUCTIONS
Thank you for coming to the [facility] MENTAL HEALTH & ADDICTION [place] CLINIC.    Lab Testing:  If you had lab testing today and your results are reassuring or normal they will be mailed to you or sent through [employer] [facility] [facility] [employer] [place] within 7 days. If the lab tests need quick action we will call you with the results. The phone number we will call with results is # [phone] (home) . If this is not the best number please call our clinic and change the number.    Medication Refills:  If you need any refills please call your pharmacy and they will contact us. Our fax number for refills is [phone]. Please allow three business for refill processing. If you need to  your refill at a new pharmacy, please contact the new pharmacy directly. The new pharmacy will help you get your medications transferred.     Scheduling:  If you have any concerns about today's visit or wish to schedule another appointment please call our office during normal business hours [phone] (8-5:00 M-F)    Contact Us:  Please call [phone] during business hours (8-5:00 M-F).  If after clinic hours, or on the weekend, please call  [phone].    Financial Assistance [phone]  [facility] Billing [phone]  Central Billing Office, [facility]: [phone]  [facility] Billing [phone]  Medical Records [phone]  [facility] Patient Bill of Rights [email or web address]       MENTAL HEALTH CRISIS NUMBERS:  For a medical emergency please call  911 or go to the nearest ER.     [facility]:   [facility] -[phone]   Crisis Residence [facility] -[phone]   Walk-In Counseling Center [facility] -[phone]   COPE 24/7 [place] Mobile Team -[phone] (adults)/[phone] (child)  CH[facility]: [facility] needs assessment team - [phone]      [facility]:   [facility] - [phone]   Walk-in counseling [facility]  [facility] - [phone]   Walk-in counseling [facility] - [phone]   Crisis Residence [facility] Res[facility] - [phone]  Urgent Care Adult Mental [facility]-[phone] mobile unit/ 24/7 crisis line    National Crisis Numbers:   National Suicide Prevention Lifeline: 6-273-811-TALK (876-921-5598)  Poison Control Center - 1-402.863.5351  [employer]/resources for a list of additional resources (SOS)  Trans Lifeline a hotline for transgender people 6-494-151-8048  The [employer] Project a hotline for LGBT youth 1-598.424.1388  Crisis Text Line: For any crisis 24/7   To: 025199  see www.crisistextline.org  - IF MAKING A CALL FEELS TOO HARD, send a text!         Again thank you for choosing [facility] MENTAL HEALTH & ADDICTION [facility] and please let us know how we can best partner with you to improve you and your family's health.    You may be receiving a survey regarding this appointment. We would love to have your feedback, both positive and negative. The survey is done by an external company, so your answers are anonymous.

## 2020-11-10 NOTE — PROGRESS NOTES
"Video- Visit Details  Type of service:  video visit for medication management  Time of service:    Date:  11/10/2020    Video Start Time:  8:36 AM        Video End Time:  8:49am    Reason for video visit:  Patient unable to travel due to Covid-19  Originating Site (patient location):  [facility]   Location- Patient's home  Distant Site (provider location):  Remote location  Mode of Communication:  Video Conference via [email or web address]  Consent:  Patient has given verbal consent for video visit?: Yes     VIDEO VISIT  Katie Griffiths is a 60 year old patient who is being evaluated via a billable video visit.      The patient has been notified of following:   \"This video visit will be conducted via a call between you and your physician/provider. We have found that certain health care needs can be provided without the need for an in-person physical exam. This service lets us provide the care you need with a video conversation. If a prescription is necessary we can send it directly to your pharmacy. If lab work is needed we can place an order for that and you can then stop by our lab to have the test done at a later time. Insurers are generally covering virtual visits as they would in-office visits so billing should not be different than normal.  If for some reason you do get billed incorrectly, you should contact the billing office to correct it and that number is in the AVS .    Video Conference to be completed via:  [email or web address]    Patient has given verbal consent for video visit?:  Yes    Patient would prefer that any video invitations be sent by: Text to cell phone: [phone]      How would patient like to obtain AVS?:  Well[employer]    AVS SmartPhrase [PsychAVS] has been placed in 'Patient Instructions':  Yes            [facility]  Psychiatry Clinic  PSYCHIATRIC PROGRESS NOTE       Katie Griffiths is a 60 year old female who prefers the name Katie and pronoun she, her.  Therapist: Nikhil, " "[facility]               PCP: Buddy Nolan  Other Providers: None    Pertinent Background:  See previous notes.  Psych critical item history includes Hospitalized 3 times with most recent occurring in 2007 after overdose attempt.  Numerous medication trials.  Possible bipolar diagnosis.     Interim History     [4, 4]     The patient is a good historian, reports good treatment adherence and was last seen 10/14/20.  Since the last visit,  Katie is \"ok\"  Mood stable.  She is requesting a decrease in Mirtazapine as she is associating it with hair loss.  Not listed as a typical side effect.  Katie reports she is walking with her father most mornings.  Describes sleep as \"so-so.\"  Averaging 5 hours of sleep per night.  Continues to see therapist regularly.     10/14/20:  Katie reports again \"I am hanging in there.\" Persistent low mood persists.  Reports she had to switch thyroid medications due to her's being recalled.  Sleep initially worsened with new drug but she is now sleeping 5-6 hours per night.  Katie reports that her housing is more stable than at last appointment.  Anxiety has improved as a result.  Katie also reports she has been more active as she is helping her father prepare his boat for winter.  She also continues to see her therapist regularly.      9/15/20: Katie continues to report \"I am hanging in there.\" Sleep continues to be main concern. Katie reports she has been waking at 2am and unable to fall back asleep for the past 2 nights.  Katie does report that Gabapentin is helpful in that she can relax when she initially lays down for bed. \"I'm getting beneficial sleep more often than I am not.\"   Continues to endorse periodic low mood and anxiety.  Katie also has not used propranolol for PRN anxiety will discontinue. She does endorse fairly constant worry about possible homelessness.  Katie also brought up Celexa as it was helpful in management of anxiety; however in May she stated it was not " "effective and maybe causing sedation.  Continues to see therapist regularly.     8/18/20: Katie reports she is continuing \"hanging in there.\"  Katie reports that gabapentin has been helpful with sleep and she is \"tossing and turning less.\"  She believes sleep duration may have increased.  Gabapentin also appears to be helping with daytime anxiety.   Mood stable.  No significant concerns with depression or mood fluctuations.     7/30/20: Katie reports she is \"hanging in there.\"  Trazodone reports trazodone 100mg was not effective and led to diarrhea.  Discussed possible hypomania episode as she was frequently calling various providers at odd hours and having difficulty sleeping.  Does not appear to meet criteria at this time but will continue to monitor.  Will try to increase dose of Mirtazapine dose and see if helps.  Also will start Gabapentin for anxiety and sleep.  Katie continues to be quite sedentary during day.  Fortunately she was on a day trip to [place] today with her parents.  Advised to follow sleep medicine recommendations    7/7/20: Katie reports she is \"hanging in there.\"  Depression and anxiety persist.  Mood stable.  She is residing at her parent's home for the past couple days.  She using office and sleeping on twin bed. She is looking for independent living but is running into issues with her income, age, and credit history. Completed sleep consultation and behavioral change recommendations were given.  Unclear if followed recommendations.  Stress has decreased since then due to moving in with her parents but still persists due to housing issue.  Advised to take Propranolol when feels stressed.  Continues to endorse sedentary lifestyle.  Advised to go for walks in morning and early evening to develop \"sleep appetite.\"  She agreed.  She also does not want to start any sleep aids that may lead to drowsiness and weight gain.  Mood, motivation, and energy low.  Recommended increase Wellbutrin to 450mg but " she again is hesitant.      New Address:  [address]  [place] [address]    6/9/20: Katie is currently trying to maintain housing.  Reports her current situation living with her son has been extremely stressful.  Currently looking for apartments in [place] and [place].  She working with [facility] social work team.  Katie is very hesitant to change her medications.  She would like to obtain better grasp of her housing before making medication adjustments. Will consult with PCP about possible Propranolol trial.       5/6/20:  Katie continues to endorse stress regarding bankruptcy.  She has phone hearing with  at the end of the month of May.  Katie continues to report lack of activity which may be attributable to lifestyle changes required during pandemic, but does report overall improvement with increase in Wellbutrin.  Sleep also continues to be an issue but she also reports it continues to improve.  Sleep medicine consultation in 2 months. No concerns with elevated mood.  Katie did not want to adjust her medications     3/31/20: when asked how she is doing, Katie reports that switch from Celexa to Wellbutrin has been beneficial in regards to mood, energy, and motivation.  No concerns with elevated mood.  Sleep continues to be an issue.  Did not worsen with addition of Wellbutrin.  Has sleep study in 3 months.  Regarding psychosocial stressors, Katie is babysitting her grandchildren as their mother is sick and is going through bankruptcy.       3/4/20: Katie again starts laughing.  Mood is stable overall but low.  Continues to endorse anhedonia, energy, and low mood. Sleep has improved since starting Remeron.  Reports sleeping 5 hours per night which results in continued daytime tiredness.    Continues to report that Celexa is not helpful and possible causing sedation    2/12/20: Katie shared that she is not sleeping.  She reports that she is getting 3 hours of sleep per night for past couple weeks. However, Katie  "did not look overly fatigued during appointment.  She also reports that her mood is quite low.  No concerns with shayla.  No goal directed behavior or impulsivity.  Katie\"s affect continues to be incongruent to her reported mood.  She is struggling to get household chores completed.  Continues to report sedentary lifestyle.  Educated on how exercise and activity can help with sleep.  Will stop Trazodone and start Mirtazapine for sleep.  If continues to be a problem, will refer to sleep medicine as sleep has been an issue for several years.  Also plan to switch Celexa as it does not appear to be managing her depression.      1/8/20: Katie's main concern today was weight gain.  Affect was quite bright today in spite of high PHQ-9 and reported persistent low mood. Reports positive holiday with her family.  Sleep continues to be inconsistent.  Mood stable with introduction of Geodon.  Will increase and discontinue Olanzapine.  Katie continues to consider a retrial of Depakote which was discontinued in past due to development of rash.      12/11/19: Katie reports her mood is stable but she is describes \"not feeling good or bad.\"  Difficult to discern if emotional blunting or lack of hypomania.  Thoughts continue to be linear and organized.  Speech normal.  Also reports sleeping continues to improve. Previous trials include Lithium (not effective?), Depakote (rash), and Abilify (side effects).  Katie would like to change medications today due to possible emotional blunting and weight gain (5lbs in past 2 weeks).      11/27/19: Increased HS Olanzapine to 5mg and Katie appears much more grounded.  Speech less pressured and mood more stable.  Thoughts more organized and linear.  Katie reports she also has noticed a significant difference.  She also reports she is sleeping much better.  Concerned about weight gain but reports she is very sedentary.      Recent Symptoms:   Depression:  depressed mood, anhedonia, low energy and " insomnia  Elevated:  none  Psychosis:  none  Anxiety:  occasional worry  Panic Attack:  none  Trauma Related:  none     Recent Substance Use:  No changes reported        Social/ Family History      [1ea,1ea]            [per patient report]               Financial support-  social security disability  Children-  2 adult children  Living situation- Lives in house in [place] with son   Social/spiritial support-  limited/none  Feels safe at home-  Yes   Family history- None      Medical / Surgical History                                 Patient Active Problem List   Diagnosis     Bipolar disorder (H)     Hashimoto's thyroiditis     Incontinence of urine in female     Osteopenia     Reactive airway disease     Vitamin D deficiency     Overweight     Dizziness     Hyperlipidemia LDL goal <100     Right shoulder pain, unspecified chronicity     Somatic dysfunction of pelvis region     Mixed incontinence urge and stress (male)(female)       Past Surgical History:   Procedure Laterality Date     partial thyroidectomy       TONSILLECTOMY       TUBAL LIGATION          Medical Review of Systems         [2,10]   The remainder of the review of systems is noncontributory  Thyroid removed  Allergy    Quetiapine and Valproic acid  Current Medications        Current Outpatient Medications   Medication Sig Dispense Refill     albuterol (PROAIR HFA/PROVENTIL HFA/VENTOLIN HFA) 108 (90 Base) MCG/ACT inhaler Inhale 2 puffs into the lungs as needed for shortness of breath / dyspnea or wheezing 18 g 1     buPROPion (WELLBUTRIN XL) 300 MG 24 hr tablet Take 1 tablet (300 mg) by mouth every morning 30 tablet 0     gabapentin (NEURONTIN) 300 MG capsule Take 1 capsule (300mg) in morning and 1 capsule (300mg) in afternoon and 4 capsules (1200mg) near bedtime 180 capsule 0     levothyroxine (SYNTHROID/LEVOTHROID) 50 MCG tablet Take 1 tablet (50 mcg) by mouth daily 90 tablet 3     ziprasidone (GEODON) 60 MG capsule Take 1 capsule (60 mg) by mouth  "2 times daily (with meals) 60 capsule 1     mirtazapine (REMERON) 30 MG tablet Take 1 tablet (30 mg) by mouth At Bedtime 11 tablet 0     Vitals         [3, 3]   There were no vitals taken for this visit.   Mental Status Exam        [9, 14 cog gs]     Alertness: alert  and oriented  Appearance: casually groomed  Behavior/Demeanor: cooperative, pleasant and calm, with good  eye contact   Speech: regular rate and rhythm.  Language: no problems  Psychomotor: normal or unremarkable  Mood: \"ok\"  Affect: appropriate; was congruent to mood; was congruent to content  Thought Process/Associations: unremarkable  Thought Content:  Reports none;  Denies suicidal ideation and violent ideation  Perception:  Reports none;  Denies auditory hallucinations and visual hallucinations  Insight: adequate  Judgment: intact  Cognition: (6) does  appear grossly intact; formal cognitive testing was not done  Gait/Station and/or Muscle Strength/Tone: unable to assess    Labs and Data                          Rating Scales:    PHQ9    PHQ9 Today:  Not completed  PHQ-9 SCORE 12/18/2019   PHQ-9 Total Score MyChart 17 (Moderately severe depression)   PHQ-9 Total Score 17        Assessment      [m2, h3]     Unspecified Mood Disorder  Bipolar I Disorder (Hx)    [place] Prescription Monitoring Program [] was not checked today:  provider not managing any controlled medications.        Plan                                                                                                                     m2, h3     1) MEDICATION:  Continue Geodon 60mg BID with food  Decrease Mirtazapine to 15mg at bedtime for sleep and mood.  Continue Wellbutrin XL 300mg daily.  Consider increasing dose to 450mg   Continue Gabapentin to 300 in morning and afternoon.  Continue 1200mg at bedtime   Continue  Levothyroxine 50mcg (prescribed by another provider).     2) THERAPY:  Continue with current therapist      RTC: 1 month.    CRISIS NUMBERS:   Provided routinely in " AVS.    Treatment Risk Statement:  The patient understands the risks, benefits, adverse effects and alternatives. Agrees to treatment with the capacity to do so. No medical contraindications to treatment. Agrees to call clinic for any problems. The patient understands to call 911 or go to the nearest ED if life threatening or urgent symptoms occur.     WHODAS 2.0  TODAY total score = N/A; [a 12-item WHODAS 2.0 assessment was not completed by the pt today and/or recorded in EPIC].       PROVIDER:  ADELINA Lieberman CNP

## 2020-11-10 NOTE — NURSING NOTE
Chief Complaint   Patient presents with     Referral     pt would like to discuss referral for PT       Segundo Odonnell CMA, EMT at 12:01 PM on 11/10/2020.

## 2020-11-11 RX ORDER — GABAPENTIN 300 MG/1
CAPSULE ORAL
Qty: 180 CAPSULE | Refills: 0 | OUTPATIENT
Start: 2020-11-11

## 2020-11-11 RX ORDER — BUPROPION HYDROCHLORIDE 300 MG/1
TABLET ORAL
Qty: 30 TABLET | Refills: 0 | OUTPATIENT
Start: 2020-11-11

## 2020-11-11 RX ORDER — MIRTAZAPINE 30 MG/1
TABLET, FILM COATED ORAL
Qty: 30 TABLET | Refills: 0 | OUTPATIENT
Start: 2020-11-11

## 2020-11-17 ENCOUNTER — MYC MEDICAL ADVICE (OUTPATIENT)
Dept: PSYCHIATRY | Facility: CLINIC | Age: 60
End: 2020-11-17

## 2020-11-17 DIAGNOSIS — F39 MOOD DISORDER (H): ICD-10-CM

## 2020-11-17 RX ORDER — MIRTAZAPINE 15 MG/1
30 TABLET, FILM COATED ORAL AT BEDTIME
Qty: 25 TABLET | Refills: 0 | Status: SHIPPED | OUTPATIENT
Start: 2020-11-17 | End: 2020-11-23

## 2020-11-18 ENCOUNTER — MYC MEDICAL ADVICE (OUTPATIENT)
Dept: PSYCHIATRY | Facility: CLINIC | Age: 60
End: 2020-11-18

## 2020-11-18 NOTE — TELEPHONE ENCOUNTER
Spoke with patient on the phone and clarified that only 25 tablets were sent to pharmacy yesterday.  So that she can pick her meds up all at the same time next month.

## 2020-11-21 ENCOUNTER — MYC MEDICAL ADVICE (OUTPATIENT)
Dept: PSYCHIATRY | Facility: CLINIC | Age: 60
End: 2020-11-21

## 2020-11-21 DIAGNOSIS — F39 MOOD DISORDER (H): ICD-10-CM

## 2020-11-23 RX ORDER — MIRTAZAPINE 30 MG/1
30 TABLET, FILM COATED ORAL AT BEDTIME
Qty: 11 TABLET | Refills: 0 | Status: SHIPPED | OUTPATIENT
Start: 2020-11-23 | End: 2020-12-09

## 2020-11-23 NOTE — TELEPHONE ENCOUNTER
Per patient request for insurance coverage, Mirtazapine strength changed to 30mg and resent to pharmacy.  Short supply to get patient to appointment because she is hoping to discuss other option with provider at next visit.

## 2020-11-24 RX ORDER — MIRTAZAPINE 30 MG/1
TABLET, FILM COATED ORAL
Qty: 30 TABLET | Refills: 0 | OUTPATIENT
Start: 2020-11-24

## 2020-11-30 ENCOUNTER — THERAPY VISIT (OUTPATIENT)
Dept: PHYSICAL THERAPY | Facility: CLINIC | Age: 60
End: 2020-11-30
Payer: MEDICARE

## 2020-11-30 DIAGNOSIS — N39.46 URINARY INCONTINENCE, MIXED: ICD-10-CM

## 2020-11-30 DIAGNOSIS — N39.46 MIXED INCONTINENCE URGE AND STRESS (MALE)(FEMALE): Primary | ICD-10-CM

## 2020-11-30 DIAGNOSIS — M99.05 SOMATIC DYSFUNCTION OF PELVIS REGION: ICD-10-CM

## 2020-11-30 DIAGNOSIS — M25.511 RIGHT SHOULDER PAIN, UNSPECIFIED CHRONICITY: ICD-10-CM

## 2020-11-30 PROCEDURE — 97161 PT EVAL LOW COMPLEX 20 MIN: CPT | Mod: GP | Performed by: PHYSICAL THERAPIST

## 2020-11-30 PROCEDURE — 97110 THERAPEUTIC EXERCISES: CPT | Mod: GP | Performed by: PHYSICAL THERAPIST

## 2020-11-30 NOTE — LETTER
DEPARTMENT OF HEALTH AND HUMAN SERVICES  CENTERS FOR MEDICARE & MEDICAID SERVICES    PLAN/UPDATED PLAN OF PROGRESS FOR OUTPATIENT REHABILITATION    PATIENTS NAME:  Katie Griffiths   : 1960  PROVIDER NUMBER:    [id number]  HICN: [id number]  PROVIDER NAME: [facility]  MEDICAL RECORD NUMBER: [id number]   START OF CARE DATE:  SOC Date: 20   TYPE:  PT  PRIMARY/TREATMENT DIAGNOSIS: (Pertinent Medical Diagnosis)  Urinary incontinence, mixed  Right shoulder pain, unspecified chronicity  Somatic dysfunction of pelvis region  Mixed incontinence urge and stress (male)(female)  VISITS FROM START OF CARE: 1     [facility] Initial Evaluation  Subjective:  Incontince   The history is provided by the patient. No  was used.   Patient Health History  Katie Griffiths being seen for muscles used to stop urine incontinence.   Problem began: 2020.   Problem occurred: started with meds   Pain is reported as 4/10 on pain scale.  General health as reported by patient is fair.  Pertinent medical history includes: asthma, changes in bowel/bladder and concussions/dizziness.   Medical allergies: other. Other medical allergies details: 2 medications in my chart.   Surgeries include:  Other. Other surgery history details: tonsels out, left thyroid loab out, tooth implant, tubal ligation.    Current medications:  Anti-depressants, anti-seizure medication and thyroid medication.    Primary job tasks include:  Prolonged sitting.                Therapist Generated HPI Evaluation  Problem details: Pt was seen here in 2020 for same problem of UI. New MD orders 11-.  She had to discontinue PT  due to mental health issues.  The incontinence is better since she got off some of her mental health meds.. she leaks a quarter size on way to bathroom. When out of house she  wears a POISE pad #3 no big gushers. She voids q 1 hour at home  and when out she voids before she leaves home at store and before she leaves store. Nocturia 4 x.  One of her meds causes constipation sometimes she strains to push out bowel. F stop deflection. Gaol : dec nocturia so she could sleep better.  Type of problem:  Incontinence. This is a chronic condition. Condition occurred with:  Insidious onset. Where condition occurred: for unknown reasons. Patient reports pain:  N/a. Work activity restrictions: not working.  Barriers include:  None as reported by patient (she lives with her parents).                        PATIENTS NAME:  Katie Griffiths   : 1960    Objective:  Gait:    Gait Type:  Normal     Flexibility/Screens:   Lower Extremity:  Decreased left lower extremity flexibility:Adductors; Hip Flexors and Hamstrings  Decreased right lower extremity flexibility:  Adductors; Hip Flexors and Hamstrings  Pelvic Dysfunction Evaluation:    Abdominal Wall:  Abdominal wall pelvic: TrA is poor.  Pelvic Clock Exam:  normal  External Assessment:  normal  Internal Assessment:  Internal assessment pelvic: release pfm is not complete B LA, post Rx it is wnl.  Sensory Exam:  Normal  Contraction/Grade:  Weak squeeze, 2 second hold (2)    Assessment/Plan:    Patient is a 60 year old female with pelvic complaints.    Patient has the following significant findings with corresponding treatment plan.                Diagnosis 1:  UI  Decreased ROM/flexibility - manual therapy, therapeutic exercise, therapeutic activity and home program  Decreased strength - therapeutic exercise, therapeutic activities and home program  Decreased function - therapeutic activities and home program  Previous and current functional limitations:  (See Goal Flow Sheet for this information)    Short term and Long term goals: (See Goal Flow Sheet for this information)   Communication ability:  Patient appears to be able to clearly communicate and understand verbal and written communication and follow  "directions correctly.  Treatment Explanation - The following has been discussed with the patient:   RX ordered/plan of care, Anticipated outcomes, Possible risks and side effects                                              PATIENTS NAME:  Katie Griffiths   : 1960    This patient would benefit from PT intervention to resume normal activities.   Rehab potential is good.  Frequency:  2 x month once daily  Duration:  for 3 months  Discharge Plan:  Achieve all LTG.  Independent in home treatment program.  Reach maximal therapeutic benefit.              Caregiver Signature/Credentials _____________________________ Date ________         Viviane Jesus, PT, Cert. MDT    I have reviewed and certified the need for these services and plan of treatment while under my care.        PHYSICIAN'S SIGNATURE:   _________________________________________      Date___________   ADELINA Ross CNP    Certification period:  Beginning of Cert date period: 20 to  21     Functional Level Progress Report: Please see attached \"Goal Flow sheet for Functional level.\"    ____X____ Continue Services or       ________ DC Services                Service dates: From  SOC Date: 20  to present                         "

## 2020-11-30 NOTE — PROGRESS NOTES
[facility] Initial Evaluation  Subjective:  Incontince     The history is provided by the patient. No  was used.   Patient Health History  Katie Griffiths being seen for muscles used to stop urine incontinence.     Problem began: 1/1/2020.   Problem occurred: started with meds   Pain is reported as 4/10 on pain scale.  General health as reported by patient is fair.  Pertinent medical history includes: asthma, changes in bowel/bladder and concussions/dizziness.     Medical allergies: other. Other medical allergies details: 2 medications in my chart.   Surgeries include:  Other. Other surgery history details: tonsels out, left thyroid loab out, tooth implant, tubal ligation.    Current medications:  Anti-depressants, anti-seizure medication and thyroid medication.       Primary job tasks include:  Prolonged sitting.                  Therapist Generated HPI Evaluation  Problem details: Pt was seen here in 2/2020 for same problem of UI. New MD orders [id number].  She had to discontinue PT  due to mental health issues.  The incontinence is better since she got off some of her mental health meds.  she leaks a quarter size on way to bathroom  When out of house she  wears a POISE pad #3 no big gushers  She voids q 1 hour at home and when out she voids before she leaves home at store and before she leaves store  Nocturia 4 x   One of her meds causes constipation sometimes she strains to push out bowel  F stop deflection  Gaol : dec nocturia so she could sleep better.         Type of problem:  Incontinence.    This is a chronic condition.  Condition occurred with:  Insidious onset.  Where condition occurred: for unknown reasons.  Patient reports pain:  N/a.               Work activity restrictions: not working.  Barriers include:  None as reported by patient (she lives with her parents).                        Objective:    Gait:    Gait Type:  Normal         Flexibility/Screens:        Lower Extremity:  Decreased left lower extremity flexibility:Adductors; Hip Flexors and Hamstrings    Decreased right lower extremity flexibility:  Adductors; Hip Flexors and Hamstrings                                       Pelvic Dysfunction Evaluation:          Abdominal Wall:  Abdominal wall pelvic: TrA is poor.        Pelvic Clock Exam:  normal                External Assessment:  normal              Internal Assessment:  Internal assessment pelvic: release pfm is not complete B LA, post Rx it is wnl.  Sensory Exam:  Normal  Contraction/Grade:  Weak squeeze, 2 second hold (2)                               General     ROS    Assessment/Plan:    Patient is a 60 year old female with pelvic complaints.    Patient has the following significant findings with corresponding treatment plan.                Diagnosis 1:  UI  Decreased ROM/flexibility - manual therapy, therapeutic exercise, therapeutic activity and home program  Decreased strength - therapeutic exercise, therapeutic activities and home program  Decreased function - therapeutic activities and home program        Previous and current functional limitations:  (See Goal Flow Sheet for this information)    Short term and Long term goals: (See Goal Flow Sheet for this information)     Communication ability:  Patient appears to be able to clearly communicate and understand verbal and written communication and follow directions correctly.  Treatment Explanation - The following has been discussed with the patient:   RX ordered/plan of care  Anticipated outcomes  Possible risks and side effects  This patient would benefit from PT intervention to resume normal activities.   Rehab potential is good.    Frequency:  2 x month once daily  Duration:  for 3 months  Discharge Plan:  Achieve all LTG.  Independent in home treatment program.  Reach maximal therapeutic benefit.    Please refer to the daily flowsheet for treatment today, total treatment time and time spent  performing 1:1 timed codes.

## 2020-12-07 DIAGNOSIS — F39 MOOD DISORDER (H): ICD-10-CM

## 2020-12-07 DIAGNOSIS — F41.9 ANXIETY: ICD-10-CM

## 2020-12-08 RX ORDER — BUPROPION HYDROCHLORIDE 300 MG/1
300 TABLET ORAL EVERY MORNING
Qty: 30 TABLET | Refills: 0 | OUTPATIENT
Start: 2020-12-08

## 2020-12-08 RX ORDER — GABAPENTIN 300 MG/1
CAPSULE ORAL
Qty: 180 CAPSULE | Refills: 0 | OUTPATIENT
Start: 2020-12-08

## 2020-12-08 RX ORDER — ZIPRASIDONE HYDROCHLORIDE 60 MG/1
CAPSULE ORAL
Qty: 60 CAPSULE | Refills: 0 | OUTPATIENT
Start: 2020-12-08

## 2020-12-09 ENCOUNTER — VIRTUAL VISIT (OUTPATIENT)
Dept: PSYCHIATRY | Facility: CLINIC | Age: 60
End: 2020-12-09
Attending: NURSE PRACTITIONER
Payer: MEDICARE

## 2020-12-09 ENCOUNTER — TELEPHONE (OUTPATIENT)
Dept: PSYCHIATRY | Facility: CLINIC | Age: 60
End: 2020-12-09

## 2020-12-09 DIAGNOSIS — F39 MOOD DISORDER (H): ICD-10-CM

## 2020-12-09 DIAGNOSIS — F41.9 ANXIETY: ICD-10-CM

## 2020-12-09 PROCEDURE — 99213 OFFICE O/P EST LOW 20 MIN: CPT | Mod: 95 | Performed by: NURSE PRACTITIONER

## 2020-12-09 RX ORDER — GABAPENTIN 300 MG/1
CAPSULE ORAL
Qty: 540 CAPSULE | Refills: 0 | Status: SHIPPED | OUTPATIENT
Start: 2020-12-09 | End: 2021-03-02

## 2020-12-09 RX ORDER — MIRTAZAPINE 30 MG/1
30 TABLET, FILM COATED ORAL AT BEDTIME
Qty: 90 TABLET | Refills: 0 | Status: SHIPPED | OUTPATIENT
Start: 2020-12-09 | End: 2021-03-02

## 2020-12-09 RX ORDER — BUPROPION HYDROCHLORIDE 300 MG/1
300 TABLET ORAL EVERY MORNING
Qty: 90 TABLET | Refills: 0 | Status: SHIPPED | OUTPATIENT
Start: 2020-12-09 | End: 2021-03-02

## 2020-12-09 RX ORDER — ZIPRASIDONE HYDROCHLORIDE 60 MG/1
60 CAPSULE ORAL 2 TIMES DAILY WITH MEALS
Qty: 180 CAPSULE | Refills: 0 | Status: SHIPPED | OUTPATIENT
Start: 2020-12-09 | End: 2021-03-02

## 2020-12-09 ASSESSMENT — PAIN SCALES - GENERAL: PAINLEVEL: NO PAIN (0)

## 2020-12-09 NOTE — PROGRESS NOTES
"Video- Visit Details  Type of service:  video visit for medication management  Time of service:    Date:  12/09/2020    Video Start Time:  8:32 AM        Video End Time:  8:44am    Reason for video visit:  Patient unable to travel due to Covid-19  Originating Site (patient location):  [facility]   Location- Patient's home  Distant Site (provider location):  Remote location  Mode of Communication:  Video Conference via [email or web address]  Consent:  Patient has given verbal consent for video visit?: Yes     VIDEO VISIT  Katie Griffiths is a 60 year old patient who is being evaluated via a billable video visit.      The patient has been notified of following:   \"This video visit will be conducted via a call between you and your physician/provider. We have found that certain health care needs can be provided without the need for an in-person physical exam. This service lets us provide the care you need with a video conversation. If a prescription is necessary we can send it directly to your pharmacy. If lab work is needed we can place an order for that and you can then stop by our lab to have the test done at a later time. Insurers are generally covering virtual visits as they would in-office visits so billing should not be different than normal.  If for some reason you do get billed incorrectly, you should contact the billing office to correct it and that number is in the AVS .    Video Conference to be completed via:  [email or web address]    Patient has given verbal consent for video visit?:  Yes    Patient would prefer that any video invitations be sent by: Text to cell phone: [phone]      How would patient like to obtain AVS?:  Little Bird    AVS SmartPhrase [PsychAVS] has been placed in 'Patient Instructions':  Yes           [facility]  Psychiatry Clinic  PSYCHIATRIC PROGRESS NOTE       Katie Griffiths is a 60 year old female who prefers the name Katie and pronoun she, her.  Therapist: Nikhil, " "Amirah [facility]               PCP: Buddy Nolan  Other Providers: None    Pertinent Background:  See previous notes.  Psych critical item history includes Hospitalized 3 times with most recent occurring in 2007 after overdose attempt.  Numerous medication trials.  Possible bipolar diagnosis.     Interim History     [4, 4]     The patient is a good historian, reports good treatment adherence and was last seen 11/10/20.  Since the last visit,  Katie reports \"I'm hanging in there.\"  She is tolerating Mirtazapine and no longer concerned about hair loss.  Sleep continues to vary.  Ranges from 5-7 hours per night.  Mood stable.  She does not want to adjust medications and is requesting 90 day supply.      11/10/20: Katie is \"ok\"  Mood stable.  She is requesting a decrease in Mirtazapine as she is associating it with hair loss.  Not listed as a typical side effect.  Katie reports she is walking with her father most mornings.  Describes sleep as \"so-so.\"  Averaging 5 hours of sleep per night.  Continues to see therapist regularly.     10/14/20:  Katie reports again \"I am hanging in there.\" Persistent low mood persists.  Reports she had to switch thyroid medications due to her's being recalled.  Sleep initially worsened with new drug but she is now sleeping 5-6 hours per night.  Katie reports that her housing is more stable than at last appointment.  Anxiety has improved as a result.  Katie also reports she has been more active as she is helping her father prepare his boat for winter.  She also continues to see her therapist regularly.      9/15/20: Katie continues to report \"I am hanging in there.\" Sleep continues to be main concern. Katie reports she has been waking at 2am and unable to fall back asleep for the past 2 nights.  Katie does report that Gabapentin is helpful in that she can relax when she initially lays down for bed. \"I'm getting beneficial sleep more often than I am not.\"   Continues to endorse " "periodic low mood and anxiety.  Katie also has not used propranolol for PRN anxiety will discontinue. She does endorse fairly constant worry about possible homelessness.  Katie also brought up Celexa as it was helpful in management of anxiety; however in May she stated it was not effective and maybe causing sedation.  Continues to see therapist regularly.     8/18/20: Katie reports she is continuing \"hanging in there.\"  Katie reports that gabapentin has been helpful with sleep and she is \"tossing and turning less.\"  She believes sleep duration may have increased.  Gabapentin also appears to be helping with daytime anxiety.   Mood stable.  No significant concerns with depression or mood fluctuations.     7/30/20: Katie reports she is \"hanging in there.\"  Trazodone reports trazodone 100mg was not effective and led to diarrhea.  Discussed possible hypomania episode as she was frequently calling various providers at odd hours and having difficulty sleeping.  Does not appear to meet criteria at this time but will continue to monitor.  Will try to increase dose of Mirtazapine dose and see if helps.  Also will start Gabapentin for anxiety and sleep.  Katie continues to be quite sedentary during day.  Fortunately she was on a day trip to [place] today with her parents.  Advised to follow sleep medicine recommendations    7/7/20: Katie reports she is \"hanging in there.\"  Depression and anxiety persist.  Mood stable.  She is residing at her parent's home for the past couple days.  She using office and sleeping on twin bed. She is looking for independent living but is running into issues with her income, age, and credit history. Completed sleep consultation and behavioral change recommendations were given.  Unclear if followed recommendations.  Stress has decreased since then due to moving in with her parents but still persists due to housing issue.  Advised to take Propranolol when feels stressed.  Continues to endorse sedentary " "lifestyle.  Advised to go for walks in morning and early evening to develop \"sleep appetite.\"  She agreed.  She also does not want to start any sleep aids that may lead to drowsiness and weight gain.  Mood, motivation, and energy low.  Recommended increase Wellbutrin to 450mg but she again is hesitant.      New Address:  [address]  [place] [address]    6/9/20: Katie is currently trying to maintain housing.  Reports her current situation living with her son has been extremely stressful.  Currently looking for apartments in [place] and [place].  She working with [facility] social work team.  Katie is very hesitant to change her medications.  She would like to obtain better grasp of her housing before making medication adjustments. Will consult with PCP about possible Propranolol trial.       5/6/20:  Katie continues to endorse stress regarding bankruptcy.  She has phone hearing with  at the end of the month of May.  Katie continues to report lack of activity which may be attributable to lifestyle changes required during pandemic, but does report overall improvement with increase in Wellbutrin.  Sleep also continues to be an issue but she also reports it continues to improve.  Sleep medicine consultation in 2 months. No concerns with elevated mood.  Katie did not want to adjust her medications     3/31/20: when asked how she is doing, Katie reports that switch from Celexa to Wellbutrin has been beneficial in regards to mood, energy, and motivation.  No concerns with elevated mood.  Sleep continues to be an issue.  Did not worsen with addition of Wellbutrin.  Has sleep study in 3 months.  Regarding psychosocial stressors, Katie is babysitting her grandchildren as their mother is sick and is going through bankruptcy.       3/4/20: Katie again starts laughing.  Mood is stable overall but low.  Continues to endorse anhedonia, energy, and low mood. Sleep has improved since starting Remeron.  Reports sleeping 5 hours " "per night which results in continued daytime tiredness.    Continues to report that Celexa is not helpful and possible causing sedation    2/12/20: Katie shared that she is not sleeping.  She reports that she is getting 3 hours of sleep per night for past couple weeks. However, Katie did not look overly fatigued during appointment.  She also reports that her mood is quite low.  No concerns with shayla.  No goal directed behavior or impulsivity.  Katie\"s affect continues to be incongruent to her reported mood.  She is struggling to get household chores completed.  Continues to report sedentary lifestyle.  Educated on how exercise and activity can help with sleep.  Will stop Trazodone and start Mirtazapine for sleep.  If continues to be a problem, will refer to sleep medicine as sleep has been an issue for several years.  Also plan to switch Celexa as it does not appear to be managing her depression.      1/8/20: Katie's main concern today was weight gain.  Affect was quite bright today in spite of high PHQ-9 and reported persistent low mood. Reports positive holiday with her family.  Sleep continues to be inconsistent.  Mood stable with introduction of Geodon.  Will increase and discontinue Olanzapine.  Katie continues to consider a retrial of Depakote which was discontinued in past due to development of rash.      12/11/19: Katie reports her mood is stable but she is describes \"not feeling good or bad.\"  Difficult to discern if emotional blunting or lack of hypomania.  Thoughts continue to be linear and organized.  Speech normal.  Also reports sleeping continues to improve. Previous trials include Lithium (not effective?), Depakote (rash), and Abilify (side effects).  Katie would like to change medications today due to possible emotional blunting and weight gain (5lbs in past 2 weeks).      11/27/19: Increased HS Olanzapine to 5mg and Katie appears much more grounded.  Speech less pressured and mood more stable.  Thoughts " more organized and linear.  Katie reports she also has noticed a significant difference.  She also reports she is sleeping much better.  Concerned about weight gain but reports she is very sedentary.      Recent Symptoms:   Depression:  occasional low mood, anhedonia, low energy and insomnia  Elevated:  none  Psychosis:  none  Anxiety:  occasional worry  Panic Attack:  none  Trauma Related:  none     Recent Substance Use:  No changes reported        Social/ Family History      [1ea,1ea]            [per patient report]               Financial support-  social security disability  Children-  2 adult children  Living situation- Lives in house in [place] with son   Social/spiritial support-  limited/none  Feels safe at home-  Yes   Family history- None      Medical / Surgical History                                 Patient Active Problem List   Diagnosis     Bipolar disorder (H)     Hashimoto's thyroiditis     Incontinence of urine in female     Osteopenia     Reactive airway disease     Vitamin D deficiency     Overweight     Dizziness     Hyperlipidemia LDL goal <100     Right shoulder pain, unspecified chronicity     Somatic dysfunction of pelvis region     Mixed incontinence urge and stress (male)(female)       Past Surgical History:   Procedure Laterality Date     partial thyroidectomy       TONSILLECTOMY       TUBAL LIGATION          Medical Review of Systems         [2,10]   The remainder of the review of systems is noncontributory  Thyroid removed  Allergy    Quetiapine and Valproic acid  Current Medications        Current Outpatient Medications   Medication Sig Dispense Refill     albuterol (PROAIR HFA/PROVENTIL HFA/VENTOLIN HFA) 108 (90 Base) MCG/ACT inhaler Inhale 2 puffs into the lungs as needed for shortness of breath / dyspnea or wheezing 18 g 1     buPROPion (WELLBUTRIN XL) 300 MG 24 hr tablet Take 1 tablet (300 mg) by mouth every morning 30 tablet 0     gabapentin (NEURONTIN) 300 MG capsule Take 1  "capsule (300mg) in morning and 1 capsule (300mg) in afternoon and 4 capsules (1200mg) near bedtime 180 capsule 0     levothyroxine (SYNTHROID/LEVOTHROID) 50 MCG tablet Take 1 tablet (50 mcg) by mouth daily 90 tablet 3     mirtazapine (REMERON) 30 MG tablet Take 1 tablet (30 mg) by mouth At Bedtime 11 tablet 0     ziprasidone (GEODON) 60 MG capsule Take 1 capsule (60 mg) by mouth 2 times daily (with meals) 60 capsule 1     Vitals         [3, 3]   There were no vitals taken for this visit.   Mental Status Exam        [9, 14 cog gs]     Alertness: alert  and oriented  Appearance: casually groomed  Behavior/Demeanor: cooperative, pleasant and calm, with good  eye contact   Speech: regular rate and rhythm.  Language: no problems  Psychomotor: normal or unremarkable  Mood: \"ok\"  Affect: appropriate; was congruent to mood; was congruent to content  Thought Process/Associations: unremarkable  Thought Content:  Reports none;  Denies suicidal ideation and violent ideation  Perception:  Reports none;  Denies auditory hallucinations and visual hallucinations  Insight: adequate  Judgment: intact  Cognition: (6) does  appear grossly intact; formal cognitive testing was not done  Gait/Station and/or Muscle Strength/Tone: unable to assess    Labs and Data                          Rating Scales:    PHQ9    PHQ9 Today:  Not completed  PHQ-9 SCORE 12/18/2019   PHQ-9 Total Score MyChart 17 (Moderately severe depression)   PHQ-9 Total Score 17        Assessment      [m2, h3]     Unspecified Mood Disorder  Bipolar I Disorder (Hx)    [place] Prescription Monitoring Program [] was not checked today:  provider not managing any controlled medications.        Plan                                                                                                                     m2, h3     1) MEDICATION:  Continue Geodon 60mg BID with food  Continue Mirtazapine 30mg at bedtime for sleep and mood.  Continue Wellbutrin XL 300mg daily.  Consider " increasing dose to 450mg   Continue Gabapentin to 300 in morning and afternoon.  Continue 1200mg at bedtime   Continue  Levothyroxine 50mcg (prescribed by another provider).     2) THERAPY:  Continue with current therapist      RTC: 3 months    CRISIS NUMBERS:   Provided routinely in AVS.    Treatment Risk Statement:  The patient understands the risks, benefits, adverse effects and alternatives. Agrees to treatment with the capacity to do so. No medical contraindications to treatment. Agrees to call clinic for any problems. The patient understands to call 911 or go to the nearest ED if life threatening or urgent symptoms occur.     WHODAS 2.0  TODAY total score = N/A; [a 12-item WHODAS 2.0 assessment was not completed by the pt today and/or recorded in EPIC].       PROVIDER:  ADELINA Lieberman CNP

## 2020-12-09 NOTE — PATIENT INSTRUCTIONS
Thank you for coming to the [facility] [place] [facility].    Lab Testing:  If you had lab testing today and your results are reassuring or normal they will be mailed to you or sent through Filmaster within 7 days. If the lab tests need quick action we will call you with the results. The phone number we will call with results is # [phone] (home) . If this is not the best number please call our clinic and change the number.    Medication Refills:  If you need any refills please call your pharmacy and they will contact us. Our fax number for refills is [phone]. Please allow three business for refill processing. If you need to  your refill at a new pharmacy, please contact the new pharmacy directly. The new pharmacy will help you get your medications transferred.     Scheduling:  If you have any concerns about today's visit or wish to schedule another appointment please call our office during normal business hours [phone] (8-5:00 M-F)    Contact Us:  Please call [phone] during business hours (8-5:00 M-F).  If after clinic hours, or on the weekend, please call  [phone].    Financial Assistance [phone]  [facility] Billing [phone]  Central Billing Office, [facility]: [phone]  [facility] Billing [phone]  Medical Records [phone]  [facility] Patient Bill of Rights [email or web address]       MENTAL HEALTH CRISIS NUMBERS:  For a medical emergency please call  911 or go to the nearest ER.     [facility]:   [facility] -[phone]   Crisis Residence [facility] -[phone]   Walk-In Counseling Center [facility] -[phone]   COPE 24/7 [place] Mobile Team -[phone] (adults)/[phone] (child)  [facility]: [facility] - [phone]      [facility]:   [facility] - [phone]   Walk-in counseling [facility]  [facility] - [phone]   Walk-in counseling [facility] - [phone]   Crisis Residence [facility] - [phone]  Urgent Care Adult Mental Fizqdj-[phone] mobile unit/ 24/7 crisis line    National Crisis Numbers:   National Suicide Prevention Lifeline: 7-848-445-TALK (535-534-2922)  Poison Control Center - 1-956.384.8333  [employer]/resources for a list of additional resources (SOS)  Trans Lifeline a hotline for transgender people 5-223-009-4516  The [employer] a hotline for LGBT youth 1-847.249.5088  Crisis Text Line: For any crisis 24/7   To: 125231  see www.crisistextline.org  - IF MAKING A CALL FEELS TOO HARD, send a text!         Again thank you for choosing [facility] MENTAL HEALTH & ADDICTION [facility] and please let us know how we can best partner with you to improve you and your family's health.    You may be receiving a survey regarding this appointment. We would love to have your feedback, both positive and negative. The survey is done by an external company, so your answers are anonymous.

## 2020-12-10 ENCOUNTER — VIRTUAL VISIT (OUTPATIENT)
Dept: PSYCHIATRY | Facility: CLINIC | Age: 60
End: 2020-12-10
Attending: PSYCHOLOGIST
Payer: MEDICARE

## 2020-12-10 DIAGNOSIS — F41.9 ANXIETY: ICD-10-CM

## 2020-12-10 DIAGNOSIS — F39 MOOD DISORDER (H): Primary | ICD-10-CM

## 2020-12-10 PROCEDURE — 90837 PSYTX W PT 60 MINUTES: CPT | Mod: 95 | Performed by: PSYCHOLOGIST

## 2020-12-14 ENCOUNTER — THERAPY VISIT (OUTPATIENT)
Dept: PHYSICAL THERAPY | Facility: CLINIC | Age: 60
End: 2020-12-14
Payer: MEDICARE

## 2020-12-14 DIAGNOSIS — M99.05 SOMATIC DYSFUNCTION OF PELVIS REGION: ICD-10-CM

## 2020-12-14 DIAGNOSIS — N39.46 MIXED INCONTINENCE URGE AND STRESS (MALE)(FEMALE): Primary | ICD-10-CM

## 2020-12-14 PROCEDURE — 97110 THERAPEUTIC EXERCISES: CPT | Mod: GP | Performed by: PHYSICAL THERAPIST

## 2020-12-14 PROCEDURE — 97112 NEUROMUSCULAR REEDUCATION: CPT | Mod: GP | Performed by: PHYSICAL THERAPIST

## 2020-12-14 PROCEDURE — 97140 MANUAL THERAPY 1/> REGIONS: CPT | Mod: GP | Performed by: PHYSICAL THERAPIST

## 2020-12-15 ENCOUNTER — MYC MEDICAL ADVICE (OUTPATIENT)
Dept: PSYCHIATRY | Facility: CLINIC | Age: 60
End: 2020-12-15

## 2020-12-15 NOTE — TELEPHONE ENCOUNTER
Called patient regarding message on [employer] about having trouble sleeping and to set up an earlier appointment.  Patient's next scheduled appointment was in February.  Worried about getting back into sleep routine where she was for over a year where she was getting 2-4 hours of sleep a night.    She is starting DBT next month.    Tentatively scheduled for 12/28 at 8:30am.  Patient thinks she can not be seen more frequently than monthly by psychiatry.  Writer placed a call to [facility] Billing Department and [facility] requesting a call back to confirm coverage for a sooner appointment with provider.    Patient states that she is safe and has no thoughts of hurting herself or others.

## 2020-12-16 NOTE — TELEPHONE ENCOUNTER
Billing confirmed that frequency of patient visit is up to the provider and she can be seen on 12/28 for MFU visit, without additional charge to the patient.  Patient contacted via telephone to notify her to plan on virtual visit on 12/28 to discuss sleep issues.

## 2020-12-21 ENCOUNTER — TELEPHONE (OUTPATIENT)
Dept: PSYCHIATRY | Facility: CLINIC | Age: 60
End: 2020-12-21

## 2020-12-21 NOTE — TELEPHONE ENCOUNTER
On 12/21/2020, 8 pages of records were received from the pt. This writer sent the records to urgent scanning, which will appear in the media tab in 6 hours, this was also routed to [employer].  Writer will confirm document in scanning in the coming days. Azeb Mcfarland, CMA

## 2020-12-28 ENCOUNTER — VIRTUAL VISIT (OUTPATIENT)
Dept: PSYCHIATRY | Facility: CLINIC | Age: 60
End: 2020-12-28
Attending: NURSE PRACTITIONER
Payer: MEDICARE

## 2020-12-28 ENCOUNTER — THERAPY VISIT (OUTPATIENT)
Dept: PHYSICAL THERAPY | Facility: CLINIC | Age: 60
End: 2020-12-28
Payer: MEDICARE

## 2020-12-28 DIAGNOSIS — N39.46 MIXED INCONTINENCE URGE AND STRESS (MALE)(FEMALE): Primary | ICD-10-CM

## 2020-12-28 DIAGNOSIS — F39 MOOD DISORDER (H): Primary | ICD-10-CM

## 2020-12-28 DIAGNOSIS — M99.05 SOMATIC DYSFUNCTION OF PELVIS REGION: ICD-10-CM

## 2020-12-28 PROCEDURE — 97530 THERAPEUTIC ACTIVITIES: CPT | Mod: GP | Performed by: PHYSICAL THERAPIST

## 2020-12-28 PROCEDURE — 97110 THERAPEUTIC EXERCISES: CPT | Mod: GP | Performed by: PHYSICAL THERAPIST

## 2020-12-28 PROCEDURE — 99213 OFFICE O/P EST LOW 20 MIN: CPT | Mod: 95 | Performed by: NURSE PRACTITIONER

## 2020-12-28 ASSESSMENT — PAIN SCALES - GENERAL: PAINLEVEL: MODERATE PAIN (4)

## 2020-12-28 NOTE — PROGRESS NOTES
"Video- Visit Details  Type of service:  video visit for medication management  Time of service:    Date:  12/28/2020    Video Start Time:  8:36 AM        Video End Time:  8:54am    Reason for video visit:  Patient unable to travel due to Covid-19  Originating Site (patient location):  [facility]   Location- Patient's home  Distant Site (provider location):  Remote location  Mode of Communication:  Video Conference via [email or web address]  Consent:  Patient has given verbal consent for video visit?: Yes     VIDEO VISIT  Katie Griffiths is a 60 year old patient who is being evaluated via a billable video visit.      The patient has been notified of following:   \"This video visit will be conducted via a call between you and your physician/provider. We have found that certain health care needs can be provided without the need for an in-person physical exam. This service lets us provide the care you need with a video conversation. If a prescription is necessary we can send it directly to your pharmacy. If lab work is needed we can place an order for that and you can then stop by our lab to have the test done at a later time. Insurers are generally covering virtual visits as they would in-office visits so billing should not be different than normal.  If for some reason you do get billed incorrectly, you should contact the billing office to correct it and that number is in the AVS .    Video Conference to be completed via:  [email or web address]    Patient has given verbal consent for video visit?:  Yes    Patient would prefer that any video invitations be sent by: Text to cell phone: [phone]      How would patient like to obtain AVS?:  [employer]    AVS SmartPhrase [PsychAVS] has been placed in 'Patient Instructions':  Yes             [facility]  Psychiatry Clinic  PSYCHIATRIC PROGRESS NOTE       Katie Griffiths is a 60 year old female who prefers the name Katie and pronoun she, her.  Therapist: Nikhil, " "[facility]               PCP: Buddy Nolan  Other Providers: None    Pertinent Background:  See previous notes.  Psych critical item history includes Hospitalized 3 times with most recent occurring in 2007 after overdose attempt.  Numerous medication trials.  Possible bipolar diagnosis.     Interim History     [4, 4]     The patient is a good historian, reports good treatment adherence and was last seen 12/9/20.  Since the last visit,  Katie again reports \"I'm hanging in there.\"  Katie called clinic approximately 2 weeks ago with concerns about her sleep.  Since then she reports her sleep has \"evened out.\"  Reports \"having to go out of my way to make pattern for sleep.\"  Currently getting about 6 hours of sleep per day.  Does report some concern with daytime sedation.  She will decrease OTC Melatonin as has grogginess has worsened with Melatonin. Continues to reports concern with hair loss and will connect with endocrinology.      12/9/20: Katie reports \"I'm hanging in there.\"  She is tolerating Mirtazapine and no longer concerned about hair loss.  Sleep continues to vary.  Ranges from 5-7 hours per night.  Mood stable.  She does not want to adjust medications and is requesting 90 day supply.      11/10/20: Katie is \"ok\"  Mood stable.  She is requesting a decrease in Mirtazapine as she is associating it with hair loss.  Not listed as a typical side effect.  Katie reports she is walking with her father most mornings.  Describes sleep as \"so-so.\"  Averaging 5 hours of sleep per night.  Continues to see therapist regularly.     10/14/20:  Katie reports again \"I am hanging in there.\" Persistent low mood persists.  Reports she had to switch thyroid medications due to her's being recalled.  Sleep initially worsened with new drug but she is now sleeping 5-6 hours per night.  Katie reports that her housing is more stable than at last appointment.  Anxiety has improved as a result.  Katie also reports she has " "been more active as she is helping her father prepare his boat for winter.  She also continues to see her therapist regularly.      9/15/20: Katie continues to report \"I am hanging in there.\" Sleep continues to be main concern. Katie reports she has been waking at 2am and unable to fall back asleep for the past 2 nights.  Katie does report that Gabapentin is helpful in that she can relax when she initially lays down for bed. \"I'm getting beneficial sleep more often than I am not.\"   Continues to endorse periodic low mood and anxiety.  Katie also has not used propranolol for PRN anxiety will discontinue. She does endorse fairly constant worry about possible homelessness.  Katie also brought up Celexa as it was helpful in management of anxiety; however in May she stated it was not effective and maybe causing sedation.  Continues to see therapist regularly.     8/18/20: Katie reports she is continuing \"hanging in there.\"  Katie reports that gabapentin has been helpful with sleep and she is \"tossing and turning less.\"  She believes sleep duration may have increased.  Gabapentin also appears to be helping with daytime anxiety.   Mood stable.  No significant concerns with depression or mood fluctuations.     7/30/20: Katie reports she is \"hanging in there.\"  Trazodone reports trazodone 100mg was not effective and led to diarrhea.  Discussed possible hypomania episode as she was frequently calling various providers at odd hours and having difficulty sleeping.  Does not appear to meet criteria at this time but will continue to monitor.  Will try to increase dose of Mirtazapine dose and see if helps.  Also will start Gabapentin for anxiety and sleep.  Katie continues to be quite sedentary during day.  Fortunately she was on a day trip to [place] today with her parents.  Advised to follow sleep medicine recommendations    7/7/20: Katie reports she is \"hanging in there.\"  Depression and anxiety persist.  Mood stable.  She is residing " "at her parent's home for the past couple days.  She using office and sleeping on twin bed. She is looking for independent living but is running into issues with her income, age, and credit history. Completed sleep consultation and behavioral change recommendations were given.  Unclear if followed recommendations.  Stress has decreased since then due to moving in with her parents but still persists due to housing issue.  Advised to take Propranolol when feels stressed.  Continues to endorse sedentary lifestyle.  Advised to go for walks in morning and early evening to develop \"sleep appetite.\"  She agreed.  She also does not want to start any sleep aids that may lead to drowsiness and weight gain.  Mood, motivation, and energy low.  Recommended increase Wellbutrin to 450mg but she again is hesitant.      New Address:  [facility] [id number]  [place] [address]    6/9/20: Katie is currently trying to maintain housing.  Reports her current situation living with her son has been extremely stressful.  Currently looking for apartments in [facility].  She working with [facility] social work team.  Katie is very hesitant to change her medications.  She would like to obtain better grasp of her housing before making medication adjustments. Will consult with PCP about possible Propranolol trial.       5/6/20:  Katie continues to endorse stress regarding bankruptcy.  She has phone hearing with  at the end of the month of May.  Katie continues to report lack of activity which may be attributable to lifestyle changes required during pandemic, but does report overall improvement with increase in Wellbutrin.  Sleep also continues to be an issue but she also reports it continues to improve.  Sleep medicine consultation in 2 months. No concerns with elevated mood.  Katie did not want to adjust her medications     3/31/20: when asked how she is doing, Katie reports that switch from Celexa to Wellbutrin has been beneficial in regards " "to mood, energy, and motivation.  No concerns with elevated mood.  Sleep continues to be an issue.  Did not worsen with addition of Wellbutrin.  Has sleep study in 3 months.  Regarding psychosocial stressors, Katie is babysitting her grandchildren as their mother is sick and is going through bankruptcy.       3/4/20: Katie again starts laughing.  Mood is stable overall but low.  Continues to endorse anhedonia, energy, and low mood. Sleep has improved since starting Remeron.  Reports sleeping 5 hours per night which results in continued daytime tiredness.    Continues to report that Celexa is not helpful and possible causing sedation    2/12/20: Katie shared that she is not sleeping.  She reports that she is getting 3 hours of sleep per night for past couple weeks. However, Katie did not look overly fatigued during appointment.  She also reports that her mood is quite low.  No concerns with shayla.  No goal directed behavior or impulsivity.  Katie\"s affect continues to be incongruent to her reported mood.  She is struggling to get household chores completed.  Continues to report sedentary lifestyle.  Educated on how exercise and activity can help with sleep.  Will stop Trazodone and start Mirtazapine for sleep.  If continues to be a problem, will refer to sleep medicine as sleep has been an issue for several years.  Also plan to switch Celexa as it does not appear to be managing her depression.      1/8/20: Katie's main concern today was weight gain.  Affect was quite bright today in spite of high PHQ-9 and reported persistent low mood. Reports positive holiday with her family.  Sleep continues to be inconsistent.  Mood stable with introduction of Geodon.  Will increase and discontinue Olanzapine.  Katie continues to consider a retrial of Depakote which was discontinued in past due to development of rash.      12/11/19: Katie reports her mood is stable but she is describes \"not feeling good or bad.\"  Difficult to discern if " emotional blunting or lack of hypomania.  Thoughts continue to be linear and organized.  Speech normal.  Also reports sleeping continues to improve. Previous trials include Lithium (not effective?), Depakote (rash), and Abilify (side effects).  Katie would like to change medications today due to possible emotional blunting and weight gain (5lbs in past 2 weeks).      11/27/19: Increased HS Olanzapine to 5mg and Katie appears much more grounded.  Speech less pressured and mood more stable.  Thoughts more organized and linear.  Katie reports she also has noticed a significant difference.  She also reports she is sleeping much better.  Concerned about weight gain but reports she is very sedentary.      Recent Symptoms:   Depression:  occasional low mood, anhedonia, low energy and insomnia  Elevated:  none  Psychosis:  none  Anxiety:  occasional worry  Panic Attack:  none  Trauma Related:  none     Recent Substance Use:  No changes reported        Social/ Family History      [1ea,1ea]            [per patient report]               Financial support-  social security disability  Children-  2 adult children  Living situation- Lives in house in [place] with son   Social/spiritial support-  limited/none  Feels safe at home-  Yes   Family history- None      Medical / Surgical History                                 Patient Active Problem List   Diagnosis     Bipolar disorder (H)     Hashimoto's thyroiditis     Incontinence of urine in female     Osteopenia     Reactive airway disease     Vitamin D deficiency     Overweight     Dizziness     Hyperlipidemia LDL goal <100     Right shoulder pain, unspecified chronicity     Somatic dysfunction of pelvis region     Mixed incontinence urge and stress (male)(female)       Past Surgical History:   Procedure Laterality Date     partial thyroidectomy       TONSILLECTOMY       TUBAL LIGATION          Medical Review of Systems         [2,10]   The remainder of the review of systems is  "noncontributory  Thyroid removed  Allergy    Quetiapine and Valproic acid  Current Medications        Current Outpatient Medications   Medication Sig Dispense Refill     albuterol (PROAIR HFA/PROVENTIL HFA/VENTOLIN HFA) 108 (90 Base) MCG/ACT inhaler Inhale 2 puffs into the lungs as needed for shortness of breath / dyspnea or wheezing 18 g 1     buPROPion (WELLBUTRIN XL) 300 MG 24 hr tablet Take 1 tablet (300 mg) by mouth every morning 90 tablet 0     gabapentin (NEURONTIN) 300 MG capsule Take 1 capsule (300mg) in morning and 1 capsule (300mg) in afternoon and 4 capsules (1200mg) near bedtime 540 capsule 0     levothyroxine (SYNTHROID/LEVOTHROID) 50 MCG tablet Take 1 tablet (50 mcg) by mouth daily 90 tablet 3     mirtazapine (REMERON) 30 MG tablet Take 1 tablet (30 mg) by mouth At Bedtime 90 tablet 0     ziprasidone (GEODON) 60 MG capsule Take 1 capsule (60 mg) by mouth 2 times daily (with meals) 180 capsule 0     Vitals         [3, 3]   There were no vitals taken for this visit.   Mental Status Exam        [9, 14 cog gs]     Alertness: alert  and oriented  Appearance: casually groomed  Behavior/Demeanor: cooperative, pleasant and calm, with good  eye contact   Speech: normal.  Language: good  Psychomotor: normal or unremarkable  Mood: \"ok\"  Affect: appropriate; was congruent to mood; was congruent to content  Thought Process/Associations: unremarkable  Thought Content:  Reports none;  Denies suicidal ideation and violent ideation  Perception:  Reports none;  Denies auditory hallucinations and visual hallucinations  Insight: adequate  Judgment: intact  Cognition: (6) does  appear grossly intact; formal cognitive testing was not done  Gait/Station and/or Muscle Strength/Tone: unable to assess    Labs and Data                          Rating Scales:    PHQ9    PHQ9 Today:  Not completed  PHQ-9 SCORE 12/18/2019   PHQ-9 Total Score MyChart 17 (Moderately severe depression)   PHQ-9 Total Score 17        Assessment      " [m2, h3]     Unspecified Mood Disorder  Bipolar I Disorder (Hx)    [place] Prescription Monitoring Program [] was not checked today:  provider not managing any controlled medications.        Plan                                                                                                                     m2, h3     1) MEDICATION:  Continue Geodon 60mg BID with food  Continue Mirtazapine 30mg at bedtime for sleep and mood.  Continue Wellbutrin XL 300mg daily.    Continue Gabapentin to 300 in morning and afternoon.  Continue 1200mg at bedtime   Continue  Levothyroxine 50mcg (prescribed by another provider).   Continue Melatonin OTC    2) THERAPY:  Continue with current therapist      RTC: 3 months    CRISIS NUMBERS:   Provided routinely in AVS.    Treatment Risk Statement:  The patient understands the risks, benefits, adverse effects and alternatives. Agrees to treatment with the capacity to do so. No medical contraindications to treatment. Agrees to call clinic for any problems. The patient understands to call 911 or go to the nearest ED if life threatening or urgent symptoms occur.     WHODAS 2.0  TODAY total score = N/A; [a 12-item WHODAS 2.0 assessment was not completed by the pt today and/or recorded in EPIC].       PROVIDER:  ADELINA Lieberman CNP

## 2020-12-28 NOTE — PATIENT INSTRUCTIONS
Thank you for coming to the [facility] HEALTH & [facility]DI[facility] [place] [facility].    Lab Testing:  If you had lab testing today and your results are reassuring or normal they will be mailed to you or sent through [employer] within 7 days. If the lab tests need quick action we will call you with the results. The phone number we will call with results is # [phone] (home) . If this is not the best number please call our clinic and change the number.    Medication Refills:  If you need any refills please call your pharmacy and they will contact us. Our fax number for refills is [phone]. Please allow three business for refill processing. If you need to  your refill at a new pharmacy, please contact the new pharmacy directly. The new pharmacy will help you get your medications transferred.     Scheduling:  If you have any concerns about today's visit or wish to schedule another appointment please call our office during normal business hours [phone] (8-5:00 M-F)    Contact Us:  Please call [phone] during business hours (8-5:00 M-F).  If after clinic hours, or on the weekend, please call  [phone].    Financial Assistance [phone]  [facility] Billing [phone]  Central Billing Office, [facility]: [phone]  [facility] Billing [phone]  Medical Records [phone]  [facility] Patient Bill of Rights [email or web address]       MENTAL HEALTH CRISIS NUMBERS:  For a medical emergency please call  911 or go to the nearest ER.     [facility]:   [facility] -[phone]   Crisis Residence [facility] -[phone]   Walk-In Counseling Center [facility] -[phone]   COPE 24/7 [place] Mobile Team -[phone] (adults)/[phone] (child)  CHILD: [facility] needs assessment team - [phone]      [facility]:   [facility] - [phone]   Walk-in counseling [facility]  [facility] - [phone]   Walk-in counseling [facility] - [phone]   Crisis Residence [facility] - [phone]  Urgent Care Adult Mental [facility]-[phone] mobile unit/ 24/7 crisis line    National Crisis Numbers:   National Suicide Prevention Lifeline: 4-934-494-TALK (245-822-3666)  Poison Control Center - 1-890.998.6227  Docitt/resources for a list of additional resources (SOS)  Trans Lifeline a hotline for transgender people 3-188-638-9717  The seoreseller.com Project a hotline for LGBT youth 1-123.318.4025  Crisis Text Line: For any crisis 24/7   To: 185162  see www.crisistextline.org  - IF MAKING A CALL FEELS TOO HARD, send a text!         Again thank you for choosing [facility] MENTAL HEALTH & ADDICTION [facility] and please let us know how we can best partner with you to improve you and your family's health.    You may be receiving a survey regarding this appointment. We would love to have your feedback, both positive and negative. The survey is done by an external company, so your answers are anonymous.

## 2021-01-04 ENCOUNTER — THERAPY VISIT (OUTPATIENT)
Dept: PHYSICAL THERAPY | Facility: CLINIC | Age: 61
End: 2021-01-04
Payer: MEDICARE

## 2021-01-04 DIAGNOSIS — M99.05 SOMATIC DYSFUNCTION OF PELVIS REGION: ICD-10-CM

## 2021-01-04 DIAGNOSIS — N39.46 MIXED INCONTINENCE URGE AND STRESS (MALE)(FEMALE): Primary | ICD-10-CM

## 2021-01-04 PROCEDURE — 97530 THERAPEUTIC ACTIVITIES: CPT | Mod: GP | Performed by: PHYSICAL THERAPIST

## 2021-01-04 PROCEDURE — 97110 THERAPEUTIC EXERCISES: CPT | Mod: GP | Performed by: PHYSICAL THERAPIST

## 2021-01-10 NOTE — PROGRESS NOTES
"Individual therapy, Intake for DBT 60 min  Date: 12/10/20  Referred for  DBT. Sees Enzo No, CNS  Diagnoses:  Major Depression, chronic and BELA       Video-Visit Details    Type of service:  Video Visit    Video Start Time (time video started): 9am    Video End Time (time video stopped):10am    Originating Location (pt. Location): Pt's home    Distant Location (provider location):  Provider's home    Mode of Communication:  Video Conference zoom.[facility]    Physician has received verbal consent for a Video Visit from the patient? Yes    Kaitlynn Shaw, PhD LP      Presenting Problem: \"I am  and losing my alimony. I was  8 years ago.\"  \"I live with my parents who or 79 and 80 years because it didn't work out with my sister and then my son so I am back here.  She reported that when she was 33 years old she lived alone for 5 years. She reported that she makes only $820 from social security disability an her medicare supplemental is $272 and has to pay for dental and prescription plans so she is very worried.. Katie reported that she \"had a bad falling out with her son--no repair yet since she lived with him. He won't communicate with me.\"  She also had a falling out with her sister and there has not been a repair.     Katie reported that she \"has a lot of anger inside of myself b/c I have gone 5 years without sleeping due to my chemical imbalance.\"     Medical and Mental health:  Katie reported that she has had 2 mental health hospitalizations--in 2007 when she was diagnosed with Bipolar and then 2008. She was suicidal:  1)  overdosed--\"I just wanted everything to go away\" and 2) wanted to drive off a bridge. She reported that she still has some thoughts that she \"wants things to go away\" but not like back then. She reported that she is \"impulsive\"--overspent, bankruptcy.Other bipolar symptoms include both shayla and depression.  Katie reported that \"I didn't sleep for 5 years due to my chemical " "imbalance.\"     Katie currently sees Enzo Geiger CNS at the [facility]. She  reported that she takes 5 different medications. 4 medications for her bipolar and then a medication for her Thyroid.  Wellbutrim, Gabapentin, Geodone.   Her previous therapist was Tatyana Stinson who had her read \"the Happiness Trap.\"     Other medical:  Hasimoto's thyroiditis, osteopenia, reactive airway disease. Surgeiries--Thyroid removed, Tonsils removed.     Family of Origin and Support Network. Katie is a  60 year old woman. She has been  for 8 years and now her spousal maintenance is ceasing. Her  is a retired  man. She has 2 adult children. She has  a 33 year old son. He is  and she moved in with him \"after the fall out with my sister--I thought that I could be helpful to taking care of his children.\" She reported that there was a misunderstanding with him.       Katie is currently living with her elderly parents 79 and 80 yrs.  Katie is the oldest of 2 siblings. She reported that her sister never  and had no children. Her sister sees a psychiatrist also. Her sister reportedly is on social security disability also due to a reported injury on the job.  Her sister lives in [place].    Katie reported that she has limited friendships. \"I used to have  A couple of friends--Mack and I\"     Education and Emploment History:  Katie reported that she was raised in [place] and graduated from [employer] in [facility]. Ex- was retired  so they moved around a lot.  Katie reported that she has a limited work history--\"only 1 year of credit\" which is why she gets only $820 per month. She used to have a  in her home. She reported that she did volunteer when she lived in [place]      Assessment:  Katie came on time for the Intake for DBT therapy. She reported that she was ambivalent regarding DBT and needed some orientation to symptoms and skills that could possibly help " her. She seems uncertain what the conflicts have been with her son and sister as she has moved from house house to household. It sounds like there has been miscommunication or lack of communication between family members. She is upset that her son doesn't talk to her and there is unresolved angst with her sister who lives in [place]. Lack of structure and additional support. Pt agreed to a full assessment for DBT.  She wanted to meet in the New year--so the follow-up is the second week of January.    Plan:  Follow-up full assessment January 11th. Pre-DBT.  Create treatment plan and recommendations.

## 2021-01-11 ENCOUNTER — VIRTUAL VISIT (OUTPATIENT)
Dept: PSYCHIATRY | Facility: CLINIC | Age: 61
End: 2021-01-11
Attending: PSYCHOLOGIST
Payer: MEDICARE

## 2021-01-11 DIAGNOSIS — F41.9 ANXIETY: ICD-10-CM

## 2021-01-11 DIAGNOSIS — F39 MOOD DISORDER (H): Primary | ICD-10-CM

## 2021-01-11 PROCEDURE — 96136 PSYCL/NRPSYC TST PHY/QHP 1ST: CPT | Mod: 95 | Performed by: PSYCHOLOGIST

## 2021-01-11 PROCEDURE — 90791 PSYCH DIAGNOSTIC EVALUATION: CPT | Mod: 95 | Performed by: PSYCHOLOGIST

## 2021-01-18 ENCOUNTER — THERAPY VISIT (OUTPATIENT)
Dept: PHYSICAL THERAPY | Facility: CLINIC | Age: 61
End: 2021-01-18
Payer: MEDICARE

## 2021-01-18 DIAGNOSIS — M99.05 SOMATIC DYSFUNCTION OF PELVIS REGION: ICD-10-CM

## 2021-01-18 DIAGNOSIS — N39.46 MIXED INCONTINENCE URGE AND STRESS (MALE)(FEMALE): Primary | ICD-10-CM

## 2021-01-18 PROCEDURE — 97110 THERAPEUTIC EXERCISES: CPT | Mod: GP | Performed by: PHYSICAL THERAPIST

## 2021-01-18 PROCEDURE — 97112 NEUROMUSCULAR REEDUCATION: CPT | Mod: GP | Performed by: PHYSICAL THERAPIST

## 2021-01-18 PROCEDURE — 97530 THERAPEUTIC ACTIVITIES: CPT | Mod: GP | Performed by: PHYSICAL THERAPIST

## 2021-01-19 ENCOUNTER — VIRTUAL VISIT (OUTPATIENT)
Dept: PSYCHIATRY | Facility: CLINIC | Age: 61
End: 2021-01-19
Attending: PSYCHOLOGIST
Payer: MEDICARE

## 2021-01-19 DIAGNOSIS — F39 MOOD DISORDER (H): Primary | ICD-10-CM

## 2021-01-19 DIAGNOSIS — F41.9 ANXIETY: ICD-10-CM

## 2021-01-19 PROCEDURE — 90837 PSYTX W PT 60 MINUTES: CPT | Mod: 95 | Performed by: PSYCHOLOGIST

## 2021-01-26 ENCOUNTER — MYC MEDICAL ADVICE (OUTPATIENT)
Dept: PSYCHIATRY | Facility: CLINIC | Age: 61
End: 2021-01-26

## 2021-01-26 ENCOUNTER — TELEPHONE (OUTPATIENT)
Dept: PSYCHIATRY | Facility: CLINIC | Age: 61
End: 2021-01-26

## 2021-01-26 ENCOUNTER — VIRTUAL VISIT (OUTPATIENT)
Dept: PSYCHIATRY | Facility: CLINIC | Age: 61
End: 2021-01-26
Attending: NURSE PRACTITIONER
Payer: MEDICARE

## 2021-01-26 DIAGNOSIS — F39 MOOD DISORDER (H): Primary | ICD-10-CM

## 2021-01-26 PROCEDURE — 99212 OFFICE O/P EST SF 10 MIN: CPT | Mod: 95 | Performed by: NURSE PRACTITIONER

## 2021-01-26 NOTE — PROGRESS NOTES
"Video- Visit Details  Type of service:  video visit for medication management  Time of service:    Date:  01/26/2021    Video Start Time:  8:34 AM        Video End Time:  8:51am    Reason for video visit:  Patient unable to travel due to Covid-19  Originating Site (patient location):  [facility]   Location- Patient's home  Distant Site (provider location):  Remote location  Mode of Communication:  Video Conference via [email or web address]  Consent:  Patient has given verbal consent for video visit?: Yes     VIDEO VISIT  Katie Griffiths is a 60 year old patient who is being evaluated via a billable video visit.      The patient has been notified of following:   \"This video visit will be conducted via a call between you and your physician/provider. We have found that certain health care needs can be provided without the need for an in-person physical exam. This service lets us provide the care you need with a video conversation. If a prescription is necessary we can send it directly to your pharmacy. If lab work is needed we can place an order for that and you can then stop by our lab to have the test done at a later time. Insurers are generally covering virtual visits as they would in-office visits so billing should not be different than normal.  If for some reason you do get billed incorrectly, you should contact the billing office to correct it and that number is in the AVS .    Video Conference to be completed via:  [email or web address]    Patient has given verbal consent for video visit?:  Yes    Patient would prefer that any video invitations be sent by: Text to cell phone: [phone]      How would patient like to obtain AVS?:  [email or web address]    AVS SmartPhrase [PsychAVS] has been placed in 'Patient Instructions':  Yes           [facility]  Psychiatry Clinic  PSYCHIATRIC PROGRESS NOTE       Katie Griffiths is a 60 year old female who prefers the name Katie and pronoun she, her.  Therapist: Nikhil, " "[facility]               PCP: Buddy Nolan  Other Providers: None    Pertinent Background:  See previous notes.  Psych critical item history includes Hospitalized 3 times with most recent occurring in 2007 after overdose attempt.  Numerous medication trials.  Possible bipolar diagnosis.     Interim History     [4, 4]     The patient is a good historian, reports good treatment adherence and was last seen 12/9/20.  Since the last visit, Katie reports \"I'm doing.\"  Reports only sleeping 2 hours last night.  However, she eports sleeping 6.5 hours the previous several nights.   She is able to share that she was overwhelmed with financial concerns and is impacting her sleep.  Current concern is being unable to stay asleep.  Continues to lead fairly sedentary lifestyle.  Reports \"there's nothing to do.\"  She is confident that if activities were available she would engage.  Discussed how employment could help her better manage her mental health.  She is hesitant to obtain employment as it may impact social security disability payments.    12/28/20: Katie again reports \"I'm hanging in there.\"  Katie called clinic approximately 2 weeks ago with concerns about her sleep.  Since then she reports her sleep has \"evened out.\"  Reports \"having to go out of my way to make pattern for sleep.\"  Currently getting about 6 hours of sleep per day.  Does report some concern with daytime sedation.  She will decrease OTC Melatonin as has grogginess has worsened with Melatonin. Continues to reports concern with hair loss and will connect with endocrinology.    12/9/20: Katie reports \"I'm hanging in there.\"  She is tolerating Mirtazapine and no longer concerned about hair loss.  Sleep continues to vary.  Ranges from 5-7 hours per night.  Mood stable.  She does not want to adjust medications and is requesting 90 day supply.      11/10/20: Katie is \"ok\"  Mood stable.  She is requesting a decrease in Mirtazapine as she is " "associating it with hair loss.  Not listed as a typical side effect.  Katie reports she is walking with her father most mornings.  Describes sleep as \"so-so.\"  Averaging 5 hours of sleep per night.  Continues to see therapist regularly.     10/14/20:  Katie reports again \"I am hanging in there.\" Persistent low mood persists.  Reports she had to switch thyroid medications due to her's being recalled.  Sleep initially worsened with new drug but she is now sleeping 5-6 hours per night.  Katie reports that her housing is more stable than at last appointment.  Anxiety has improved as a result.  Katie also reports she has been more active as she is helping her father prepare his boat for winter.  She also continues to see her therapist regularly.      9/15/20: Katie continues to report \"I am hanging in there.\" Sleep continues to be main concern. Katie reports she has been waking at 2am and unable to fall back asleep for the past 2 nights.  Katie does report that Gabapentin is helpful in that she can relax when she initially lays down for bed. \"I'm getting beneficial sleep more often than I am not.\"   Continues to endorse periodic low mood and anxiety.  Katie also has not used propranolol for PRN anxiety will discontinue. She does endorse fairly constant worry about possible homelessness.  Katie also brought up Celexa as it was helpful in management of anxiety; however in May she stated it was not effective and maybe causing sedation.  Continues to see therapist regularly.     8/18/20: Katie reports she is continuing \"hanging in there.\"  Katie reports that gabapentin has been helpful with sleep and she is \"tossing and turning less.\"  She believes sleep duration may have increased.  Gabapentin also appears to be helping with daytime anxiety.   Mood stable.  No significant concerns with depression or mood fluctuations.     7/30/20: Katie reports she is \"hanging in there.\"  Trazodone reports trazodone 100mg was not effective and led to " "diarrhea.  Discussed possible hypomania episode as she was frequently calling various providers at odd hours and having difficulty sleeping.  Does not appear to meet criteria at this time but will continue to monitor.  Will try to increase dose of Mirtazapine dose and see if helps.  Also will start Gabapentin for anxiety and sleep.  Katie continues to be quite sedentary during day.  Fortunately she was on a day trip to [place] today with her parents.  Advised to follow sleep medicine recommendations    7/7/20: Katie reports she is \"hanging in there.\"  Depression and anxiety persist.  Mood stable.  She is residing at her parent's home for the past couple days.  She using office and sleeping on twin bed. She is looking for independent living but is running into issues with her income, age, and credit history. Completed sleep consultation and behavioral change recommendations were given.  Unclear if followed recommendations.  Stress has decreased since then due to moving in with her parents but still persists due to housing issue.  Advised to take Propranolol when feels stressed.  Continues to endorse sedentary lifestyle.  Advised to go for walks in morning and early evening to develop \"sleep appetite.\"  She agreed.  She also does not want to start any sleep aids that may lead to drowsiness and weight gain.  Mood, motivation, and energy low.  Recommended increase Wellbutrin to 450mg but she again is hesitant.      New Address:  [facility] [id number]  [place] [address]    6/9/20: Katie is currently trying to maintain housing.  Reports her current situation living with her son has been extremely stressful.  Currently looking for apartments in [facility].  She working with [facility] social work team.  Katie is very hesitant to change her medications.  She would like to obtain better grasp of her housing before making medication adjustments. Will consult with PCP about possible Propranolol trial.       5/6/20:  Katie " "continues to endorse stress regarding bankruptcy.  She has phone hearing with  at the end of the month of May.  Katie continues to report lack of activity which may be attributable to lifestyle changes required during pandemic, but does report overall improvement with increase in Wellbutrin.  Sleep also continues to be an issue but she also reports it continues to improve.  Sleep medicine consultation in 2 months. No concerns with elevated mood.  Katie did not want to adjust her medications     3/31/20: when asked how she is doing, Katie reports that switch from Celexa to Wellbutrin has been beneficial in regards to mood, energy, and motivation.  No concerns with elevated mood.  Sleep continues to be an issue.  Did not worsen with addition of Wellbutrin.  Has sleep study in 3 months.  Regarding psychosocial stressors, Katie is babysitting her grandchildren as their mother is sick and is going through bankruptcy.       3/4/20: Katie again starts laughing.  Mood is stable overall but low.  Continues to endorse anhedonia, energy, and low mood. Sleep has improved since starting Remeron.  Reports sleeping 5 hours per night which results in continued daytime tiredness.    Continues to report that Celexa is not helpful and possible causing sedation    2/12/20: Katie shared that she is not sleeping.  She reports that she is getting 3 hours of sleep per night for past couple weeks. However, Katie did not look overly fatigued during appointment.  She also reports that her mood is quite low.  No concerns with shayla.  No goal directed behavior or impulsivity.  Katie\"s affect continues to be incongruent to her reported mood.  She is struggling to get household chores completed.  Continues to report sedentary lifestyle.  Educated on how exercise and activity can help with sleep.  Will stop Trazodone and start Mirtazapine for sleep.  If continues to be a problem, will refer to sleep medicine as sleep has been an issue for several " "years.  Also plan to switch Celexa as it does not appear to be managing her depression.      1/8/20: Katie's main concern today was weight gain.  Affect was quite bright today in spite of high PHQ-9 and reported persistent low mood. Reports positive holiday with her family.  Sleep continues to be inconsistent.  Mood stable with introduction of Geodon.  Will increase and discontinue Olanzapine.  Katie continues to consider a retrial of Depakote which was discontinued in past due to development of rash.      12/11/19: Katie reports her mood is stable but she is describes \"not feeling good or bad.\"  Difficult to discern if emotional blunting or lack of hypomania.  Thoughts continue to be linear and organized.  Speech normal.  Also reports sleeping continues to improve. Previous trials include Lithium (not effective?), Depakote (rash), and Abilify (side effects).  Katie would like to change medications today due to possible emotional blunting and weight gain (5lbs in past 2 weeks).      11/27/19: Increased HS Olanzapine to 5mg and Katie appears much more grounded.  Speech less pressured and mood more stable.  Thoughts more organized and linear.  Katie reports she also has noticed a significant difference.  She also reports she is sleeping much better.  Concerned about weight gain but reports she is very sedentary.      Recent Symptoms:   Depression:  occasional low mood, anhedonia, low energy and insomnia  Elevated:  none  Psychosis:  none  Anxiety:  occasional worry  Panic Attack:  none  Trauma Related:  none     Recent Substance Use:  No changes reported        Social/ Family History      [1ea,1ea]            [per patient report]               Financial support-  social security disability  Children-  2 adult children  Living situation- Lives in house in [place] with son   Social/spiritial support-  limited/none  Feels safe at home-  Yes   Family history- None      Medical / Surgical History                             " "    Patient Active Problem List   Diagnosis     Bipolar disorder (H)     Hashimoto's thyroiditis     Incontinence of urine in female     Osteopenia     Reactive airway disease     Vitamin D deficiency     Overweight     Dizziness     Hyperlipidemia LDL goal <100     Right shoulder pain, unspecified chronicity     Somatic dysfunction of pelvis region     Mixed incontinence urge and stress (male)(female)       Past Surgical History:   Procedure Laterality Date     partial thyroidectomy       TONSILLECTOMY       TUBAL LIGATION          Medical Review of Systems         [2,10]   The remainder of the review of systems is noncontributory  Thyroid removed  Allergy    Quetiapine and Valproic acid  Current Medications        Current Outpatient Medications   Medication Sig Dispense Refill     albuterol (PROAIR HFA/PROVENTIL HFA/VENTOLIN HFA) 108 (90 Base) MCG/ACT inhaler Inhale 2 puffs into the lungs as needed for shortness of breath / dyspnea or wheezing 18 g 1     buPROPion (WELLBUTRIN XL) 300 MG 24 hr tablet Take 1 tablet (300 mg) by mouth every morning 90 tablet 0     gabapentin (NEURONTIN) 300 MG capsule Take 1 capsule (300mg) in morning and 1 capsule (300mg) in afternoon and 4 capsules (1200mg) near bedtime 540 capsule 0     levothyroxine (SYNTHROID/LEVOTHROID) 50 MCG tablet Take 1 tablet (50 mcg) by mouth daily 90 tablet 3     mirtazapine (REMERON) 30 MG tablet Take 1 tablet (30 mg) by mouth At Bedtime 90 tablet 0     ziprasidone (GEODON) 60 MG capsule Take 1 capsule (60 mg) by mouth 2 times daily (with meals) 180 capsule 0     Vitals         [3, 3]   There were no vitals taken for this visit.   Mental Status Exam        [9, 14 cog gs]     Alertness: alert  and oriented  Appearance: casually groomed  Behavior/Demeanor: cooperative, pleasant and calm, with good  eye contact   Speech: normal.  Language: good  Psychomotor: normal or unremarkable  Mood: \"ok\"  Affect: somewhat restricted; was congruent to mood; was " congruent to content  Thought Process/Associations: unremarkable  Thought Content:  Reports none;  Denies suicidal ideation and violent ideation  Perception:  Reports none;  Denies auditory hallucinations and visual hallucinations  Insight: adequate  Judgment: intact  Cognition: (6) does  appear grossly intact; formal cognitive testing was not done  Gait/Station and/or Muscle Strength/Tone: unable to assess    Labs and Data                          Rating Scales:    PHQ9    PHQ9 Today:  Not completed  PHQ-9 SCORE 12/18/2019   PHQ-9 Total Score MyChart 17 (Moderately severe depression)   PHQ-9 Total Score 17        Assessment      [m2, h3]     Unspecified Mood Disorder  Bipolar I Disorder (Hx)    [place] Prescription Monitoring Program [] was not checked today:  provider not managing any controlled medications.        Plan                                                                                                                     m2, h3     1) MEDICATION:  Continue Geodon 60mg BID with food  Continue Mirtazapine 30mg at bedtime for sleep and mood.  Continue Wellbutrin XL 300mg daily.  Consider increasing dose to 450mg   Continue Gabapentin to 300 in morning and afternoon.  Continue 1200mg at bedtime   Continue  Levothyroxine 50mcg (prescribed by another provider).   Switch to ER formulation of Melatonin 6mg    2) THERAPY:  Continue with current therapist      RTC: 2 months    CRISIS NUMBERS:   Provided routinely in AVS.    Treatment Risk Statement:  The patient understands the risks, benefits, adverse effects and alternatives. Agrees to treatment with the capacity to do so. No medical contraindications to treatment. Agrees to call clinic for any problems. The patient understands to call 911 or go to the nearest ED if life threatening or urgent symptoms occur.     WHODAS 2.0  TODAY total score = N/A; [a 12-item WHODAS 2.0 assessment was not completed by the pt today and/or recorded in EPIC].       PROVIDER:  Enzo  ADELINA Meredith CNP

## 2021-01-26 NOTE — TELEPHONE ENCOUNTER
On January 26, 2021, at 7:49 AM, writer called patient at [phone] to confirm Virtual Visit. Writer unable to make contact with patient. Writer left detailed voice message for call back. [phone] left as call back number. Penelope Chan, [facility]

## 2021-01-27 ENCOUNTER — VIRTUAL VISIT (OUTPATIENT)
Dept: PSYCHIATRY | Facility: CLINIC | Age: 61
End: 2021-01-27
Attending: PSYCHOLOGIST
Payer: MEDICARE

## 2021-01-27 DIAGNOSIS — F39 MOOD DISORDER (H): Primary | ICD-10-CM

## 2021-01-27 DIAGNOSIS — F41.9 ANXIETY: ICD-10-CM

## 2021-01-27 PROCEDURE — 90853 GROUP PSYCHOTHERAPY: CPT | Mod: 95 | Performed by: PSYCHOLOGIST

## 2021-01-27 NOTE — TELEPHONE ENCOUNTER
"Patient sent a BoxC message requesting to be prescribed extended release melatonin. She mentions that she prefers 5 mg but is willing to try 10 mg as suggested by provider.     Per provider's note from 1/26 virtual appointment, medication plan includes, \"switch to ER formulation of Melatonin 6 mg\" which is incongruent with what patient stated in her message.     Will pend melatonin ER 6 mg prescription and route to provider for clarification and approval.   " Impression: Exudative age-rel mclr degn, left eye, with inactive scar: Z97.5779. OS. Condition: stable. Vision: vision affected. Last AV OS 08/05/2016 Plan: Due to Coronavirus COVID-19 pandemic and National Emergency, deferred Slit Lamp examination. Findings are based on OCT and Optos. OCT and Optos shows no IRF or SRF OS. No treatment needed at this time based on diagnostic tests. Recommend a retina follow - up in 4 - 6 wks.

## 2021-01-28 RX ORDER — CHOLECALCIFEROL (VITAMIN D3) 25 MCG
6 TABLET ORAL AT BEDTIME
Qty: 60 TABLET | Refills: 0 | Status: SHIPPED | OUTPATIENT
Start: 2021-01-28

## 2021-02-01 ENCOUNTER — THERAPY VISIT (OUTPATIENT)
Dept: PHYSICAL THERAPY | Facility: CLINIC | Age: 61
End: 2021-02-01
Payer: MEDICARE

## 2021-02-01 DIAGNOSIS — N39.46 MIXED INCONTINENCE URGE AND STRESS (MALE)(FEMALE): Primary | ICD-10-CM

## 2021-02-01 DIAGNOSIS — M99.05 SOMATIC DYSFUNCTION OF PELVIS REGION: ICD-10-CM

## 2021-02-01 PROCEDURE — 97112 NEUROMUSCULAR REEDUCATION: CPT | Mod: GP | Performed by: PHYSICAL THERAPIST

## 2021-02-01 PROCEDURE — 97530 THERAPEUTIC ACTIVITIES: CPT | Mod: GP | Performed by: PHYSICAL THERAPIST

## 2021-02-01 PROCEDURE — 97140 MANUAL THERAPY 1/> REGIONS: CPT | Mod: GP | Performed by: PHYSICAL THERAPIST

## 2021-02-01 NOTE — PROGRESS NOTES
PROGRESS  REPORT    Progress reporting period is from [id number] to [id number]      SUBJECTIVE  Subjective changes noted by patient:  Subjective: the urgency has returned for ELI to her. There has not been a  change in her meds BUT she has gone 3 nights without sleeping due to stress- financial and relational with her son.. she voids q 1.5-2 hours now and it was q 3 hours before.     .      .   Changes in function:  None  Adverse reaction to treatment or activity: None    OBJECTIVE  Objective: internal exam shows B LA OI over activity with incomplete release post Rx pfm release was back to normal held [facility]      ASSESSMENT/PLAN  Updated problem list and treatment plan:   Diagnosis 1:  Urgency frequency  Decreased strength - therapeutic exercise, therapeutic activities and home program  Impaired muscle performance - neuro re-education and home program  Decreased function - therapeutic activities and home program  STG/LTGs have been met or progress has been made towards goals:  Yes (See Goal flow sheet completed today.)  Assessment of Progress: The patient has had set backs in their progress.  Self Management Plans:  Patient is independent in a home treatment program.  Patient is independent in self management of symptoms.    Katie continues to require the following intervention to meet STG and LTG's:  PT    Recommendations:  This patient would benefit from continued therapy.     Frequency:  1 X week, once daily  Duration:  for 6 weeks

## 2021-02-08 ENCOUNTER — THERAPY VISIT (OUTPATIENT)
Dept: PHYSICAL THERAPY | Facility: CLINIC | Age: 61
End: 2021-02-08
Payer: MEDICARE

## 2021-02-08 DIAGNOSIS — M25.511 RIGHT SHOULDER PAIN, UNSPECIFIED CHRONICITY: Primary | ICD-10-CM

## 2021-02-08 PROCEDURE — 97162 PT EVAL MOD COMPLEX 30 MIN: CPT | Mod: GP | Performed by: PHYSICAL THERAPIST

## 2021-02-08 PROCEDURE — 97110 THERAPEUTIC EXERCISES: CPT | Mod: GP | Performed by: PHYSICAL THERAPIST

## 2021-02-08 PROCEDURE — 97112 NEUROMUSCULAR REEDUCATION: CPT | Mod: GP | Performed by: PHYSICAL THERAPIST

## 2021-02-08 NOTE — PROGRESS NOTES
[facility]    Dialectical Behavior Therapy Program    DBT Individual Psychotherapy Progress Note    Date: Jan 19, 2021    Patient Name: Katie Griffiths     Patient Pronouns: She/Her    Patient MRN: [id number]    Provider: Kaitlynn Shaw, PhD LP    Procedure: Individual DBT session    Appointment Duration:55 minutes)    Diagnosis:Bipolar , Generalized Anxiety Disorder, Emotional Dysregulation    Type of service:  video visit for psychotherapy  Time of service:    Date:1/19/2021    Video Start Time:  9:05      Video End Time:  10am  Reason for video visit:  Patient unable to travel due to Covid-19  Originating Site (patient location):  [facility]   Location- Patient's home  Distant Site (provider location):  Remote location  Mode of Communication:  Video Conference via Zoom  Consent:  Patient has given verbal consent for video visit?: Yes      Pre-DBT Session: Reviewed Bio Psycho Social model, Reviewed the 5 components of therapy. Assessing motivation and commitment to therapy and change.   Diary Card Completed Before Session?No--Received the diary card after session..     Patient Used Phone Coaching Since Last Session: No  Provide description if pt reported any suicidal intent/bx in call/refer to phone coaching note.    Primary Targets Addressed in This Session:  Other: orientation to DBT    [id number] text for chain analysis: Behavior chain analysis completed on target problem behavior--NO    Secondary Targets Addressed in This Session:   Emotion regulation, Self-validation, Emotion experiencing    DBT Strategies used in This Session:    Validation  Commitment strategies  Irreverent communication     Interactive Complexity Code Was Used (85067): No. Interactive complexity is appropriate for this visit due to need to manage maladaptive communication (related to high anxiety, high reactivity, repeated questions or disagreement) among participants that  complicates delivery of care and caregiver emotion/ behavior that interferes with implementation of the treatment plan.    Behavioral Observations/Mental Status: Patient arrived on time to session. Patient was well groomed. Eye contact was good. Mood today was friendly. Observed affect was full range. Speech was regular rate and rhythm. Thought process was Logical and Linear. Patient was actively engaged in session.    Risk Assessment: The patient has the following risk and protective factors:     Risk factors: age  Protective factors:  Cultural or Religion beliefs discouraging suicide and Is a parent    Based on risk and protective factors, patient is assessed to be at the following levels of acute and chronic risk.    Acute Risk: Low Acute Risk (i.e., no current suicidal intent, specific plan, preparatory behaviors, and high confidence in patient's ability to maintain safety).  Chronic Risk: Low Chronic Risk (i.e., evidence of consistently enduring stressors without suicidal ideation)    Behavioral Assignments:  1) Complete DBT Diary Card daily  2) Complete DBT Skills Group homework  3) Notice triggers to anxiety--what is she telling herself before the anxiety comes.      Plan: Patient will continue in full-model, outpatient DBT program until completion of one full-round of DBT skills/the end of their 6-month treatment agreement.     Treatment Plan Completed:Nex session

## 2021-02-08 NOTE — PROGRESS NOTES
[facility] Initial Evaluation -- Upper Extremity    Evaluation Date: February 8, 2021  Katie Griffiths is a 60 year old female with a R shld condition.   Referral: FP  Work mechanical stresses: none  Employment status:    Leisure mechanical stresses: 30 min daily when not icy, Lt wts at home  Functional disability score (SPADI): See Flowsheet  VAS score (0-10): 1-2/10  Handedness (R/L):  R  Patient goals/expectations:  Get Exer to avoid repeated exacerbations of  R shld pain    HISTORY    Present symptoms: R Post shld and scap.    Pain quality (sharp/shooting/stabbing/aching/burning/cramping):  ache    Present since (onset date):  March 2020.  MD referral 11-.   Symptoms (improving/unchanging/worsening):  improving.    Symptoms commenced as a result of: Possibly lifting or carrying/Unknown   Condition occurred in the following environment: Unknown    Symptoms at onset: Sharp burning post shld  Paresthesia (yes/no):  no  Spinal history: yes   Cough/Sneeze (pos/neg):  no    Constant symptoms: R shld??  Intermittent symptoms:     Symptoms are worse with the following: Sometimes Dressing, Sometimes Reaching - deny seat belt, Sometimes When still and Time of day - No effect   Symptoms are better with the following: Other - rest and lt wt exer    Continued use makes the pain (better/worse/no effect): does not keep going if it hurts    Disturbed night (yes/no):  no    Pain at rest (yes/no): sometimes  Site (neck/shoulder/elbow/wrist/hand): post shld R    Other questions (swelling/catching/clicking/locking/subluxing):  none    Previous episodes: few year hx of shld pain on and off  Previous treatments: PT - UB and shld - relief    Specific Questions:  General health (excellent/good/fair/poor):  fair  Pertinent medical history includes: Mental illness  Medications (nil/NSAIDS/analg/steroids/anticoag/other):  Other - Thyroid and gabapentin, anxiety/depression/sleep,   Medical allergies:  2 -  See epic chart  Imaging (None/Xray/MRI/Other): none  Recent or major surgery (yes/no): L lobe thyroid removed,   Night pain (yes/no): no  Accidents (yes/no): none  Unexplained weight loss (yes/no): none  Barriers at home: none  Other red flags: none    Sites for physical examination (neck/shoulder/elbow/wrist/hand): Neck and shld    EXAMINATION    Posture:  Sitting (good/fair/poor): fair  Correction of posture (better/worse/no effect/NA): B LBP, decr R shld  Standing (good/fair/poor): fair - protracted head and shld  Other observations:      Neurological (NA/motor/sensory/reflexes/dural):     Baselines (pain or functional activity): movement of reaching for seatbelt or reaching behind back    Extremities (Shoulder/Elbow/Wrist/Hand): R shld    Movement Loss Armand Mod Min Nil Pain   Flexion   X  +   Extension   X     Abduction   X     Internal Rotation (BB)   X  +   External Rotation (BH)    X       Passive Movement (+/- overpressure)/(PDM/ERP):      W OP - WNL - ++ERP Flex        + ERP IR  Resisted Test Response (pain):       R  L   Shld Flex  4+/5  5/5    Abd  5-/5  5/5    ER  4+/5 (+) 5/5    IR  5/5  5/5    Biceps  5/5  5/5    Triceps 4/4  5/5  Other Tests:     Spine:  Movement Loss:    Cerv AROM:    Protr WNL - slight R Scap    Flex WNL     Retr Min Decr    Ext Min decr    Rot R Min decr           L Mod decr    SB R WNL          L Min decr  Effect of repeated movements:    Seated Cerv Retr x10 - NE (stretch)  NE  Incr Cerv ROM     Seated Retr w/ OP x10 - NE (stretch)  NE  Incr Cerv ROM  no pain shld AROM   Effect of static positioning: NA  Spine testing (not relevant/relevant/secondary problem): relevant?    Baseline Symptoms: NA - will clear C-spine then reassess R shld  Repeated Tests Symptom Response Mechanical Response   Active/Passive movement, resisted test, functional test During - Produce, Abolish, Increase, Decrease, NE After -   Better, Worse, NB, NW, NE Effect -   ? or ? ROM, strength or key functional  test No Effect          Effect of static positioning                  Provisional Classification (Extremity/Spine): Extremity - Needs Further assessment - Cerv derangement vs Shld derangement      Principle of Management:  Education:  Posture - Neutral Spine, use of L-roll, affect of posture on spine/pain, no couch, recliner, or propping up on pillows in bed, specificity of exer, centralisation/peripheralisation, spine as a source of extremity pain and HEP    Equipment provided:  None - Recommended using rolled towel for L-Roll whenever sitting.    Exercise and dosage:  Seated cerv Retr w/ OP x10 6-8 x/day    ASSESSMENT/PLAN:    Patient is a 60 year old female with right side shoulder complaints.    Patient has the following significant findings with corresponding treatment plan.                Diagnosis 1:  R Shoulder pain, unspecified chronicity  Pain -  hot/cold therapy, manual therapy, self management, education, directional preference exercise and home program  Decreased ROM/flexibility - manual therapy, therapeutic exercise and home program  Decreased strength - therapeutic exercise, therapeutic activities and home program  Decreased function - therapeutic activities and home program  Impaired posture - neuro re-education and home program    Therapy Evaluation Codes:   1) History comprised of:   Personal factors that impact the plan of care:      Time since onset of symptoms.    Comorbidity factors that impact the plan of care are:      Asthma, Bowel/bladder changes, Chest pain, Depression, Dizziness, Menopausal, Mental illness, Migraines/headaches, Osteoarthritis, Overweight and Osteoporosis, Thyroid problems.     Medications impacting care: Anti-depressant and mood stabilizer and anxiety.  2) Examination of Body Systems comprised of:   Body structures and functions that impact the plan of care:      Cervical spine and Shoulder.   Activity limitations that impact the plan of care are:      Dressing, Lifting  and reaching.  3) Clinical presentation characteristics are:   Evolving/Changing.  4) Decision-Making    Moderate complexity using standardized patient assessment instrument and/or measureable assessment of functional outcome.  Cumulative Therapy Evaluation is: Moderate complexity.    Previous and current functional limitations:  (See Goal Flow Sheet for this information)    Short term and Long term goals: (See Goal Flow Sheet for this information)     Communication ability:  Patient appears to be able to clearly communicate and understand verbal and written communication and follow directions correctly.  Treatment Explanation - The following has been discussed with the patient:   RX ordered/plan of care  Anticipated outcomes  Possible risks and side effects  This patient would benefit from PT intervention to resume normal activities.   Rehab potential is good.    Frequency:  1 X week, once daily  Duration:  for 8 weeks  Discharge Plan:  Achieve all LTG.  Independent in home treatment program.  Reach maximal therapeutic benefit.    Please refer to the daily flowsheet for treatment today, total treatment time and time spent performing 1:1 timed codes.   Answers for HPI/ROS submitted by the patient on 2/5/2021   History Reported by Patient  Reason for Visit:: Shoulder/ back  When problem began:: 5/4/2020  How problem occurred:: dont know for sure might have lifted or carried something heavy  Number scale: 1/10  General health as reported by patient: fair  Please check all that apply to your current or past medical history: asthma, changes in bowel/bladder, chest pain, concussions/dizziness, depression, menopausal, mental illness, migraines/headaches, overweight, osteoarthritis, osteoporosis, thyroid problems  Other Med Hx Detail: I have forgotten some right now  Medical allergies: other  Other Allergies Detail: the 2 meds in my records  Surgeries: other  Other Surgery Detail: thyroidectomy left loab, tonsils out,  tubal ligation, tooth implant  Medications you are currently taking: anti-depressants, thyroid medication, other  Other Meds Detail: mood stabalizer, anxiety medication  Occupation:: None Disabled  What are your primary job tasks: prolonged sitting, other  Other Tasks Detail: watching tv, movies

## 2021-02-08 NOTE — LETTER
DEPARTMENT OF HEALTH AND HUMAN SERVICES  CENTERS FOR MEDICARE & MEDICAID SERVICES    PLAN/UPDATED PLAN OF PROGRESS FOR OUTPATIENT REHABILITATION     PATIENTS NAME:  Katie Griffiths   : 1960  PROVIDER NUMBER:    [id number]  HICN:  [id number]   PROVIDER NAME: [place] [facility]  MEDICAL RECORD NUMBER: [id number]   START OF CARE DATE:  SOC Date: 21   TYPE:  PT  PRIMARY/TREATMENT DIAGNOSIS: (Pertinent Medical Diagnosis)  Right shoulder pain, unspecified chronicity  VISITS FROM START OF CARE:   1   [facility] Initial Evaluation -- Upper Extremity  Evaluation Date: 2021  Katie Griffiths is a 60 year old female with a R shld condition.   Referral: FP  Work mechanical stresses: none  Employment status:    Leisure mechanical stresses: 30 min daily when not icy, Lt wts at home  Functional disability score (SPADI): See Flowsheet  VAS score (0-10): 1-2/10  Handedness (R/L):  R  Patient goals/expectations:  Get Exer to avoid repeated exacerbations of  R shld pain    HISTORY  Present symptoms: R Post shld and scap.    Pain quality (sharp/shooting/stabbing/aching/burning/cramping):  ache  Present since (onset date):  2020.  MD referral 11-.   Symptoms (improving/unchanging/worsening):  improving.  Symptoms commenced as a result of: Possibly lifting or carrying/Unknown   Condition occurred in the following environment: Unknown  Symptoms at onset: Sharp burning post shld    Paresthesia (yes/no):  no  Spinal history: yes     Cough/Sneeze (pos/neg):  no  Constant symptoms: R shld??  Intermittent symptoms:   Symptoms are worse with the following: Sometimes Dressing, Sometimes Reaching - deny seat belt, Sometimes When still and Time of day - No effect   Symptoms are better with the following: Other - rest and lt wt exer  Continued use makes the pain (better/worse/no effect): does not keep going if it hurts  Disturbed night  (yes/no):  no  Pain at rest (yes/no): sometimes    Site (neck/shoulder/elbow/wrist/hand): post shld R  Other questions (swelling/catching/clicking/locking/subluxing):  none  Previous episodes: few year hx of shld pain on and off  Previous treatments: PT - UB and shld - relief      PATIENTS NAME:  Katie Griffiths   : 1960    Specific Questions:  General health (excellent/good/fair/poor):  fair  Pertinent medical history includes: Mental illness  Medications (nil/NSAIDS/analg/steroids/anticoag/other):  Other - Thyroid and gabapentin, anxiety/depression/sleep,   Medical allergies:  2 - See epic chart  Imaging (None/Xray/MRI/Other): none  Recent or major surgery (yes/no): L lobe thyroid removed,   Night pain (yes/no): no  Accidents (yes/no): none  Unexplained weight loss (yes/no): none  Barriers at home: none  Other red flags: none    Sites for physical examination (neck/shoulder/elbow/wrist/hand): Neck and shld    EXAMINATION    Posture:  Sitting (good/fair/poor): fair  Correction of posture (better/worse/no effect/NA): B LBP, decr R shld  Standing (good/fair/poor): fair - protracted head and shld  Other observations:    Neurological (NA/motor/sensory/reflexes/dural):   Baselines (pain or functional activity): movement of reaching for seatbelt or reaching behind back  Extremities (Shoulder/Elbow/Wrist/Hand): R shld    Movement Loss Amrand Mod Min Nil Pain   Flexion   X  +   Extension   X     Abduction   X     Internal Rotation (BB)   X  +   External Rotation (BH)    X    Passive Movement (+/- overpressure)/(PDM/ERP):      W OP - WNL - ++ERP Flex        + ERP IR  Resisted Test Response (pain):       R  L   Shld Flex  4+/5  5/5    Abd  5-/5  5/5    ER  4+/5 (+) 5/5    IR  5/5  5/5    Biceps  5/5  5/5    Triceps 4/4  5/5  PATIENTS NAME:  Katie Griffiths   : 1960    Other Tests:     Spine:  Movement Loss:    Cerv AROM:    Protr WNL - slight R Scap    Flex WNL     Retr Min Decr    Ext Min decr    Rot R Min  decr           L Mod decr    SB R WNL          L Min decr  Effect of repeated movements:    Seated Cerv Retr x10 - NE (stretch)  NE  Incr Cerv ROM     Seated Retr w/ OP x10 - NE (stretch)  NE  Incr Cerv ROM  no pain shld AROM   Effect of static positioning: NA  Spine testing (not relevant/relevant/secondary problem): relevant?  Baseline Symptoms: NA - will clear C-spine then reassess R shld  Repeated Tests Symptom Response Mechanical Response   Active/Passive movement, resisted test, functional test During - Produce, Abolish, Increase, Decrease, NE After -   Better, Worse, NB, NW, NE Effect -   ? or ? ROM, strength or key functional test No Effect   Effect of static positioning       Provisional Classification (Extremity/Spine): Extremity - Needs Further assessment - Cerv derangement vs Shld derangement    Principle of Management:  Education:  Posture - Neutral Spine, use of L-roll, affect of posture on spine/pain, no couch, recliner, or propping up on pillows in bed, specificity of exer, centralisation/peripheralisation, spine as a source of extremity pain and HEP  Equipment provided:  None - Recommended using rolled towel for L-Roll whenever sitting.  Exercise and dosage:  Seated cerv Retr w/ OP x10 6-8 x/day    ASSESSMENT/PLAN:  Patient is a 60 year old female with right side shoulder complaints.    Patient has the following significant findings with corresponding treatment plan.                Diagnosis 1:  R Shoulder pain, unspecified chronicity  Pain -  hot/cold therapy, manual therapy, self management, education, directional preference exercise and home program  Decreased ROM/flexibility - manual therapy, therapeutic exercise and home program  Decreased strength - therapeutic exercise, therapeutic activities and home program  Decreased function - therapeutic activities and home program  Impaired posture - neuro re-education and home program  PATIENTS NAME:  Katie Griffiths   : 1960    Therapy  Evaluation Codes:   1) History comprised of:   Personal factors that impact the plan of care:      Time since onset of symptoms.    Comorbidity factors that impact the plan of care are:      Asthma, Bowel/bladder changes, Chest pain, Depression, Dizziness,   Menopausal, Mental illness, Migraines/headaches, Osteoarthritis,    Overweight and Osteoporosis, Thyroid problems.     Medications impacting care: Anti-depressant and mood stabilizer and anxiety.  2) Examination of Body Systems comprised of:   Body structures and functions that impact the plan of care:      Cervical spine and Shoulder.   Activity limitations that impact the plan of care are:      Dressing, Lifting and reaching.  3) Clinical presentation characteristics are:   Evolving/Changing.  4) Decision-Making    Moderate complexity using standardized patient assessment instrument   and/or measureable assessment of functional outcome.  Cumulative Therapy Evaluation is: Moderate complexity.    Previous and current functional limitations:  (See Goal Flow Sheet for this information)    Short term and Long term goals: (See Goal Flow Sheet for this information)   Communication ability:  Patient appears to be able to clearly communicate and understand verbal and written communication and follow directions correctly.  Treatment Explanation - The following has been discussed with the patient:   RX ordered/plan of care, Anticipated outcomes, Possible risks and side effects                                                  PATIENTS NAME:  Katie Griffiths   : 1960    This patient would benefit from PT intervention to resume normal activities.   Rehab potential is good.  Frequency:  1 X week, once daily  Duration:  for 8 weeks  Discharge Plan:  Achieve all LTG.  Independent in home treatment program.  Reach maximal therapeutic benefit.        Caregiver Signature/Credentials _____________________________ Date ________         Evie Camp, PT, Cert. MDT    I  "have reviewed and certified the need for these services and plan of treatment while under my care.        PHYSICIAN'S SIGNATURE:   _________________________________________      Date___________   ADELINA Ross CNP    Certification period:  Beginning of Cert date period: 02/08/21 to   05/08/21     Functional Level Progress Report: Please see attached \"Goal Flow sheet for Functional level.\"    ____X____ Continue Services or       ________ DC Services                Service dates: From  SOC Date: 02/08/21  to present                         "

## 2021-02-09 ENCOUNTER — VIRTUAL VISIT (OUTPATIENT)
Dept: PSYCHIATRY | Facility: CLINIC | Age: 61
End: 2021-02-09
Attending: PSYCHOLOGIST
Payer: MEDICARE

## 2021-02-09 DIAGNOSIS — F41.9 ANXIETY: ICD-10-CM

## 2021-02-09 DIAGNOSIS — F39 MOOD DISORDER (H): Primary | ICD-10-CM

## 2021-02-09 PROCEDURE — 90837 PSYTX W PT 60 MINUTES: CPT | Mod: 95 | Performed by: PSYCHOLOGIST

## 2021-02-10 ENCOUNTER — ANCILLARY PROCEDURE (OUTPATIENT)
Dept: MAMMOGRAPHY | Facility: CLINIC | Age: 61
End: 2021-02-10
Attending: NURSE PRACTITIONER
Payer: MEDICARE

## 2021-02-10 ENCOUNTER — TELEPHONE (OUTPATIENT)
Dept: PSYCHIATRY | Facility: CLINIC | Age: 61
End: 2021-02-10

## 2021-02-10 DIAGNOSIS — Z12.31 VISIT FOR SCREENING MAMMOGRAM: ICD-10-CM

## 2021-02-10 PROCEDURE — 77067 SCR MAMMO BI INCL CAD: CPT | Mod: GC | Performed by: RADIOLOGY

## 2021-02-10 PROCEDURE — 77063 BREAST TOMOSYNTHESIS BI: CPT | Mod: GC | Performed by: RADIOLOGY

## 2021-02-10 NOTE — TELEPHONE ENCOUNTER
Pt left some messages in [employer]. Pt is not happy with the notes from January. She would like to talk next week.     [facility]    Phone Message    May a detailed message be left on voicemail: yes     Reason for Call: Other: Pt left some messages in [employer]. Pt is not happy with the notes from January. She would like to talk next week     Action Taken: Other: Sent to Kaitlynn Shaw    Travel Screening: Not Applicable

## 2021-02-15 ENCOUNTER — THERAPY VISIT (OUTPATIENT)
Dept: PHYSICAL THERAPY | Facility: CLINIC | Age: 61
End: 2021-02-15
Payer: MEDICARE

## 2021-02-15 DIAGNOSIS — M99.05 SOMATIC DYSFUNCTION OF PELVIS REGION: ICD-10-CM

## 2021-02-15 DIAGNOSIS — N39.46 MIXED INCONTINENCE URGE AND STRESS (MALE)(FEMALE): Primary | ICD-10-CM

## 2021-02-15 PROCEDURE — 97140 MANUAL THERAPY 1/> REGIONS: CPT | Mod: GP | Performed by: PHYSICAL THERAPIST

## 2021-02-15 PROCEDURE — 97110 THERAPEUTIC EXERCISES: CPT | Mod: GP | Performed by: PHYSICAL THERAPIST

## 2021-02-16 ENCOUNTER — VIRTUAL VISIT (OUTPATIENT)
Dept: PSYCHIATRY | Facility: CLINIC | Age: 61
End: 2021-02-16
Attending: PSYCHOLOGIST
Payer: MEDICARE

## 2021-02-16 DIAGNOSIS — F41.9 ANXIETY: ICD-10-CM

## 2021-02-16 DIAGNOSIS — F39 MOOD DISORDER (H): Primary | ICD-10-CM

## 2021-02-16 PROCEDURE — 90837 PSYTX W PT 60 MINUTES: CPT | Mod: 95 | Performed by: PSYCHOLOGIST

## 2021-02-22 ENCOUNTER — THERAPY VISIT (OUTPATIENT)
Dept: PHYSICAL THERAPY | Facility: CLINIC | Age: 61
End: 2021-02-22
Payer: MEDICARE

## 2021-02-22 DIAGNOSIS — M25.511 RIGHT SHOULDER PAIN, UNSPECIFIED CHRONICITY: ICD-10-CM

## 2021-02-22 DIAGNOSIS — M99.05 SOMATIC DYSFUNCTION OF PELVIS REGION: ICD-10-CM

## 2021-02-22 DIAGNOSIS — N39.46 MIXED INCONTINENCE URGE AND STRESS (MALE)(FEMALE): Primary | ICD-10-CM

## 2021-02-22 PROCEDURE — 97530 THERAPEUTIC ACTIVITIES: CPT | Mod: GP | Performed by: PHYSICAL THERAPIST

## 2021-02-22 PROCEDURE — 97110 THERAPEUTIC EXERCISES: CPT | Mod: GP | Performed by: PHYSICAL THERAPIST

## 2021-02-22 NOTE — PROGRESS NOTES
"    [facility]    Dialectical Behavior Therapy Program    DBT Individual Psychotherapy Progress Note    Date: Jan 27, 2021    Patient Name: Katie Griffiths     Patient Pronouns: She/Her    Patient MRN: [id number]    Provider: Kaitlynn Shaw, PhD LP    Procedure: Individual DBT session    Appointment Duration:55 minutes)    Diagnosis:Bipolar , Generalized Anxiety Disorder, Emotional Dysregulation    Type of service:  video visit for psychotherapy  Time of service:    Date:1/27/2021    Video Start Time:  10:05      Video End Time:  11am  Reason for video visit:  Patient unable to travel due to Covid-19  Originating Site (patient location):  [facility]   Location- Patient's home  Distant Site (provider location):  Remote location  Mode of Communication:  Video Conference via Zoom  Consent:  Patient has given verbal consent for video visit?: Yes      Pre-DBT Session: Reviewed  The diary card and \"target Behaviors\"   Diary Card Completed Before Session? She is starting to do the diary card now..     Patient Used Phone Coaching Since Last Session: No  Provide description if pt reported any suicidal intent/bx in call/refer to phone coaching note.    Primary Targets Addressed in This Session:  Other: orientation to DBT    [id number] text for chain analysis: Behavior chain analysis completed on target problem behavior--NO    Secondary Targets Addressed in This Session:   Emotion regulation, Self-validation, Emotion experiencing    DBT Strategies used in This Session:    Validation  Commitment strategies  Irreverent communication     Interactive Complexity Code Was Used (70471): No. Interactive complexity is appropriate for this visit due to need to manage maladaptive communication (related to high anxiety, high reactivity, repeated questions or disagreement) among participants that complicates delivery of care and caregiver emotion/ behavior that interferes with " implementation of the treatment plan.    Behavioral Observations/Mental Status: Patient arrived on time to session. Patient was well groomed. Eye contact was good. Mood today was friendly. Observed affect was full range. Speech was regular rate and rhythm. Thought process was Logical and Linear. Patient was actively engaged in session.    Risk Assessment: The patient has the following risk and protective factors:     Risk factors: age  Protective factors:  Cultural or Scientologist beliefs discouraging suicide and Is a parent    Based on risk and protective factors, patient is assessed to be at the following levels of acute and chronic risk.    Acute Risk: Low Acute Risk (i.e., no current suicidal intent, specific plan, preparatory behaviors, and high confidence in patient's ability to maintain safety).  Chronic Risk: Low Chronic Risk (i.e., evidence of consistently enduring stressors without suicidal ideation)    Behavioral Assignments:  1) Complete DBT Diary Card daily  2) Complete DBT Skills Group homework--when she starts group on 2/22@ 2:30pm.-4:30pm Mondays.  3) Notice triggers to anxiety--what is she telling herself before the anxiety comes.      Plan: Patient will continue in full-model, outpatient DBT program until completion of one full-round of DBT skills/the end of their 6-month treatment agreement.     Treatment Plan Completed: 1/27/2021  Treatment plan Review DATE:  4/27/2021

## 2021-02-23 ENCOUNTER — VIRTUAL VISIT (OUTPATIENT)
Dept: PSYCHIATRY | Facility: CLINIC | Age: 61
End: 2021-02-23
Attending: PSYCHOLOGIST
Payer: MEDICARE

## 2021-02-23 DIAGNOSIS — F41.9 ANXIETY: ICD-10-CM

## 2021-02-23 DIAGNOSIS — F39 MOOD DISORDER (H): Primary | ICD-10-CM

## 2021-02-23 PROCEDURE — 90837 PSYTX W PT 60 MINUTES: CPT | Mod: 95 | Performed by: PSYCHOLOGIST

## 2021-03-01 ENCOUNTER — VIRTUAL VISIT (OUTPATIENT)
Dept: PSYCHIATRY | Facility: CLINIC | Age: 61
End: 2021-03-01
Attending: PSYCHOLOGIST
Payer: MEDICARE

## 2021-03-01 DIAGNOSIS — F31.9 BIPOLAR AFFECTIVE DISORDER, REMISSION STATUS UNSPECIFIED (H): Primary | ICD-10-CM

## 2021-03-01 PROCEDURE — 90853 GROUP PSYCHOTHERAPY: CPT | Mod: 95 | Performed by: PSYCHOLOGIST

## 2021-03-01 NOTE — PROGRESS NOTES
[facility]    Dialectic Behavior Therapy Clinic     Adult DBT Skills Group     DBT (Dialectic Behavior Therapy) is a cognitive behavioral therapy that includes skills group, which uses didactics, modeling, behavior rehearsal, and homework exercises to aid patients in acquiring new skills and the opportunity to practice new behaviors.    Date of Service: Mar 1, 2021  Group Length: 120 minutes  Start time: 2:30   End time: 4:30  Number of Participants: 10  Group Facilitators: Jennifer Huggins PhD, LP and Marcellus Miles MD    Client: Katie Griffiths  Pronouns: She/Her/Hers/Herself  YOB: 1960  MRN: [id number]    Type of service:  video visit for group therapy  Reason for video visit:  Patient unable to travel due to Covid-19  Originating Site (patient location):  [facility]   Location- Patient's home  Distant Site (provider location):  Remote location  Mode of Communication:  Video Conference via Zoom  Consent:  Patient has given verbal consent for video visit?: Yes     Mindfulness: Cathy Felipe Self Compassion Break  Homework Reviewed: Mindfulness worksheets 3 and 4  Group Topic for Today: Mindfulness How Skills  Homework Assigned: Mindfulness worksheets 5 or 5a    Assessment: Patient was on time for group. No homework as today was her first group. Patient was engaged in homework review and was engaged in the didactic portion of group. Mood appeared Euthymic. Katie shared that she has done some work on her diary card, despite this being her first group session.     Diagnosis:   Encounter Diagnosis   Name Primary?     Bipolar affective disorder, remission status unspecified (H) Yes       Plan: Continue in full-model outpatient DBT program.       -----  I was present for the entirety of this psychotherapy group and I agree with the above progress note and plan.    Jennifer Huggins, PhD, LP  Clinical Psychologist  Mar 1, 2021

## 2021-03-01 NOTE — NURSING NOTE
Dermatology Rooming Note    Katie Griffiths's goals for this visit include:   Chief Complaint   Patient presents with     Derm Problem     Katie is here to get a spot on the upoer lip rechecked - states that it has stayed the same     Alicja Napier, EMT     [facility]  PAIN [facility]NAGEMENT     [address] [place] [address] - Sue GONZALEZ  Ph:  [phone]  Fax:  [phone]    PATIENT INSTRUCTIONS FOR PROCEDURES   Facet Injections, Medial Branch Injections, Epidural Injections, Sacroiliac Joint Injections, Bursa Injection    1. Do not take herbal medications for 7 days prior to the procedure (e.g. Chelsy, Ecotrin, Empirin, Excedrin, Equagesic, synalogos-DC, BC powder, Gingko, Garlic, Fish Oil). 81mg aspirin may need to be stopped, but ask Dr. Mariola Marcial or Primary Care Provider before doing so. 2. If you take PRADAXA or PLAVIX you must stop the medication 7 days prior to the injection. Coumadin/Warfarin should be stopped 5 days prior to the procedure. If you are on LOVENOX, do not have an injection of Lovenox 24 hours before a procedure. If you are on other blood thinners, ask Dr. Mariola Marcial or his staff when they should be stopped. PLEASE discontinue these medications ONLY if you have permission from your primary care provider/physician. However, if you are having a Sacroiliac Joint Injection or Bursa Injection, this may not apply. Please confirm with Dr. Mariola Marcial. 3. Do not take any anti-inflammatory medications 7 days before the procedure (Ibuprofen, Advil, Meloxicam, Motrin, etc.)  4. Take all other medications per your normal medication routine. If you are diabetic and take insulin please review with nurse for dosing instructions. 5. Meals - A light meal is acceptable anytime before the procedure, unless sedation is being used. If sedation is being used, please do not have anything to eat or drink for 8 hours. If you do, then the procedure may need to rescheduled. This includes no gum or hard candy. 6. PLEASE BE ON TIME. You should plan to arrive 30 minutes prior to your scheduled appointment. Please be here 1 hour prior to procedure if you have been told you will be receiving sedation.    7. Please HAVE A RESPONSIBLE ADULT TO DRIVE YOU HOME.  IF YOU ARE USING A TRANSPORTATION SERVICE YOU WILL NEED AN ADULT TO ACCOMPANY YOU HOME.  8. You will be asked to sign a consent form on the day of the procedure. 9. Please advise the physician if you have any drug allergies, especially dye or anesthetics, or if you take blood thinning medications or have a cardiac device (Pacemaker, Defibrillator)  10. The procedure generally ranges from 15 to 30 minutes. Additional recovery time is usually 10 to 30 minutes. Dr. Queen Benito will carefully walk you through the procedure. 11. We strive to stay on schedule for all patients having procedures. As such on procedure days, please limit questions/concerns to only the procedure that is being done. All medication refill questions and other questions should be addressed over the phone or on a different visit. 12. For Spinal Cord Stimulator Trial Patients, Implant Patients, and patients having a discogram.  Sedation will likely be used. As such please do not have anything to eat or drink 8 hours before. This includes no gum or hard candy please. However, you may take some of your normal medications with a sip of water (ie, blood pressure medications)   Also, the night before the procedure please use a soap that is provided HIBIC[employer]ENS. Please take a shower with this, the night before the procedure and the morning of the procedure. Only use this if you are not allergic to it. Only apply this to the areas that are going to be operated on and keep it on for at least 15min. Do not contact eyes or groin region with this soap. Also make certain that if you have hair in the lower back region (or where the procedure will be done) that you shave it the night before the procedure. All of this helps reduce the chance of any infections, as such is very important. 13. For patients having Radiofreqency Ablation. Sedation will likely be used.    As such please do not have anything to eat or drink 8 hours before, again this includes no gum or hard candy please. However, you may take some of your normal medications with a sip of water (ie, blood pressure medications)   Also make certain that if you have hair in the lower back/neck region (or where the procedure will be done) that you shave it the night before the procedure. All of this helps reduce the chance of any infections, as such is very important. 14. Please call our office if you have a cough with drainage, runny nose with drainage, temperature that is elevated or are currently taking antibiotics. There may be a chance we may not be able to do the procedure if you have any of these. 15. DISCOGRAM PATIENTS-all sedation instructions above apply. In addition, you will need your  to take you to the hospital for a CT scan immediately after the procedure. DURING THE PROCEDURE          The injection takes just a few minutes. But extra time is needed to get ready. Â· Monitoring devices may be attached to your chest or side. These devices measure your heart rate, breathing, and blood pressure. Â· You lie on your stomach or side, depending on where the injection will be given. Your back is cleaned and may be covered with sterile towels. Â· Medicine is given to numb the skin near the injection site. Â· If fluoroscopy (x-ray imaging) is to be used, a contrast âdyeâ may be injected into your back. This helps get a better image. A local anesthetic (for numbing) or steroids (for reducing inflammation), or both are injected sometimes. RISKS OF PROCEDURE/SURGERY  As with most medical procedures, there are risks associated with procedures, which are usually less than 1% of the time. These risks include: infection, bleeding, nerve damage,procedure not working,increased pain, paralysis, meningitis, death, allergic reactions, need for surgery, unknown risks, and possibly requiring further injections and/or surgery.   Every reasonable effort will be made to minimize these risks.    AFTER PROCEDURE   1. You may resume taking your normal medications once the procedure is complete. Typically if you are taking blood thinners, wait at least 12-24 hours before restarting them. Ask Dr. Aleyda Cheatham if you have a question about this. 2. You will likely have a bandage over your injection site. Apply ice to the area of the injection for 20 minutes at a time, 4 times daily, during the first 24 hours after the procedure as needed for any discomfort and to reduce local muscle soreness. 3. Drink plenty of fluids. 4. Avoid strenuous exercise for 1 to 2 days, even if you are feeling great. Physical therapy should be avoided for at least 48 hours after an injection. Let your therapist know. 5. It is normal to have some soreness at the site. This is likely because the numbing agent is wearing off. Just continue to ice the area, try to avoid heat. 6. You may experience low back or neck discomfort for up to seven days. In              addition, you may have increased leg pain or neck pain after the injection. This is normal as the steroids take about 7-10 days to work sometimes. 7.  For SPINAL CORD STIMULATOR TRIAL PATIENTS or STIMULATOR IMPLANT Patients. If you have staples after a surgery, please do not get this wet. You should only sponge bathe with a Tegaderm and Gauze pad over the wound sites, but ONLY after 3 days after the surgery. Also do not forget to take your antibiotics. Depending on how your wounds are healing, staples maybe kept in place for 2 weeks. It is important to keep the wound area clean and dry. If the hospital did not give you Tegaderm and Gauze, please make sure to ask for them or purchase them from a pharmacy. Also it is very important. For up to a month, no excessive twisting, turning, lifting above your head or strenuous activity. ABSOLUTELY NO SEXUAL ACTIVITY OF ANY KIND FOR 5 DAYS. If you do this, your stimulator may move.   Please note, avoid sexual activity for the duration of the stimulator trial and for up to 3 weeks after the stimulator implant. Below are reasons to call our office after a procedure. If the office is closed or an answer is not received and you feel one of these issues is occurring, please call 911 and/or go to your nearest Emergency Room. â¢ If you have increased pain that is intolerable in nature  â¢ Fevers above 100.4 degrees Fahrenheit  â¢ If you have bowel or bladder problems (you are defecating or urinating on yourself without control) and this is new in nature  â¢ If you have complete numbness in the groin region and/or if you cannot move a leg or an arm    ++++++    Procedure Appointment Locations    ++++++    Please verify the location of your procedure. Dr. Dominic Sanders is at the [address] [place] [address] next to [email or web address] [facility] [email or web address]; we really want you to be at the right place at the right time. So please take some time now to verify the location of your next appointment. [address] [place] [address]  Ph:  [phone]  Fax:  [phone]    We look forward to help you live well! You may receive a survey after this visit. We always strive to provide the best care possible and hope that we can always be rated highly. If you had any problems, please  provide that feedback directly over the phone to the  at [phone]. This way it can be addressed promptly to help us continually improve. Thank you for your time.     Sincerely,  Dr. Dominic Sanders  and Staff

## 2021-03-02 ENCOUNTER — VIRTUAL VISIT (OUTPATIENT)
Dept: PSYCHIATRY | Facility: CLINIC | Age: 61
End: 2021-03-02
Attending: NURSE PRACTITIONER
Payer: MEDICARE

## 2021-03-02 ENCOUNTER — TELEPHONE (OUTPATIENT)
Dept: PSYCHIATRY | Facility: CLINIC | Age: 61
End: 2021-03-02

## 2021-03-02 DIAGNOSIS — F41.9 ANXIETY: ICD-10-CM

## 2021-03-02 DIAGNOSIS — F39 MOOD DISORDER (H): ICD-10-CM

## 2021-03-02 PROCEDURE — 99213 OFFICE O/P EST LOW 20 MIN: CPT | Mod: 95 | Performed by: NURSE PRACTITIONER

## 2021-03-02 RX ORDER — MIRTAZAPINE 15 MG/1
15 TABLET, FILM COATED ORAL AT BEDTIME
Qty: 30 TABLET | Refills: 0 | Status: SHIPPED | OUTPATIENT
Start: 2021-03-02 | End: 2021-03-08

## 2021-03-02 RX ORDER — BUPROPION HYDROCHLORIDE 300 MG/1
300 TABLET ORAL EVERY MORNING
Qty: 90 TABLET | Refills: 0 | Status: SHIPPED | OUTPATIENT
Start: 2021-03-02 | End: 2021-06-01

## 2021-03-02 RX ORDER — ZIPRASIDONE HYDROCHLORIDE 60 MG/1
60 CAPSULE ORAL 2 TIMES DAILY WITH MEALS
Qty: 180 CAPSULE | Refills: 0 | Status: SHIPPED | OUTPATIENT
Start: 2021-03-02 | End: 2021-06-01

## 2021-03-02 RX ORDER — GABAPENTIN 300 MG/1
CAPSULE ORAL
Qty: 540 CAPSULE | Refills: 0 | Status: SHIPPED | OUTPATIENT
Start: 2021-03-02 | End: 2021-06-01

## 2021-03-02 ASSESSMENT — PAIN SCALES - GENERAL: PAINLEVEL: NO PAIN (0)

## 2021-03-02 NOTE — PROGRESS NOTES
"Video- Visit Details  Type of service:  video visit for medication management  Time of service:    Date:  03/02/2021    Video Start Time:  8:38 AM        Video End Time:  8:58am    Reason for video visit:  Patient unable to travel due to Covid-19  Originating Site (patient location):  [facility]   Location- Patient's home  Distant Site (provider location):  Remote location  Mode of Communication:  Video Conference via [email or web address]  Consent:  Patient has given verbal consent for video visit?: Yes     VIDEO VISIT  Katie Griffiths is a 60 year old patient who is being evaluated via a billable video visit.      The patient has been notified of following:   \"This video visit will be conducted via a call between you and your physician/provider. We have found that certain health care needs can be provided without the need for an in-person physical exam. This service lets us provide the care you need with a video conversation. If a prescription is necessary we can send it directly to your pharmacy. If lab work is needed we can place an order for that and you can then stop by our lab to have the test done at a later time. Insurers are generally covering virtual visits as they would in-office visits so billing should not be different than normal.  If for some reason you do get billed incorrectly, you should contact the billing office to correct it and that number is in the AVS .    Video Conference to be completed via:  [email or web address]     Patient has given verbal consent for video visit?:  Yes    Patient would prefer that any video invitations be sent by: Send to e-mail at: [email or web address]      How would patient like to obtain AVS?:  [email or web address]    AVS SmartPhrase [PsychAVS] has been placed in 'Patient Instructions':  Yes        [facility]  Psychiatry Clinic  PSYCHIATRIC PROGRESS NOTE       Katie Griffiths is a 60 year old female who prefers the name Katie and pronoun she, her.  Therapist: " "Amirah Tejeda [facility]               PCP: Buddy Nolan  Other Providers: None    Pertinent Background:  See previous notes.  Psych critical item history includes Hospitalized 3 times with most recent occurring in 2007 after overdose attempt.  Numerous medication trials.  Possible bipolar diagnosis.     Interim History     [4, 4]     The patient is a good historian, reports good treatment adherence and was last seen 1/26/21.  Since the last visit, when asked how she is doing, Katie reports \"ok, I'm here\" but laughed afterwards.  Klonopin 0.25mg started by sleep medicine.  Sleep study completed and follow-up scheduled for late March.  She reports that Klonopin was helpful the first two nights but now it is \"hit or miss.\"  Informed Katie that since Sleep Medicine started Klonopin that I will request provider to continue to manage moving forward.  She is requesting that Mirtazapine be decreased due to concerns about weight and efficacy.  Continues to endorse sedentary lifestyle. Advised her to walk twice per day to improve mood and sleep.  Anxiety/worry continues and will addressed during therapy.        1/26/21: Katie reports \"I'm doing.\"  Reports only sleeping 2 hours last night.  However, she eports sleeping 6.5 hours the previous several nights.   She is able to share that she was overwhelmed with financial concerns and is impacting her sleep.  Current concern is being unable to stay asleep.  Continues to lead fairly sedentary lifestyle.  Reports \"there's nothing to do.\"  She is confident that if activities were available she would engage.  Discussed how employment could help her better manage her mental health.  She is hesitant to obtain employment as it may impact social security disability payments.    12/28/20: Katie again reports \"I'm hanging in there.\"  Katie called clinic approximately 2 weeks ago with concerns about her sleep.  Since then she reports her sleep has \"evened out.\"  Reports \"having " "to go out of my way to make pattern for sleep.\"  Currently getting about 6 hours of sleep per day.  Does report some concern with daytime sedation.  She will decrease OTC Melatonin as has grogginess has worsened with Melatonin. Continues to reports concern with hair loss and will connect with endocrinology.    12/9/20: Katie reports \"I'm hanging in there.\"  She is tolerating Mirtazapine and no longer concerned about hair loss.  Sleep continues to vary.  Ranges from 5-7 hours per night.  Mood stable.  She does not want to adjust medications and is requesting 90 day supply.      11/10/20: Katie is \"ok\"  Mood stable.  She is requesting a decrease in Mirtazapine as she is associating it with hair loss.  Not listed as a typical side effect.  Katie reports she is walking with her father most mornings.  Describes sleep as \"so-so.\"  Averaging 5 hours of sleep per night.  Continues to see therapist regularly.     10/14/20:  Katie reports again \"I am hanging in there.\" Persistent low mood persists.  Reports she had to switch thyroid medications due to her's being recalled.  Sleep initially worsened with new drug but she is now sleeping 5-6 hours per night.  Katie reports that her housing is more stable than at last appointment.  Anxiety has improved as a result.  Katie also reports she has been more active as she is helping her father prepare his boat for winter.  She also continues to see her therapist regularly.      9/15/20: Katie continues to report \"I am hanging in there.\" Sleep continues to be main concern. Katie reports she has been waking at 2am and unable to fall back asleep for the past 2 nights.  Katie does report that Gabapentin is helpful in that she can relax when she initially lays down for bed. \"I'm getting beneficial sleep more often than I am not.\"   Continues to endorse periodic low mood and anxiety.  Katie also has not used propranolol for PRN anxiety will discontinue. She does endorse fairly constant worry about " "possible homelessness.  Katie also brought up Celexa as it was helpful in management of anxiety; however in May she stated it was not effective and maybe causing sedation.  Continues to see therapist regularly.     8/18/20: Katie reports she is continuing \"hanging in there.\"  Katie reports that gabapentin has been helpful with sleep and she is \"tossing and turning less.\"  She believes sleep duration may have increased.  Gabapentin also appears to be helping with daytime anxiety.   Mood stable.  No significant concerns with depression or mood fluctuations.     7/30/20: Katie reports she is \"hanging in there.\"  Trazodone reports trazodone 100mg was not effective and led to diarrhea.  Discussed possible hypomania episode as she was frequently calling various providers at odd hours and having difficulty sleeping.  Does not appear to meet criteria at this time but will continue to monitor.  Will try to increase dose of Mirtazapine dose and see if helps.  Also will start Gabapentin for anxiety and sleep.  Katie continues to be quite sedentary during day.  Fortunately she was on a day trip to [place] today with her parents.  Advised to follow sleep medicine recommendations    7/7/20: Katie reports she is \"hanging in there.\"  Depression and anxiety persist.  Mood stable.  She is residing at her parent's home for the past couple days.  She using office and sleeping on twin bed. She is looking for independent living but is running into issues with her income, age, and credit history. Completed sleep consultation and behavioral change recommendations were given.  Unclear if followed recommendations.  Stress has decreased since then due to moving in with her parents but still persists due to housing issue.  Advised to take Propranolol when feels stressed.  Continues to endorse sedentary lifestyle.  Advised to go for walks in morning and early evening to develop \"sleep appetite.\"  She agreed.  She also does not want to start any " sleep aids that may lead to drowsiness and weight gain.  Mood, motivation, and energy low.  Recommended increase Wellbutrin to 450mg but she again is hesitant.      New Address:  [id number]  [place] [address]    6/9/20: Katie is currently trying to maintain housing.  Reports her current situation living with her son has been extremely stressful.  Currently looking for apartments in [place] and [place].  She working with [facility] social work team.  Katie is very hesitant to change her medications.  She would like to obtain better grasp of her housing before making medication adjustments. Will consult with PCP about possible Propranolol trial.       5/6/20:  Katie continues to endorse stress regarding bankruptcy.  She has phone hearing with  at the end of the month of May.  Katie continues to report lack of activity which may be attributable to lifestyle changes required during pandemic, but does report overall improvement with increase in Wellbutrin.  Sleep also continues to be an issue but she also reports it continues to improve.  Sleep medicine consultation in 2 months. No concerns with elevated mood.  Katie did not want to adjust her medications     3/31/20: when asked how she is doing, Katie reports that switch from Celexa to Wellbutrin has been beneficial in regards to mood, energy, and motivation.  No concerns with elevated mood.  Sleep continues to be an issue.  Did not worsen with addition of Wellbutrin.  Has sleep study in 3 months.  Regarding psychosocial stressors, Katie is babysitting her grandchildren as their mother is sick and is going through bankruptcy.       3/4/20: Katie again starts laughing.  Mood is stable overall but low.  Continues to endorse anhedonia, energy, and low mood. Sleep has improved since starting Remeron.  Reports sleeping 5 hours per night which results in continued daytime tiredness.    Continues to report that Celexa is not helpful and possible causing  "sedation    2/12/20: Katie shared that she is not sleeping.  She reports that she is getting 3 hours of sleep per night for past couple weeks. However, Katie did not look overly fatigued during appointment.  She also reports that her mood is quite low.  No concerns with shayla.  No goal directed behavior or impulsivity.  Katie\"s affect continues to be incongruent to her reported mood.  She is struggling to get household chores completed.  Continues to report sedentary lifestyle.  Educated on how exercise and activity can help with sleep.  Will stop Trazodone and start Mirtazapine for sleep.  If continues to be a problem, will refer to sleep medicine as sleep has been an issue for several years.  Also plan to switch Celexa as it does not appear to be managing her depression.      1/8/20: Katie's main concern today was weight gain.  Affect was quite bright today in spite of high PHQ-9 and reported persistent low mood. Reports positive holiday with her family.  Sleep continues to be inconsistent.  Mood stable with introduction of Geodon.  Will increase and discontinue Olanzapine.  Katie continues to consider a retrial of Depakote which was discontinued in past due to development of rash.      12/11/19: Katie reports her mood is stable but she is describes \"not feeling good or bad.\"  Difficult to discern if emotional blunting or lack of hypomania.  Thoughts continue to be linear and organized.  Speech normal.  Also reports sleeping continues to improve. Previous trials include Lithium (not effective?), Depakote (rash), and Abilify (side effects).  Katie would like to change medications today due to possible emotional blunting and weight gain (5lbs in past 2 weeks).      11/27/19: Increased HS Olanzapine to 5mg and Katie appears much more grounded.  Speech less pressured and mood more stable.  Thoughts more organized and linear.  Katie reports she also has noticed a significant difference.  She also reports she is sleeping much " better.  Concerned about weight gain but reports she is very sedentary.      Recent Symptoms:   Depression:  occasional low mood, anhedonia, low energy and insomnia  Elevated:  none  Psychosis:  none  Anxiety:  occasional worry  Panic Attack:  none  Trauma Related:  none     Recent Substance Use:  No changes reported        Social/ Family History      [1ea,1ea]            [per patient report]               Financial support-  social security disability  Children-  2 adult children  Living situation- Lives in house in [place] with son   Social/spiritial support-  limited/none  Feels safe at home-  Yes   Family history- None      Medical / Surgical History                                 Patient Active Problem List   Diagnosis     Bipolar disorder (H)     Hashimoto's thyroiditis     Incontinence of urine in female     Osteopenia     Reactive airway disease     Vitamin D deficiency     Overweight     Dizziness     Hyperlipidemia LDL goal <100     Right shoulder pain, unspecified chronicity     Somatic dysfunction of pelvis region     Mixed incontinence urge and stress (male)(female)       Past Surgical History:   Procedure Laterality Date     partial thyroidectomy       TONSILLECTOMY       TUBAL LIGATION          Medical Review of Systems         [2,10]   The remainder of the review of systems is noncontributory  Thyroid removed  Allergy    Quetiapine and Valproic acid  Current Medications        Current Outpatient Medications   Medication Sig Dispense Refill     albuterol (PROAIR HFA/PROVENTIL HFA/VENTOLIN HFA) 108 (90 Base) MCG/ACT inhaler Inhale 2 puffs into the lungs as needed for shortness of breath / dyspnea or wheezing 18 g 1     buPROPion (WELLBUTRIN XL) 300 MG 24 hr tablet Take 1 tablet (300 mg) by mouth every morning 90 tablet 0     gabapentin (NEURONTIN) 300 MG capsule Take 1 capsule (300mg) in morning and 1 capsule (300mg) in afternoon and 4 capsules (1200mg) near bedtime 540 capsule 0     levothyroxine  "(SYNTHROID/LEVOTHROID) 50 MCG tablet Take 1 tablet (50 mcg) by mouth daily 90 tablet 3     melatonin ER 3 MG tablet Take 2 tablets (6 mg) by mouth At Bedtime 60 tablet 0     mirtazapine (REMERON) 30 MG tablet Take 1 tablet (30 mg) by mouth At Bedtime 90 tablet 0     ziprasidone (GEODON) 60 MG capsule Take 1 capsule (60 mg) by mouth 2 times daily (with meals) 180 capsule 0     Vitals         [3, 3]   There were no vitals taken for this visit.   Mental Status Exam        [9, 14 cog gs]     Alertness: alert  and oriented  Appearance: casually groomed  Behavior/Demeanor: cooperative, pleasant and calm, with good  eye contact   Speech: regular rate and rhythm.  Language: no obvious problem  Psychomotor: normal or unremarkable  Mood: \"ok\"  Affect: somewhat restricted; was congruent to mood; was congruent to content  Thought Process/Associations: unremarkable  Thought Content:  Reports none;  Denies suicidal ideation and violent ideation  Perception:  Reports none;  Denies auditory hallucinations and visual hallucinations  Insight: adequate  Judgment: intact  Cognition: (6) does  appear grossly intact; formal cognitive testing was not done  Gait/Station and/or Muscle Strength/Tone: unable to assess    Labs and Data                          Rating Scales:    PHQ9    PHQ9 Today:  Not completed  PHQ-9 SCORE 12/18/2019   PHQ-9 Total Score MyChart 17 (Moderately severe depression)   PHQ-9 Total Score 17        Assessment      [m2, h3]     Unspecified Mood Disorder  Bipolar I Disorder (Hx)    [place] Prescription Monitoring Program [] was not checked today:  provider not managing any controlled medications.        Plan                                                                                                                     m2, h3     1) MEDICATION:  Continue Geodon 60mg BID with food  Decrease Mirtazapine to 15mg at bedtime for sleep and mood.  Continue Wellbutrin XL 300mg daily.  Consider increasing dose to 450mg "   Continue Gabapentin to 300 in morning and afternoon.  Continue 1200mg at bedtime   Continue  Levothyroxine 50mcg (prescribed by another provider).   Continue Melatonin 6mg    Klonopin 0.25mg started by sleep medicine per patient report    2) THERAPY:  Continue with current therapist  Continue DBT      RTC: 1 month     CRISIS NUMBERS:   Provided routinely in AVS.    Treatment Risk Statement:  The patient understands the risks, benefits, adverse effects and alternatives. Agrees to treatment with the capacity to do so. No medical contraindications to treatment. Agrees to call clinic for any problems. The patient understands to call 911 or go to the nearest ED if life threatening or urgent symptoms occur.     WHODAS 2.0  TODAY total score = N/A; [a 12-item WHODAS 2.0 assessment was not completed by the pt today and/or recorded in EPIC].       PROVIDER:  ADELINA Lieberman CNP

## 2021-03-02 NOTE — TELEPHONE ENCOUNTER
On March 2, 2021, at 7:51 AM, writer called patient at [phone] to confirm Virtual Visit. Writer unable to make contact with patient. Writer unable to leave detailed voice mail message due to no option. Penelope Chan, CMA

## 2021-03-03 ENCOUNTER — TELEPHONE (OUTPATIENT)
Dept: PSYCHIATRY | Facility: CLINIC | Age: 61
End: 2021-03-03

## 2021-03-03 NOTE — TELEPHONE ENCOUNTER
On 2/26/2021 the patient signed a PHI authorizing Person to Person communication with Dr. Sukhi Hare/[facility] for medical, scheduling and billing information.  Writer sent this document to scanning on 3/3/2021 and kept a copy in Psychiatry until scanning is complete/confirmed. ALEJANDRO Mcgilll, EMT

## 2021-03-08 ENCOUNTER — VIRTUAL VISIT (OUTPATIENT)
Dept: PSYCHIATRY | Facility: CLINIC | Age: 61
End: 2021-03-08
Attending: PSYCHOLOGIST
Payer: MEDICARE

## 2021-03-08 ENCOUNTER — THERAPY VISIT (OUTPATIENT)
Dept: PHYSICAL THERAPY | Facility: CLINIC | Age: 61
End: 2021-03-08
Payer: MEDICARE

## 2021-03-08 DIAGNOSIS — M25.511 RIGHT SHOULDER PAIN, UNSPECIFIED CHRONICITY: Primary | ICD-10-CM

## 2021-03-08 DIAGNOSIS — F31.9 BIPOLAR AFFECTIVE DISORDER, REMISSION STATUS UNSPECIFIED (H): Primary | ICD-10-CM

## 2021-03-08 PROBLEM — M99.05 SOMATIC DYSFUNCTION OF PELVIS REGION: Status: RESOLVED | Noted: 2020-11-30 | Resolved: 2021-03-08

## 2021-03-08 PROBLEM — N39.46 MIXED INCONTINENCE URGE AND STRESS (MALE)(FEMALE): Status: RESOLVED | Noted: 2020-11-30 | Resolved: 2021-03-08

## 2021-03-08 PROCEDURE — 97112 NEUROMUSCULAR REEDUCATION: CPT | Mod: GP | Performed by: PHYSICAL THERAPIST

## 2021-03-08 PROCEDURE — 97110 THERAPEUTIC EXERCISES: CPT | Mod: GP | Performed by: PHYSICAL THERAPIST

## 2021-03-08 PROCEDURE — 90853 GROUP PSYCHOTHERAPY: CPT | Mod: 95 | Performed by: PSYCHOLOGIST

## 2021-03-08 NOTE — PROGRESS NOTES
DISCHARGE REPORT    Progress reporting period is from [id number] to [id number]      SUBJECTIVE  Subjective changes noted by patient:  Pt reports she voided at 7 pm and then 8 pm then slept thru the night BUT has been able to wait thru 1st urge and wait until bedtime to void just once.. nocturia 0 x BUT she reports being somewhat dehydrated.      .      .   Changes in function:  Yes (See Goal flowsheet attached for changes in current functional level)  Adverse reaction to treatment or activity: None    OBJECTIVE  Objective: PERF score-4/10/10/10 concentric elevator has good control and eccentric has poor so she is going to focus on that.       ASSESSMENT/PLAN  Updated problem list and treatment plan:   Diagnosis 1:  UI  Impaired muscle performance - neuro re-education and home program  STG/LTGs have been met or progress has been made towards goals:  Yes (See Goal flow sheet completed today.)  Assessment of Progress: The patient has met all of their long term goals.  Self Management Plans:  Patient is independent in a home treatment program.  Patient is independent in self management of symptoms.    Katie continues to require the following intervention to meet STG and LTG's:  PT intervention is no longer required to meet STG/LTG.    Recommendations:  This patient is ready to be discharged from therapy and continue their home treatment program.

## 2021-03-08 NOTE — PROGRESS NOTES
[facility]    Dialectical Behavior Therapy Program    DBT Individual Psychotherapy Progress Note    Date: Feb 9, 2021    Patient Name: Katie Griffiths     Patient Pronouns: She/Her    Patient MRN: [id number]    Provider: Kaitlynn Shaw, PhD LP    Procedure: Individual DBT session    Appointment Duration:55 minutes)    Diagnosis:Bipolar , Generalized Anxiety Disorder, Emotional Dysregulation    Type of service:  video visit for psychotherapy  Time of service:    Date:2/9/2021    Video Start Time:  9:05      Video End Time:  10am  Reason for video visit:  Patient unable to travel due to Covid-19  Originating Site (patient location):  [facility]   Location- Patient's home  Distant Site (provider location):  Remote location  Mode of Communication:  Video Conference via Zoom  Consent:  Patient has given verbal consent for video visit?: Yes      Pre-DBT Session: BCAs, needing to expand covid [place] or reconnect with friends/family so she has some where to practice skills other than with her Parents.  Diary Card Completed Before Session?No--Received the diary card after session..     Patient Used Phone Coaching Since Last Session: No  Provide description if pt reported any suicidal intent/bx in call/refer to phone coaching note.    Primary Targets Addressed in This Session:  Other: orientation to DBT    Example text for chain analysis: Behavior chain analysis completed on target problem behavior--NO    Secondary Targets Addressed in This Session:   Emotion regulation, Self-validation, Emotion experiencing    DBT Strategies used in This Session:    Validation  Commitment strategies  Irreverent communication     Interactive Complexity Code Was Used (68905): No. Interactive complexity is appropriate for this visit due to need to manage maladaptive communication (related to high anxiety, high reactivity, repeated questions or disagreement) among participants that  complicates delivery of care and caregiver emotion/ behavior that interferes with implementation of the treatment plan.    Behavioral Observations/Mental Status: Patient arrived on time to session. Patient was well groomed. Eye contact was good. Mood today was friendly. Observed affect was full range. Speech was regular rate and rhythm. Thought process was Logical and Linear. Patient was actively engaged in session.    Risk Assessment: The patient has the following risk and protective factors:     Risk factors: age  Protective factors:  Cultural or Church beliefs discouraging suicide and Is a parent    Based on risk and protective factors, patient is assessed to be at the following levels of acute and chronic risk.    Acute Risk: Low Acute Risk (i.e., no current suicidal intent, specific plan, preparatory behaviors, and high confidence in patient's ability to maintain safety).  Chronic Risk: Low Chronic Risk (i.e., evidence of consistently enduring stressors without suicidal ideation)    Behavioral Assignments:  1) Complete DBT Diary Card daily  2) Complete DBT Skills Group homework  3) Notice triggers to anxiety--what is she telling herself before the anxiety comes/use ice.      Plan: Patient will continue in full-model, outpatient DBT program until completion of one full-round of DBT skills/the end of their 6-month treatment agreement.     Treatment Plan Completed:Next session

## 2021-03-08 NOTE — PROGRESS NOTES
[facility]    Dialectic Behavior Therapy Clinic     Adult DBT Skills Group     DBT (Dialectic Behavior Therapy) is a cognitive behavioral therapy that includes skills group, which uses didactics, modeling, behavior rehearsal, and homework exercises to aid patients in acquiring new skills and the opportunity to practice new behaviors.    Date of Service: Mar 8, 2021  Group Length: 120 minutes  Start time: 2:30   End time: 4:30  Number of Participants: 13  Group Facilitators: Jennifer Huggins, PhD, LP, Marcellus Mckeon MD and Marcellus Miles MD    Client: Katie Griffiths  Pronouns: She/Her/Hers/Herself  YOB: 1960  MRN: [id number]    Type of service:  video visit for group therapy  Reason for video visit:  Patient unable to travel due to Covid-19  Originating Site (patient location):  [facility]   Location- Patient's home  Distant Site (provider location):  Remote location  Mode of Communication:  Video Conference via Zoom  Consent:  Patient has given verbal consent for video visit?: Yes     Mindfulness: Draw by instruction  Homework Reviewed: Mindfulness worksheets 5 and/or 5A  Group Topic for Today: Emotion Regulation Handouts 1, 3, 4, 4A, 5  Homework Assigned: Emotion Regulation Handout 6, Worksheet 4 or 4A    Assessment: Patient was on time for group. Patient did not complete the homework assigned the previous week prior to arriving at group. Patient was engaged in homework review and was engaged in the didactic portion of group. Mood appeared Euthymic. Katie shared that she spent last week reading up on DBT skills, though had trouble doing her homework.    Diagnosis:   Encounter Diagnosis   Name Primary?     Bipolar affective disorder, remission status unspecified (H) Yes       Plan: Continue in full-model outpatient DBT program.     -----  I was present for the entirety of this psychotherapy group and I agree with the above progress note and  plan.    Jennifer Huggins, PhD,   Clinical Psychologist  Mar 8, 2021

## 2021-03-12 ENCOUNTER — TELEPHONE (OUTPATIENT)
Dept: PSYCHIATRY | Facility: CLINIC | Age: 61
End: 2021-03-12

## 2021-03-17 ENCOUNTER — MYC MEDICAL ADVICE (OUTPATIENT)
Dept: PSYCHIATRY | Facility: CLINIC | Age: 61
End: 2021-03-17

## 2021-03-17 NOTE — TELEPHONE ENCOUNTER
Patient last seen on March 2, 2021 and the plan was:    Continue Geodon 60mg BID with food  Decrease Mirtazapine to 15mg at bedtime for sleep and mood.  Continue Wellbutrin XL 300mg daily.  Consider increasing dose to 450mg   Continue Gabapentin to 300 in morning and afternoon.  Continue 1200mg at bedtime   Continue  Levothyroxine 50mcg (prescribed by another provider).   Continue Melatonin 6mg     Klonopin 0.25mg started by sleep medicine per patient report       Since that visit, she has requested that mirtazapine not be decreased as planned, and to continue at 22.5 mg at bedtime for now, but would eventually like to discontinue due to weight gain. She would also like to know the best way to taper off gabapentin. She would like to go back to taking citalopram and discontinue bupropion eventually. Today, she would like to know options for discontinuing ziprasidone and switching to lamotrigine.     Patient has a follow up appointment scheduled with ADELINA Corbett, on April 1, 2021.

## 2021-03-23 ENCOUNTER — TELEPHONE (OUTPATIENT)
Dept: PSYCHIATRY | Facility: CLINIC | Age: 61
End: 2021-03-23

## 2021-03-23 NOTE — TELEPHONE ENCOUNTER
Attempted to reach Dr. Shay @ sleep medicine as requested.  No answer.  Left message to call personal number due to working remotely

## 2021-03-25 NOTE — PROGRESS NOTES
[facility]    Dialectical Behavior Therapy Program    DBT Individual Psychotherapy Progress Note    Date: Feb 16, 2021    Patient Name: Katie Griffiths     Patient Pronouns: She/Her    Patient MRN: [id number]    Provider: Kaitlynn Shaw, PhD LP    Procedure: Individual DBT session    Appointment Duration:55 minutes)    Diagnosis:Bipolar , Generalized Anxiety Disorder, Emotional Dysregulation    Type of service:  video visit for psychotherapy  Time of service:    Date:2/9/2021    Video Start Time:  9:05      Video End Time:  10am  Reason for video visit:  Patient unable to travel due to Covid-19  Originating Site (patient location):  [facility]   Location- Patient's home  Distant Site (provider location):  Remote location  Mode of Communication:  Video Conference via Zoom  Consent:  Patient has given verbal consent for video visit?: Yes      Pre-DBT Session: Continue orient to DBT group She reports that she is struggling with  motivation  Diary Card Completed Before Session?No--Received the diary card after session..     Patient Used Phone Coaching Since Last Session: No  Provide description if pt reported any suicidal intent/bx in call/refer to phone coaching note.    Primary Targets Addressed in This Session:  Other: orientation to DBT    Example text for chain analysis: Behavior chain analysis completed on target problem behavior--NO    Secondary Targets Addressed in This Session:   Emotion regulation, Self-validation, Emotion experiencing    DBT Strategies used in This Session:    Validation  Commitment strategies  Irreverent communication     Interactive Complexity Code Was Used (01781): No. Interactive complexity is appropriate for this visit due to need to manage maladaptive communication (related to high anxiety, high reactivity, repeated questions or disagreement) among participants that complicates delivery of care and caregiver emotion/ behavior  that interferes with implementation of the treatment plan.    Behavioral Observations/Mental Status: Patient arrived on time to session. Patient was well groomed. Eye contact was good. Mood today was friendly. Observed affect was full range. Speech was regular rate and rhythm. Thought process was Logical and Linear. Patient was actively engaged in session.    Risk Assessment: The patient has the following risk and protective factors:     Risk factors: age  Protective factors:  Cultural or Rastafarian beliefs discouraging suicide and Is a parent    Based on risk and protective factors, patient is assessed to be at the following levels of acute and chronic risk.    Acute Risk: Low Acute Risk (i.e., no current suicidal intent, specific plan, preparatory behaviors, and high confidence in patient's ability to maintain safety).  Chronic Risk: Low Chronic Risk (i.e., evidence of consistently enduring stressors without suicidal ideation)    Behavioral Assignments:  1) Complete DBT Diary Card daily  2) Complete DBT Skills Group homework  3) Notice triggers to anxiety--what is she telling herself before the anxiety comes/use ice.      Plan: Patient will continue in full-model, outpatient DBT program until completion of one full-round of DBT skills/the end of their 6-month treatment agreement.     Treatment Plan Completed:Next session

## 2021-03-31 ENCOUNTER — IMMUNIZATION (OUTPATIENT)
Dept: NURSING | Facility: CLINIC | Age: 61
End: 2021-03-31
Payer: MEDICARE

## 2021-03-31 PROCEDURE — 0001A PR COVID VAC PFIZER DIL RECON 30 MCG/0.3 ML IM: CPT

## 2021-03-31 PROCEDURE — 91300 PR COVID VAC PFIZER DIL RECON 30 MCG/0.3 ML IM: CPT

## 2021-04-01 ENCOUNTER — VIRTUAL VISIT (OUTPATIENT)
Dept: PSYCHIATRY | Facility: CLINIC | Age: 61
End: 2021-04-01
Attending: NURSE PRACTITIONER
Payer: MEDICARE

## 2021-04-01 DIAGNOSIS — F39 MOOD DISORDER (H): ICD-10-CM

## 2021-04-01 DIAGNOSIS — F41.9 ANXIETY: ICD-10-CM

## 2021-04-01 PROCEDURE — 99213 OFFICE O/P EST LOW 20 MIN: CPT | Mod: 95 | Performed by: NURSE PRACTITIONER

## 2021-04-01 RX ORDER — MIRTAZAPINE 15 MG/1
22.5 TABLET, FILM COATED ORAL AT BEDTIME
Qty: 45 TABLET | Refills: 0 | Status: SHIPPED | OUTPATIENT
Start: 2021-04-01 | End: 2021-05-04

## 2021-04-01 ASSESSMENT — PAIN SCALES - GENERAL: PAINLEVEL: NO PAIN (0)

## 2021-04-01 NOTE — PROGRESS NOTES
"Video- Visit Details  Type of service:  video visit for medication management  Time of service:    Date:  04/01/2021    Video Start Time:  8:36 AM        Video End Time:  8:52am    Reason for video visit:  Patient unable to travel due to Covid-19  Originating Site (patient location):  [facility]   Location- Patient's home  Distant Site (provider location):  Remote location  Mode of Communication:  Video Conference via [email or web address]  Consent:  Patient has given verbal consent for video visit?: Yes     VIDEO VISIT  Katie Griffiths is a 60 year old patient who is being evaluated via a billable video visit.      The patient has been notified of following:   \"This video visit will be conducted via a call between you and your physician/provider. We have found that certain health care needs can be provided without the need for an in-person physical exam. This service lets us provide the care you need with a video conversation. If a prescription is necessary we can send it directly to your pharmacy. If lab work is needed we can place an order for that and you can then stop by our lab to have the test done at a later time. Insurers are generally covering virtual visits as they would in-office visits so billing should not be different than normal.  If for some reason you do get billed incorrectly, you should contact the billing office to correct it and that number is in the AVS .    Video Conference to be completed via:  [email or web address]    Patient has given verbal consent for video visit?:  Yes    Patient would prefer that any video invitations be sent by: Send to e-mail at: [email or web address]      How would patient like to obtain AVS?:  PayrollHerot    AVS SmartPhrase [PsychAVS] has been placed in 'Patient Instructions':  Yes         [facility]  Psychiatry Clinic  PSYCHIATRIC PROGRESS NOTE       Katie Griffiths is a 60 year old female who prefers the name Katie and pronoun she, her.  Therapist: " "Amirah Tejeda [facility]               PCP: Buddy Nolan  Other Providers: None    Pertinent Background:  See previous notes.  Psych critical item history includes Hospitalized 3 times with most recent occurring in 2007 after overdose attempt.  Numerous medication trials.  Possible bipolar diagnosis.     Interim History     [4, 4]     The patient is a good historian, reports good treatment adherence and was last seen 3/2/21.  Since the last visit, Katie reports she is ok.  Klonopin 0.5mg started for sleep by sleep medicine.  Katie reports it is effective as she is sleeping 7-7.5 hours per night.  Reports feeling \"flat.\"  Jointly decided to continue with current medications and assess in month to see if change in weather and improved sleep will improve mood.     3/2/21: When asked how she is doing, Katie reports \"ok, I'm here\" but laughed afterwards.  Klonopin 0.25mg started by sleep medicine.  Sleep study completed and follow-up scheduled for late March.  She reports that Klonopin was helpful the first two nights but now it is \"hit or miss.\"  Informed Katie that since Sleep Medicine started Klonopin that I will request provider to continue to manage moving forward.  She is requesting that Mirtazapine be decreased due to concerns about weight and efficacy.  Continues to endorse sedentary lifestyle. Advised her to walk twice per day to improve mood and sleep.  Anxiety/worry continues and will addressed during therapy.        1/26/21: Katie reports \"I'm doing.\"  Reports only sleeping 2 hours last night.  However, she eports sleeping 6.5 hours the previous several nights.   She is able to share that she was overwhelmed with financial concerns and is impacting her sleep.  Current concern is being unable to stay asleep.  Continues to lead fairly sedentary lifestyle.  Reports \"there's nothing to do.\"  She is confident that if activities were available she would engage.  Discussed how employment could help her " "better manage her mental health.  She is hesitant to obtain employment as it may impact social security disability payments.    12/28/20: Katie again reports \"I'm hanging in there.\"  Katie called clinic approximately 2 weeks ago with concerns about her sleep.  Since then she reports her sleep has \"evened out.\"  Reports \"having to go out of my way to make pattern for sleep.\"  Currently getting about 6 hours of sleep per day.  Does report some concern with daytime sedation.  She will decrease OTC Melatonin as has grogginess has worsened with Melatonin. Continues to reports concern with hair loss and will connect with endocrinology.    12/9/20: Katie reports \"I'm hanging in there.\"  She is tolerating Mirtazapine and no longer concerned about hair loss.  Sleep continues to vary.  Ranges from 5-7 hours per night.  Mood stable.  She does not want to adjust medications and is requesting 90 day supply.      11/10/20: Katie is \"ok\"  Mood stable.  She is requesting a decrease in Mirtazapine as she is associating it with hair loss.  Not listed as a typical side effect.  Katie reports she is walking with her father most mornings.  Describes sleep as \"so-so.\"  Averaging 5 hours of sleep per night.  Continues to see therapist regularly.     10/14/20:  Katie reports again \"I am hanging in there.\" Persistent low mood persists.  Reports she had to switch thyroid medications due to her's being recalled.  Sleep initially worsened with new drug but she is now sleeping 5-6 hours per night.  Katie reports that her housing is more stable than at last appointment.  Anxiety has improved as a result.  Katie also reports she has been more active as she is helping her father prepare his boat for winter.  She also continues to see her therapist regularly.      9/15/20: Katie continues to report \"I am hanging in there.\" Sleep continues to be main concern. Katie reports she has been waking at 2am and unable to fall back asleep for the past 2 nights.  Katie " "does report that Gabapentin is helpful in that she can relax when she initially lays down for bed. \"I'm getting beneficial sleep more often than I am not.\"   Continues to endorse periodic low mood and anxiety.  Katie also has not used propranolol for PRN anxiety will discontinue. She does endorse fairly constant worry about possible homelessness.  Katie also brought up Celexa as it was helpful in management of anxiety; however in May she stated it was not effective and maybe causing sedation.  Continues to see therapist regularly.     8/18/20: Katie reports she is continuing \"hanging in there.\"  Katie reports that gabapentin has been helpful with sleep and she is \"tossing and turning less.\"  She believes sleep duration may have increased.  Gabapentin also appears to be helping with daytime anxiety.   Mood stable.  No significant concerns with depression or mood fluctuations.     7/30/20: Katie reports she is \"hanging in there.\"  Trazodone reports trazodone 100mg was not effective and led to diarrhea.  Discussed possible hypomania episode as she was frequently calling various providers at odd hours and having difficulty sleeping.  Does not appear to meet criteria at this time but will continue to monitor.  Will try to increase dose of Mirtazapine dose and see if helps.  Also will start Gabapentin for anxiety and sleep.  Katie continues to be quite sedentary during day.  Fortunately she was on a day trip to [place] today with her parents.  Advised to follow sleep medicine recommendations    7/7/20: Katie reports she is \"hanging in there.\"  Depression and anxiety persist.  Mood stable.  She is residing at her parent's home for the past couple days.  She using office and sleeping on twin bed. She is looking for independent living but is running into issues with her income, age, and credit history. Completed sleep consultation and behavioral change recommendations were given.  Unclear if followed recommendations.  Stress has " "decreased since then due to moving in with her parents but still persists due to housing issue.  Advised to take Propranolol when feels stressed.  Continues to endorse sedentary lifestyle.  Advised to go for walks in morning and early evening to develop \"sleep appetite.\"  She agreed.  She also does not want to start any sleep aids that may lead to drowsiness and weight gain.  Mood, motivation, and energy low.  Recommended increase Wellbutrin to 450mg but she again is hesitant.      New Address:  [id number]  [place] [address]    6/9/20: Katie is currently trying to maintain housing.  Reports her current situation living with her son has been extremely stressful.  Currently looking for apartments in [place] and [place].  She working with [facility] social work team.  Katie is very hesitant to change her medications.  She would like to obtain better grasp of her housing before making medication adjustments. Will consult with PCP about possible Propranolol trial.       5/6/20:  Katie continues to endorse stress regarding bankruptcy.  She has phone hearing with  at the end of the month of May.  Katie continues to report lack of activity which may be attributable to lifestyle changes required during pandemic, but does report overall improvement with increase in Wellbutrin.  Sleep also continues to be an issue but she also reports it continues to improve.  Sleep medicine consultation in 2 months. No concerns with elevated mood.  Katie did not want to adjust her medications     3/31/20: when asked how she is doing, Katie reports that switch from Celexa to Wellbutrin has been beneficial in regards to mood, energy, and motivation.  No concerns with elevated mood.  Sleep continues to be an issue.  Did not worsen with addition of Wellbutrin.  Has sleep study in 3 months.  Regarding psychosocial stressors, Katie is babysitting her grandchildren as their mother is sick and is going through bankruptcy.       3/4/20: Katie " "again starts laughing.  Mood is stable overall but low.  Continues to endorse anhedonia, energy, and low mood. Sleep has improved since starting Remeron.  Reports sleeping 5 hours per night which results in continued daytime tiredness.    Continues to report that Celexa is not helpful and possible causing sedation    2/12/20: Katie shared that she is not sleeping.  She reports that she is getting 3 hours of sleep per night for past couple weeks. However, Katie did not look overly fatigued during appointment.  She also reports that her mood is quite low.  No concerns with shayla.  No goal directed behavior or impulsivity.  Katie\"s affect continues to be incongruent to her reported mood.  She is struggling to get household chores completed.  Continues to report sedentary lifestyle.  Educated on how exercise and activity can help with sleep.  Will stop Trazodone and start Mirtazapine for sleep.  If continues to be a problem, will refer to sleep medicine as sleep has been an issue for several years.  Also plan to switch Celexa as it does not appear to be managing her depression.      1/8/20: Katie's main concern today was weight gain.  Affect was quite bright today in spite of high PHQ-9 and reported persistent low mood. Reports positive holiday with her family.  Sleep continues to be inconsistent.  Mood stable with introduction of Geodon.  Will increase and discontinue Olanzapine.  Katie continues to consider a retrial of Depakote which was discontinued in past due to development of rash.      12/11/19: Katie reports her mood is stable but she is describes \"not feeling good or bad.\"  Difficult to discern if emotional blunting or lack of hypomania.  Thoughts continue to be linear and organized.  Speech normal.  Also reports sleeping continues to improve. Previous trials include Lithium (not effective?), Depakote (rash), and Abilify (side effects).  Katie would like to change medications today due to possible emotional blunting " and weight gain (5lbs in past 2 weeks).      11/27/19: Increased HS Olanzapine to 5mg and Katie appears much more grounded.  Speech less pressured and mood more stable.  Thoughts more organized and linear.  Katie reports she also has noticed a significant difference.  She also reports she is sleeping much better.  Concerned about weight gain but reports she is very sedentary.      Recent Symptoms:   Depression:  occasional low mood, anhedonia and low energy  Elevated:  none  Psychosis:  none  Anxiety:  occasional worry  Panic Attack:  none  Trauma Related:  none     Recent Substance Use:  No changes reported        Social/ Family History      [1ea,1ea]            [per patient report]               Financial support-  social security disability  Children-  2 adult children  Living situation- Lives in house in [place] with son   Social/spiritial support-  limited/none  Feels safe at home-  Yes   Family history- None      Medical / Surgical History                                 Patient Active Problem List   Diagnosis     Bipolar disorder (H)     Hashimoto's thyroiditis     Incontinence of urine in female     Osteopenia     Reactive airway disease     Vitamin D deficiency     Overweight     Dizziness     Hyperlipidemia LDL goal <100     Right shoulder pain, unspecified chronicity       Past Surgical History:   Procedure Laterality Date     partial thyroidectomy       TONSILLECTOMY       TUBAL LIGATION          Medical Review of Systems         [2,10]   The remainder of the review of systems is noncontributory  Thyroid removed  Allergy    Quetiapine and Valproic acid  Current Medications        Current Outpatient Medications   Medication Sig Dispense Refill     albuterol (PROAIR HFA/PROVENTIL HFA/VENTOLIN HFA) 108 (90 Base) MCG/ACT inhaler Inhale 2 puffs into the lungs as needed for shortness of breath / dyspnea or wheezing 18 g 1     buPROPion (WELLBUTRIN XL) 300 MG 24 hr tablet Take 1 tablet (300 mg) by mouth every  "morning 90 tablet 0     clonazePAM (KLONOPIN) 0.5 MG tablet Take 0.5 mg by mouth At Bedtime       gabapentin (NEURONTIN) 300 MG capsule Take 1 capsule (300mg) in morning and 1 capsule (300mg) in afternoon and 4 capsules (1200mg) near bedtime 540 capsule 0     levothyroxine (SYNTHROID/LEVOTHROID) 50 MCG tablet Take 1 tablet (50 mcg) by mouth daily 90 tablet 3     melatonin ER 3 MG tablet Take 2 tablets (6 mg) by mouth At Bedtime 60 tablet 0     mirtazapine (REMERON) 15 MG tablet Take 1.5 tablets (22.5 mg) by mouth At Bedtime 45 tablet 0     ziprasidone (GEODON) 60 MG capsule Take 1 capsule (60 mg) by mouth 2 times daily (with meals) 180 capsule 0     Vitals         [3, 3]   There were no vitals taken for this visit.   Mental Status Exam        [9, 14 cog gs]     Alertness: alert  and oriented  Appearance: casually groomed  Behavior/Demeanor: cooperative, pleasant and calm, with good  eye contact   Speech: normal.  Language: good  Psychomotor: normal or unremarkable  Mood: \"ok\"  Affect: appropriate; was congruent to mood; was congruent to content  Thought Process/Associations: unremarkable  Thought Content:  Reports none;  Denies suicidal ideation and violent ideation  Perception:  Reports none;  Denies auditory hallucinations and visual hallucinations  Insight: adequate  Judgment: intact  Cognition: (6) does  appear grossly intact; formal cognitive testing was not done  Gait/Station and/or Muscle Strength/Tone: unable to assess    Labs and Data                          Rating Scales:    PHQ9    PHQ9 Today:  Not completed  PHQ-9 SCORE 12/18/2019   PHQ-9 Total Score MyChart 17 (Moderately severe depression)   PHQ-9 Total Score 17        Assessment      [m2, h3]     Unspecified Mood Disorder  Bipolar I Disorder (Hx)    [place] Prescription Monitoring Program [] was not checked today:  provider not managing any controlled medications.        Plan                                                                               "                                       m2, h3     1) MEDICATION:  Continue Geodon 60mg BID with food  Continue Mirtazapine 22.5mg at bedtime for sleep and mood.  Continue Wellbutrin XL 300mg daily.    Continue Gabapentin to 300 in morning and afternoon.  Continue 1200mg at bedtime   Continue  Levothyroxine 50mcg (prescribed by another provider).   Continue Melatonin 6mg    Klonopin 0.25mg started by sleep medicine per patient report    2) THERAPY:  Continue with current therapist  Continue DBT      RTC: 1 month     CRISIS NUMBERS:   Provided routinely in AVS.    Treatment Risk Statement:  The patient understands the risks, benefits, adverse effects and alternatives. Agrees to treatment with the capacity to do so. No medical contraindications to treatment. Agrees to call clinic for any problems. The patient understands to call 911 or go to the nearest ED if life threatening or urgent symptoms occur.     WHODAS 2.0  TODAY total score = N/A; [a 12-item WHODAS 2.0 assessment was not completed by the pt today and/or recorded in EPIC].       PROVIDER:  ADELINA Lieberman CNP

## 2021-04-01 NOTE — PATIENT INSTRUCTIONS
**For crisis resources, please see the information at the end of this document**     Patient Education      Thank you for coming to the [facility] M[facility] HEALTH & [facility] [place] [facility].    Lab Testing:  If you had lab testing today and your results are reassuring or normal they will be mailed to you or sent through [employer] within 7 days. If the lab tests need quick action we will call you with the results. The phone number we will call with results is # [phone] (home) . If this is not the best number please call our clinic and change the number.    Medication Refills:  If you need any refills please call your pharmacy and they will contact us. Our fax number for refills is [phone]. Please allow three business for refill processing. If you need to  your refill at a new pharmacy, please contact the new pharmacy directly. The new pharmacy will help you get your medications transferred.     Scheduling:  If you have any concerns about today's visit or wish to schedule another appointment please call our office during normal business hours [phone] (8-5:00 M-F)    Contact Us:  Please call [phone] during business hours (8-5:00 M-F).  If after clinic hours, or on the weekend, please call  [phone].    Financial Assistance [phone]  [facility] Billing [phone]  Central Billing Office, [facility]: [phone]  [place] Billing [phone]  Medical Records [phone]  [place] Patient Bill of Rights [email or web address]       MENTAL HEALTH CRISIS NUMBERS:  For a medical emergency please call  911 or go to the nearest ER.     [facility]:   [facility] -[phone]   Crisis Residence [facility] -[phone]   Walk-In Counseling Center [facility] -[phone]   COPE 24/7 [place] Mobile Team -[phone] (adults)/[phone] (child)  CHILD: [facility] needs assessment  team - [phone]      [facility]:   [facility] - [phone]   Walk-in counseling [facility] - [phone]   Walk-in counseling [facility] - [phone]   Crisis Residence [facility] - [phone]  Urgent Care Adult Mental [facility]-[phone] mobile unit/ 24/7 crisis line    National Crisis Numbers:   National Suicide Prevention Lifeline: 2-054-569-TALK (942-348-6433)  Poison Control Center - 3-408-592-8951  [facility] [employer]/resources for a list of additional resources (SOS)  Trans Lifeline a hotline for transgender people 1-512.287.7568  The [employer] a hotline for LGBT youth 8-131-123-6575  Crisis Text Line: For any crisis 24/7   To: 144293  see www.crisistextline.org  - IF MAKING A CALL FEELS TOO HARD, send a text!         Again thank you for choosing [facility] MENTAL HEALTH & ADDICTION [facility] and please let us know how we can best partner with you to improve you and your family's health.    You may be receiving a survey regarding this appointment. We would love to have your feedback, both positive and negative. The survey is done by an external company, so your answers are anonymous.

## 2021-04-05 NOTE — PROGRESS NOTES
[facility]    Dialectical Behavior Therapy Program    DBT Individual Psychotherapy Progress Note    Date: Feb 23, 2021    Patient Name: Katie Griffiths     Patient Pronouns: She/Her    Patient MRN: [id number]    Provider: Kaitlynn Shaw, PhD LP    Procedure: Individual DBT session    Appointment Duration:55 minutes)    Diagnosis:Bipolar , Generalized Anxiety Disorder, Emotional Dysregulation    Type of service:  video visit for psychotherapy  Time of service:      Video Start Time:  9:05      Video End Time:  10am  Reason for video visit:  Patient unable to travel due to Covid-19  Originating Site (patient location):  [facility]   Location- Patient's home  Distant Site (provider location):  Remote location  Mode of Communication:  Video Conference via Zoom  Consent:  Patient has given verbal consent for video visit?: Yes      Pre-DBT Session: Continue orient to DBT group She reports that she is struggling with  motivation  Diary Card Completed Before Session?No--Received the diary card after session..     Patient Used Phone Coaching Since Last Session: No  Provide description if pt reported any suicidal intent/bx in call/refer to phone coaching note.    Primary Targets Addressed in This Session:  Other: orientation and follow-up on the diary card and the structure of the DBT group., What is her goal(s)???    Example text for chain analysis: Behavior chain analysis completed on target problem behavior--NO    Secondary Targets Addressed in This Session:   Emotion regulation, Self-validation, Emotion experiencing    DBT Strategies used in This Session:    Validation  Commitment strategies  Irreverent communication     Interactive Complexity Code Was Used (79422): No. Interactive complexity is appropriate for this visit due to need to manage maladaptive communication (related to high anxiety, high reactivity, repeated questions or disagreement) among  participants that complicates delivery of care and caregiver emotion/ behavior that interferes with implementation of the treatment plan.    Behavioral Observations/Mental Status: Patient arrived on time to session. Patient was well groomed. Eye contact was good. Mood today was friendly. Observed affect was full range. Speech was regular rate and rhythm. Thought process was Logical and Linear. Patient was actively engaged in session.    Risk Assessment: The patient has the following risk and protective factors:     Risk factors: age  Protective factors:  Cultural or Caodaism beliefs discouraging suicide and Is a parent    Based on risk and protective factors, patient is assessed to be at the following levels of acute and chronic risk.    Acute Risk: Low Acute Risk (i.e., no current suicidal intent, specific plan, preparatory behaviors, and high confidence in patient's ability to maintain safety).  Chronic Risk: Low Chronic Risk (i.e., evidence of consistently enduring stressors without suicidal ideation)    Behavioral Assignments:  1) Complete DBT Diary Card daily  2) Complete DBT Skills Group homework  3) Notice triggers to anxiety--what is she telling herself before the anxiety comes/use ice.   4) what is her goal(s) for life?? Friends? Volunteer or part-time job? Currently no life goals and assessing motivation for change.     Plan: Patient will continue in full-model, outpatient DBT program until completion of one full-round of DBT skills/the end of their 6-month treatment agreement.     Treatment Plan Completed:Next session

## 2021-04-06 ENCOUNTER — VIRTUAL VISIT (OUTPATIENT)
Dept: INTERNAL MEDICINE | Facility: CLINIC | Age: 61
End: 2021-04-06
Payer: MEDICARE

## 2021-04-06 DIAGNOSIS — R63.4 WEIGHT LOSS: ICD-10-CM

## 2021-04-06 DIAGNOSIS — R63.0 LOSS OF APPETITE: ICD-10-CM

## 2021-04-06 DIAGNOSIS — Z12.11 SPECIAL SCREENING FOR MALIGNANT NEOPLASMS, COLON: Primary | ICD-10-CM

## 2021-04-06 DIAGNOSIS — F31.9 BIPOLAR AFFECTIVE DISORDER, REMISSION STATUS UNSPECIFIED (H): ICD-10-CM

## 2021-04-06 PROCEDURE — 99213 OFFICE O/P EST LOW 20 MIN: CPT | Mod: 95 | Performed by: NURSE PRACTITIONER

## 2021-04-06 ASSESSMENT — PAIN SCALES - GENERAL: PAINLEVEL: EXTREME PAIN (9)

## 2021-04-06 NOTE — PROGRESS NOTES
Katie is a 60 year old who is being evaluated via a billable video visit.      How would you like to obtain your AVS? MyChart  If the video visit is dropped, the invitation should be resent by: Text to cell phone: [phone]  Will anyone else be joining your video visit? No    Video Start Time: 8:34 AM    Assessment & Plan     Special screening for malignant neoplasms, colon  Due for routine screening. She has a family history of colon polyps in her mother. Encouraged scheduling colonoscopy, particularly given ~30 lb reported weight loss last year. She would like to wait until the pandemic settles down, which is reasonable particularly if hemoglobin is normal.   - GASTROENTEROLOGY ADULT REF PROCEDURE ONLY; Future    Loss of appetite  Weight loss  She reports she lost about 30 lbs in the last 9 months, unintentionally. Her weight has since stabilized, which she attributes to Mirtazapine use. She will be due for thyroid panel the first week in May, will add on labs to check CMP, lipase, and CBC. In the meantime, eat easy-to-digest bland foods, and gradually increase fiber in the diet. Stay well-hydrated and stay active to help promote regular bowel movements.   - Comprehensive metabolic panel; Future  - Lipase; Future  - CBC with platelets; Future    Bipolar affective disorder, remission status unspecified (H)  Follow-up with Enzo No in psychiatry next month as scheduled.  She is concerned that her medications are causing her symptoms of low appetite and anxiety.       29 minutes spent on the date of the encounter doing chart review, history and exam, documentation and further activities per the note    Return if symptoms worsen or fail to improve.    ADELINA Rudolph [facility] INTERNAL MEDICINE [facility]   Katie is a 60 year old who presents for the following health issues     HPI     GI concerns--Foods don't sound good, doesn't want to eat breakfast. Forces herself  "to eat with her meds. Lunch is the biggest meal, then forces herself to eat a little dinner with her medications.  Hard to explain the feeling, a \"combination of tight, maybe,\" and \"feeling like you're stressed all the time,\" but she says she is not feeling stressed all the time. Feels like anxiety in her stomach, but she is on medication for anxiety.   Symptoms have been present over the last few months, thinks since starting Bupropion (started late summer) and Ziprasidone.  Had gained weight when she went off the celexa.  No abdominal pain. Occasional nausea, comes and goes. No vomiting. No black/bloody stools. No diarrhea, dose feel constipated on and off, with hard/firm stools every couple of days. Has to be careful, because her meds can cause constipation, just tries to manage with diet.   Had lost some weight, then stabilized. Taking Mirtazapine.  170 lbs last summer, now 140 lbs.  TSH due around May 2nd.     Review of Systems   Constitutional, HEENT, cardiovascular, pulmonary, gi and gu systems are negative, except as otherwise noted.      Objective    Vitals - Patient Reported  Pain Score: Extreme Pain (9)        Physical Exam   GENERAL: Healthy, alert and no distress  EYES: Eyes grossly normal to inspection.  No discharge or erythema, or obvious scleral/conjunctival abnormalities.  RESP: No audible wheeze, cough, or visible cyanosis.  No visible retractions or increased work of breathing.    SKIN: Visible skin clear. No significant rash, abnormal pigmentation or lesions.  NEURO: Cranial nerves grossly intact.  Mentation and speech appropriate for age.  PSYCH: Mentation appears normal, affect normal/bright, judgement and insight intact, normal speech and appearance well-groomed.                Video-Visit Details    Type of service:  Video Visit    Video End Time:8:53 AM    Originating Location (pt. Location): Home    Distant Location (provider location):  [facility] INTERNAL MEDICINE " [place]     Platform used for Video Visit: Luis Angel

## 2021-04-06 NOTE — NURSING NOTE
Chief Complaint   Patient presents with     GI Problem     Discuss GI concerns.      ABILIO Zuniga 8:09 AM  4/6/2021

## 2021-04-06 NOTE — PATIENT INSTRUCTIONS
Primary Care Center Phone Number [phone]  Primary [facility] Medication Refill Request Information:  * Please contact your pharmacy regarding ANY request for medication refills.  ** [facility] Prescription Fax = [phone]  * Please allow 3 business days for routine medication refills.  * Please allow 5 business days for controlled substance medication refills.     [facility] Test Result notification information:  *You will be notified with in 7-10 days of your appointment day regarding the results of your test.  If you are on MyChart you will be notified as soon as the provider has reviewed the results and signed off on them.          If you have questions regarding Covid-19 and the Covid-19 vaccine, please visit this website.    [email or web address]

## 2021-04-08 ENCOUNTER — TELEPHONE (OUTPATIENT)
Dept: GASTROENTEROLOGY | Facility: CLINIC | Age: 61
End: 2021-04-08

## 2021-04-08 ENCOUNTER — TELEPHONE (OUTPATIENT)
Dept: GASTROENTEROLOGY | Facility: OUTPATIENT CENTER | Age: 61
End: 2021-04-08

## 2021-04-08 DIAGNOSIS — Z11.59 ENCOUNTER FOR SCREENING FOR OTHER VIRAL DISEASES: ICD-10-CM

## 2021-04-08 DIAGNOSIS — Z12.11 ENCOUNTER FOR SCREENING COLONOSCOPY: Primary | ICD-10-CM

## 2021-04-08 RX ORDER — BISACODYL 5 MG/1
TABLET, DELAYED RELEASE ORAL
Qty: 4 TABLET | Refills: 0 | Status: SHIPPED | OUTPATIENT
Start: 2021-04-08 | End: 2021-04-08

## 2021-04-08 RX ORDER — BISACODYL 5 MG/1
TABLET, DELAYED RELEASE ORAL
Qty: 4 TABLET | Refills: 0 | Status: SHIPPED | OUTPATIENT
Start: 2021-04-08 | End: 2021-04-30

## 2021-04-08 NOTE — TELEPHONE ENCOUNTER
Attempted to contact patient regarding upcoming colonoscopy procedure on 4.14.2021 for pre assessment questions.  No answer.  Left message to return call to [phone] #[phone]      COVID test 4.12.2021.  Prep sent to pt's preferred pharmacy: FLORENCIA Jamison RN

## 2021-04-08 NOTE — TELEPHONE ENCOUNTER
Pharmacy tech from [facility] calling stating that they do not have Golytely in stock as they thought they did.     Called patient regarding note above. Scripts sent to [facility].     Malini Nolasco RN

## 2021-04-08 NOTE — TELEPHONE ENCOUNTER
Patient is scheduled for COLON with Dr. OMALLEY    Spoke with: PATIENT    Date of Procedure: 4/14/2021    Location: [facility]    Sedation Type: CS    Conscious Sedation- Needs  for 6 hours after the procedure  MAC/General-Needs  for 24 hours after procedure    Pre-op for Unit J MAC and OR: N    (if yes advise patient they will need a pre-op prior to procedure)      Is patient on blood thinners? -: N   (If yes- inform patient to follow up with PCP or provider for follow up instructions)     Informed patient they will need an adult  : Y  Cannot take any type of public or medical transportation alone    Informed Patient of COVID Test Requirement: Y-SCHED    Preferred Pharmacy for Pre Prescription: N/A    Confirmed Nurse will call to complete assessment: Y    Additional comments: N/A

## 2021-04-08 NOTE — TELEPHONE ENCOUNTER
Patient returned call.     Pre assessment questions completed.     Golytely prep script sent to [facility] pharmacy per note below. Prep instructions was sent via [facility].    Patient had no questions or concerns at this time.    Malini Nolasco RN

## 2021-04-09 ENCOUNTER — MYC MEDICAL ADVICE (OUTPATIENT)
Dept: PSYCHIATRY | Facility: CLINIC | Age: 61
End: 2021-04-09

## 2021-04-09 NOTE — TELEPHONE ENCOUNTER
Rey Timmons,   It depends on the times for her lunch and bedtime dose.  If there is at least a 7-8 hour gap between lunch and second dose, I think it's fine to take it with lunch.  If not, then I'd probably go ahead and just take it on an empty stomach so that she gets some effect without too much risk for side effects with double dosing.   -Patricia Garcia message sent to patient relaying the above information from pharmacist.

## 2021-04-12 DIAGNOSIS — Z11.59 ENCOUNTER FOR SCREENING FOR OTHER VIRAL DISEASES: ICD-10-CM

## 2021-04-12 LAB
LABORATORY COMMENT REPORT: NORMAL
SARS-COV-2 RNA RESP QL NAA+PROBE: NEGATIVE
SARS-COV-2 RNA RESP QL NAA+PROBE: NORMAL
SPECIMEN SOURCE: NORMAL
SPECIMEN SOURCE: NORMAL

## 2021-04-12 PROCEDURE — U0003 INFECTIOUS AGENT DETECTION BY NUCLEIC ACID (DNA OR RNA); SEVERE ACUTE RESPIRATORY SYNDROME CORONAVIRUS 2 (SARS-COV-2) (CORONAVIRUS DISEASE [COVID-19]), AMPLIFIED PROBE TECHNIQUE, MAKING USE OF HIGH THROUGHPUT TECHNOLOGIES AS DESCRIBED BY CMS-2020-01-R: HCPCS | Performed by: INTERNAL MEDICINE

## 2021-04-12 PROCEDURE — U0005 INFEC AGEN DETEC AMPLI PROBE: HCPCS | Performed by: INTERNAL MEDICINE

## 2021-04-14 ENCOUNTER — HOSPITAL ENCOUNTER (OUTPATIENT)
Facility: CLINIC | Age: 61
Discharge: HOME | End: 2021-04-14
Attending: INTERNAL MEDICINE | Admitting: INTERNAL MEDICINE
Payer: MEDICARE

## 2021-04-14 VITALS
TEMPERATURE: 97.9 F | DIASTOLIC BLOOD PRESSURE: 73 MMHG | HEART RATE: 67 BPM | RESPIRATION RATE: 21 BRPM | OXYGEN SATURATION: 100 % | WEIGHT: 132.94 LBS | HEIGHT: 65 IN | BODY MASS INDEX: 22.15 KG/M2 | SYSTOLIC BLOOD PRESSURE: 107 MMHG

## 2021-04-14 LAB — COLONOSCOPY: NORMAL

## 2021-04-14 PROCEDURE — 45378 DIAGNOSTIC COLONOSCOPY: CPT | Performed by: INTERNAL MEDICINE

## 2021-04-14 PROCEDURE — 88305 TISSUE EXAM BY PATHOLOGIST: CPT | Mod: 26 | Performed by: PATHOLOGY

## 2021-04-14 PROCEDURE — 45380 COLONOSCOPY AND BIOPSY: CPT | Performed by: INTERNAL MEDICINE

## 2021-04-14 PROCEDURE — 99153 MOD SED SAME PHYS/QHP EA: CPT | Performed by: INTERNAL MEDICINE

## 2021-04-14 PROCEDURE — 45385 COLONOSCOPY W/LESION REMOVAL: CPT | Mod: PT | Performed by: INTERNAL MEDICINE

## 2021-04-14 PROCEDURE — 88305 TISSUE EXAM BY PATHOLOGIST: CPT | Mod: TC | Performed by: INTERNAL MEDICINE

## 2021-04-14 PROCEDURE — 250N000011 HC RX IP 250 OP 636: Performed by: INTERNAL MEDICINE

## 2021-04-14 PROCEDURE — G0500 MOD SEDAT ENDO SERVICE >5YRS: HCPCS | Performed by: INTERNAL MEDICINE

## 2021-04-14 RX ORDER — FENTANYL CITRATE 50 UG/ML
INJECTION, SOLUTION INTRAMUSCULAR; INTRAVENOUS PRN
Status: COMPLETED | OUTPATIENT
Start: 2021-04-14 | End: 2021-04-14

## 2021-04-14 RX ORDER — NALOXONE HYDROCHLORIDE 0.4 MG/ML
0.2 INJECTION, SOLUTION INTRAMUSCULAR; INTRAVENOUS; SUBCUTANEOUS
Status: CANCELLED | OUTPATIENT
Start: 2021-04-14

## 2021-04-14 RX ORDER — NALOXONE HYDROCHLORIDE 0.4 MG/ML
0.4 INJECTION, SOLUTION INTRAMUSCULAR; INTRAVENOUS; SUBCUTANEOUS
Status: CANCELLED | OUTPATIENT
Start: 2021-04-14

## 2021-04-14 RX ORDER — ONDANSETRON 2 MG/ML
4 INJECTION INTRAMUSCULAR; INTRAVENOUS
Status: DISCONTINUED | OUTPATIENT
Start: 2021-04-14 | End: 2021-04-14 | Stop reason: HOSPADM

## 2021-04-14 RX ORDER — PROCHLORPERAZINE MALEATE 10 MG
10 TABLET ORAL EVERY 6 HOURS PRN
Status: CANCELLED | OUTPATIENT
Start: 2021-04-14

## 2021-04-14 RX ORDER — ONDANSETRON 4 MG/1
4 TABLET, ORALLY DISINTEGRATING ORAL EVERY 6 HOURS PRN
Status: CANCELLED | OUTPATIENT
Start: 2021-04-14

## 2021-04-14 RX ORDER — LIDOCAINE 40 MG/G
CREAM TOPICAL
Status: DISCONTINUED | OUTPATIENT
Start: 2021-04-14 | End: 2021-04-14 | Stop reason: HOSPADM

## 2021-04-14 RX ORDER — FLUMAZENIL 0.1 MG/ML
0.2 INJECTION, SOLUTION INTRAVENOUS
Status: CANCELLED | OUTPATIENT
Start: 2021-04-14 | End: 2021-04-14

## 2021-04-14 RX ORDER — ONDANSETRON 2 MG/ML
4 INJECTION INTRAMUSCULAR; INTRAVENOUS EVERY 6 HOURS PRN
Status: CANCELLED | OUTPATIENT
Start: 2021-04-14

## 2021-04-14 RX ADMIN — MIDAZOLAM 2 MG: 1 INJECTION INTRAMUSCULAR; INTRAVENOUS at 08:40

## 2021-04-14 RX ADMIN — FENTANYL CITRATE 25 MCG: 50 INJECTION, SOLUTION INTRAMUSCULAR; INTRAVENOUS at 08:40

## 2021-04-14 ASSESSMENT — MIFFLIN-ST. JEOR: SCORE: 1173.88

## 2021-04-14 NOTE — OR NURSING
Procedure: colonoscopy with polypectomy using Steris cold snare  Sedation: Conscious sedation (25 mcg fentanyl, 2 mg versed)  O2: 2 LPM NC during procedure  Tolerated: VS stable during and post procedure. Not c/o abdominal or chest pain.  Report: Given to jacky BARFIELD  Pt to recovery area in stable condition, accompanied by CAROLYNE Alcazar, RN

## 2021-04-15 LAB — COPATH REPORT: NORMAL

## 2021-04-21 ENCOUNTER — IMMUNIZATION (OUTPATIENT)
Dept: NURSING | Facility: CLINIC | Age: 61
End: 2021-04-21
Attending: INTERNAL MEDICINE
Payer: MEDICARE

## 2021-04-21 PROCEDURE — 91300 PR COVID VAC PFIZER DIL RECON 30 MCG/0.3 ML IM: CPT

## 2021-04-21 PROCEDURE — 0002A PR COVID VAC PFIZER DIL RECON 30 MCG/0.3 ML IM: CPT

## 2021-04-25 ENCOUNTER — HEALTH MAINTENANCE LETTER (OUTPATIENT)
Age: 61
End: 2021-04-25

## 2021-04-29 ASSESSMENT — ENCOUNTER SYMPTOMS
HEADACHES: 0
NIGHT SWEATS: 0
EYE WATERING: 1
SPEECH CHANGE: 0
WEAKNESS: 0
PANIC: 0
DECREASED APPETITE: 1
DYSURIA: 0
HOARSE VOICE: 0
DIZZINESS: 0
WEIGHT LOSS: 0
SMELL DISTURBANCE: 0
TREMORS: 0
PARALYSIS: 0
TROUBLE SWALLOWING: 0
DECREASED LIBIDO: 1
SORE THROAT: 0
SINUS PAIN: 0
EYE REDNESS: 0
DIFFICULTY URINATING: 0
TASTE DISTURBANCE: 0
ALTERED TEMPERATURE REGULATION: 0
CHILLS: 0
LOSS OF CONSCIOUSNESS: 0
HOT FLASHES: 0
DEPRESSION: 1
POLYDIPSIA: 0
DOUBLE VISION: 0
TINGLING: 0
WEIGHT GAIN: 0
FLANK PAIN: 0
DECREASED CONCENTRATION: 1
FEVER: 0
MEMORY LOSS: 1
SEIZURES: 0
DISTURBANCES IN COORDINATION: 0
NECK MASS: 0
EYE PAIN: 0
HALLUCINATIONS: 0
POLYPHAGIA: 0
NERVOUS/ANXIOUS: 1
FATIGUE: 1
HEMATURIA: 0
SINUS CONGESTION: 0
EYE IRRITATION: 1
INCREASED ENERGY: 0
NUMBNESS: 0
INSOMNIA: 0

## 2021-04-30 ENCOUNTER — MYC MEDICAL ADVICE (OUTPATIENT)
Dept: INTERNAL MEDICINE | Facility: CLINIC | Age: 61
End: 2021-04-30

## 2021-04-30 ENCOUNTER — OFFICE VISIT (OUTPATIENT)
Dept: INTERNAL MEDICINE | Facility: CLINIC | Age: 61
End: 2021-04-30
Payer: MEDICARE

## 2021-04-30 VITALS
BODY MASS INDEX: 22.3 KG/M2 | DIASTOLIC BLOOD PRESSURE: 72 MMHG | OXYGEN SATURATION: 95 % | SYSTOLIC BLOOD PRESSURE: 106 MMHG | HEART RATE: 88 BPM | WEIGHT: 134 LBS

## 2021-04-30 DIAGNOSIS — R63.0 LOSS OF APPETITE: ICD-10-CM

## 2021-04-30 DIAGNOSIS — Z12.4 SCREENING FOR CERVICAL CANCER: ICD-10-CM

## 2021-04-30 DIAGNOSIS — R63.4 WEIGHT LOSS: ICD-10-CM

## 2021-04-30 DIAGNOSIS — Z00.00 ROUTINE HISTORY AND PHYSICAL EXAMINATION OF ADULT: ICD-10-CM

## 2021-04-30 DIAGNOSIS — E78.5 HYPERLIPIDEMIA LDL GOAL <100: Primary | ICD-10-CM

## 2021-04-30 PROCEDURE — 99214 OFFICE O/P EST MOD 30 MIN: CPT | Performed by: NURSE PRACTITIONER

## 2021-04-30 PROCEDURE — 87624 HPV HI-RISK TYP POOLED RSLT: CPT | Performed by: PATHOLOGY

## 2021-04-30 PROCEDURE — G0145 SCR C/V CYTO,THINLAYER,RESCR: HCPCS | Performed by: PATHOLOGY

## 2021-04-30 RX ORDER — MULTIPLE VITAMINS W/ MINERALS TAB 9MG-400MCG
1 TAB ORAL DAILY
COMMUNITY
End: 2022-01-27

## 2021-04-30 NOTE — NURSING NOTE
Chief Complaint   Patient presents with     Physical     pap and pelvic        Astrid Larry MA, at 2:01 PM on 4/30/2021.

## 2021-04-30 NOTE — PROGRESS NOTES
"SUBJECTIVE:  Katie Griffiths is a 60 year old female with pmh of   Patient Active Problem List   Diagnosis     Bipolar disorder (H)     Hashimoto's thyroiditis     Incontinence of urine in female     Osteopenia     Reactive airway disease     Vitamin D deficiency     Overweight     Dizziness     Hyperlipidemia LDL goal <100     Right shoulder pain, unspecified chronicity     who comes in for preventive care examination today.   She has the following concerns    Feeling well overall.   Has dry/watery eyes, fatigue, low appetite, thinks symptoms are med-related.    Asthma--well-controlled. Used inhaler more when she was living in the basement at her son's house, hasn't used since living in her mother's house. Symptoms were also worse with the pollens when she lived in [place].    Menses:  No LMP recorded.  Menstrual cycles are absent (postmenopausal)    PAP HX:   Last No results found for: PAP  History of abnormal \"years ago\" in [place], and follow-up was fine, doesn't remember when.    Breast:  Patient performs self breast exams  No  Breast concerns No  No results found.    Sexual Hx:  Sexually active  not at present  Partner(s) are  male   IS NOT interested in STD testing    Pregnancy:   G  2 and P  2      Medications and allergies reviewed by me today.     Family History   Problem Relation Age of Onset     Colon Polyps Mother      Eye Disorder Father      Factor V Leiden deficiency Father         pt tested negative     Hyperlipidemia Father        Social History     Tobacco Use     Smoking status: Never Smoker     Smokeless tobacco: Never Used   Substance Use Topics     Alcohol use: Yes     Comment: occasional     Drug use: Never       Immunization History   Administered Date(s) Administered     COVID-19REUBEN,Pfizer 03/31/2021, 04/21/2021         Review Of Systems    Constitutional: no fevers, chills, night sweats or unintentional weight change, +fatigue and loss of appetite  Eyes: no vision change, diplopia " or red eyes, dry/watery eyes  Ears, Nose, Mouth, Throat: no tinnitus or hearing change, no epistaxis or nasal discharge, no oral lesions, throat clear, +dry mouth  Cardiovascular: no chest pain, palpitations, or pain with walking, no orthopnea or PND   Respiratory: no dyspnea, cough, shortness of breath or wheezing   GI: no nausea, vomiting, diarrhea or constipation, no abdominal pain   : no change in urine, no dysuria or hematuria, +low libido, +vaginal dryness  Musculoskeletal: no joint or muscle pain or swelling   Integumentary: no concerning lesions or moles   Neuro: no loss of strength or sensation, no numbness or tingling, no tremor, no dizziness, no headache   Endo: no polyuria or polydipsia, no temperature intolerance   Psych: +depression/anxiety, memory problems, trouble concentrating      OBJECTIVE:    /72 (BP Location: Right arm, Patient Position: Sitting, Cuff Size: Adult Regular)   Pulse 88   Wt 60.8 kg (134 lb)   SpO2 95%   BMI 22.30 kg/m     Wt Readings from Last 1 Encounters:   04/30/21 60.8 kg (134 lb)     Constitutional: no distress, comfortable, pleasant   Eyes: anicteric, normal extra-ocular movements   Ears, Nose and Throat: tympanic membranes clear, nose clear and free of lesions, throat clear, neck supple with full range of motion, no thyromegaly.   Cardiovascular: regular rate and rhythm, normal S1 and S2, no murmurs, rubs or gallops, peripheral pulses full and symmetric   Respiratory: clear to auscultation, no wheezes or crackles, normal breath sounds   Gastrointestinal: positive bowel sounds, nontender, no hepatosplenomegaly, no masses   Genitourinary: normal external genitalia,  no enlargement of the [place] or [place] glands, urethra normal, perineum normal and free of lesions, cervix normal without lesions, no masses or cervical motion tenderness, adnexa without enlargement or lesions  Musculoskeletal: full range of motion, no edema   Skin: no concerning lesions, no jaundice  "  Breast: no lumps, no nipple discharge  Neurological: cranial nerves intact, normal strength and sensation, reflexes at patella normal, normal gait, mild tremor   Psychological: appropriate mood, demonstrates intact judgment and logical thought processes  Lymphatic: no cervical or clavicular lymphadenopathy    ASSESSMENT/PLAN:  Pt is a 60 year old female here for preventive examination    1. Hyperlipidemia LDL goal <100  We do not have a lipid panel on file, she is coming to lab tomorrow morning and will do fasting labs.  - Lipid panel reflex to direct LDL Fasting; Future    2. Routine history and physical examination of adult  Encouraged work on healthy lifestyle with increased physical activity, nutritious diet with good portion control, focusing on lean proteins and vegetable/fruit intake, stress management and safety.  - Comprehensive metabolic panel; Future    3. Screening for cervical cancer  Routine screening.   - Pap imaged thin layer screen with HPV - recommended age 30 - 65 years (select HPV order below)  - HPV High Risk Types DNA Cervical    4. Loss of appetite  We discussed her abdominal symptoms during a virtual visit on 4/6/21, described as \"tight\", check lipase and CMP.  - Lipase; Future    5. Weight loss  She has lost ~30 lbs over the past year, which was unintentional. Colonoscopy was overall normal on 4/14/21, showed two 3-4 mm benign polyps, and repeat colonoscopy recommended in 10 years. Her thyroid will be rechecked tomorrow in lab. If this is normal, will need to pursue further workup to determine source of weight loss.   - CBC with platelets; Future    FOLLOW UP: Return in about 6 weeks (around 6/11/2021).    GYN TESTING:  Breast examination performed.Yes   Pelvic examination performed Yes    Pap   Yes  If normal PAP results no paps needed.  STD testing No     PREVENTATIVE TESTING:  Breast cancer screening  not indicated Previously done  Colorectal screening not indicated  Previously " done  Osteoporosis screening not indicated.  Recommend that patient take 1200 mg calcium in divided doses and 1000 IU of vitamin D on daily basis.   Immunizations reviewed and updated in Epic    Labs ordered As above  Counseling was provided in the following areas:  regular exercise  weight management  healthy diet/nutrition  osteoporosis prevention/bone health  self breast exam    ADELINA Rudolph CNP

## 2021-05-01 ENCOUNTER — MYC MEDICAL ADVICE (OUTPATIENT)
Dept: INTERNAL MEDICINE | Facility: CLINIC | Age: 61
End: 2021-05-01

## 2021-05-01 DIAGNOSIS — Z00.00 ROUTINE HISTORY AND PHYSICAL EXAMINATION OF ADULT: ICD-10-CM

## 2021-05-01 DIAGNOSIS — R63.0 LOSS OF APPETITE: ICD-10-CM

## 2021-05-01 DIAGNOSIS — E78.5 HYPERLIPIDEMIA LDL GOAL <100: ICD-10-CM

## 2021-05-01 DIAGNOSIS — E06.3 HYPOTHYROIDISM DUE TO HASHIMOTO'S THYROIDITIS: ICD-10-CM

## 2021-05-01 DIAGNOSIS — R63.4 WEIGHT LOSS: ICD-10-CM

## 2021-05-01 LAB
ALBUMIN SERPL-MCNC: 4.2 G/DL (ref 3.4–5)
ALP SERPL-CCNC: 76 U/L (ref 40–150)
ALT SERPL W P-5'-P-CCNC: 33 U/L (ref 0–50)
ANION GAP SERPL CALCULATED.3IONS-SCNC: 2 MMOL/L (ref 3–14)
AST SERPL W P-5'-P-CCNC: 15 U/L (ref 0–45)
BILIRUB SERPL-MCNC: 0.5 MG/DL (ref 0.2–1.3)
BUN SERPL-MCNC: 14 MG/DL (ref 7–30)
CALCIUM SERPL-MCNC: 9.2 MG/DL (ref 8.5–10.1)
CHLORIDE SERPL-SCNC: 107 MMOL/L (ref 94–109)
CHOLEST SERPL-MCNC: 228 MG/DL
CO2 SERPL-SCNC: 30 MMOL/L (ref 20–32)
CREAT SERPL-MCNC: 0.88 MG/DL (ref 0.52–1.04)
ERYTHROCYTE [DISTWIDTH] IN BLOOD BY AUTOMATED COUNT: 13.4 % (ref 10–15)
GFR SERPL CREATININE-BSD FRML MDRD: 71 ML/MIN/{1.73_M2}
GLUCOSE SERPL-MCNC: 104 MG/DL (ref 70–99)
HCT VFR BLD AUTO: 45.7 % (ref 35–47)
HDLC SERPL-MCNC: 67 MG/DL
HGB BLD-MCNC: 15 G/DL (ref 11.7–15.7)
LDLC SERPL CALC-MCNC: 133 MG/DL
LIPASE SERPL-CCNC: 116 U/L (ref 73–393)
MCH RBC QN AUTO: 30.7 PG (ref 26.5–33)
MCHC RBC AUTO-ENTMCNC: 32.8 G/DL (ref 31.5–36.5)
MCV RBC AUTO: 94 FL (ref 78–100)
NONHDLC SERPL-MCNC: 162 MG/DL
PLATELET # BLD AUTO: 237 10E9/L (ref 150–450)
POTASSIUM SERPL-SCNC: 4 MMOL/L (ref 3.4–5.3)
PROT SERPL-MCNC: 7.7 G/DL (ref 6.8–8.8)
RBC # BLD AUTO: 4.88 10E12/L (ref 3.8–5.2)
SODIUM SERPL-SCNC: 138 MMOL/L (ref 133–144)
T4 FREE SERPL-MCNC: 0.98 NG/DL (ref 0.76–1.46)
TRIGL SERPL-MCNC: 144 MG/DL
TSH SERPL DL<=0.005 MIU/L-ACNC: 2.3 MU/L (ref 0.4–4)
WBC # BLD AUTO: 5.3 10E9/L (ref 4–11)

## 2021-05-01 PROCEDURE — 84443 ASSAY THYROID STIM HORMONE: CPT | Performed by: PATHOLOGY

## 2021-05-01 PROCEDURE — 85027 COMPLETE CBC AUTOMATED: CPT | Performed by: PATHOLOGY

## 2021-05-01 PROCEDURE — 84439 ASSAY OF FREE THYROXINE: CPT | Performed by: PATHOLOGY

## 2021-05-01 PROCEDURE — 36415 COLL VENOUS BLD VENIPUNCTURE: CPT | Performed by: PATHOLOGY

## 2021-05-01 PROCEDURE — 80061 LIPID PANEL: CPT | Performed by: PATHOLOGY

## 2021-05-01 PROCEDURE — 83690 ASSAY OF LIPASE: CPT | Performed by: PATHOLOGY

## 2021-05-01 PROCEDURE — 80053 COMPREHEN METABOLIC PANEL: CPT | Performed by: PATHOLOGY

## 2021-05-01 ASSESSMENT — ASTHMA QUESTIONNAIRES: ACT_TOTALSCORE: 25

## 2021-05-02 ENCOUNTER — MYC MEDICAL ADVICE (OUTPATIENT)
Dept: PSYCHIATRY | Facility: CLINIC | Age: 61
End: 2021-05-02

## 2021-05-03 NOTE — TELEPHONE ENCOUNTER
Idalia Schmitz, LT[facility], located within [facility]  You; Enzo No, APRN CNP 12 minutes ago (3:25 PM)     Hi guys,     No direct drug-drug interactions between benadryl and Geodon. Benadryl can sometimes cause the issues Katie listed, but if she is using it just as needed and short term for allergies I would not be overly concerned about this. If she were using long-term I would be more concerned and would recommend a different allergy medication such as zyrtec.     Thanks!     Idalia moreira

## 2021-05-04 ENCOUNTER — VIRTUAL VISIT (OUTPATIENT)
Dept: PSYCHIATRY | Facility: CLINIC | Age: 61
End: 2021-05-04
Attending: NURSE PRACTITIONER
Payer: MEDICARE

## 2021-05-04 DIAGNOSIS — F39 MOOD DISORDER (H): ICD-10-CM

## 2021-05-04 PROCEDURE — 99213 OFFICE O/P EST LOW 20 MIN: CPT | Mod: 95 | Performed by: NURSE PRACTITIONER

## 2021-05-04 RX ORDER — MIRTAZAPINE 15 MG/1
15 TABLET, FILM COATED ORAL AT BEDTIME
Qty: 30 TABLET | Refills: 0 | Status: SHIPPED | OUTPATIENT
Start: 2021-05-04 | End: 2021-06-01

## 2021-05-04 ASSESSMENT — PAIN SCALES - GENERAL: PAINLEVEL: NO PAIN (0)

## 2021-05-04 NOTE — PATIENT INSTRUCTIONS
**For crisis resources, please see the information at the end of this document**     Patient Education      Thank you for coming to the [facility].    Lab Testing:  If you had lab testing today and your results are reassuring or normal they will be mailed to you or sent through [employer] within 7 days. If the lab tests need quick action we will call you with the results. The phone number we will call with results is # [phone] (home) . If this is not the best number please call our clinic and change the number.    Medication Refills:  If you need any refills please call your pharmacy and they will contact us. Our fax number for refills is [phone]. Please allow three business for refill processing. If you need to  your refill at a new pharmacy, please contact the new pharmacy directly. The new pharmacy will help you get your medications transferred.     Scheduling:  If you have any concerns about today's visit or wish to schedule another appointment please call our office during normal business hours [phone] (8-5:00 M-F)    Contact Us:  Please call [phone] during business hours (8-5:00 M-F).  If after clinic hours, or on the weekend, please call  [phone].    Financial Assistance [phone]  [facility] Billing [phone]  Central Billing Office, [facility]: [phone]  [facility] Billing [phone]  Medical Records [phone]  [facility] Patient Bill of Rights [email or web address]       MENTAL HEALTH CRISIS NUMBERS:  For a medical emergency please call  911 or go to the nearest ER.     [facility]:   [facility] -[phone]   Crisis Residence [facility] Residence -[phone]   Walk-In Counseling Center [facility] -[phone]   COPE 24/7 [facility] Mobile Team -[phone] (adults)/[phone] (child)  CHILD: [facility] needs assessment  team - [phone]      [facility]:   [facility] - [phone]   Walk-in counseling [facility] - [phone]   Walk-in counseling [facility] - [phone]   Crisis Residence [facility] - [phone]  Urgent Care Adult Mental [facility]-[phone] mobile unit/ 24/7 crisis line    National Crisis Numbers:   National Suicide Prevention Lifeline: 5-639-567-TALK (169-299-7969)  Poison Control Center - 6-057-570-9760  GTxcel/resources for a list of additional resources (SOS)  Trans Lifeline a hotline for transgender people 1-165.539.7872  The Tae Project a hotline for LGBT youth 0-721-990-3458  Crisis Text Line: For any crisis 24/7   To: 901490  see www.crisistextline.org  - IF MAKING A CALL FEELS TOO HARD, send a text!         Again thank you for choosing [facility] MENTAL HE[facility] & ADDICTION [facility] and please let us know how we can best partner with you to improve you and your family's health.    You may be receiving a survey regarding this appointment. We would love to have your feedback, both positive and negative. The survey is done by an external company, so your answers are anonymous.

## 2021-05-04 NOTE — PROGRESS NOTES
"Video- Visit Details  Type of service:  video visit for medication management  Time of service:    Date:  05/04/2021    Video Start Time:  2:04 PM        Video End Time:  2:16pm    Reason for video visit:  Patient unable to travel due to Covid-19  Originating Site (patient location):  [facility]   Location- Patient's home  Distant Site (provider location):  Remote location  Mode of Communication:  Video Conference via [email or web address]  Consent:  Patient has given verbal consent for video visit?: Yes     VIDEO VISIT  Katie Griffiths is a 60 year old patient who is being evaluated via a billable video visit.      The patient has been notified of following:   \"This video visit will be conducted via a call between you and your physician/provider. We have found that certain health care needs can be provided without the need for an in-person physical exam. This service lets us provide the care you need with a video conversation. If a prescription is necessary we can send it directly to your pharmacy. If lab work is needed we can place an order for that and you can then stop by our lab to have the test done at a later time. Insurers are generally covering virtual visits as they would in-office visits so billing should not be different than normal.  If for some reason you do get billed incorrectly, you should contact the billing office to correct it and that number is in the AVS .    Video Conference to be completed via:  [email or web address]    Patient has given verbal consent for video visit?:  Yes    Patient would prefer that any video invitations be sent by: Send to e-mail at: [email or web address]      How would patient like to obtain AVS?:  TV TubeXt    AVS SmartPhrase [PsychAVS] has been placed in 'Patient Instructions':  Yes          [facility]  Psychiatry Clinic  PSYCHIATRIC PROGRESS NOTE       Katie Griffiths is a 60 year old female who prefers the name Katie and pronoun she, her.  Therapist: " "Amirah Tejeda [facility]               PCP: Buddy Nolan  Other Providers: None    Pertinent Background:  See previous notes.  Psych critical item history includes Hospitalized 3 times with most recent occurring in 2007 after overdose attempt.  Numerous medication trials.  Possible bipolar diagnosis.     Interim History     [4, 4]     The patient is a good historian, reports good treatment adherence and was last seen 4/1/21.  Since the last visit, Katie reports she is \"doing.\"  Affect bright and laughed frequently.  Sleep described as \"pretty good.\"  Katie started seeing therapist  (Amirah Zimmerman in [place]).  Plans to see on a weekly basis.  In spite of improved sleep, weight loss and continued low mood, Katie is requesting that Mirtazapine be decreased to 15mg at bedtime.  Katie also reports that her son may be evicted which has impacted mood and anxiety.      4/1/21: Katie reports she is ok.  Klonopin 0.5mg started for sleep by sleep medicine.  Katie reports it is effective as she is sleeping 7-7.5 hours per night.  Reports feeling \"flat.\"  Jointly decided to continue with current medications and assess in month to see if change in weather and improved sleep will improve mood.     3/2/21: When asked how she is doing, Katie reports \"ok, I'm here\" but laughed afterwards.  Klonopin 0.25mg started by sleep medicine.  Sleep study completed and follow-up scheduled for late March.  She reports that Klonopin was helpful the first two nights but now it is \"hit or miss.\"  Informed Katie that since Sleep Medicine started Klonopin that I will request provider to continue to manage moving forward.  She is requesting that Mirtazapine be decreased due to concerns about weight and efficacy.  Continues to endorse sedentary lifestyle. Advised her to walk twice per day to improve mood and sleep.  Anxiety/worry continues and will addressed during therapy.        1/26/21: Katie reports \"I'm doing.\"  Reports only sleeping 2 " "hours last night.  However, she eports sleeping 6.5 hours the previous several nights.   She is able to share that she was overwhelmed with financial concerns and is impacting her sleep.  Current concern is being unable to stay asleep.  Continues to lead fairly sedentary lifestyle.  Reports \"there's nothing to do.\"  She is confident that if activities were available she would engage.  Discussed how employment could help her better manage her mental health.  She is hesitant to obtain employment as it may impact social security disability payments.    12/28/20: Katie again reports \"I'm hanging in there.\"  Katie called clinic approximately 2 weeks ago with concerns about her sleep.  Since then she reports her sleep has \"evened out.\"  Reports \"having to go out of my way to make pattern for sleep.\"  Currently getting about 6 hours of sleep per day.  Does report some concern with daytime sedation.  She will decrease OTC Melatonin as has grogginess has worsened with Melatonin. Continues to reports concern with hair loss and will connect with endocrinology.    12/9/20: Katie reports \"I'm hanging in there.\"  She is tolerating Mirtazapine and no longer concerned about hair loss.  Sleep continues to vary.  Ranges from 5-7 hours per night.  Mood stable.  She does not want to adjust medications and is requesting 90 day supply.      11/10/20: Katie is \"ok\"  Mood stable.  She is requesting a decrease in Mirtazapine as she is associating it with hair loss.  Not listed as a typical side effect.  Katie reports she is walking with her father most mornings.  Describes sleep as \"so-so.\"  Averaging 5 hours of sleep per night.  Continues to see therapist regularly.     10/14/20:  Katie reports again \"I am hanging in there.\" Persistent low mood persists.  Reports she had to switch thyroid medications due to her's being recalled.  Sleep initially worsened with new drug but she is now sleeping 5-6 hours per night.  Katie reports that her housing is " "more stable than at last appointment.  Anxiety has improved as a result.  Katie also reports she has been more active as she is helping her father prepare his boat for winter.  She also continues to see her therapist regularly.      9/15/20: Katie continues to report \"I am hanging in there.\" Sleep continues to be main concern. Katie reports she has been waking at 2am and unable to fall back asleep for the past 2 nights.  Katie does report that Gabapentin is helpful in that she can relax when she initially lays down for bed. \"I'm getting beneficial sleep more often than I am not.\"   Continues to endorse periodic low mood and anxiety.  Katie also has not used propranolol for PRN anxiety will discontinue. She does endorse fairly constant worry about possible homelessness.  Katie also brought up Celexa as it was helpful in management of anxiety; however in May she stated it was not effective and maybe causing sedation.  Continues to see therapist regularly.     8/18/20: Katie reports she is continuing \"hanging in there.\"  Katie reports that gabapentin has been helpful with sleep and she is \"tossing and turning less.\"  She believes sleep duration may have increased.  Gabapentin also appears to be helping with daytime anxiety.   Mood stable.  No significant concerns with depression or mood fluctuations.     7/30/20: Katie reports she is \"hanging in there.\"  Trazodone reports trazodone 100mg was not effective and led to diarrhea.  Discussed possible hypomania episode as she was frequently calling various providers at odd hours and having difficulty sleeping.  Does not appear to meet criteria at this time but will continue to monitor.  Will try to increase dose of Mirtazapine dose and see if helps.  Also will start Gabapentin for anxiety and sleep.  Katie continues to be quite sedentary during day.  Fortunately she was on a day trip to [place] today with her parents.  Advised to follow sleep medicine recommendations    7/7/20: Katie " "reports she is \"hanging in there.\"  Depression and anxiety persist.  Mood stable.  She is residing at her parent's home for the past couple days.  She using office and sleeping on twin bed. She is looking for independent living but is running into issues with her income, age, and credit history. Completed sleep consultation and behavioral change recommendations were given.  Unclear if followed recommendations.  Stress has decreased since then due to moving in with her parents but still persists due to housing issue.  Advised to take Propranolol when feels stressed.  Continues to endorse sedentary lifestyle.  Advised to go for walks in morning and early evening to develop \"sleep appetite.\"  She agreed.  She also does not want to start any sleep aids that may lead to drowsiness and weight gain.  Mood, motivation, and energy low.  Recommended increase Wellbutrin to 450mg but she again is hesitant.      New Address:  [facility] [address]  [place] [address]    6/9/20: Katie is currently trying to maintain housing.  Reports her current situation living with her son has been extremely stressful.  Currently looking for apartments in [place].  She working with [facility] social work team.  Katie is very hesitant to change her medications.  She would like to obtain better grasp of her housing before making medication adjustments. Will consult with PCP about possible Propranolol trial.       5/6/20:  Katie continues to endorse stress regarding bankruptcy.  She has phone hearing with  at the end of the month of May.  Katie continues to report lack of activity which may be attributable to lifestyle changes required during pandemic, but does report overall improvement with increase in Wellbutrin.  Sleep also continues to be an issue but she also reports it continues to improve.  Sleep medicine consultation in 2 months. No concerns with elevated mood.  Katie did not want to adjust her medications     3/31/20: when asked " "how she is doing, Katie reports that switch from Celexa to Wellbutrin has been beneficial in regards to mood, energy, and motivation.  No concerns with elevated mood.  Sleep continues to be an issue.  Did not worsen with addition of Wellbutrin.  Has sleep study in 3 months.  Regarding psychosocial stressors, Katie is babysitting her grandchildren as their mother is sick and is going through bankruptcy.       3/4/20: Katie again starts laughing.  Mood is stable overall but low.  Continues to endorse anhedonia, energy, and low mood. Sleep has improved since starting Remeron.  Reports sleeping 5 hours per night which results in continued daytime tiredness.    Continues to report that Celexa is not helpful and possible causing sedation    2/12/20: Katie shared that she is not sleeping.  She reports that she is getting 3 hours of sleep per night for past couple weeks. However, Katie did not look overly fatigued during appointment.  She also reports that her mood is quite low.  No concerns with shayla.  No goal directed behavior or impulsivity.  Katie\"s affect continues to be incongruent to her reported mood.  She is struggling to get household chores completed.  Continues to report sedentary lifestyle.  Educated on how exercise and activity can help with sleep.  Will stop Trazodone and start Mirtazapine for sleep.  If continues to be a problem, will refer to sleep medicine as sleep has been an issue for several years.  Also plan to switch Celexa as it does not appear to be managing her depression.      1/8/20: Katie's main concern today was weight gain.  Affect was quite bright today in spite of high PHQ-9 and reported persistent low mood. Reports positive holiday with her family.  Sleep continues to be inconsistent.  Mood stable with introduction of Geodon.  Will increase and discontinue Olanzapine.  Katie continues to consider a retrial of Depakote which was discontinued in past due to development of rash.      12/11/19: Katie " "reports her mood is stable but she is describes \"not feeling good or bad.\"  Difficult to discern if emotional blunting or lack of hypomania.  Thoughts continue to be linear and organized.  Speech normal.  Also reports sleeping continues to improve. Previous trials include Lithium (not effective?), Depakote (rash), and Abilify (side effects).  Katie would like to change medications today due to possible emotional blunting and weight gain (5lbs in past 2 weeks).      11/27/19: Increased HS Olanzapine to 5mg and Katie appears much more grounded.  Speech less pressured and mood more stable.  Thoughts more organized and linear.  Katie reports she also has noticed a significant difference.  She also reports she is sleeping much better.  Concerned about weight gain but reports she is very sedentary.      Recent Symptoms:   Depression:   low mood, anhedonia and low energy  Elevated:  none  Psychosis:  none  Anxiety:  occasional worry  Panic Attack:  none  Trauma Related:  none     Recent Substance Use:  No changes reported        Social/ Family History      [1ea,1ea]            [per patient report]               Financial support-  social security disability  Children-  2 adult children  Living situation- Lives in house in [place] with son   Social/spiritial support-  limited/none  Feels safe at home-  Yes   Family history- None      Medical / Surgical History                                 Patient Active Problem List   Diagnosis     Bipolar disorder (H)     Hashimoto's thyroiditis     Incontinence of urine in female     Osteopenia     Reactive airway disease     Vitamin D deficiency     Overweight     Dizziness     Hyperlipidemia LDL goal <100     Right shoulder pain, unspecified chronicity       Past Surgical History:   Procedure Laterality Date     COLONOSCOPY N/A 4/14/2021    Procedure: COLONOSCOPY;  Surgeon: Guerita Shannon MD;  Location:  GI     COLONOSCOPY N/A 4/14/2021    Procedure: Colonoscopy, With Polypectomy " "And Biopsy;  Surgeon: Guerita Shannon MD;  Location: [facility]     partial thyroidectomy       TONSILLECTOMY       TUBAL LIGATION          Medical Review of Systems         [2,10]   The remainder of the review of systems is noncontributory  Thyroid removed  Allergy    Quetiapine and Valproic acid  Current Medications        Current Outpatient Medications   Medication Sig Dispense Refill     albuterol (PROAIR HFA/PROVENTIL HFA/VENTOLIN HFA) 108 (90 Base) MCG/ACT inhaler Inhale 2 puffs into the lungs as needed for shortness of breath / dyspnea or wheezing 18 g 1     buPROPion (WELLBUTRIN XL) 300 MG 24 hr tablet Take 1 tablet (300 mg) by mouth every morning 90 tablet 0     clonazePAM (KLONOPIN) 0.5 MG tablet Take 0.5 mg by mouth At Bedtime       gabapentin (NEURONTIN) 300 MG capsule Take 1 capsule (300mg) in morning and 1 capsule (300mg) in afternoon and 4 capsules (1200mg) near bedtime 540 capsule 0     levothyroxine (SYNTHROID/LEVOTHROID) 50 MCG tablet Take 1 tablet (50 mcg) by mouth daily 90 tablet 3     melatonin ER 3 MG tablet Take 2 tablets (6 mg) by mouth At Bedtime 60 tablet 0     mirtazapine (REMERON) 15 MG tablet Take 1.5 tablets (22.5 mg) by mouth At Bedtime 45 tablet 0     multivitamin w/minerals (THERA-VIT-M) tablet Take 1 tablet by mouth daily       ziprasidone (GEODON) 60 MG capsule Take 1 capsule (60 mg) by mouth 2 times daily (with meals) 180 capsule 0     Vitals         [3, 3]   There were no vitals taken for this visit.   Mental Status Exam        [9, 14 cog gs]     Alertness: alert  and oriented  Appearance: casually groomed  Behavior/Demeanor: cooperative, pleasant and calm, with good  eye contact   Speech: regular rate and rhythm.  Language: no problems  Psychomotor: normal or unremarkable  Mood: \"ok\"  Affect: appropriate; was congruent to mood; was congruent to content  Thought Process/Associations: unremarkable  Thought Content:  Reports none;  Denies suicidal ideation and violent " ideation  Perception:  Reports none;  Denies auditory hallucinations and visual hallucinations  Insight: adequate  Judgment: intact  Cognition: (6) does  appear grossly intact; formal cognitive testing was not done  Gait/Station and/or Muscle Strength/Tone: unable to assess    Labs and Data                          Rating Scales:    PHQ9    PHQ9 Today:  Not completed  PHQ-9 SCORE 12/18/2019   PHQ-9 Total Score MyChart 17 (Moderately severe depression)   PHQ-9 Total Score 17        Assessment      [m2, h3]     Unspecified Mood Disorder  Bipolar I Disorder (Hx)    [place] Prescription Monitoring Program [] was not checked today:  provider not managing any controlled medications.        Plan                                                                                                                     m2, h3     1) MEDICATION:  Continue Geodon 60mg BID with food  Decrease Mirtazapine to 15mg at bedtime for sleep and mood.  Continue Wellbutrin XL 300mg daily.    Continue Gabapentin to 300 in morning and afternoon.  Continue 1200mg at bedtime   Continue  Levothyroxine 50mcg (prescribed by another provider).   Continue Melatonin 6mg    Klonopin 0.25mg started by sleep medicine per patient report    2) THERAPY:  Continue with current therapist  Continue DBT      RTC: 1 month     CRISIS NUMBERS:   Provided routinely in AVS.    Treatment Risk Statement:  The patient understands the risks, benefits, adverse effects and alternatives. Agrees to treatment with the capacity to do so. No medical contraindications to treatment. Agrees to call clinic for any problems. The patient understands to call 911 or go to the nearest ED if life threatening or urgent symptoms occur.     WHODAS 2.0  TODAY total score = N/A; [a 12-item WHODAS 2.0 assessment was not completed by the pt today and/or recorded in EPIC].       PROVIDER:  ADELINA Lieberman CNP

## 2021-05-05 LAB
COPATH REPORT: NORMAL
PAP: NORMAL

## 2021-05-07 LAB
FINAL DIAGNOSIS: NORMAL
HPV HR 12 DNA CVX QL NAA+PROBE: NEGATIVE
HPV16 DNA SPEC QL NAA+PROBE: NEGATIVE
HPV18 DNA SPEC QL NAA+PROBE: NEGATIVE
SPECIMEN DESCRIPTION: NORMAL
SPECIMEN SOURCE CVX/VAG CYTO: NORMAL

## 2021-05-10 ENCOUNTER — MYC MEDICAL ADVICE (OUTPATIENT)
Dept: INTERNAL MEDICINE | Facility: CLINIC | Age: 61
End: 2021-05-10

## 2021-05-31 DIAGNOSIS — F39 MOOD DISORDER (H): ICD-10-CM

## 2021-06-01 ENCOUNTER — VIRTUAL VISIT (OUTPATIENT)
Dept: PSYCHIATRY | Facility: CLINIC | Age: 61
End: 2021-06-01
Attending: NURSE PRACTITIONER
Payer: MEDICARE

## 2021-06-01 ENCOUNTER — MYC MEDICAL ADVICE (OUTPATIENT)
Dept: PSYCHIATRY | Facility: CLINIC | Age: 61
End: 2021-06-01

## 2021-06-01 DIAGNOSIS — F39 MOOD DISORDER (H): ICD-10-CM

## 2021-06-01 DIAGNOSIS — F41.9 ANXIETY: ICD-10-CM

## 2021-06-01 PROCEDURE — 99213 OFFICE O/P EST LOW 20 MIN: CPT | Mod: 95 | Performed by: NURSE PRACTITIONER

## 2021-06-01 RX ORDER — ZIPRASIDONE HYDROCHLORIDE 60 MG/1
60 CAPSULE ORAL 2 TIMES DAILY WITH MEALS
Qty: 180 CAPSULE | Refills: 0 | Status: SHIPPED | OUTPATIENT
Start: 2021-06-01 | End: 2021-07-01

## 2021-06-01 RX ORDER — BUPROPION HYDROCHLORIDE 300 MG/1
300 TABLET ORAL EVERY MORNING
Qty: 90 TABLET | Refills: 0 | Status: SHIPPED | OUTPATIENT
Start: 2021-06-01 | End: 2021-09-01

## 2021-06-01 RX ORDER — GABAPENTIN 300 MG/1
CAPSULE ORAL
Qty: 540 CAPSULE | Refills: 0 | Status: SHIPPED | OUTPATIENT
Start: 2021-06-01 | End: 2021-09-01

## 2021-06-01 RX ORDER — MIRTAZAPINE 15 MG/1
15 TABLET, FILM COATED ORAL AT BEDTIME
Qty: 30 TABLET | Refills: 0 | Status: SHIPPED | OUTPATIENT
Start: 2021-06-01 | End: 2021-07-01

## 2021-06-01 ASSESSMENT — PAIN SCALES - GENERAL: PAINLEVEL: NO PAIN (0)

## 2021-06-01 NOTE — PATIENT INSTRUCTIONS
**For crisis resources, please see the information at the end of this document**     Patient Education      Thank you for coming to the [facility] MENTAL HEALTH & ADDICTION [place] [facility].    Lab Testing:  If you had lab testing today and your results are reassuring or normal they will be mailed to you or sent through [employer] within 7 days. If the lab tests need quick action we will call you with the results. The phone number we will call with results is # [phone] (home) . If this is not the best number please call our clinic and change the number.    Medication Refills:  If you need any refills please call your pharmacy and they will contact us. Our fax number for refills is [phone]. Please allow three business for refill processing. If you need to  your refill at a new pharmacy, please contact the new pharmacy directly. The new pharmacy will help you get your medications transferred.     Scheduling:  If you have any concerns about today's visit or wish to schedule another appointment please call our office during normal business hours [phone] (8-5:00 M-F)    Contact Us:  Please call [phone] during business hours (8-5:00 M-F).  If after clinic hours, or on the weekend, please call  [phone].    Financial Assistance [phone]  [facility] Billing [phone]  Central Billing Office, [facility]: [phone]  [facility] Billing [phone]  Medical Records [phone]  [facility] Patient Bill of Rights [email or web address]       MENTAL HEALTH CRISIS NUMBERS:  For a medical emergency please call  911 or go to the nearest ER.     [facility]:   [facility] -[phone]   Crisis Residence [facility] Residence -[phone]   Walk-In Counseling Center [facility] -[phone]   COPE 24/7 [place] Mobile Team -[phone] (adults)/[phone] (child)  CHILD: [facility] needs assessment  team - [phone]      [facility]:   [facility] - [phone]   Walk-in counseling [facility] - [phone]   Walk-in counseling [facility] - [phone]   Crisis Residence [facility] Residence - [phone]  Urgent Care Adult Mental Cktqcf-[phone] mobile unit/ 24/7 crisis line    National Crisis Numbers:   National Suicide Prevention Lifeline: 5-752-512-TALK (189-115-8985)  Poison Control Center - 7-641-047-2542  [employer]/resources for a list of additional resources (SOS)  Trans Lifeline a hotline for transgender people 1-338.741.5764  The [employer] a hotline for LGBT youth 9-500-202-1583  Crisis Text Line: For any crisis 24/7   To: 002314  see www.crisistextline.org  - IF MAKING A CALL FEELS TOO HARD, send a text!         Again thank you for choosing [facility] MENTAL HEALTH & ADDICTION [facility] and please let us know how we can best partner with you to improve you and your family's health.    You may be receiving a survey regarding this appointment. We would love to have your feedback, both positive and negative. The survey is done by an external company, so your answers are anonymous.

## 2021-06-01 NOTE — PROGRESS NOTES
"Video- Visit Details  Type of service:  video visit for medication management  Time of service:    Date:  06/01/2021    Video Start Time:  1:07 PM        Video End Time:  1:21pm    Reason for video visit:  Patient unable to travel due to Covid-19  Originating Site (patient location):  [facility]   Location- Patient's home  Distant Site (provider location):  Remote location  Mode of Communication:  Video Conference via [email or web address]  Consent:  Patient has given verbal consent for video visit?: Yes     VIDEO VISIT  Katie Griffiths is a 60 year old patient who is being evaluated via a billable video visit.      The patient has been notified of following:   \"This video visit will be conducted via a call between you and your physician/provider. We have found that certain health care needs can be provided without the need for an in-person physical exam. This service lets us provide the care you need with a video conversation. If a prescription is necessary we can send it directly to your pharmacy. If lab work is needed we can place an order for that and you can then stop by our lab to have the test done at a later time. Insurers are generally covering virtual visits as they would in-office visits so billing should not be different than normal.  If for some reason you do get billed incorrectly, you should contact the billing office to correct it and that number is in the AVS .    Video Conference to be completed via:  [email or web address]    Patient has given verbal consent for video visit?:  Yes    Patient would prefer that any video invitations be sent by: Send to e-mail at: [email or web address]      How would patient like to obtain AVS?:  [employer]    AVS SmartPhrase [PsychAVS] has been placed in 'Patient Instructions':  Yes       [facility]  Psychiatry Clinic  PSYCHIATRIC PROGRESS NOTE       Katie Griffiths is a 60 year old female who prefers the name Katie and pronoun she, her.  Therapist: " "Amirah Tejeda [facility]               PCP: Buddy Nolan  Other Providers: None    Pertinent Background:  See previous notes.  Psych critical item history includes Hospitalized 3 times with most recent occurring in 2007 after overdose attempt.  Numerous medication trials.  Possible bipolar diagnosis.     Interim History     [4, 4]     The patient is a good historian, reports good treatment adherence and was last seen 4/1/21.  Since the last visit, Katie again reports she is \"ok.\"  Tolerated decrease in Mirtazapine and sleep has not been impacted.   Continues to endorse a \"blah\" mood but she was quite bright today.  She attributes to medication-derived emotional bluntness.  Unclear if this is case or is depression not managed.  She does not want to adjust medications today but would like to possibly decrease Mirtazapine.  Continues to see therapist weekly.      4/1/21: Katie reports she is ok.  Klonopin 0.5mg started for sleep by sleep medicine.  Katie reports it is effective as she is sleeping 7-7.5 hours per night.  Reports feeling \"flat.\"  Jointly decided to continue with current medications and assess in month to see if change in weather and improved sleep will improve mood.     3/2/21: When asked how she is doing, Katie reports \"ok, I'm here\" but laughed afterwards.  Klonopin 0.25mg started by sleep medicine.  Sleep study completed and follow-up scheduled for late March.  She reports that Klonopin was helpful the first two nights but now it is \"hit or miss.\"  Informed Katie that since Sleep Medicine started Klonopin that I will request provider to continue to manage moving forward.  She is requesting that Mirtazapine be decreased due to concerns about weight and efficacy.  Continues to endorse sedentary lifestyle. Advised her to walk twice per day to improve mood and sleep.  Anxiety/worry continues and will addressed during therapy.        1/26/21: Katie reports \"I'm doing.\"  Reports only sleeping 2 " "hours last night.  However, she eports sleeping 6.5 hours the previous several nights.   She is able to share that she was overwhelmed with financial concerns and is impacting her sleep.  Current concern is being unable to stay asleep.  Continues to lead fairly sedentary lifestyle.  Reports \"there's nothing to do.\"  She is confident that if activities were available she would engage.  Discussed how employment could help her better manage her mental health.  She is hesitant to obtain employment as it may impact social security disability payments.    12/28/20: Katie again reports \"I'm hanging in there.\"  Katie called clinic approximately 2 weeks ago with concerns about her sleep.  Since then she reports her sleep has \"evened out.\"  Reports \"having to go out of my way to make pattern for sleep.\"  Currently getting about 6 hours of sleep per day.  Does report some concern with daytime sedation.  She will decrease OTC Melatonin as has grogginess has worsened with Melatonin. Continues to reports concern with hair loss and will connect with endocrinology.    12/9/20: Katie reports \"I'm hanging in there.\"  She is tolerating Mirtazapine and no longer concerned about hair loss.  Sleep continues to vary.  Ranges from 5-7 hours per night.  Mood stable.  She does not want to adjust medications and is requesting 90 day supply.      11/10/20: Katie is \"ok\"  Mood stable.  She is requesting a decrease in Mirtazapine as she is associating it with hair loss.  Not listed as a typical side effect.  Katie reports she is walking with her father most mornings.  Describes sleep as \"so-so.\"  Averaging 5 hours of sleep per night.  Continues to see therapist regularly.     10/14/20:  Katie reports again \"I am hanging in there.\" Persistent low mood persists.  Reports she had to switch thyroid medications due to her's being recalled.  Sleep initially worsened with new drug but she is now sleeping 5-6 hours per night.  Katie reports that her housing is " "more stable than at last appointment.  Anxiety has improved as a result.  Katie also reports she has been more active as she is helping her father prepare his boat for winter.  She also continues to see her therapist regularly.      9/15/20: Katie continues to report \"I am hanging in there.\" Sleep continues to be main concern. Katie reports she has been waking at 2am and unable to fall back asleep for the past 2 nights.  Katie does report that Gabapentin is helpful in that she can relax when she initially lays down for bed. \"I'm getting beneficial sleep more often than I am not.\"   Continues to endorse periodic low mood and anxiety.  Katie also has not used propranolol for PRN anxiety will discontinue. She does endorse fairly constant worry about possible homelessness.  Katie also brought up Celexa as it was helpful in management of anxiety; however in May she stated it was not effective and maybe causing sedation.  Continues to see therapist regularly.     8/18/20: Katie reports she is continuing \"hanging in there.\"  Katie reports that gabapentin has been helpful with sleep and she is \"tossing and turning less.\"  She believes sleep duration may have increased.  Gabapentin also appears to be helping with daytime anxiety.   Mood stable.  No significant concerns with depression or mood fluctuations.     7/30/20: Katie reports she is \"hanging in there.\"  Trazodone reports trazodone 100mg was not effective and led to diarrhea.  Discussed possible hypomania episode as she was frequently calling various providers at odd hours and having difficulty sleeping.  Does not appear to meet criteria at this time but will continue to monitor.  Will try to increase dose of Mirtazapine dose and see if helps.  Also will start Gabapentin for anxiety and sleep.  Katie continues to be quite sedentary during day.  Fortunately she was on a day trip to [place] today with her parents.  Advised to follow sleep medicine recommendations    7/7/20: Katie " "reports she is \"hanging in there.\"  Depression and anxiety persist.  Mood stable.  She is residing at her parent's home for the past couple days.  She using office and sleeping on twin bed. She is looking for independent living but is running into issues with her income, age, and credit history. Completed sleep consultation and behavioral change recommendations were given.  Unclear if followed recommendations.  Stress has decreased since then due to moving in with her parents but still persists due to housing issue.  Advised to take Propranolol when feels stressed.  Continues to endorse sedentary lifestyle.  Advised to go for walks in morning and early evening to develop \"sleep appetite.\"  She agreed.  She also does not want to start any sleep aids that may lead to drowsiness and weight gain.  Mood, motivation, and energy low.  Recommended increase Wellbutrin to 450mg but she again is hesitant.      New Address:  [facility] [address]  [place] [address]    6/9/20: Katie is currently trying to maintain housing.  Reports her current situation living with her son has been extremely stressful.  Currently looking for apartments in [facility] [place].  She working with [facility] social work team.  Katie is very hesitant to change her medications.  She would like to obtain better grasp of her housing before making medication adjustments. Will consult with PCP about possible Propranolol trial.       5/6/20:  Katie continues to endorse stress regarding bankruptcy.  She has phone hearing with  at the end of the month of May.  Katie continues to report lack of activity which may be attributable to lifestyle changes required during pandemic, but does report overall improvement with increase in Wellbutrin.  Sleep also continues to be an issue but she also reports it continues to improve.  Sleep medicine consultation in 2 months. No concerns with elevated mood.  Katie did not want to adjust her medications     3/31/20: when asked " "how she is doing, Katie reports that switch from Celexa to Wellbutrin has been beneficial in regards to mood, energy, and motivation.  No concerns with elevated mood.  Sleep continues to be an issue.  Did not worsen with addition of Wellbutrin.  Has sleep study in 3 months.  Regarding psychosocial stressors, Katie is babysitting her grandchildren as their mother is sick and is going through bankruptcy.       3/4/20: Katie again starts laughing.  Mood is stable overall but low.  Continues to endorse anhedonia, energy, and low mood. Sleep has improved since starting Remeron.  Reports sleeping 5 hours per night which results in continued daytime tiredness.    Continues to report that Celexa is not helpful and possible causing sedation    2/12/20: Katie shared that she is not sleeping.  She reports that she is getting 3 hours of sleep per night for past couple weeks. However, Katie did not look overly fatigued during appointment.  She also reports that her mood is quite low.  No concerns with shayla.  No goal directed behavior or impulsivity.  Katie\"s affect continues to be incongruent to her reported mood.  She is struggling to get household chores completed.  Continues to report sedentary lifestyle.  Educated on how exercise and activity can help with sleep.  Will stop Trazodone and start Mirtazapine for sleep.  If continues to be a problem, will refer to sleep medicine as sleep has been an issue for several years.  Also plan to switch Celexa as it does not appear to be managing her depression.      1/8/20: Katie's main concern today was weight gain.  Affect was quite bright today in spite of high PHQ-9 and reported persistent low mood. Reports positive holiday with her family.  Sleep continues to be inconsistent.  Mood stable with introduction of Geodon.  Will increase and discontinue Olanzapine.  Katie continues to consider a retrial of Depakote which was discontinued in past due to development of rash.      12/11/19: Katie " "reports her mood is stable but she is describes \"not feeling good or bad.\"  Difficult to discern if emotional blunting or lack of hypomania.  Thoughts continue to be linear and organized.  Speech normal.  Also reports sleeping continues to improve. Previous trials include Lithium (not effective?), Depakote (rash), and Abilify (side effects).  Katie would like to change medications today due to possible emotional blunting and weight gain (5lbs in past 2 weeks).      11/27/19: Increased HS Olanzapine to 5mg and Katie appears much more grounded.  Speech less pressured and mood more stable.  Thoughts more organized and linear.  Katie reports she also has noticed a significant difference.  She also reports she is sleeping much better.  Concerned about weight gain but reports she is very sedentary.      Recent Symptoms:   Depression:  occasional low mood, anhedonia and low energy  Elevated:  none  Psychosis:  none  Anxiety:  occasional worry  Panic Attack:  none  Trauma Related:  none     Recent Substance Use:  No changes reported        Social/ Family History      [1ea,1ea]            [per patient report]               Financial support-  social security disability  Children-  2 adult children  Living situation- Lives in house in [place] with son   Social/spiritial support-  limited/none  Feels safe at home-  Yes   Family history- None      Medical / Surgical History                                 Patient Active Problem List   Diagnosis     Bipolar disorder (H)     Hashimoto's thyroiditis     Incontinence of urine in female     Osteopenia     Reactive airway disease     Vitamin D deficiency     Overweight     Dizziness     Hyperlipidemia LDL goal <100     Right shoulder pain, unspecified chronicity       Past Surgical History:   Procedure Laterality Date     COLONOSCOPY N/A 4/14/2021    Procedure: COLONOSCOPY;  Surgeon: Guerita Shannon MD;  Location:  GI     COLONOSCOPY N/A 4/14/2021    Procedure: Colonoscopy, With " "Polypectomy And Biopsy;  Surgeon: Guerita Shannon MD;  Location: [facility]     partial thyroidectomy       TONSILLECTOMY       TUBAL LIGATION          Medical Review of Systems         [2,10]   The remainder of the review of systems is noncontributory  Thyroid removed  Allergy    Quetiapine and Valproic acid  Current Medications        Current Outpatient Medications   Medication Sig Dispense Refill     albuterol (PROAIR HFA/PROVENTIL HFA/VENTOLIN HFA) 108 (90 Base) MCG/ACT inhaler Inhale 2 puffs into the lungs as needed for shortness of breath / dyspnea or wheezing 18 g 1     buPROPion (WELLBUTRIN XL) 300 MG 24 hr tablet Take 1 tablet (300 mg) by mouth every morning 90 tablet 0     clonazePAM (KLONOPIN) 0.5 MG tablet Take 0.5 mg by mouth At Bedtime       gabapentin (NEURONTIN) 300 MG capsule Take 1 capsule (300mg) in morning and 1 capsule (300mg) in afternoon and 4 capsules (1200mg) near bedtime 540 capsule 0     levothyroxine (SYNTHROID/LEVOTHROID) 50 MCG tablet Take 1 tablet (50 mcg) by mouth daily 90 tablet 3     melatonin ER 3 MG tablet Take 2 tablets (6 mg) by mouth At Bedtime 60 tablet 0     mirtazapine (REMERON) 15 MG tablet Take 1 tablet (15 mg) by mouth At Bedtime 30 tablet 0     multivitamin w/minerals (THERA-VIT-M) tablet Take 1 tablet by mouth daily       ziprasidone (GEODON) 60 MG capsule Take 1 capsule (60 mg) by mouth 2 times daily (with meals) 180 capsule 0     Vitals         [3, 3]   There were no vitals taken for this visit.   Mental Status Exam        [9, 14 cog gs]     Alertness: alert  and oriented  Appearance: casually groomed  Behavior/Demeanor: cooperative, pleasant and calm, with good  eye contact   Speech: regular rate and rhythm.  Language: good  Psychomotor: normal or unremarkable  Mood: \"blah\"  Affect: bright; was congruent to mood; was congruent to content  Thought Process/Associations: unremarkable  Thought Content:  Reports none;  Denies suicidal ideation and violent " ideation  Perception:  Reports none;  Denies auditory hallucinations and visual hallucinations  Insight: adequate  Judgment: intact  Cognition: (6) does  appear grossly intact; formal cognitive testing was not done  Gait/Station and/or Muscle Strength/Tone: unable to assess    Labs and Data                          Rating Scales:    PHQ9    PHQ9 Today:  Not completed  PHQ-9 SCORE 12/18/2019   PHQ-9 Total Score MyChart 17 (Moderately severe depression)   PHQ-9 Total Score 17        Assessment      [m2, h3]     Unspecified Mood Disorder  Bipolar I Disorder (Hx)    [place] Prescription Monitoring Program [] was not checked today:  provider not managing any controlled medications.        Plan                                                                                                                     m2, h3     1) MEDICATION:  Continue Geodon 60mg BID with food  Continue Mirtazapine 22.5mg at bedtime for sleep and mood.  Continue Wellbutrin XL 300mg daily.    Continue Gabapentin to 300 in morning and afternoon.  Continue 1200mg at bedtime   Continue  Levothyroxine 50mcg (prescribed by another provider).   Continue Melatonin 6mg    Klonopin 0.25mg started by sleep medicine per patient report    2) THERAPY:  Continue with current therapist        RTC: 1 month     CRISIS NUMBERS:   Provided routinely in AVS.    Treatment Risk Statement:  The patient understands the risks, benefits, adverse effects and alternatives. Agrees to treatment with the capacity to do so. No medical contraindications to treatment. Agrees to call clinic for any problems. The patient understands to call 911 or go to the nearest ED if life threatening or urgent symptoms occur.     WHODAS 2.0  TODAY total score = N/A; [a 12-item WHODAS 2.0 assessment was not completed by the pt today and/or recorded in EPIC].       PROVIDER:  ADELINA Lieberman CNP

## 2021-06-02 ENCOUNTER — MYC MEDICAL ADVICE (OUTPATIENT)
Dept: ENDOCRINOLOGY | Facility: CLINIC | Age: 61
End: 2021-06-02

## 2021-06-02 DIAGNOSIS — E03.9 HYPOTHYROIDISM, UNSPECIFIED TYPE: Primary | ICD-10-CM

## 2021-06-03 ENCOUNTER — TRANSFERRED RECORDS (OUTPATIENT)
Dept: HEALTH INFORMATION MANAGEMENT | Facility: CLINIC | Age: 61
End: 2021-06-03

## 2021-06-03 ENCOUNTER — MEDICAL CORRESPONDENCE (OUTPATIENT)
Dept: HEALTH INFORMATION MANAGEMENT | Facility: CLINIC | Age: 61
End: 2021-06-03

## 2021-06-03 RX ORDER — MIRTAZAPINE 15 MG/1
TABLET, FILM COATED ORAL
Qty: 30 TABLET | Refills: 0 | OUTPATIENT
Start: 2021-06-03

## 2021-06-05 ENCOUNTER — MYC MEDICAL ADVICE (OUTPATIENT)
Dept: ENDOCRINOLOGY | Facility: CLINIC | Age: 61
End: 2021-06-05

## 2021-06-05 DIAGNOSIS — E03.9 HYPOTHYROIDISM, UNSPECIFIED TYPE: Primary | ICD-10-CM

## 2021-06-15 NOTE — TELEPHONE ENCOUNTER
Good morning,    I just saw Katie's message. [email or web address].  Shanelle, can you ask Katie to send us a copy of the results? We do not have access to the results at the [facility] portal if we did not order them.    Thanks    Filomena

## 2021-06-16 ENCOUNTER — TELEPHONE (OUTPATIENT)
Dept: PSYCHIATRY | Facility: CLINIC | Age: 61
End: 2021-06-16

## 2021-06-16 NOTE — TELEPHONE ENCOUNTER
[facility] Call Center    Phone Message    May a detailed message be left on voicemail: yes     Reason for Call: Other: Pt called requesting a call back from Shanelle BARFIELD. She would like to discuss her travelmobight testing.      Action Taken: Other: Sent to Shanelle BARFIELD    Travel Screening: Not Applicable

## 2021-06-28 DIAGNOSIS — F39 MOOD DISORDER (H): ICD-10-CM

## 2021-06-30 RX ORDER — MIRTAZAPINE 15 MG/1
TABLET, FILM COATED ORAL
Qty: 30 TABLET | Refills: 0 | OUTPATIENT
Start: 2021-06-30

## 2021-07-01 ENCOUNTER — VIRTUAL VISIT (OUTPATIENT)
Dept: PSYCHIATRY | Facility: CLINIC | Age: 61
End: 2021-07-01
Attending: NURSE PRACTITIONER
Payer: MEDICARE

## 2021-07-01 ENCOUNTER — TELEPHONE (OUTPATIENT)
Dept: PSYCHIATRY | Facility: CLINIC | Age: 61
End: 2021-07-01

## 2021-07-01 DIAGNOSIS — F39 MOOD DISORDER (H): ICD-10-CM

## 2021-07-01 PROCEDURE — 99213 OFFICE O/P EST LOW 20 MIN: CPT | Mod: 95 | Performed by: NURSE PRACTITIONER

## 2021-07-01 RX ORDER — MIRTAZAPINE 15 MG/1
15 TABLET, FILM COATED ORAL AT BEDTIME
Qty: 30 TABLET | Refills: 0 | Status: SHIPPED | OUTPATIENT
Start: 2021-07-01 | End: 2021-08-02

## 2021-07-01 RX ORDER — ZIPRASIDONE HYDROCHLORIDE 40 MG/1
40 CAPSULE ORAL 2 TIMES DAILY WITH MEALS
Qty: 60 CAPSULE | Refills: 0 | Status: SHIPPED | OUTPATIENT
Start: 2021-07-01 | End: 2021-08-02

## 2021-07-01 ASSESSMENT — PAIN SCALES - GENERAL: PAINLEVEL: NO PAIN (0)

## 2021-07-01 NOTE — PATIENT INSTRUCTIONS
**For crisis resources, please see the information at the end of this document**     Patient Education      Thank you for coming to the [facility] [place] [facility].    Lab Testing:  If you had lab testing today and your results are reassuring or normal they will be mailed to you or sent through [employer] within 7 days. If the lab tests need quick action we will call you with the results. The phone number we will call with results is # [phone] (home) . If this is not the best number please call our clinic and change the number.    Medication Refills:  If you need any refills please call your pharmacy and they will contact us. Our fax number for refills is [phone]. Please allow three business for refill processing. If you need to  your refill at a new pharmacy, please contact the new pharmacy directly. The new pharmacy will help you get your medications transferred.     Scheduling:  If you have any concerns about today's visit or wish to schedule another appointment please call our office during normal business hours [phone] (8-5:00 M-F)    Contact Us:  Please call [phone] during business hours (8-5:00 M-F).  If after clinic hours, or on the weekend, please call  [phone].    Financial Assistance [phone]  [facility] Billing [phone]  Central Billing Office, [facility]: [phone]  [facility] Billing [phone]  Medical Records [phone]  [facility] Patient Bill of Rights [email or web address]       MENTAL HEALTH CRISIS NUMBERS:  For a medical emergency please call  911 or go to the nearest ER.     [facility]:   [facility] -[phone]   Crisis Residence [facility] -[phone]   Walk-In Counseling Center [facility] -[phone]   COPE 24/7 [place] Mobile Team -[phone] (adults)/[phone] (child)  CHILD: [facility] needs assessment  team - [phone]      [facility]:   [facility] - [phone]   Walk-in counseling [facility] - [phone]   Walk-in counseling [facility] - [phone]   Crisis Residence [facility] - [phone]  Urgent Care Adult Mental Qjxgrv-[phone] mobile unit/ 24/7 crisis line    National Crisis Numbers:   National Suicide Prevention Lifeline: 8-883-506-TALK (141-273-7615)  Poison Control Center - 9-116-316-6139  Tranz/resources for a list of additional resources (SOS)  Trans Lifeline a hotline for transgender people 1-878.620.1879  The Tae Project a hotline for LGBT youth 2-280-887-5347  Crisis Text Line: For any crisis 24/7   To: 731677  see www.crisistextline.org  - IF MAKING A CALL FEELS TOO HARD, send a text!         Again thank you for choosing [facility] M[facility]DICTION [facility] and please let us know how we can best partner with you to improve you and your family's health.    You may be receiving a survey regarding this appointment. We would love to have your feedback, both positive and negative. The survey is done by an external company, so your answers are anonymous.

## 2021-07-01 NOTE — TELEPHONE ENCOUNTER
----- Message from ADELINA Coker CNP sent at 7/1/2021  8:17 AM CDT -----  Hello,    Could you please call Katie's pharmacy and change prescriptions I ordered today from 30 day fills to 33 day fills?  She already made appointment for 8/2.    Thanks    Enzo

## 2021-07-01 NOTE — PROGRESS NOTES
"Video- Visit Details  Type of service:  video visit for medication management  Time of service:    Date:  07/01/2021    Video Start Time:  8:07 AM        Video End Time:  8:18am    Reason for video visit:  Patient unable to travel due to Covid-19  Originating Site (patient location):  [facility]   Location- Patient's home  Distant Site (provider location):  Remote location  Mode of Communication:  Video Conference via [email or web address]  Consent:  Patient has given verbal consent for video visit?: Yes     VIDEO VISIT  Katie Griffiths is a 60 year old patient who is being evaluated via a billable video visit.      The patient has been notified of following:   \"This video visit will be conducted via a call between you and your physician/provider. We have found that certain health care needs can be provided without the need for an in-person physical exam. This service lets us provide the care you need with a video conversation. If a prescription is necessary we can send it directly to your pharmacy. If lab work is needed we can place an order for that and you can then stop by our lab to have the test done at a later time. Insurers are generally covering virtual visits as they would in-office visits so billing should not be different than normal.  If for some reason you do get billed incorrectly, you should contact the billing office to correct it and that number is in the AVS .    Video Conference to be completed via:  [email or web address]    Patient has given verbal consent for video visit?:  Yes    Patient would prefer that any video invitations be sent by: Send to e-mail at: [email or web address]      How would patient like to obtain AVS?:  [employer]    AVS SmartPhrase [PsychAVS] has been placed in 'Patient Instructions':  Yes          [facility]  Psychiatry Clinic  PSYCHIATRIC PROGRESS NOTE       Katie Griffiths is a 60 year old female who prefers the name Katie and pronoun she, her.  Therapist: " "Amirah Tejeda [facility]               PCP: Buddy Nolan  Other Providers: None    Pertinent Background:  See previous notes.  Psych critical item history includes Hospitalized 3 times with most recent occurring in 2007 after overdose attempt.  Numerous medication trials.  Possible bipolar diagnosis.     Interim History     [4, 4]     The patient is a good historian, reports good treatment adherence and was last seen 6/1/21.  Since the last visit, She reports she is \"better.\"  Endorses improvement in mood.  Her son and his family recently visited which was pleasant and \"kept her busy.\"  She is requesting to decrease dose of Geodon due to overall improved mood and  \"it being summertime.\"  Sleep described as \"pretty good.\"  Katie will call clinic if experience any symptoms of elevated mood.     6/1/21: Katie again reports she is \"ok.\"  Tolerated decrease in Mirtazapine and sleep has not been impacted.   Continues to endorse a \"blah\" mood but she was quite bright today.  She attributes to medication-derived emotional bluntness.  Unclear if this is case or is depression not managed.  She does not want to adjust medications today but would like to possibly decrease Mirtazapine.  Continues to see therapist weekly.      4/1/21: Katie reports she is ok.  Klonopin 0.5mg started for sleep by sleep medicine.  Katie reports it is effective as she is sleeping 7-7.5 hours per night.  Reports feeling \"flat.\"  Jointly decided to continue with current medications and assess in month to see if change in weather and improved sleep will improve mood.     3/2/21: When asked how she is doing, Katie reports \"ok, I'm here\" but laughed afterwards.  Klonopin 0.25mg started by sleep medicine.  Sleep study completed and follow-up scheduled for late March.  She reports that Klonopin was helpful the first two nights but now it is \"hit or miss.\"  Informed Katie that since Sleep Medicine started Klonopin that I will request provider to " "continue to manage moving forward.  She is requesting that Mirtazapine be decreased due to concerns about weight and efficacy.  Continues to endorse sedentary lifestyle. Advised her to walk twice per day to improve mood and sleep.  Anxiety/worry continues and will addressed during therapy.        1/26/21: Katie reports \"I'm doing.\"  Reports only sleeping 2 hours last night.  However, she eports sleeping 6.5 hours the previous several nights.   She is able to share that she was overwhelmed with financial concerns and is impacting her sleep.  Current concern is being unable to stay asleep.  Continues to lead fairly sedentary lifestyle.  Reports \"there's nothing to do.\"  She is confident that if activities were available she would engage.  Discussed how employment could help her better manage her mental health.  She is hesitant to obtain employment as it may impact social security disability payments.    12/28/20: Katie again reports \"I'm hanging in there.\"  Katie called clinic approximately 2 weeks ago with concerns about her sleep.  Since then she reports her sleep has \"evened out.\"  Reports \"having to go out of my way to make pattern for sleep.\"  Currently getting about 6 hours of sleep per day.  Does report some concern with daytime sedation.  She will decrease OTC Melatonin as has grogginess has worsened with Melatonin. Continues to reports concern with hair loss and will connect with endocrinology.    12/9/20: Katie reports \"I'm hanging in there.\"  She is tolerating Mirtazapine and no longer concerned about hair loss.  Sleep continues to vary.  Ranges from 5-7 hours per night.  Mood stable.  She does not want to adjust medications and is requesting 90 day supply.      11/10/20: Katie is \"ok\"  Mood stable.  She is requesting a decrease in Mirtazapine as she is associating it with hair loss.  Not listed as a typical side effect.  Katie reports she is walking with her father most mornings.  Describes sleep as \"so-so.\"  " "Averaging 5 hours of sleep per night.  Continues to see therapist regularly.     10/14/20:  Katie reports again \"I am hanging in there.\" Persistent low mood persists.  Reports she had to switch thyroid medications due to her's being recalled.  Sleep initially worsened with new drug but she is now sleeping 5-6 hours per night.  Katie reports that her housing is more stable than at last appointment.  Anxiety has improved as a result.  Katie also reports she has been more active as she is helping her father prepare his boat for winter.  She also continues to see her therapist regularly.      9/15/20: Katie continues to report \"I am hanging in there.\" Sleep continues to be main concern. Katie reports she has been waking at 2am and unable to fall back asleep for the past 2 nights.  Katie does report that Gabapentin is helpful in that she can relax when she initially lays down for bed. \"I'm getting beneficial sleep more often than I am not.\"   Continues to endorse periodic low mood and anxiety.  Katie also has not used propranolol for PRN anxiety will discontinue. She does endorse fairly constant worry about possible homelessness.  Katie also brought up Celexa as it was helpful in management of anxiety; however in May she stated it was not effective and maybe causing sedation.  Continues to see therapist regularly.     8/18/20: Katie reports she is continuing \"hanging in there.\"  Katie reports that gabapentin has been helpful with sleep and she is \"tossing and turning less.\"  She believes sleep duration may have increased.  Gabapentin also appears to be helping with daytime anxiety.   Mood stable.  No significant concerns with depression or mood fluctuations.     7/30/20: Katie reports she is \"hanging in there.\"  Trazodone reports trazodone 100mg was not effective and led to diarrhea.  Discussed possible hypomania episode as she was frequently calling various providers at odd hours and having difficulty sleeping.  Does not appear to " "meet criteria at this time but will continue to monitor.  Will try to increase dose of Mirtazapine dose and see if helps.  Also will start Gabapentin for anxiety and sleep.  Katie continues to be quite sedentary during day.  Fortunately she was on a day trip to [place] today with her parents.  Advised to follow sleep medicine recommendations    7/7/20: Katie reports she is \"hanging in there.\"  Depression and anxiety persist.  Mood stable.  She is residing at her parent's home for the past couple days.  She using office and sleeping on twin bed. She is looking for independent living but is running into issues with her income, age, and credit history. Completed sleep consultation and behavioral change recommendations were given.  Unclear if followed recommendations.  Stress has decreased since then due to moving in with her parents but still persists due to housing issue.  Advised to take Propranolol when feels stressed.  Continues to endorse sedentary lifestyle.  Advised to go for walks in morning and early evening to develop \"sleep appetite.\"  She agreed.  She also does not want to start any sleep aids that may lead to drowsiness and weight gain.  Mood, motivation, and energy low.  Recommended increase Wellbutrin to 450mg but she again is hesitant.      New Address:  [facility] [address]  [place] [address]    6/9/20: Katie is currently trying to maintain housing.  Reports her current situation living with her son has been extremely stressful.  Currently looking for apartments in [facility].  She working with [facility] social work team.  Katie is very hesitant to change her medications.  She would like to obtain better grasp of her housing before making medication adjustments. Will consult with PCP about possible Propranolol trial.       5/6/20:  Katie continues to endorse stress regarding bankruptcy.  She has phone hearing with  at the end of the month of May.  Katie continues to report lack of activity " "which may be attributable to lifestyle changes required during pandemic, but does report overall improvement with increase in Wellbutrin.  Sleep also continues to be an issue but she also reports it continues to improve.  Sleep medicine consultation in 2 months. No concerns with elevated mood.  Katie did not want to adjust her medications     3/31/20: when asked how she is doing, Katie reports that switch from Celexa to Wellbutrin has been beneficial in regards to mood, energy, and motivation.  No concerns with elevated mood.  Sleep continues to be an issue.  Did not worsen with addition of Wellbutrin.  Has sleep study in 3 months.  Regarding psychosocial stressors, Katie is babysitting her grandchildren as their mother is sick and is going through bankruptcy.       3/4/20: Katie again starts laughing.  Mood is stable overall but low.  Continues to endorse anhedonia, energy, and low mood. Sleep has improved since starting Remeron.  Reports sleeping 5 hours per night which results in continued daytime tiredness.    Continues to report that Celexa is not helpful and possible causing sedation    2/12/20: Katie shared that she is not sleeping.  She reports that she is getting 3 hours of sleep per night for past couple weeks. However, Katie did not look overly fatigued during appointment.  She also reports that her mood is quite low.  No concerns with shayla.  No goal directed behavior or impulsivity.  Katie\"s affect continues to be incongruent to her reported mood.  She is struggling to get household chores completed.  Continues to report sedentary lifestyle.  Educated on how exercise and activity can help with sleep.  Will stop Trazodone and start Mirtazapine for sleep.  If continues to be a problem, will refer to sleep medicine as sleep has been an issue for several years.  Also plan to switch Celexa as it does not appear to be managing her depression.      1/8/20: Katie's main concern today was weight gain.  Affect was quite " "bright today in spite of high PHQ-9 and reported persistent low mood. Reports positive holiday with her family.  Sleep continues to be inconsistent.  Mood stable with introduction of Geodon.  Will increase and discontinue Olanzapine.  Katie continues to consider a retrial of Depakote which was discontinued in past due to development of rash.      12/11/19: Katie reports her mood is stable but she is describes \"not feeling good or bad.\"  Difficult to discern if emotional blunting or lack of hypomania.  Thoughts continue to be linear and organized.  Speech normal.  Also reports sleeping continues to improve. Previous trials include Lithium (not effective?), Depakote (rash), and Abilify (side effects).  Katie would like to change medications today due to possible emotional blunting and weight gain (5lbs in past 2 weeks).      11/27/19: Increased HS Olanzapine to 5mg and Katie appears much more grounded.  Speech less pressured and mood more stable.  Thoughts more organized and linear.  Katie reports she also has noticed a significant difference.  She also reports she is sleeping much better.  Concerned about weight gain but reports she is very sedentary.      Recent Symptoms:   Depression:  occasional low mood and low energy  Elevated:  none  Psychosis:  none  Anxiety:  occasional worry  Panic Attack:  none  Trauma Related:  none     Recent Substance Use:  No changes reported        Social/ Family History      [1ea,1ea]            [per patient report]               Financial support-  social security disability  Children-  2 adult children  Living situation- Lives in house in [place] with son   Social/spiritial support-  limited/none  Feels safe at home-  Yes   Family history- None      Medical / Surgical History                                 Patient Active Problem List   Diagnosis     Bipolar disorder (H)     Hashimoto's thyroiditis     Incontinence of urine in female     Osteopenia     Reactive airway disease     " Vitamin D deficiency     Overweight     Dizziness     Hyperlipidemia LDL goal <100     Right shoulder pain, unspecified chronicity       Past Surgical History:   Procedure Laterality Date     COLONOSCOPY N/A 4/14/2021    Procedure: COLONOSCOPY;  Surgeon: Guerita Shannon MD;  Location: [facility]     COLONOSCOPY N/A 4/14/2021    Procedure: Colonoscopy, With Polypectomy And Biopsy;  Surgeon: Guerita Shannon MD;  Location: [facility]     partial thyroidectomy       TONSILLECTOMY       TUBAL LIGATION          Medical Review of Systems         [2,10]   The remainder of the review of systems is noncontributory  Thyroid removed  Allergy    Quetiapine and Valproic acid  Current Medications        Current Outpatient Medications   Medication Sig Dispense Refill     albuterol (PROAIR HFA/PROVENTIL HFA/VENTOLIN HFA) 108 (90 Base) MCG/ACT inhaler Inhale 2 puffs into the lungs as needed for shortness of breath / dyspnea or wheezing 18 g 1     buPROPion (WELLBUTRIN XL) 300 MG 24 hr tablet Take 1 tablet (300 mg) by mouth every morning 90 tablet 0     clonazePAM (KLONOPIN) 0.5 MG tablet Take 0.5 mg by mouth At Bedtime       gabapentin (NEURONTIN) 300 MG capsule Take 1 capsule (300mg) in morning and 1 capsule (300mg) in afternoon and 4 capsules (1200mg) near bedtime 540 capsule 0     levothyroxine (SYNTHROID/LEVOTHROID) 50 MCG tablet Take 1 tablet (50 mcg) by mouth daily 90 tablet 3     melatonin ER 3 MG tablet Take 2 tablets (6 mg) by mouth At Bedtime 60 tablet 0     mirtazapine (REMERON) 15 MG tablet Take 1 tablet (15 mg) by mouth At Bedtime 30 tablet 0     multivitamin w/minerals (THERA-VIT-M) tablet Take 1 tablet by mouth daily       ziprasidone (GEODON) 60 MG capsule Take 1 capsule (60 mg) by mouth 2 times daily (with meals) 180 capsule 0     Vitals         [3, 3]   There were no vitals taken for this visit.   Mental Status Exam        [9, 14 cog gs]     Alertness: alert  and oriented  Appearance: casually groomed  Behavior/Demeanor:  "cooperative, pleasant and calm, with good  eye contact   Speech: normal.  Language: good  Psychomotor: normal or unremarkable  Mood: \"better, good\"  Affect: bright; was congruent to mood; was congruent to content  Thought Process/Associations: unremarkable  Thought Content:  Reports none;  Denies suicidal ideation and violent ideation  Perception:  Reports none;  Denies auditory hallucinations and visual hallucinations  Insight: adequate  Judgment: intact  Cognition: (6) does  appear grossly intact; formal cognitive testing was not done  Gait/Station and/or Muscle Strength/Tone: unable to assess    Labs and Data                          Rating Scales:    PHQ9    PHQ9 Today:  Not completed  PHQ-9 SCORE 12/18/2019   PHQ-9 Total Score MyChart 17 (Moderately severe depression)   PHQ-9 Total Score 17        Assessment      [m2, h3]     Unspecified Mood Disorder  Bipolar I Disorder (Hx)    [place] Prescription Monitoring Program [] was not checked today:  provider not managing any controlled medications.        Plan                                                                                                                     m2, h3     1) MEDICATION:  Decrease Geodon to 40mg BID with food  Continue Mirtazapine 22.5mg at bedtime for sleep and mood.  Continue Wellbutrin XL 300mg daily.    Continue Gabapentin to 300 in morning and afternoon.  Continue 1200mg at bedtime   Continue  Levothyroxine 50mcg (prescribed by another provider).   Continue Melatonin 6mg    Klonopin 0.25mg started by sleep medicine per patient report    2) THERAPY:  Continue with current therapist        RTC: 1 month     CRISIS NUMBERS:   Provided routinely in AVS.    Treatment Risk Statement:  The patient understands the risks, benefits, adverse effects and alternatives. Agrees to treatment with the capacity to do so. No medical contraindications to treatment. Agrees to call clinic for any problems. The patient understands to call 911 or go to the " nearest ED if life threatening or urgent symptoms occur.     WHODAS 2.0  TODAY total score = N/A; [a 12-item WHODAS 2.0 assessment was not completed by the pt today and/or recorded in EPIC].       PROVIDER:  ADELINA Lieberman CNP

## 2021-08-02 ENCOUNTER — TELEPHONE (OUTPATIENT)
Dept: PSYCHIATRY | Facility: CLINIC | Age: 61
End: 2021-08-02

## 2021-08-02 ENCOUNTER — VIRTUAL VISIT (OUTPATIENT)
Dept: PSYCHIATRY | Facility: CLINIC | Age: 61
End: 2021-08-02
Attending: NURSE PRACTITIONER
Payer: MEDICARE

## 2021-08-02 DIAGNOSIS — F39 MOOD DISORDER (H): ICD-10-CM

## 2021-08-02 DIAGNOSIS — F41.9 ANXIETY: ICD-10-CM

## 2021-08-02 PROCEDURE — 99442 PR PHYSICIAN TELEPHONE EVALUATION 11-20 MIN: CPT | Mod: 95 | Performed by: NURSE PRACTITIONER

## 2021-08-02 RX ORDER — ZIPRASIDONE HYDROCHLORIDE 20 MG/1
CAPSULE ORAL
Qty: 60 CAPSULE | Refills: 0 | Status: SHIPPED | OUTPATIENT
Start: 2021-08-02 | End: 2021-09-01

## 2021-08-02 RX ORDER — ZIPRASIDONE HYDROCHLORIDE 20 MG/1
CAPSULE ORAL
Qty: 70 CAPSULE | Refills: 0 | Status: SHIPPED | OUTPATIENT
Start: 2021-08-02 | End: 2021-08-02

## 2021-08-02 RX ORDER — ZIPRASIDONE HYDROCHLORIDE 40 MG/1
CAPSULE ORAL
Qty: 7 CAPSULE | Refills: 0 | Status: SHIPPED | OUTPATIENT
Start: 2021-08-02 | End: 2021-09-01 | Stop reason: DRUGHIGH

## 2021-08-02 RX ORDER — MIRTAZAPINE 15 MG/1
15 TABLET, FILM COATED ORAL AT BEDTIME
Qty: 30 TABLET | Refills: 0 | Status: SHIPPED | OUTPATIENT
Start: 2021-08-02 | End: 2021-09-01

## 2021-08-02 ASSESSMENT — PAIN SCALES - GENERAL: PAINLEVEL: MODERATE PAIN (4)

## 2021-08-02 NOTE — PROGRESS NOTES
"TELEPHONE VISIT  Katie Griffiths is a 60 year old pt. who is being evaluated via a billable telephone visit.      The patient has been notified of the following:    We have found that certain health care needs can be provided without the need for a physical exam. This service lets us provide the care you need with a short phone conversation. If a prescription is necessary we can send it directly to your pharmacy. If lab work is needed we can place an order for that and you can then stop by our lab to have the test done at a later time. Insurers are generally covering virtual visits as they would in-office visits so billing should not be different than normal.  If for some reason you do get billed incorrectly, you should contact the billing office to correct it and that number is in the AVS .    Patient has given verbal consent for a telephone visit?:  Yes   How would the pt like to obtain the AVS?:  not requested  AVS SmartPhrase [PsychAVS] has been placed in 'Patient Instructions':  N/A     Start Time:  8:12 AM  Tech Issues with Amwell        End Time:  8:26am       [facility]  Psychiatry Clinic  PSYCHIATRIC PROGRESS NOTE       Katie Griffiths is a 60 year old female who prefers the name Katie and pronoun she, her.  Therapist: Amirah Tejeda @ [facility]               PCP: Buddy Nolan  Other Providers: None    Pertinent Background:  See previous notes.  Psych critical item history includes Hospitalized 3 times with most recent occurring in 2007 after overdose attempt.  Numerous medication trials.  Possible bipolar diagnosis.     Interim History     [4, 4]     The patient is a good historian, reports good treatment adherence and was last seen 8/2/21.  Since the last visit, Katie reports she is \"ok.\"  Decreased Geodon at last appointment to 40mg BID and did not experience any change to mood.  Does report brief worsening of anxiety but attributes to family-related " "stress.  Sleep described as \"fairly consistent.\"  Getting 7-8 hours per night.  Still endorses some daytime sedation.  Continues to take Klonopin 0.25-0.50mg at bedtime which is managed by sleep medicine    8/2/21: She reports she is \"better.\"  Endorses improvement in mood.  Her son and his family recently visited which was pleasant and \"kept her busy.\"  She is requesting to decrease dose of Geodon due to overall improved mood and  \"it being summertime.\"  Sleep described as \"pretty good.\"  Katie will call clinic if experience any symptoms of elevated mood.     6/1/21: Katie again reports she is \"ok.\"  Tolerated decrease in Mirtazapine and sleep has not been impacted.   Continues to endorse a \"blah\" mood but she was quite bright today.  She attributes to medication-derived emotional bluntness.  Unclear if this is case or is depression not managed.  She does not want to adjust medications today but would like to possibly decrease Mirtazapine.  Continues to see therapist weekly.      4/1/21: Katie reports she is ok.  Klonopin 0.5mg started for sleep by sleep medicine.  Katie reports it is effective as she is sleeping 7-7.5 hours per night.  Reports feeling \"flat.\"  Jointly decided to continue with current medications and assess in month to see if change in weather and improved sleep will improve mood.     3/2/21: When asked how she is doing, Katie reports \"ok, I'm here\" but laughed afterwards.  Klonopin 0.25mg started by sleep medicine.  Sleep study completed and follow-up scheduled for late March.  She reports that Klonopin was helpful the first two nights but now it is \"hit or miss.\"  Informed Katie that since Sleep Medicine started Klonopin that I will request provider to continue to manage moving forward.  She is requesting that Mirtazapine be decreased due to concerns about weight and efficacy.  Continues to endorse sedentary lifestyle. Advised her to walk twice per day to improve mood and sleep.  Anxiety/worry " "continues and will addressed during therapy.        1/26/21: Katie reports \"I'm doing.\"  Reports only sleeping 2 hours last night.  However, she eports sleeping 6.5 hours the previous several nights.   She is able to share that she was overwhelmed with financial concerns and is impacting her sleep.  Current concern is being unable to stay asleep.  Continues to lead fairly sedentary lifestyle.  Reports \"there's nothing to do.\"  She is confident that if activities were available she would engage.  Discussed how employment could help her better manage her mental health.  She is hesitant to obtain employment as it may impact social security disability payments.    12/28/20: Katie again reports \"I'm hanging in there.\"  Katie called clinic approximately 2 weeks ago with concerns about her sleep.  Since then she reports her sleep has \"evened out.\"  Reports \"having to go out of my way to make pattern for sleep.\"  Currently getting about 6 hours of sleep per day.  Does report some concern with daytime sedation.  She will decrease OTC Melatonin as has grogginess has worsened with Melatonin. Continues to reports concern with hair loss and will connect with endocrinology.    12/9/20: Katie reports \"I'm hanging in there.\"  She is tolerating Mirtazapine and no longer concerned about hair loss.  Sleep continues to vary.  Ranges from 5-7 hours per night.  Mood stable.  She does not want to adjust medications and is requesting 90 day supply.      11/10/20: Katie is \"ok\"  Mood stable.  She is requesting a decrease in Mirtazapine as she is associating it with hair loss.  Not listed as a typical side effect.  Katie reports she is walking with her father most mornings.  Describes sleep as \"so-so.\"  Averaging 5 hours of sleep per night.  Continues to see therapist regularly.     10/14/20:  Katie reports again \"I am hanging in there.\" Persistent low mood persists.  Reports she had to switch thyroid medications due to her's being recalled.  Sleep " "initially worsened with new drug but she is now sleeping 5-6 hours per night.  Katie reports that her housing is more stable than at last appointment.  Anxiety has improved as a result.  Katie also reports she has been more active as she is helping her father prepare his boat for winter.  She also continues to see her therapist regularly.      9/15/20: Katie continues to report \"I am hanging in there.\" Sleep continues to be main concern. Katie reports she has been waking at 2am and unable to fall back asleep for the past 2 nights.  Katie does report that Gabapentin is helpful in that she can relax when she initially lays down for bed. \"I'm getting beneficial sleep more often than I am not.\"   Continues to endorse periodic low mood and anxiety.  Katie also has not used propranolol for PRN anxiety will discontinue. She does endorse fairly constant worry about possible homelessness.  Katie also brought up Celexa as it was helpful in management of anxiety; however in May she stated it was not effective and maybe causing sedation.  Continues to see therapist regularly.     8/18/20: Katie reports she is continuing \"hanging in there.\"  Katie reports that gabapentin has been helpful with sleep and she is \"tossing and turning less.\"  She believes sleep duration may have increased.  Gabapentin also appears to be helping with daytime anxiety.   Mood stable.  No significant concerns with depression or mood fluctuations.     7/30/20: Katie reports she is \"hanging in there.\"  Trazodone reports trazodone 100mg was not effective and led to diarrhea.  Discussed possible hypomania episode as she was frequently calling various providers at odd hours and having difficulty sleeping.  Does not appear to meet criteria at this time but will continue to monitor.  Will try to increase dose of Mirtazapine dose and see if helps.  Also will start Gabapentin for anxiety and sleep.  Katie continues to be quite sedentary during day.  Fortunately she was on a " "day trip to [place] today with her parents.  Advised to follow sleep medicine recommendations    7/7/20: Katie reports she is \"hanging in there.\"  Depression and anxiety persist.  Mood stable.  She is residing at her parent's home for the past couple days.  She using office and sleeping on twin bed. She is looking for independent living but is running into issues with her income, age, and credit history. Completed sleep consultation and behavioral change recommendations were given.  Unclear if followed recommendations.  Stress has decreased since then due to moving in with her parents but still persists due to housing issue.  Advised to take Propranolol when feels stressed.  Continues to endorse sedentary lifestyle.  Advised to go for walks in morning and early evening to develop \"sleep appetite.\"  She agreed.  She also does not want to start any sleep aids that may lead to drowsiness and weight gain.  Mood, motivation, and energy low.  Recommended increase Wellbutrin to 450mg but she again is hesitant.      New Address:  [facility] [address]  [place] [address]    6/9/20: Katie is currently trying to maintain housing.  Reports her current situation living with her son has been extremely stressful.  Currently looking for apartments in [place].  She working with [facility] social work team.  Katie is very hesitant to change her medications.  She would like to obtain better grasp of her housing before making medication adjustments. Will consult with PCP about possible Propranolol trial.       5/6/20:  Katie continues to endorse stress regarding bankruptcy.  She has phone hearing with  at the end of the month of May.  Katie continues to report lack of activity which may be attributable to lifestyle changes required during pandemic, but does report overall improvement with increase in Wellbutrin.  Sleep also continues to be an issue but she also reports it continues to improve.  Sleep medicine consultation in " "2 months. No concerns with elevated mood.  Katie did not want to adjust her medications     3/31/20: when asked how she is doing, Katie reports that switch from Celexa to Wellbutrin has been beneficial in regards to mood, energy, and motivation.  No concerns with elevated mood.  Sleep continues to be an issue.  Did not worsen with addition of Wellbutrin.  Has sleep study in 3 months.  Regarding psychosocial stressors, Katie is babysitting her grandchildren as their mother is sick and is going through bankruptcy.       3/4/20: Katie again starts laughing.  Mood is stable overall but low.  Continues to endorse anhedonia, energy, and low mood. Sleep has improved since starting Remeron.  Reports sleeping 5 hours per night which results in continued daytime tiredness.    Continues to report that Celexa is not helpful and possible causing sedation    2/12/20: Katie shared that she is not sleeping.  She reports that she is getting 3 hours of sleep per night for past couple weeks. However, Katie did not look overly fatigued during appointment.  She also reports that her mood is quite low.  No concerns with shayla.  No goal directed behavior or impulsivity.  Katie\"s affect continues to be incongruent to her reported mood.  She is struggling to get household chores completed.  Continues to report sedentary lifestyle.  Educated on how exercise and activity can help with sleep.  Will stop Trazodone and start Mirtazapine for sleep.  If continues to be a problem, will refer to sleep medicine as sleep has been an issue for several years.  Also plan to switch Celexa as it does not appear to be managing her depression.      1/8/20: Katie's main concern today was weight gain.  Affect was quite bright today in spite of high PHQ-9 and reported persistent low mood. Reports positive holiday with her family.  Sleep continues to be inconsistent.  Mood stable with introduction of Geodon.  Will increase and discontinue Olanzapine.  Katie continues to " "consider a retrial of Depakote which was discontinued in past due to development of rash.      12/11/19: Katie reports her mood is stable but she is describes \"not feeling good or bad.\"  Difficult to discern if emotional blunting or lack of hypomania.  Thoughts continue to be linear and organized.  Speech normal.  Also reports sleeping continues to improve. Previous trials include Lithium (not effective?), Depakote (rash), and Abilify (side effects).  Katie would like to change medications today due to possible emotional blunting and weight gain (5lbs in past 2 weeks).      11/27/19: Increased HS Olanzapine to 5mg and Katie appears much more grounded.  Speech less pressured and mood more stable.  Thoughts more organized and linear.  Katie reports she also has noticed a significant difference.  She also reports she is sleeping much better.  Concerned about weight gain but reports she is very sedentary.      Recent Symptoms:   Depression:  occasional low mood and low energy  Elevated:  none  Psychosis:  none  Anxiety:  occasional worry  Panic Attack:  none  Trauma Related:  none     Recent Substance Use:  No changes reported        Social/ Family History      [1ea,1ea]            [per patient report]               Financial support-  social security disability  Children-  2 adult children  Living situation- Lives in house in [place] with son   Social/spiritial support-  limited/none  Feels safe at home-  Yes   Family history- None      Medical / Surgical History                                 Patient Active Problem List   Diagnosis     Bipolar disorder (H)     Hashimoto's thyroiditis     Incontinence of urine in female     Osteopenia     Reactive airway disease     Vitamin D deficiency     Overweight     Dizziness     Hyperlipidemia LDL goal <100     Right shoulder pain, unspecified chronicity       Past Surgical History:   Procedure Laterality Date     COLONOSCOPY N/A 4/14/2021    Procedure: COLONOSCOPY;  Surgeon: de " "Guerita Rush MD;  Location:  GI     COLONOSCOPY N/A 4/14/2021    Procedure: Colonoscopy, With Polypectomy And Biopsy;  Surgeon: Guerita Shannon MD;  Location: [facility]     partial thyroidectomy       TONSILLECTOMY       TUBAL LIGATION          Medical Review of Systems         [2,10]   The remainder of the review of systems is noncontributory  Thyroid removed  Allergy    Quetiapine and Valproic acid  Current Medications        Current Outpatient Medications   Medication Sig Dispense Refill     albuterol (PROAIR HFA/PROVENTIL HFA/VENTOLIN HFA) 108 (90 Base) MCG/ACT inhaler Inhale 2 puffs into the lungs as needed for shortness of breath / dyspnea or wheezing 18 g 1     buPROPion (WELLBUTRIN XL) 300 MG 24 hr tablet Take 1 tablet (300 mg) by mouth every morning 90 tablet 0     clonazePAM (KLONOPIN) 0.5 MG tablet Take 0.5 mg by mouth At Bedtime       gabapentin (NEURONTIN) 300 MG capsule Take 1 capsule (300mg) in morning and 1 capsule (300mg) in afternoon and 4 capsules (1200mg) near bedtime 540 capsule 0     levothyroxine (SYNTHROID/LEVOTHROID) 50 MCG tablet Take 1 tablet (50 mcg) by mouth daily 90 tablet 3     melatonin ER 3 MG tablet Take 2 tablets (6 mg) by mouth At Bedtime 60 tablet 0     mirtazapine (REMERON) 15 MG tablet Take 1 tablet (15 mg) by mouth At Bedtime 30 tablet 0     multivitamin w/minerals (THERA-VIT-M) tablet Take 1 tablet by mouth daily       ziprasidone (GEODON) 40 MG capsule Take 1 capsule (40 mg) by mouth 2 times daily (with meals) 60 capsule 0     Vitals         [3, 3]   There were no vitals taken for this visit.   Mental Status Exam        [9, 14 cog gs]     Alertness: alert  and oriented  Appearance: unable to assess  Behavior/Demeanor: cooperative and pleasant, with unable to assess eye contact   Speech: normal.  Language: good  Psychomotor: unable to assess  Mood: \"ok\"  Affect: unable to assess; was congruent to mood; was congruent to content  Thought Process/Associations: " unremarkable  Thought Content:  Reports none;  Denies suicidal ideation and violent ideation  Perception:  Reports none;  Denies auditory hallucinations and visual hallucinations  Insight: adequate  Judgment: intact  Cognition: (6) does  appear grossly intact; formal cognitive testing was not done  Gait/Station and/or Muscle Strength/Tone: unable to assess    Labs and Data                          Rating Scales:    PHQ9    PHQ9 Today:  Not completed  PHQ-9 SCORE 12/18/2019   PHQ-9 Total Score MyChart 17 (Moderately severe depression)   PHQ-9 Total Score 17        Assessment      [m2, h3]     Unspecified Mood Disorder  Bipolar I Disorder (Hx)    [place] Prescription Monitoring Program [] was not checked today:  provider not managing any controlled medications.        Plan                                                                                                                     m2, h3     1) MEDICATION:  Decrease Geodon to  20mg qAM and 40mg at bedtime for week then decrease to 20mg BID  Continue Mirtazapine 15mg at bedtime for sleep and mood.  Continue Wellbutrin XL 300mg daily.    Continue Gabapentin to 300 in morning and afternoon.  Continue 1200mg at bedtime   Continue  Levothyroxine 50mcg (prescribed by another provider).   Continue Melatonin 6mg    Klonopin 0.25mg started by sleep medicine per patient report    2) THERAPY:  Continue with current therapist        RTC: 1 month     CRISIS NUMBERS:   Provided routinely in AVS.    Treatment Risk Statement:  The patient understands the risks, benefits, adverse effects and alternatives. Agrees to treatment with the capacity to do so. No medical contraindications to treatment. Agrees to call clinic for any problems. The patient understands to call 911 or go to the nearest ED if life threatening or urgent symptoms occur.     WHODAS 2.0  TODAY total score = N/A; [a 12-item WHODAS 2.0 assessment was not completed by the pt today and/or recorded in EPIC].        PROVIDER:  ADELINA Lieberman CNP

## 2021-08-02 NOTE — PATIENT INSTRUCTIONS
**For crisis resources, please see the information at the end of this document**     Patient Education      Thank you for coming to the [facility] MENTAL HEALTH & ADDICTION [place] [facility].    Lab Testing:  If you had lab testing today and your results are reassuring or normal they will be mailed to you or sent through [employer] within 7 days. If the lab tests need quick action we will call you with the results. The phone number we will call with results is # [phone] (home) . If this is not the best number please call our clinic and change the number.    Medication Refills:  If you need any refills please call your pharmacy and they will contact us. Our fax number for refills is [phone]. Please allow three business for refill processing. If you need to  your refill at a new pharmacy, please contact the new pharmacy directly. The new pharmacy will help you get your medications transferred.     Scheduling:  If you have any concerns about today's visit or wish to schedule another appointment please call our office during normal business hours [phone] (8-5:00 M-F)    Contact Us:  Please call [phone] during business hours (8-5:00 M-F).  If after clinic hours, or on the weekend, please call  [phone].    Financial Assistance [phone]  [facility] Billing [phone]  Central Billing Office, [facility]: [phone]  [facility] Billing [phone]  Medical Records [phone]  [facility] Patient Bill of Rights [email or web address]       MENTAL HEALTH CRISIS NUMBERS:  For a medical emergency please call  911 or go to the nearest ER.     [facility]:   [facility] -[phone]   Crisis Residence [facility] Residence -[phone]   Walk-In Counseling Center [facility] -[phone]   COPE 24/7 [facility] Mobile Team -[phone] (adults)/[phone] (child)  CHILD: [facility] needs assessment  team - [phone]      [facility]:   [facility] - [phone]   Walk-in counseling [facility] House - [phone]   Walk-in counseling [facility] - [phone]   Crisis Residence [facility] Residence - [phone]  Urgent Care Adult Mental [facility]-[phone] mobile unit/ 24/7 crisis line    National Crisis Numbers:   National Suicide Prevention Lifeline: 3-614-645-TALK (378-404-2801)  Poison Control Center - 8-765-535-1872  [employer]/resources for a list of additional resources (SOS)  Trans Lifeline a hotline for transgender people 1-914.252.5643  The [employer] Project a hotline for LGBT youth 7-670-987-9792  Crisis Text Line: For any crisis 24/7   To: 003660  see www.crisistextline.org  - IF MAKING A CALL FEELS TOO HARD, send a text!         Again thank you for choosing [facility] MENTAL HEALTH & ADDICTION [facility] and please let us know how we can best partner with you to improve you and your family's health.    You may be receiving a survey regarding this appointment. We would love to have your feedback, both positive and negative. The survey is done by an external company, so your answers are anonymous.

## 2021-08-02 NOTE — TELEPHONE ENCOUNTER
On 8/2/2021 a PA was received from [facility] and is required for the Ziprasidone HCL 20mg caps, this was routed and emailed to ERI Farias and placed in her folder, and a copy was held. Azeb Mcfarland CMA    COMMENTS: Max per day is 2 tabs needs PA or change the dose.

## 2021-08-06 ENCOUNTER — MYC MEDICAL ADVICE (OUTPATIENT)
Dept: INTERNAL MEDICINE | Facility: CLINIC | Age: 61
End: 2021-08-06

## 2021-08-08 ENCOUNTER — MYC MEDICAL ADVICE (OUTPATIENT)
Dept: INTERNAL MEDICINE | Facility: CLINIC | Age: 61
End: 2021-08-08

## 2021-09-01 ENCOUNTER — VIRTUAL VISIT (OUTPATIENT)
Dept: PSYCHIATRY | Facility: CLINIC | Age: 61
End: 2021-09-01
Attending: NURSE PRACTITIONER
Payer: MEDICARE

## 2021-09-01 DIAGNOSIS — F39 MOOD DISORDER (H): ICD-10-CM

## 2021-09-01 DIAGNOSIS — F41.9 ANXIETY: ICD-10-CM

## 2021-09-01 PROCEDURE — 99214 OFFICE O/P EST MOD 30 MIN: CPT | Mod: 95 | Performed by: NURSE PRACTITIONER

## 2021-09-01 RX ORDER — GABAPENTIN 300 MG/1
CAPSULE ORAL
Qty: 540 CAPSULE | Refills: 0 | Status: SHIPPED | OUTPATIENT
Start: 2021-09-01 | End: 2021-11-01

## 2021-09-01 RX ORDER — BUPROPION HYDROCHLORIDE 300 MG/1
300 TABLET ORAL EVERY MORNING
Qty: 90 TABLET | Refills: 0 | Status: SHIPPED | OUTPATIENT
Start: 2021-09-01 | End: 2021-11-01

## 2021-09-01 RX ORDER — MIRTAZAPINE 15 MG/1
15 TABLET, FILM COATED ORAL AT BEDTIME
Qty: 90 TABLET | Refills: 0 | Status: SHIPPED | OUTPATIENT
Start: 2021-09-01 | End: 2021-11-01

## 2021-09-01 RX ORDER — ZIPRASIDONE HYDROCHLORIDE 20 MG/1
20 CAPSULE ORAL
Qty: 60 CAPSULE | Refills: 0 | Status: SHIPPED | OUTPATIENT
Start: 2021-09-01 | End: 2021-11-01

## 2021-09-01 ASSESSMENT — PAIN SCALES - GENERAL: PAINLEVEL: NO PAIN (0)

## 2021-09-01 NOTE — PATIENT INSTRUCTIONS
**For crisis resources, please see the information at the end of this document**     Patient Education      Thank you for coming to the [facility].    Lab Testing:  If you had lab testing today and your results are reassuring or normal they will be mailed to you or sent through efectivox within 7 days. If the lab tests need quick action we will call you with the results. The phone number we will call with results is # [phone] (home) . If this is not the best number please call our clinic and change the number.    Medication Refills:  If you need any refills please call your pharmacy and they will contact us. Our fax number for refills is [phone]. Please allow three business for refill processing. If you need to  your refill at a new pharmacy, please contact the new pharmacy directly. The new pharmacy will help you get your medications transferred.     Scheduling:  If you have any concerns about today's visit or wish to schedule another appointment please call our office during normal business hours [phone] (8-5:00 M-F)    Contact Us:  Please call [phone] during business hours (8-5:00 M-F).  If after clinic hours, or on the weekend, please call  [phone].    Financial Assistance [phone]  [facility] Billing [phone]  Central Billing Office, [facility]: [phone]  [place] Billing [phone]  Medical Records [phone]  [place] Patient Bill of Rights [email or web address]       MENTAL HEALTH CRISIS NUMBERS:  For a medical emergency please call  911 or go to the nearest ER.     [facility]:   [facility] -[phone]   Crisis Residence [facility] -[phone]   Walk-In Counseling Center [facility] -[phone]   COPE 24/7 [place] Mobile Team -[phone] (adults)/[phone] (child)  CHILD: [facility] needs assessment  team - [phone]      [facility]:   [facility] - [phone]   Walk-in counseling [facility] - [phone]   Walk-in counseling [facility] - [phone]   Crisis Residence [facility] - [phone]  Urgent Care Adult [facility] [place]-[phone] mobile unit/ 24/7 crisis line    National Crisis Numbers:   National Suicide Prevention Lifeline: 9-225-745-TALK (992-484-5250)  Poison Control Center - 0-534-162-3551  [employer]/resources for a list of additional resources (SOS)  Trans Lifeline a hotline for transgender people 1-506.102.9484  The Tae Project a hotline for LGBT youth 7-737-455-5248  Crisis Text Line: For any crisis 24/7   To: 094959  see www.crisistextline.org  - IF MAKING A CALL FEELS TOO HARD, send a text!         Again thank you for choosing [facility] M[facility]DI[facility] and please let us know how we can best partner with you to improve you and your family's health.    You may be receiving a survey regarding this appointment. We would love to have your feedback, both positive and negative. The survey is done by an external company, so your answers are anonymous.

## 2021-09-01 NOTE — PROGRESS NOTES
"Video- Visit Details  Type of service:  video visit for medication management  Time of service:    Date:  09/01/2021    Video Start Time:  8:53 AM        Video End Time:  9:06am    Reason for video visit:  Patient unable to travel due to Covid-19  Originating Site (patient location):  [facility]   Location- Patient's home  Distant Site (provider location):  Remote location  Mode of Communication:  Video Conference via AmWell  Consent:  Patient has given verbal consent for video visit?: Yes     VIDEO VISIT  Katie Griffiths is a 60 year old patient who is being evaluated via a billable video visit.      The patient has been notified of following:   \"This video visit will be conducted via a call between you and your physician/provider. We have found that certain health care needs can be provided without the need for an in-person physical exam. This service lets us provide the care you need with a video conversation. If a prescription is necessary we can send it directly to your pharmacy. If lab work is needed we can place an order for that and you can then stop by our lab to have the test done at a later time. Insurers are generally covering virtual visits as they would in-office visits so billing should not be different than normal.  If for some reason you do get billed incorrectly, you should contact the billing office to correct it and that number is in the AVS .    Video Conference to be completed via:  Amwell    Patient has given verbal consent for video visit?:  Yes    Patient would prefer that any video invitations be sent by: Send to e-mail at: [email or web address]      How would patient like to obtain AVS?:  [place]    AVS SmartPhrase [PsychAVS] has been placed in 'Patient Instructions':  Yes       [facility]  Psychiatry Clinic  PSYCHIATRIC PROGRESS NOTE       Katie Griffiths is a 60 year old female who prefers the name Katie and pronoun she, her.  Therapist: Nikhil, " "[facility]               PCP: Buddy Nolan  Other Providers: None    Pertinent Background:  See previous notes.  Psych critical item history includes Hospitalized 3 times with most recent occurring in 2007 after overdose attempt.  Numerous medication trials.  Possible bipolar diagnosis.     Interim History     [4, 4]     The patient is a good historian, reports good treatment adherence and was last seen 8/2/21.  Since the last visit, Katie reports she she's \"not bad.\"  However, she does unfortunately endorse new psychosocial stressors, primarily her son's new medical concerns, which has impacted her mood somewhat (feels more \"on edge\").  Tolerated decrease in Geodon.  No changes to mood stability. She has noticed improvement in GI issues.  No significant concerns with sleep.  Continues to be followed by sleep medicine.  Katie again requests to decrease Geodon. Requested she call clinic if she begins to experience decreased need for sleep, impulsivity, and increased goal-directed behavior.      8/2/21; Katie reports she is \"ok.\"  Decreased Geodon at last appointment to 40mg BID and did not experience any change to mood.  Does report brief worsening of anxiety but attributes to family-related stress.  Sleep described as \"fairly consistent.\"  Getting 7-8 hours per night.  Still endorses some daytime sedation.  Continues to take Klonopin 0.25-0.50mg at bedtime which is managed by sleep medicine    7/1/21: She reports she is \"better.\"  Endorses improvement in mood.  Her son and his family recently visited which was pleasant and \"kept her busy.\"  She is requesting to decrease dose of Geodon due to overall improved mood and  \"it being summertime.\"  Sleep described as \"pretty good.\"  Katie will call clinic if experience any symptoms of elevated mood.     6/1/21: Katie again reports she is \"ok.\"  Tolerated decrease in Mirtazapine and sleep has not been impacted.   Continues to endorse a \"blah\" mood but she " "was quite bright today.  She attributes to medication-derived emotional bluntness.  Unclear if this is case or is depression not managed.  She does not want to adjust medications today but would like to possibly decrease Mirtazapine.  Continues to see therapist weekly.      4/1/21: Katie reports she is ok.  Klonopin 0.5mg started for sleep by sleep medicine.  Katie reports it is effective as she is sleeping 7-7.5 hours per night.  Reports feeling \"flat.\"  Jointly decided to continue with current medications and assess in month to see if change in weather and improved sleep will improve mood.     3/2/21: When asked how she is doing, Katie reports \"ok, I'm here\" but laughed afterwards.  Klonopin 0.25mg started by sleep medicine.  Sleep study completed and follow-up scheduled for late March.  She reports that Klonopin was helpful the first two nights but now it is \"hit or miss.\"  Informed Katie that since Sleep Medicine started Klonopin that I will request provider to continue to manage moving forward.  She is requesting that Mirtazapine be decreased due to concerns about weight and efficacy.  Continues to endorse sedentary lifestyle. Advised her to walk twice per day to improve mood and sleep.  Anxiety/worry continues and will addressed during therapy.        1/26/21: Katie reports \"I'm doing.\"  Reports only sleeping 2 hours last night.  However, she eports sleeping 6.5 hours the previous several nights.   She is able to share that she was overwhelmed with financial concerns and is impacting her sleep.  Current concern is being unable to stay asleep.  Continues to lead fairly sedentary lifestyle.  Reports \"there's nothing to do.\"  She is confident that if activities were available she would engage.  Discussed how employment could help her better manage her mental health.  She is hesitant to obtain employment as it may impact social security disability payments.    12/28/20: Katie again reports \"I'm hanging in there.\"  Katie " "called clinic approximately 2 weeks ago with concerns about her sleep.  Since then she reports her sleep has \"evened out.\"  Reports \"having to go out of my way to make pattern for sleep.\"  Currently getting about 6 hours of sleep per day.  Does report some concern with daytime sedation.  She will decrease OTC Melatonin as has grogginess has worsened with Melatonin. Continues to reports concern with hair loss and will connect with endocrinology.    12/9/20: Katie reports \"I'm hanging in there.\"  She is tolerating Mirtazapine and no longer concerned about hair loss.  Sleep continues to vary.  Ranges from 5-7 hours per night.  Mood stable.  She does not want to adjust medications and is requesting 90 day supply.      11/10/20: Katie is \"ok\"  Mood stable.  She is requesting a decrease in Mirtazapine as she is associating it with hair loss.  Not listed as a typical side effect.  Katie reports she is walking with her father most mornings.  Describes sleep as \"so-so.\"  Averaging 5 hours of sleep per night.  Continues to see therapist regularly.     10/14/20:  Katie reports again \"I am hanging in there.\" Persistent low mood persists.  Reports she had to switch thyroid medications due to her's being recalled.  Sleep initially worsened with new drug but she is now sleeping 5-6 hours per night.  Katie reports that her housing is more stable than at last appointment.  Anxiety has improved as a result.  Katie also reports she has been more active as she is helping her father prepare his boat for winter.  She also continues to see her therapist regularly.      9/15/20: Katie continues to report \"I am hanging in there.\" Sleep continues to be main concern. Katie reports she has been waking at 2am and unable to fall back asleep for the past 2 nights.  Katie does report that Gabapentin is helpful in that she can relax when she initially lays down for bed. \"I'm getting beneficial sleep more often than I am not.\"   Continues to endorse periodic " "low mood and anxiety.  Katie also has not used propranolol for PRN anxiety will discontinue. She does endorse fairly constant worry about possible homelessness.  Katie also brought up Celexa as it was helpful in management of anxiety; however in May she stated it was not effective and maybe causing sedation.  Continues to see therapist regularly.     8/18/20: Katie reports she is continuing \"hanging in there.\"  Katie reports that gabapentin has been helpful with sleep and she is \"tossing and turning less.\"  She believes sleep duration may have increased.  Gabapentin also appears to be helping with daytime anxiety.   Mood stable.  No significant concerns with depression or mood fluctuations.     7/30/20: Katie reports she is \"hanging in there.\"  Trazodone reports trazodone 100mg was not effective and led to diarrhea.  Discussed possible hypomania episode as she was frequently calling various providers at odd hours and having difficulty sleeping.  Does not appear to meet criteria at this time but will continue to monitor.  Will try to increase dose of Mirtazapine dose and see if helps.  Also will start Gabapentin for anxiety and sleep.  Katie continues to be quite sedentary during day.  Fortunately she was on a day trip to [place] today with her parents.  Advised to follow sleep medicine recommendations    7/7/20: Katie reports she is \"hanging in there.\"  Depression and anxiety persist.  Mood stable.  She is residing at her parent's home for the past couple days.  She using office and sleeping on twin bed. She is looking for independent living but is running into issues with her income, age, and credit history. Completed sleep consultation and behavioral change recommendations were given.  Unclear if followed recommendations.  Stress has decreased since then due to moving in with her parents but still persists due to housing issue.  Advised to take Propranolol when feels stressed.  Continues to endorse sedentary lifestyle.  " "Advised to go for walks in morning and early evening to develop \"sleep appetite.\"  She agreed.  She also does not want to start any sleep aids that may lead to drowsiness and weight gain.  Mood, motivation, and energy low.  Recommended increase Wellbutrin to 450mg but she again is hesitant.      New Address:  [address]  [place] [address]    6/9/20: Katie is currently trying to maintain housing.  Reports her current situation living with her son has been extremely stressful.  Currently looking for apartments in [facility] [facility].  She working with [facility] social work team.  Katie is very hesitant to change her medications.  She would like to obtain better grasp of her housing before making medication adjustments. Will consult with PCP about possible Propranolol trial.       5/6/20:  Katie continues to endorse stress regarding bankruptcy.  She has phone hearing with  at the end of the month of May.  Katie continues to report lack of activity which may be attributable to lifestyle changes required during pandemic, but does report overall improvement with increase in Wellbutrin.  Sleep also continues to be an issue but she also reports it continues to improve.  Sleep medicine consultation in 2 months. No concerns with elevated mood.  Katie did not want to adjust her medications     3/31/20: when asked how she is doing, Katie reports that switch from Celexa to Wellbutrin has been beneficial in regards to mood, energy, and motivation.  No concerns with elevated mood.  Sleep continues to be an issue.  Did not worsen with addition of Wellbutrin.  Has sleep study in 3 months.  Regarding psychosocial stressors, Katie is babysitting her grandchildren as their mother is sick and is going through bankruptcy.       3/4/20: Katie again starts laughing.  Mood is stable overall but low.  Continues to endorse anhedonia, energy, and low mood. Sleep has improved since starting Remeron.  Reports sleeping 5 hours per night " "which results in continued daytime tiredness.    Continues to report that Celexa is not helpful and possible causing sedation    2/12/20: Katie shared that she is not sleeping.  She reports that she is getting 3 hours of sleep per night for past couple weeks. However, Katie did not look overly fatigued during appointment.  She also reports that her mood is quite low.  No concerns with shayla.  No goal directed behavior or impulsivity.  Katie\"s affect continues to be incongruent to her reported mood.  She is struggling to get household chores completed.  Continues to report sedentary lifestyle.  Educated on how exercise and activity can help with sleep.  Will stop Trazodone and start Mirtazapine for sleep.  If continues to be a problem, will refer to sleep medicine as sleep has been an issue for several years.  Also plan to switch Celexa as it does not appear to be managing her depression.      1/8/20: Katie's main concern today was weight gain.  Affect was quite bright today in spite of high PHQ-9 and reported persistent low mood. Reports positive holiday with her family.  Sleep continues to be inconsistent.  Mood stable with introduction of Geodon.  Will increase and discontinue Olanzapine.  Katie continues to consider a retrial of Depakote which was discontinued in past due to development of rash.      12/11/19: Katie reports her mood is stable but she is describes \"not feeling good or bad.\"  Difficult to discern if emotional blunting or lack of hypomania.  Thoughts continue to be linear and organized.  Speech normal.  Also reports sleeping continues to improve. Previous trials include Lithium (not effective?), Depakote (rash), and Abilify (side effects).  Katie would like to change medications today due to possible emotional blunting and weight gain (5lbs in past 2 weeks).      11/27/19: Increased HS Olanzapine to 5mg and Katie appears much more grounded.  Speech less pressured and mood more stable.  Thoughts more " organized and linear.  Katie reports she also has noticed a significant difference.  She also reports she is sleeping much better.  Concerned about weight gain but reports she is very sedentary.      Recent Symptoms:   Depression:  occasional low mood and low energy  Elevated:  none  Psychosis:  none  Anxiety:  occasional worry, feeling on edge  Panic Attack:  none  Trauma Related:  none     Recent Substance Use:  No changes reported        Social/ Family History      [1ea,1ea]            [per patient report]               Financial support-  social security disability  Children-  2 adult children  Living situation- Lives in house in [place] with son   Social/spiritial support-  limited/none  Feels safe at home-  Yes   Family history- None      Medical / Surgical History                                 Patient Active Problem List   Diagnosis     Bipolar disorder (H)     Hashimoto's thyroiditis     Incontinence of urine in female     Osteopenia     Reactive airway disease     Vitamin D deficiency     Overweight     Dizziness     Hyperlipidemia LDL goal <100     Right shoulder pain, unspecified chronicity       Past Surgical History:   Procedure Laterality Date     COLONOSCOPY N/A 4/14/2021    Procedure: COLONOSCOPY;  Surgeon: Guerita Shannon MD;  Location: [facility]     COLONOSCOPY N/A 4/14/2021    Procedure: Colonoscopy, With Polypectomy And Biopsy;  Surgeon: Guerita Shannon MD;  Location: [facility]     partial thyroidectomy       TONSILLECTOMY       TUBAL LIGATION          Medical Review of Systems         [2,10]   The remainder of the review of systems is noncontributory  Thyroid removed  Allergy    Quetiapine and Valproic acid  Current Medications        Current Outpatient Medications   Medication Sig Dispense Refill     albuterol (PROAIR HFA/PROVENTIL HFA/VENTOLIN HFA) 108 (90 Base) MCG/ACT inhaler Inhale 2 puffs into the lungs as needed for shortness of breath / dyspnea or wheezing 18 g 1     buPROPion (WELLBUTRIN  XL) 300 MG 24 hr tablet Take 1 tablet (300 mg) by mouth every morning 90 tablet 0     clonazePAM (KLONOPIN) 0.5 MG tablet Take 0.5 mg by mouth At Bedtime       gabapentin (NEURONTIN) 300 MG capsule Take 1 capsule (300mg) in morning and 1 capsule (300mg) in afternoon and 4 capsules (1200mg) near bedtime 540 capsule 0     levothyroxine (SYNTHROID/LEVOTHROID) 50 MCG tablet Take 1 tablet (50 mcg) by mouth daily 90 tablet 3     melatonin ER 3 MG tablet Take 2 tablets (6 mg) by mouth At Bedtime 60 tablet 0     mirtazapine (REMERON) 15 MG tablet Take 1 tablet (15 mg) by mouth At Bedtime 30 tablet 0     multivitamin w/minerals (THERA-VIT-M) tablet Take 1 tablet by mouth daily       ziprasidone (GEODON) 20 MG capsule Take 1 capsule (20mg) in the morning and 40 mg cap in the evening for one week. Then decrease to 1 capsule (20mg) twice per day. 60 capsule 0     ziprasidone (GEODON) 40 MG capsule Take one 20 mg cap QAM + one 40 mg cap QPM for 7 days. Then lower dose to 20 mg BID. 7 capsule 0     Vitals         [3, 3]   There were no vitals taken for this visit.   Mental Status Exam        [9, 14 cog gs]     Alertness: alert  and oriented  Appearance: casually groomed  Behavior/Demeanor: cooperative, pleasant and calm, with good  eye contact   Speech: normal.  Language: good  Psychomotor: normal or unremarkable  Mood: 'not bad'  Affect: appropriate; was congruent to mood; was congruent to content  Thought Process/Associations: unremarkable  Thought Content:  Reports none;  Denies suicidal ideation and violent ideation  Perception:  Reports none;  Denies auditory hallucinations and visual hallucinations  Insight: adequate  Judgment: intact  Cognition: (6) does  appear grossly intact; formal cognitive testing was not done  Gait/Station and/or Muscle Strength/Tone: unable to assess    Labs and Data                          Rating Scales:    PHQ9    PHQ9 Today:  Not completed  PHQ-9 SCORE 12/18/2019   PHQ-9 Total Score MyChart 17  (Moderately severe depression)   PHQ-9 Total Score 17        Assessment      [m2, h3]     Generalized Anxiety Disorder  Bipolar I Disorder (Hx)    [place] Prescription Monitoring Program [] was not checked today:  provider not managing any controlled medications.        Plan                                                                                                                     m2, h3     1) MEDICATION:  Decrease Geodon to  20mg at bedtime   Continue Mirtazapine 15mg at bedtime for sleep and mood.  Continue Wellbutrin XL 300mg daily.    Continue Gabapentin to 300 in morning and afternoon.  Continue 1200mg at bedtime   Continue  Levothyroxine 50mcg (prescribed by another provider).   Continue Melatonin 6mg    Klonopin 0.25mg started by sleep medicine per patient report    2) THERAPY:  Continue with current therapist        RTC: 1 month     CRISIS NUMBERS:   Provided routinely in AVS.    Treatment Risk Statement:  The patient understands the risks, benefits, adverse effects and alternatives. Agrees to treatment with the capacity to do so. No medical contraindications to treatment. Agrees to call clinic for any problems. The patient understands to call 911 or go to the nearest ED if life threatening or urgent symptoms occur.     WHODAS 2.0  TODAY total score = N/A; [a 12-item WHODAS 2.0 assessment was not completed by the pt today and/or recorded in EPIC].       PROVIDER:  ADELINA Lieberman CNP

## 2021-10-04 ENCOUNTER — VIRTUAL VISIT (OUTPATIENT)
Dept: PSYCHIATRY | Facility: CLINIC | Age: 61
End: 2021-10-04
Attending: NURSE PRACTITIONER
Payer: MEDICARE

## 2021-10-04 DIAGNOSIS — F39 MOOD DISORDER (H): Primary | ICD-10-CM

## 2021-10-04 PROCEDURE — 99214 OFFICE O/P EST MOD 30 MIN: CPT | Mod: 95 | Performed by: NURSE PRACTITIONER

## 2021-10-04 NOTE — PROGRESS NOTES
"VIDEO VISIT  Katie Griffiths is a 61 year old patient who is being evaluated via a billable video visit.      The patient has been notified of following:   \"We have found that certain health care needs can be provided without the need for an in-person physical exam. This service lets us provide the care you need with a video conversation. If a prescription is necessary we can send it directly to your pharmacy. If lab work is needed we can place an order for that and you can then stop by our lab to have the test done at a later time. Insurers are generally covering virtual visits as they would in-office visits so billing should not be different than normal.  If for some reason you do get billed incorrectly, you should contact the billing office to correct it and that number is in the AVS .    Patient has given verbal consent for video visit?: Yes   How would you like to obtain your AVS?: not requested  AVS SmartPhrase [PsychAVS] has been placed in 'Patient Instructions': N/A      Video- Visit Details  Type of service:  video visit for medication management  Time of service:    Date:  10/04/2021    Video Start Time:  8:07 AM        Video End Time:  8:24am    Reason for video visit:  Patient unable to travel due to Covid-19  Originating Site (patient location):  [facility]   Location- Patient's home  Distant Site (provider location):  Remote location  Mode of Communication:  Video Conference via AmWell  Consent:  Patient has given verbal consent for video visit?: Yes          [facility]  Psychiatry Clinic  PSYCHIATRIC PROGRESS NOTE       Katie Griffiths is a 61 year old female who prefers the name Katie and pronoun she, her.  Therapist: Amirah Tejeda [facility]               PCP: Buddy Nolan  Other Providers: None    Pertinent Background:  See previous notes.  Psych critical item history includes Hospitalized 3 times with most recent occurring in 2007 after overdose " "attempt.  Numerous medication trials.  Possible bipolar diagnosis.     Interim History     [4, 4]     The patient is a good historian, reports good treatment adherence and was last seen 9/1/21.  Since the last visit, Katie again reports \"I'm doing.\"   Successfully decreased Geodon to 20mg at bedtime.  With decrease, Katie reports feeling less \"medicated.\"  Also experiencing less GI discomfort. No significant changes to mood.  Sleeping every night.  Continues to be followed by sleep medicine.  Katie requests to take Geodon 20mg every other night for the next 2 weeks prior to discontinuation.  Discussed signs of symptom relapse.  Katie will call clinic if she experiences decrease need for sleep, rapid speech, increased goal oriented behaviors, and impulsivity.  Started termination of care process as provider will be leaving clinic in approximately 2 months.      9/1/21: Katie reports she she's \"not bad.\"  However, she does unfortunately endorse new psychosocial stressors, primarily her son's new medical concerns, which has impacted her mood somewhat (feels more \"on edge\").  Tolerated decrease in Geodon.  No changes to mood stability. She has noticed improvement in GI issues.  No significant concerns with sleep.  Continues to be followed by sleep medicine.  Katie again requests to decrease Geodon. Requested she call clinic if she begins to experience decreased need for sleep, impulsivity, and increased goal-directed behavior.    8/2/21; Katie reports she is \"ok.\"  Decreased Geodon at last appointment to 40mg BID and did not experience any change to mood.  Does report brief worsening of anxiety but attributes to family-related stress.  Sleep described as \"fairly consistent.\"  Getting 7-8 hours per night.  Still endorses some daytime sedation.  Continues to take Klonopin 0.25-0.50mg at bedtime which is managed by sleep medicine    7/1/21: She reports she is \"better.\"  Endorses improvement in mood.  Her son and his family " "recently visited which was pleasant and \"kept her busy.\"  She is requesting to decrease dose of Geodon due to overall improved mood and  \"it being summertime.\"  Sleep described as \"pretty good.\"  Katie will call clinic if experience any symptoms of elevated mood.     6/1/21: Katie again reports she is \"ok.\"  Tolerated decrease in Mirtazapine and sleep has not been impacted.   Continues to endorse a \"blah\" mood but she was quite bright today.  She attributes to medication-derived emotional bluntness.  Unclear if this is case or is depression not managed.  She does not want to adjust medications today but would like to possibly decrease Mirtazapine.  Continues to see therapist weekly.      4/1/21: Katie reports she is ok.  Klonopin 0.5mg started for sleep by sleep medicine.  Katie reports it is effective as she is sleeping 7-7.5 hours per night.  Reports feeling \"flat.\"  Jointly decided to continue with current medications and assess in month to see if change in weather and improved sleep will improve mood.     3/2/21: When asked how she is doing, Katie reports \"ok, I'm here\" but laughed afterwards.  Klonopin 0.25mg started by sleep medicine.  Sleep study completed and follow-up scheduled for late March.  She reports that Klonopin was helpful the first two nights but now it is \"hit or miss.\"  Informed Katie that since Sleep Medicine started Klonopin that I will request provider to continue to manage moving forward.  She is requesting that Mirtazapine be decreased due to concerns about weight and efficacy.  Continues to endorse sedentary lifestyle. Advised her to walk twice per day to improve mood and sleep.  Anxiety/worry continues and will addressed during therapy.        1/26/21: Katie reports \"I'm doing.\"  Reports only sleeping 2 hours last night.  However, she eports sleeping 6.5 hours the previous several nights.   She is able to share that she was overwhelmed with financial concerns and is impacting her sleep.  Current " "concern is being unable to stay asleep.  Continues to lead fairly sedentary lifestyle.  Reports \"there's nothing to do.\"  She is confident that if activities were available she would engage.  Discussed how employment could help her better manage her mental health.  She is hesitant to obtain employment as it may impact social security disability payments.    12/28/20: Katie again reports \"I'm hanging in there.\"  Katie called clinic approximately 2 weeks ago with concerns about her sleep.  Since then she reports her sleep has \"evened out.\"  Reports \"having to go out of my way to make pattern for sleep.\"  Currently getting about 6 hours of sleep per day.  Does report some concern with daytime sedation.  She will decrease OTC Melatonin as has grogginess has worsened with Melatonin. Continues to reports concern with hair loss and will connect with endocrinology.    12/9/20: Katie reports \"I'm hanging in there.\"  She is tolerating Mirtazapine and no longer concerned about hair loss.  Sleep continues to vary.  Ranges from 5-7 hours per night.  Mood stable.  She does not want to adjust medications and is requesting 90 day supply.      11/10/20: Katie is \"ok\"  Mood stable.  She is requesting a decrease in Mirtazapine as she is associating it with hair loss.  Not listed as a typical side effect.  Katie reports she is walking with her father most mornings.  Describes sleep as \"so-so.\"  Averaging 5 hours of sleep per night.  Continues to see therapist regularly.     10/14/20:  Katie reports again \"I am hanging in there.\" Persistent low mood persists.  Reports she had to switch thyroid medications due to her's being recalled.  Sleep initially worsened with new drug but she is now sleeping 5-6 hours per night.  Katie reports that her housing is more stable than at last appointment.  Anxiety has improved as a result.  Katie also reports she has been more active as she is helping her father prepare his boat for winter.  She also continues " "to see her therapist regularly.      9/15/20: Katie continues to report \"I am hanging in there.\" Sleep continues to be main concern. Katie reports she has been waking at 2am and unable to fall back asleep for the past 2 nights.  Katie does report that Gabapentin is helpful in that she can relax when she initially lays down for bed. \"I'm getting beneficial sleep more often than I am not.\"   Continues to endorse periodic low mood and anxiety.  Katie also has not used propranolol for PRN anxiety will discontinue. She does endorse fairly constant worry about possible homelessness.  Katie also brought up Celexa as it was helpful in management of anxiety; however in May she stated it was not effective and maybe causing sedation.  Continues to see therapist regularly.     8/18/20: Katie reports she is continuing \"hanging in there.\"  Katie reports that gabapentin has been helpful with sleep and she is \"tossing and turning less.\"  She believes sleep duration may have increased.  Gabapentin also appears to be helping with daytime anxiety.   Mood stable.  No significant concerns with depression or mood fluctuations.     7/30/20: Katie reports she is \"hanging in there.\"  Trazodone reports trazodone 100mg was not effective and led to diarrhea.  Discussed possible hypomania episode as she was frequently calling various providers at odd hours and having difficulty sleeping.  Does not appear to meet criteria at this time but will continue to monitor.  Will try to increase dose of Mirtazapine dose and see if helps.  Also will start Gabapentin for anxiety and sleep.  Katie continues to be quite sedentary during day.  Fortunately she was on a day trip to [place] today with her parents.  Advised to follow sleep medicine recommendations    7/7/20: Katie reports she is \"hanging in there.\"  Depression and anxiety persist.  Mood stable.  She is residing at her parent's home for the past couple days.  She using office and sleeping on twin bed. She " "is looking for independent living but is running into issues with her income, age, and credit history. Completed sleep consultation and behavioral change recommendations were given.  Unclear if followed recommendations.  Stress has decreased since then due to moving in with her parents but still persists due to housing issue.  Advised to take Propranolol when feels stressed.  Continues to endorse sedentary lifestyle.  Advised to go for walks in morning and early evening to develop \"sleep appetite.\"  She agreed.  She also does not want to start any sleep aids that may lead to drowsiness and weight gain.  Mood, motivation, and energy low.  Recommended increase Wellbutrin to 450mg but she again is hesitant.      New Address:  [address]  [place] [address]    6/9/20: Katie is currently trying to maintain housing.  Reports her current situation living with her son has been extremely stressful.  Currently looking for apartments in [facility].  She working with [facility] social work team.  Katie is very hesitant to change her medications.  She would like to obtain better grasp of her housing before making medication adjustments. Will consult with PCP about possible Propranolol trial.       5/6/20:  Katie continues to endorse stress regarding bankruptcy.  She has phone hearing with  at the end of the month of May.  Katie continues to report lack of activity which may be attributable to lifestyle changes required during pandemic, but does report overall improvement with increase in Wellbutrin.  Sleep also continues to be an issue but she also reports it continues to improve.  Sleep medicine consultation in 2 months. No concerns with elevated mood.  Katie did not want to adjust her medications     3/31/20: when asked how she is doing, Katie reports that switch from Celexa to Wellbutrin has been beneficial in regards to mood, energy, and motivation.  No concerns with elevated mood.  Sleep continues to be an " "issue.  Did not worsen with addition of Wellbutrin.  Has sleep study in 3 months.  Regarding psychosocial stressors, Katie is babysitting her grandchildren as their mother is sick and is going through bankruptcy.       3/4/20: Katie again starts laughing.  Mood is stable overall but low.  Continues to endorse anhedonia, energy, and low mood. Sleep has improved since starting Remeron.  Reports sleeping 5 hours per night which results in continued daytime tiredness.    Continues to report that Celexa is not helpful and possible causing sedation    2/12/20: Katie shared that she is not sleeping.  She reports that she is getting 3 hours of sleep per night for past couple weeks. However, Katie did not look overly fatigued during appointment.  She also reports that her mood is quite low.  No concerns with shayla.  No goal directed behavior or impulsivity.  Katie\"s affect continues to be incongruent to her reported mood.  She is struggling to get household chores completed.  Continues to report sedentary lifestyle.  Educated on how exercise and activity can help with sleep.  Will stop Trazodone and start Mirtazapine for sleep.  If continues to be a problem, will refer to sleep medicine as sleep has been an issue for several years.  Also plan to switch Celexa as it does not appear to be managing her depression.      1/8/20: Katie's main concern today was weight gain.  Affect was quite bright today in spite of high PHQ-9 and reported persistent low mood. Reports positive holiday with her family.  Sleep continues to be inconsistent.  Mood stable with introduction of Geodon.  Will increase and discontinue Olanzapine.  Katie continues to consider a retrial of Depakote which was discontinued in past due to development of rash.      12/11/19: Katie reports her mood is stable but she is describes \"not feeling good or bad.\"  Difficult to discern if emotional blunting or lack of hypomania.  Thoughts continue to be linear and organized.  " Speech normal.  Also reports sleeping continues to improve. Previous trials include Lithium (not effective?), Depakote (rash), and Abilify (side effects).  Katie would like to change medications today due to possible emotional blunting and weight gain (5lbs in past 2 weeks).      11/27/19: Increased HS Olanzapine to 5mg and Katie appears much more grounded.  Speech less pressured and mood more stable.  Thoughts more organized and linear.  Katie reports she also has noticed a significant difference.  She also reports she is sleeping much better.  Concerned about weight gain but reports she is very sedentary.      Recent Symptoms:   Depression:  occasional low mood and low energy  Elevated:  none  Psychosis:  none  Anxiety:  occasional worry  Panic Attack:  none  Trauma Related:  none     Recent Substance Use:  No changes reported        Social/ Family History      [1ea,1ea]            [per patient report]               Financial support-  social security disability  Children-  2 adult children  Living situation- Lives in house in [place] with son   Social/spiritial support-  limited/none  Feels safe at home-  Yes   Family history- None      Medical / Surgical History                                 Patient Active Problem List   Diagnosis     Bipolar disorder (H)     Hashimoto's thyroiditis     Incontinence of urine in female     Osteopenia     Reactive airway disease     Vitamin D deficiency     Overweight     Dizziness     Hyperlipidemia LDL goal <100     Right shoulder pain, unspecified chronicity       Past Surgical History:   Procedure Laterality Date     COLONOSCOPY N/A 4/14/2021    Procedure: COLONOSCOPY;  Surgeon: Guerita Shannon MD;  Location: [facility]     COLONOSCOPY N/A 4/14/2021    Procedure: Colonoscopy, With Polypectomy And Biopsy;  Surgeon: Guerita Shannon MD;  Location: [facility]     partial thyroidectomy       TONSILLECTOMY       TUBAL LIGATION          Medical Review of Systems         [2,10]   The  remainder of the review of systems is noncontributory  Thyroid removed  Allergy    Quetiapine and Valproic acid  Current Medications        Current Outpatient Medications   Medication Sig Dispense Refill     albuterol (PROAIR HFA/PROVENTIL HFA/VENTOLIN HFA) 108 (90 Base) MCG/ACT inhaler Inhale 2 puffs into the lungs as needed for shortness of breath / dyspnea or wheezing 18 g 1     buPROPion (WELLBUTRIN XL) 300 MG 24 hr tablet Take 1 tablet (300 mg) by mouth every morning 90 tablet 0     clonazePAM (KLONOPIN) 0.5 MG tablet Take 0.5 mg by mouth At Bedtime       gabapentin (NEURONTIN) 300 MG capsule Take 1 capsule (300mg) in morning and 1 capsule (300mg) in afternoon and 4 capsules (1200mg) near bedtime 540 capsule 0     levothyroxine (SYNTHROID/LEVOTHROID) 50 MCG tablet Take 1 tablet (50 mcg) by mouth daily 90 tablet 3     melatonin ER 3 MG tablet Take 2 tablets (6 mg) by mouth At Bedtime 60 tablet 0     mirtazapine (REMERON) 15 MG tablet Take 1 tablet (15 mg) by mouth At Bedtime 90 tablet 0     multivitamin w/minerals (THERA-VIT-M) tablet Take 1 tablet by mouth daily       ziprasidone (GEODON) 20 MG capsule Take 1 capsule (20 mg) by mouth daily (with dinner) 60 capsule 0     Vitals         [3, 3]   There were no vitals taken for this visit.   Mental Status Exam        [9, 14 cog gs]     Alertness: alert  and oriented  Appearance: casually groomed  Behavior/Demeanor: cooperative, pleasant and calm, with good  eye contact   Speech: normal.  Language: good  Psychomotor: normal or unremarkable  Mood: 'not bad'  Affect: appropriate; was congruent to mood; was congruent to content  Thought Process/Associations: unremarkable  Thought Content:  Reports none;  Denies suicidal ideation and violent ideation  Perception:  Reports none;  Denies auditory hallucinations and visual hallucinations  Insight: adequate  Judgment: intact  Cognition: (6) does  appear grossly intact; formal cognitive testing was not done  Gait/Station  and/or Muscle Strength/Tone: unable to assess    Labs and Data                          Rating Scales:    PHQ9    PHQ9 Today:  Not completed  PHQ-9 SCORE 12/18/2019   PHQ-9 Total Score MyChart 17 (Moderately severe depression)   PHQ-9 Total Score 17        Assessment      [m2, h3]     Generalized Anxiety Disorder  Bipolar I Disorder (Hx)    [place] Prescription Monitoring Program [] was not checked today:  provider not managing any controlled medications.        Plan                                                                                                                     m2, h3     1) MEDICATION:  Decrease Geodon to  20mg at bedtime every other night for 2 weeks   Continue Mirtazapine 15mg at bedtime for sleep and mood.  Continue Wellbutrin XL 300mg daily.    Continue Gabapentin to 300 in morning and afternoon.  Continue 1200mg at bedtime   Continue  Levothyroxine 50mcg (prescribed by another provider).   Continue Melatonin 6mg    Klonopin 0.25mg started by sleep medicine per patient report    2) THERAPY:  Continue with current therapist        RTC: 1 month     CRISIS NUMBERS:   Provided routinely in AVS.    Treatment Risk Statement:  The patient understands the risks, benefits, adverse effects and alternatives. Agrees to treatment with the capacity to do so. No medical contraindications to treatment. Agrees to call clinic for any problems. The patient understands to call 911 or go to the nearest ED if life threatening or urgent symptoms occur.     WHODAS 2.0  TODAY total score = N/A; [a 12-item WHODAS 2.0 assessment was not completed by the pt today and/or recorded in EPIC].       PROVIDER:  ADELINA Lieberman CNP

## 2021-10-05 DIAGNOSIS — J45.20 MILD INTERMITTENT ASTHMA WITHOUT COMPLICATION: ICD-10-CM

## 2021-10-05 NOTE — TELEPHONE ENCOUNTER
[facility] Call Center    Phone Message    May a detailed message be left on voicemail: yes     Reason for Call: Medication Refill Request    Has the patient contacted the pharmacy for the refill? Yes   Name of medication being requested: albuterol (PROAIR HFA/PROVENTIL HFA/VENTOLIN HFA) 108 (90 Base) MCG/ACT inhaler  Provider who prescribed the medication: Sofía  Pharmacy: [facility]  Pharmacy                 [address] [place] [address]  Date medication is needed: ASAP     Action Taken: Message routed to:  [facility]    Travel Screening: Not Applicable

## 2021-10-06 NOTE — TELEPHONE ENCOUNTER
albuterol (PROAIR HFA/PROVENTIL HFA/VENTOLIN HFA) 108 (90 Base) MCG/ACT   inhaler    Sig - Route: Inhale 2 puffs into the lungs  as needed for shortness of breath / dyspnea or wheezing - Inhalation  Last Written Prescription Date:  3-18-20  Last Fill Quantity: 18 g ,   # refills: 1  Last Office Visit : 4-30-21  Next clinic visit: none        Routing refill request to provider for review/approval because:  Clarification for Rx: frequency

## 2021-10-07 RX ORDER — ALBUTEROL SULFATE 90 UG/1
2 AEROSOL, METERED RESPIRATORY (INHALATION) EVERY 4 HOURS PRN
Qty: 18 G | Refills: 1 | Status: SHIPPED | OUTPATIENT
Start: 2021-10-07

## 2021-10-13 ENCOUNTER — MYC MEDICAL ADVICE (OUTPATIENT)
Dept: INTERNAL MEDICINE | Facility: CLINIC | Age: 61
End: 2021-10-13

## 2021-10-13 DIAGNOSIS — E06.3 HYPOTHYROIDISM DUE TO HASHIMOTO'S THYROIDITIS: ICD-10-CM

## 2021-10-17 RX ORDER — LEVOTHYROXINE SODIUM 50 UG/1
50 TABLET ORAL DAILY
Qty: 90 TABLET | Refills: 0 | Status: SHIPPED | OUTPATIENT
Start: 2021-10-17 | End: 2022-01-12

## 2021-10-17 NOTE — TELEPHONE ENCOUNTER
Last Clinic Visit:  11/2/2020  [facility] [place]    TSH   Date Value Ref Range Status   05/01/2021 2.30 0.40 - 4.00 mU/L Final

## 2021-10-20 ENCOUNTER — TELEPHONE (OUTPATIENT)
Dept: PSYCHIATRY | Facility: CLINIC | Age: 61
End: 2021-10-20

## 2021-10-20 NOTE — TELEPHONE ENCOUNTER
[facility] Call Center    Phone Message    May a detailed message be left on voicemail: yes     Reason for Call: Other: questions and concerns about Enzo leaving. Wanting more information about scheduling appointments moving forwards     Action Taken: Message routed to:  Other: nursing pool    Travel Screening: Not Applicable

## 2021-10-21 NOTE — TELEPHONE ENCOUNTER
Received another incoming phone call from the patient with the same request. Informed her that writer is unaware of the provider's plan for this patient at this time (stay in clinic or transfer out), but writer or another RN will be happy to contact her once a plan is in place. Pt shared concerns about this and said it messes up her plans to schedule an appointment in December and receive refills. Writer assured her that the RN will make sure she has enough medication to get her to a visit with a new psychiatric provider. Pt needed lots of reassurance, but agreed to wait for a clinic member's phone call.

## 2021-10-22 ENCOUNTER — TELEPHONE (OUTPATIENT)
Dept: OPHTHALMOLOGY | Facility: CLINIC | Age: 61
End: 2021-10-22

## 2021-10-22 NOTE — TELEPHONE ENCOUNTER
Writer discussed plan for this patient and others further with Enzo No and Dr. Pulliam. Was informed pt will need to be transferred to a community provider. Dr. Pulliam asked that SW team assist with transfers out. Pt will likely need assistance doing so. Dr. Pulliam would like to provide the pt with the following clinics:     [facility] [phone]      [facility] [phone]      [facility] [phone]      [facility] [phone]      [facility] [phone]      [facility] [phone]    Will ask for SW's assistance

## 2021-10-22 NOTE — TELEPHONE ENCOUNTER
[facility] Call Center    Phone Message    May a detailed message be left on voicemail: yes     Reason for Call: Other: Pt called into schedule an appt but had many questions first that I was not able to answer for her.      1.  Pt's family has a history of Glaucoma and wants to know if she makes the general eye exam appt, can she get the dilation done along with the Humphry visual field testing done in the same visit?  Pt also mentioned she currently wears Progressive eye glasses now.     2.  What all comes with the 1st examination?    Pt would like a call back to discuss these questions before scheduling.    Action Taken: Message routed to:  [facility] Eye    Travel Screening: Not Applicable

## 2021-10-25 NOTE — TELEPHONE ENCOUNTER
I spoke to the patient and explained a comprehensive eye exam.   The patient is happy with her appointment.

## 2021-10-26 NOTE — TELEPHONE ENCOUNTER
FUTURE VISIT INFORMATION      FUTURE VISIT INFORMATION:    Date: 11/29/21    Time: 8:40am    [facility]  REFERRAL INFORMATION:    Referring provider:  self    Referring providers clinic:  N/A    Reason for visit/diagnosis  Routine Eye Exam    RECORDS REQUESTED FROM:       No recs to collect per Pt

## 2021-10-28 ENCOUNTER — TELEPHONE (OUTPATIENT)
Dept: PSYCHIATRY | Facility: CLINIC | Age: 61
End: 2021-10-28

## 2021-10-28 ENCOUNTER — MYC MEDICAL ADVICE (OUTPATIENT)
Dept: PSYCHIATRY | Facility: CLINIC | Age: 61
End: 2021-10-28

## 2021-10-28 NOTE — TELEPHONE ENCOUNTER
Social Work   Incoming/Outgoing Call  [facility]    Outgoing Call To: Katie Griffiths  Callback number: [phone]    Reason for Call:  SW was asked to help get Katie connected to an ongoing psychiatric provider after Enzo No leaves.    Response/Plan:  I called Katie and explained that I was calling to assist her with finding another provider. She reported being confused about this plan and told me she had been told she could stay in our clinic with either another NP or a resident. I explained that not all of Enzo's 500+ patients can be absorbed by other providers in the clinic, and so we are encouraging patients to either go to their PCP or a community provider. Katie was not agreeable to this plan, so I said I would Morongo back with Enzo and his nurse partner to ask about the plan.    Communicated with Alma Starr, who said she would call Katie to discuss the plan.    Will route to patient's current psychiatric provider(s) as an FYI.   Please call or EPIC message with any questions or concerns.    Jocy Pena,  AMANDA, [facility]  [phone]

## 2021-10-28 NOTE — TELEPHONE ENCOUNTER
Pt calling back again wanting to talk to a nurse about transferring care after Enzo leaves. This writer let patient know that the plan based on the last phone call was for Alma to get in touch with her when she was available to. Pt still requesting to be connected with someone.

## 2021-10-29 ENCOUNTER — MYC MEDICAL ADVICE (OUTPATIENT)
Dept: PSYCHIATRY | Facility: CLINIC | Age: 61
End: 2021-10-29

## 2021-10-29 NOTE — TELEPHONE ENCOUNTER
Outpatient RN, Jonny Rosas, calling wanting to speak with CAROLYNE Marquez. He states that this patient is wondering where she will be seen after Enzo leaves. This writer let Jonny know that there has been communication between this patient and the nursing staff here. Jonny would like clarification on how to proceed with this patient. Jonny requested a teams chat, or his phone line is [phone].

## 2021-10-29 NOTE — TELEPHONE ENCOUNTER
Patient has also been communicating via [employer]. Writer responded and asked she call the behavioral health access line for guidance on other psychiatrist within the [facility] system. Explained that pt likely cannot stay with the [facility]. Pt responded and continues to push for an appt in this clinic. Currently discussing situation with Enzo No and Dr. Pulliam. At this time, Dr. Pulliam asked that writer does not respond to Jonny Rosas and confirm where he is calling from.

## 2021-11-01 ENCOUNTER — VIRTUAL VISIT (OUTPATIENT)
Dept: PSYCHIATRY | Facility: CLINIC | Age: 61
End: 2021-11-01
Attending: NURSE PRACTITIONER
Payer: MEDICARE

## 2021-11-01 DIAGNOSIS — F41.9 ANXIETY: ICD-10-CM

## 2021-11-01 DIAGNOSIS — F39 MOOD DISORDER (H): ICD-10-CM

## 2021-11-01 PROCEDURE — 99214 OFFICE O/P EST MOD 30 MIN: CPT | Mod: 95 | Performed by: NURSE PRACTITIONER

## 2021-11-01 RX ORDER — BUPROPION HYDROCHLORIDE 300 MG/1
300 TABLET ORAL EVERY MORNING
Qty: 90 TABLET | Refills: 0 | Status: SHIPPED | OUTPATIENT
Start: 2021-11-01 | End: 2022-04-20

## 2021-11-01 RX ORDER — GABAPENTIN 300 MG/1
CAPSULE ORAL
Qty: 540 CAPSULE | Refills: 0 | Status: SHIPPED | OUTPATIENT
Start: 2021-11-01

## 2021-11-01 RX ORDER — MIRTAZAPINE 15 MG/1
15 TABLET, FILM COATED ORAL AT BEDTIME
Qty: 90 TABLET | Refills: 0 | Status: SHIPPED | OUTPATIENT
Start: 2021-11-01 | End: 2023-03-28

## 2021-11-01 ASSESSMENT — PAIN SCALES - GENERAL: PAINLEVEL: NO PAIN (0)

## 2021-11-01 NOTE — TELEPHONE ENCOUNTER
Patient met with provider today. Per provider, transferring her care to an outside provider was discussed. No further action needed by RNCC at this time.

## 2021-11-01 NOTE — PATIENT INSTRUCTIONS
**For crisis resources, please see the information at the end of this document**     Patient Education      Thank you for coming to the [facility] [place] [facility].    Lab Testing:  If you had lab testing today and your results are reassuring or normal they will be mailed to you or sent through [employer] within 7 days. If the lab tests need quick action we will call you with the results. The phone number we will call with results is # [phone] (home) . If this is not the best number please call our clinic and change the number.    Medication Refills:  If you need any refills please call your pharmacy and they will contact us. Our fax number for refills is [phone]. Please allow three business for refill processing. If you need to  your refill at a new pharmacy, please contact the new pharmacy directly. The new pharmacy will help you get your medications transferred.     Scheduling:  If you have any concerns about today's visit or wish to schedule another appointment please call our office during normal business hours [phone] (8-5:00 M-F)    Contact Us:  Please call [phone] during business hours (8-5:00 M-F).  If after clinic hours, or on the weekend, please call  [phone].    Financial Assistance [phone]  [facility] Billing [phone]  Central Billing Office, [facility]: [phone]  [facility] Billing [phone]  Medical Records [phone]  [facility] Patient Bill of Rights [email or web address]       MENTAL HEALTH CRISIS NUMBERS:  For a medical emergency please call  911 or go to the nearest ER.     [facility]:   [facility] -[phone]   Crisis Residence [facility]idence -[phone]   Walk-In Counseling Center [facility] -[phone]   COPE 24/7 [facility] Mobile Team -[phone] (adults)/[phone] (child)  CHILD: [facility] needs assessment  team - [phone]      [facility]:   [facility] - [phone]   Walk-in counseling [facility] - [phone]   Walk-in counseling [facility] - [phone]   Crisis Residence [facility] - [phone]  Urgent Care Adult Mental [facility]-[phone] mobile unit/ 24/7 crisis line    National Crisis Numbers:   National Suicide Prevention Lifeline: 8-997-887-TALK (327-389-9466)  Poison Control Center - 1-952-308-9292  [employer]/resources for a list of additional resources (SOS)  Trans Lifeline a hotline for transgender people 1-477.815.2469  The [employer] a hotline for LGBT youth 3-626-485-0510  Crisis Text Line: For any crisis 24/7   To: 999932  see www.crisistextline.org  - IF MAKING A CALL FEELS TOO HARD, send a text!         Again thank you for choosing [facility] MENTAL HEALTH & ADDICTION [facility] and please let us know how we can best partner with you to improve you and your family's health.    You may be receiving a survey regarding this appointment. We would love to have your feedback, both positive and negative. The survey is done by an external company, so your answers are anonymous.

## 2021-11-01 NOTE — PROGRESS NOTES
"Video- Visit Details  Type of service:  video visit for medication management  Time of service:    Date:  11/01/2021    Video Start Time:  8:04 AM        Video End Time:  8:24am    Reason for video visit:  Patient unable to travel due to Covid-19  Originating Site (patient location):  [facility]   Location- Patient's home  Distant Site (provider location):  Remote location  Mode of Communication:  Video Conference via AmWell  Consent:  Patient has given verbal consent for video visit?: Yes     VIDEO VISIT  Katie Griffiths is a 61 year old patient that has consented to receive services via billable video visit.      The patient has been notified of following:   \"This video visit will be conducted via a call between you and your physician/provider. We have found that certain health care needs can be provided without the need for an in-person physical exam. This service lets us provide the care you need with a video conversation. If a prescription is necessary we can send it directly to your pharmacy. If lab work is needed we can place an order for that and you can then stop by our lab to have the test done at a later time. Insurers are generally covering virtual visits as they would in-office visits so billing should not be different than normal.  If for some reason you do get billed incorrectly, you should contact the billing office to correct it and that number is in the AVS .    Patient will join video visit via:  [employer] (Patient / guardian confirmed to join via CyActive)    If patient attempts to join the video via CyActive at appointment start time, but is unable to, they would prefer that the provider send them a video invitation via:   Send to preferred e-mail: [email or web address]      How would patient like to obtain AVS?:  [employer]        [facility]  Psychiatry Clinic  PSYCHIATRIC PROGRESS NOTE       Katie Griffiths is a 61 year old female who prefers the name Katie and " "pronoun she, her.  Therapist: Amirah Tejeda [facility]               PCP: Buddy Nolan  Other Providers: None    Pertinent Background:  See previous notes.  Psych critical item history includes Hospitalized 3 times with most recent occurring in 2007 after overdose attempt.  Numerous medication trials.  Possible bipolar diagnosis.     Interim History     [4, 4]     The patient is a good historian, reports good treatment adherence and was last seen 10/4/21.  Since the last visit, Katie reports she is \"ok, hanging in there.\"  Shared some disappointment in that she has to have her care transferred to another clinic due to this provider leaving.  As a result, her stress/anxiety has increased. Katie did stop Geodon completely approximately 2 weeks ago and is \"still getting used to it.\"  She does not endorse any symptoms associated with elevated mood that has occurred in past (goal-oriented behavior, lack of need for sleep). She also has not been able to see her therapist due to him being sick.   Katie continues to report that she wants to be on the lowest amount of medication possible and adjust her lifestyle to avoid anything that may trigger anxiety.        10/4/21: Katie again reports \"I'm doing.\"   Successfully decreased Geodon to 20mg at bedtime.  With decrease, Katie reports feeling less \"medicated.\"  Also experiencing less GI discomfort. No significant changes to mood.  Sleeping every night.  Continues to be followed by sleep medicine.  Katie requests to take Geodon 20mg every other night for the next 2 weeks prior to discontinuation.  Discussed signs of symptom relapse.  Katie will call clinic if she experiences decrease need for sleep, rapid speech, increased goal oriented behaviors, and impulsivity.  Started termination of care process as provider will be leaving clinic in approximately 2 months.      9/1/21: Katie reports she she's \"not bad.\"  However, she does unfortunately endorse new psychosocial " "stressors, primarily her son's new medical concerns, which has impacted her mood somewhat (feels more \"on edge\").  Tolerated decrease in Geodon.  No changes to mood stability. She has noticed improvement in GI issues.  No significant concerns with sleep.  Continues to be followed by sleep medicine.  Katie again requests to decrease Geodon. Requested she call clinic if she begins to experience decreased need for sleep, impulsivity, and increased goal-directed behavior.    8/2/21; Katie reports she is \"ok.\"  Decreased Geodon at last appointment to 40mg BID and did not experience any change to mood.  Does report brief worsening of anxiety but attributes to family-related stress.  Sleep described as \"fairly consistent.\"  Getting 7-8 hours per night.  Still endorses some daytime sedation.  Continues to take Klonopin 0.25-0.50mg at bedtime which is managed by sleep medicine    7/1/21: She reports she is \"better.\"  Endorses improvement in mood.  Her son and his family recently visited which was pleasant and \"kept her busy.\"  She is requesting to decrease dose of Geodon due to overall improved mood and  \"it being summertime.\"  Sleep described as \"pretty good.\"  Katie will call clinic if experience any symptoms of elevated mood.     6/1/21: Katie again reports she is \"ok.\"  Tolerated decrease in Mirtazapine and sleep has not been impacted.   Continues to endorse a \"blah\" mood but she was quite bright today.  She attributes to medication-derived emotional bluntness.  Unclear if this is case or is depression not managed.  She does not want to adjust medications today but would like to possibly decrease Mirtazapine.  Continues to see therapist weekly.      4/1/21: Katie reports she is ok.  Klonopin 0.5mg started for sleep by sleep medicine.  Katie reports it is effective as she is sleeping 7-7.5 hours per night.  Reports feeling \"flat.\"  Jointly decided to continue with current medications and assess in month to see if change in " "weather and improved sleep will improve mood.     3/2/21: When asked how she is doing, Katie reports \"ok, I'm here\" but laughed afterwards.  Klonopin 0.25mg started by sleep medicine.  Sleep study completed and follow-up scheduled for late March.  She reports that Klonopin was helpful the first two nights but now it is \"hit or miss.\"  Informed Katie that since Sleep Medicine started Klonopin that I will request provider to continue to manage moving forward.  She is requesting that Mirtazapine be decreased due to concerns about weight and efficacy.  Continues to endorse sedentary lifestyle. Advised her to walk twice per day to improve mood and sleep.  Anxiety/worry continues and will addressed during therapy.      1/26/21: Katie reports \"I'm doing.\"  Reports only sleeping 2 hours last night.  However, she eports sleeping 6.5 hours the previous several nights.   She is able to share that she was overwhelmed with financial concerns and is impacting her sleep.  Current concern is being unable to stay asleep.  Continues to lead fairly sedentary lifestyle.  Reports \"there's nothing to do.\"  She is confident that if activities were available she would engage.  Discussed how employment could help her better manage her mental health.  She is hesitant to obtain employment as it may impact social security disability payments.    12/28/20: Katie again reports \"I'm hanging in there.\"  Katie called clinic approximately 2 weeks ago with concerns about her sleep.  Since then she reports her sleep has \"evened out.\"  Reports \"having to go out of my way to make pattern for sleep.\"  Currently getting about 6 hours of sleep per day.  Does report some concern with daytime sedation.  She will decrease OTC Melatonin as has grogginess has worsened with Melatonin. Continues to reports concern with hair loss and will connect with endocrinology.    12/9/20: Katie reports \"I'm hanging in there.\"  She is tolerating Mirtazapine and no longer concerned " "about hair loss.  Sleep continues to vary.  Ranges from 5-7 hours per night.  Mood stable.  She does not want to adjust medications and is requesting 90 day supply.      11/10/20: Katie is \"ok\"  Mood stable.  She is requesting a decrease in Mirtazapine as she is associating it with hair loss.  Not listed as a typical side effect.  Katie reports she is walking with her father most mornings.  Describes sleep as \"so-so.\"  Averaging 5 hours of sleep per night.  Continues to see therapist regularly.     10/14/20:  Katie reports again \"I am hanging in there.\" Persistent low mood persists.  Reports she had to switch thyroid medications due to her's being recalled.  Sleep initially worsened with new drug but she is now sleeping 5-6 hours per night.  Katie reports that her housing is more stable than at last appointment.  Anxiety has improved as a result.  Katie also reports she has been more active as she is helping her father prepare his boat for winter.  She also continues to see her therapist regularly.      9/15/20: Katie continues to report \"I am hanging in there.\" Sleep continues to be main concern. Katie reports she has been waking at 2am and unable to fall back asleep for the past 2 nights.  Katie does report that Gabapentin is helpful in that she can relax when she initially lays down for bed. \"I'm getting beneficial sleep more often than I am not.\"   Continues to endorse periodic low mood and anxiety.  Katie also has not used propranolol for PRN anxiety will discontinue. She does endorse fairly constant worry about possible homelessness.  Katie also brought up Celexa as it was helpful in management of anxiety; however in May she stated it was not effective and maybe causing sedation.  Continues to see therapist regularly.     8/18/20: Katie reports she is continuing \"hanging in there.\"  Katie reports that gabapentin has been helpful with sleep and she is \"tossing and turning less.\"  She believes sleep duration may have " "increased.  Gabapentin also appears to be helping with daytime anxiety.   Mood stable.  No significant concerns with depression or mood fluctuations.     7/30/20: Katie reports she is \"hanging in there.\"  Trazodone reports trazodone 100mg was not effective and led to diarrhea.  Discussed possible hypomania episode as she was frequently calling various providers at odd hours and having difficulty sleeping.  Does not appear to meet criteria at this time but will continue to monitor.  Will try to increase dose of Mirtazapine dose and see if helps.  Also will start Gabapentin for anxiety and sleep.  Katie continues to be quite sedentary during day.  Fortunately she was on a day trip to [place] today with her parents.  Advised to follow sleep medicine recommendations    7/7/20: Katie reports she is \"hanging in there.\"  Depression and anxiety persist.  Mood stable.  She is residing at her parent's home for the past couple days.  She using office and sleeping on twin bed. She is looking for independent living but is running into issues with her income, age, and credit history. Completed sleep consultation and behavioral change recommendations were given.  Unclear if followed recommendations.  Stress has decreased since then due to moving in with her parents but still persists due to housing issue.  Advised to take Propranolol when feels stressed.  Continues to endorse sedentary lifestyle.  Advised to go for walks in morning and early evening to develop \"sleep appetite.\"  She agreed.  She also does not want to start any sleep aids that may lead to drowsiness and weight gain.  Mood, motivation, and energy low.  Recommended increase Wellbutrin to 450mg but she again is hesitant.      New Address:  [facility] [address]  [place] [address]    6/9/20: Katie is currently trying to maintain housing.  Reports her current situation living with her son has been extremely stressful.  Currently [place] for apartments in [facility] [place].  She working with [facility] social work team.  Katie is very hesitant to change her medications.  She would like to obtain better grasp of her housing before making medication adjustments. Will consult with PCP about possible Propranolol trial.       5/6/20:  Katie continues to endorse stress regarding bankruptcy.  She has phone hearing with  at the end of the month of May.  Katie continues to report lack of activity which may be attributable to lifestyle changes required during pandemic, but does report overall improvement with increase in Wellbutrin.  Sleep also continues to be an issue but she also reports it continues to improve.  Sleep medicine consultation in 2 months. No concerns with elevated mood.  Katie did not want to adjust her medications     3/31/20: when asked how she is doing, Katie reports that switch from Celexa to Wellbutrin has been beneficial in regards to mood, energy, and motivation.  No concerns with elevated mood.  Sleep continues to be an issue.  Did not worsen with addition of Wellbutrin.  Has sleep study in 3 months.  Regarding psychosocial stressors, Katie is babysitting her grandchildren as their mother is sick and is going through bankruptcy.       3/4/20: Katie again starts laughing.  Mood is stable overall but low.  Continues to endorse anhedonia, energy, and low mood. Sleep has improved since starting Remeron.  Reports sleeping 5 hours per night which results in continued daytime tiredness.    Continues to report that Celexa is not helpful and possible causing sedation    2/12/20: Katie shared that she is not sleeping.  She reports that she is getting 3 hours of sleep per night for past couple weeks. However, Katie did not look overly fatigued during appointment.  She also reports that her mood is quite low.  No concerns with shayla.  No goal directed behavior or impulsivity.  Katie\"s affect continues to be incongruent to her reported mood.  She is struggling to get household chores " "completed.  Continues to report sedentary lifestyle.  Educated on how exercise and activity can help with sleep.  Will stop Trazodone and start Mirtazapine for sleep.  If continues to be a problem, will refer to sleep medicine as sleep has been an issue for several years.  Also plan to switch Celexa as it does not appear to be managing her depression.      1/8/20: Katie's main concern today was weight gain.  Affect was quite bright today in spite of high PHQ-9 and reported persistent low mood. Reports positive holiday with her family.  Sleep continues to be inconsistent.  Mood stable with introduction of Geodon.  Will increase and discontinue Olanzapine.  Katie continues to consider a retrial of Depakote which was discontinued in past due to development of rash.      12/11/19: Katie reports her mood is stable but she is describes \"not feeling good or bad.\"  Difficult to discern if emotional blunting or lack of hypomania.  Thoughts continue to be linear and organized.  Speech normal.  Also reports sleeping continues to improve. Previous trials include Lithium (not effective?), Depakote (rash), and Abilify (side effects).  Katie would like to change medications today due to possible emotional blunting and weight gain (5lbs in past 2 weeks).      11/27/19: Increased HS Olanzapine to 5mg and Katie appears much more grounded.  Speech less pressured and mood more stable.  Thoughts more organized and linear.  Katie reports she also has noticed a significant difference.  She also reports she is sleeping much better.  Concerned about weight gain but reports she is very sedentary.      Recent Symptoms:   Depression:  occasional low mood and low energy  Elevated:  none  Psychosis:  none  Anxiety:  occasional worry and feeling overwhelmed  Panic Attack:  none  Trauma Related:  none     Recent Substance Use:  No changes reported        Social/ Family History      [1ea,1ea]            [per patient report]               Financial " support-  social security disability  Children-  2 adult children  Living situation- Lives in house in [place] with son   Social/spiritial support-  limited/none  Feels safe at home-  Yes   Family history- None      Medical / Surgical History                                 Patient Active Problem List   Diagnosis     Bipolar disorder (H)     Hashimoto's thyroiditis     Incontinence of urine in female     Osteopenia     Reactive airway disease     Vitamin D deficiency     Overweight     Dizziness     Hyperlipidemia LDL goal <100     Right shoulder pain, unspecified chronicity       Past Surgical History:   Procedure Laterality Date     COLONOSCOPY N/A 4/14/2021    Procedure: COLONOSCOPY;  Surgeon: Guerita Shannon MD;  Location: [facility]     COLONOSCOPY N/A 4/14/2021    Procedure: Colonoscopy, With Polypectomy And Biopsy;  Surgeon: Guerita Shannon MD;  Location: [facility]     partial thyroidectomy       TONSILLECTOMY       TUBAL LIGATION          Medical Review of Systems         [2,10]   The remainder of the review of systems is noncontributory  Thyroid removed  Allergy    Quetiapine and Valproic acid  Current Medications        Current Outpatient Medications   Medication Sig Dispense Refill     albuterol (PROAIR HFA/PROVENTIL HFA/VENTOLIN HFA) 108 (90 Base) MCG/ACT inhaler Inhale 2 puffs into the lungs every 4 hours as needed for shortness of breath / dyspnea or wheezing 18 g 1     buPROPion (WELLBUTRIN XL) 300 MG 24 hr tablet Take 1 tablet (300 mg) by mouth every morning 90 tablet 0     clonazePAM (KLONOPIN) 0.5 MG tablet Take 0.5 mg by mouth At Bedtime       gabapentin (NEURONTIN) 300 MG capsule Take 1 capsule (300mg) in morning and 1 capsule (300mg) in afternoon and 4 capsules (1200mg) near bedtime 540 capsule 0     levothyroxine (SYNTHROID/LEVOTHROID) 50 MCG tablet Take 1 tablet (50 mcg) by mouth daily **Patient please call Endocrine clinic for appointment [phone] - 12 month visit due around 11/2/2021** 90  "tablet 0     melatonin ER 3 MG tablet Take 2 tablets (6 mg) by mouth At Bedtime 60 tablet 0     mirtazapine (REMERON) 15 MG tablet Take 1 tablet (15 mg) by mouth At Bedtime 90 tablet 0     multivitamin w/minerals (THERA-VIT-M) tablet Take 1 tablet by mouth daily       ziprasidone (GEODON) 20 MG capsule Take 1 capsule (20 mg) by mouth daily (with dinner) 60 capsule 0     Vitals         [3, 3]   There were no vitals taken for this visit.   Mental Status Exam        [9, 14 cog gs]     Alertness: alert  and oriented  Appearance: casually groomed  Behavior/Demeanor: cooperative, pleasant and calm, with good  eye contact   Speech: regular rate and rhythm.  Language: no problems  Psychomotor: normal or unremarkable  Mood: \"ok\"  Affect: appropriate; was congruent to mood; was congruent to content  Thought Process/Associations: unremarkable  Thought Content:  Reports none;  Denies suicidal ideation and violent ideation  Perception:  Reports none;  Denies auditory hallucinations and visual hallucinations  Insight: adequate  Judgment: intact  Cognition: (6) does  appear grossly intact; formal cognitive testing was not done  Gait/Station and/or Muscle Strength/Tone: unable to assess    Labs and Data                          Rating Scales:    PHQ9    PHQ9 Today:  Not completed  PHQ-9 SCORE 12/18/2019   PHQ-9 Total Score MyChart 17 (Moderately severe depression)   PHQ-9 Total Score 17        Assessment      [m2, h3]     Generalized Anxiety Disorder  Bipolar I Disorder (Hx)    [place] Prescription Monitoring Program [] was not checked today:  provider not managing any controlled medications.        Plan                                                                                                                     m2, h3     1) MEDICATION:  Discontinue Geodon.  Patient stopped.   Continue Mirtazapine 15mg at bedtime for sleep and mood.  Continue Wellbutrin XL 300mg daily.    Continue Gabapentin to 300 in morning and afternoon.  " Continue 1200mg at bedtime   Continue  Levothyroxine 50mcg (prescribed by another provider).   Continue Melatonin 6mg (OTC)    Klonopin 0.25mg started by sleep medicine per patient report    2) THERAPY:  Continue with current therapist    RTC: Care to be transferred to another provider     CRISIS NUMBERS:   Provided routinely in AVS.    Treatment Risk Statement:  The patient understands the risks, benefits, adverse effects and alternatives. Agrees to treatment with the capacity to do so. No medical contraindications to treatment. Agrees to call clinic for any problems. The patient understands to call 911 or go to the nearest ED if life threatening or urgent symptoms occur.     WHODAS 2.0  TODAY total score = N/A; [a 12-item WHODAS 2.0 assessment was not completed by the pt today and/or recorded in EPIC].       PROVIDER:  ADELINA Lieberman CNP

## 2021-11-02 ENCOUNTER — LAB (OUTPATIENT)
Dept: LAB | Facility: CLINIC | Age: 61
End: 2021-11-02
Payer: MEDICARE

## 2021-11-02 DIAGNOSIS — E03.9 HYPOTHYROIDISM, UNSPECIFIED TYPE: ICD-10-CM

## 2021-11-02 LAB
T3 SERPL-MCNC: 75 NG/DL (ref 60–181)
T4 FREE SERPL-MCNC: 1.1 NG/DL (ref 0.76–1.46)
TSH SERPL DL<=0.005 MIU/L-ACNC: 2.57 MU/L (ref 0.4–4)

## 2021-11-02 PROCEDURE — 36415 COLL VENOUS BLD VENIPUNCTURE: CPT | Performed by: PATHOLOGY

## 2021-11-02 PROCEDURE — 84439 ASSAY OF FREE THYROXINE: CPT | Performed by: PATHOLOGY

## 2021-11-02 PROCEDURE — 84480 ASSAY TRIIODOTHYRONINE (T3): CPT | Performed by: INTERNAL MEDICINE

## 2021-11-02 PROCEDURE — 84443 ASSAY THYROID STIM HORMONE: CPT | Performed by: PATHOLOGY

## 2021-11-05 ASSESSMENT — ENCOUNTER SYMPTOMS
SEIZURES: 0
HEMATURIA: 0
MUSCLE WEAKNESS: 0
DYSURIA: 0
ORTHOPNEA: 0
SLEEP DISTURBANCES DUE TO BREATHING: 0
STIFFNESS: 1
DIFFICULTY URINATING: 0
ALTERED TEMPERATURE REGULATION: 0
WEIGHT GAIN: 0
SORE THROAT: 0
POOR WOUND HEALING: 0
WEIGHT LOSS: 0
MYALGIAS: 1
NERVOUS/ANXIOUS: 1
PANIC: 1
SPEECH CHANGE: 0
INSOMNIA: 1
DECREASED CONCENTRATION: 1
LIGHT-HEADEDNESS: 1
HYPOTENSION: 0
DISTURBANCES IN COORDINATION: 0
WEAKNESS: 1
LEG PAIN: 0
PARALYSIS: 0
TROUBLE SWALLOWING: 0
DECREASED APPETITE: 0
EYE IRRITATION: 1
SINUS CONGESTION: 0
HOARSE VOICE: 0
HYPERTENSION: 0
ARTHRALGIAS: 0
SINUS PAIN: 0
FATIGUE: 1
HEADACHES: 0
NUMBNESS: 1
CHILLS: 1
NECK MASS: 0
DECREASED LIBIDO: 1
MUSCLE CRAMPS: 0
TREMORS: 0
EYE WATERING: 1
NIGHT SWEATS: 1
EXERCISE INTOLERANCE: 0
TINGLING: 0
SKIN CHANGES: 0
TASTE DISTURBANCE: 0
EYE PAIN: 0
NECK PAIN: 1
LOSS OF CONSCIOUSNESS: 0
MEMORY LOSS: 1
DIZZINESS: 0
PALPITATIONS: 0
FLANK PAIN: 0
SYNCOPE: 0
POLYPHAGIA: 0
EYE REDNESS: 0
POLYDIPSIA: 0
NAIL CHANGES: 1
FEVER: 0
SMELL DISTURBANCE: 0
BACK PAIN: 1
JOINT SWELLING: 0
HOT FLASHES: 0
INCREASED ENERGY: 1
HALLUCINATIONS: 0
DEPRESSION: 1
DOUBLE VISION: 1

## 2021-11-09 ENCOUNTER — TELEPHONE (OUTPATIENT)
Dept: PSYCHIATRY | Facility: CLINIC | Age: 61
End: 2021-11-09
Payer: MEDICARE

## 2021-11-09 ENCOUNTER — MYC MEDICAL ADVICE (OUTPATIENT)
Dept: INTERNAL MEDICINE | Facility: CLINIC | Age: 61
End: 2021-11-09
Payer: MEDICARE

## 2021-11-09 NOTE — TELEPHONE ENCOUNTER
"Katie called into clinic requesting support. She had list of community providers sent by clinic but could no longer access it. Writer agreed to My Chart her the information. Katie wondered why these providers were listed as \"preferred.\" Writer noted that clinic was hoping to make the list more manageable. First number also has access to multiple mental health clinics in [place] and may be most manageable way to access services.    Katie made an appointment with [facility]. She has previously been referred to them while at an emergency room. She would like list in case she needs a back up.    Katie also  Noted that her therapist has been on medical leave for 8 weeks, which has made it difficult to manage emotions. She was able to contact another provider and has a therapy session scheduled. Writer provided information on [facility] if she needs short term support. Katie requested writer send information by My Chart.    Writer provided positive feedback on Katie's attempts to connect to new providers and care for her mental health.    Kaley Kelley, AMANDA, [facility]  [phone]    "

## 2021-11-10 ENCOUNTER — VIRTUAL VISIT (OUTPATIENT)
Dept: ENDOCRINOLOGY | Facility: CLINIC | Age: 61
End: 2021-11-10
Payer: MEDICARE

## 2021-11-10 DIAGNOSIS — E03.9 HYPOTHYROIDISM, UNSPECIFIED TYPE: Primary | ICD-10-CM

## 2021-11-10 PROCEDURE — 99214 OFFICE O/P EST MOD 30 MIN: CPT | Mod: 95 | Performed by: INTERNAL MEDICINE

## 2021-11-10 NOTE — LETTER
11/10/2021       RE: Katie Griffiths  [address]  [address]  [facility] [address]     Dear Colleague,    Thank you for referring your patient, Katie Griffiths, to the [facility] ENDOCRINOLOG[facility]INIC [place] at [facility]. Please see a copy of my visit note below.    Katie Griffiths  is being evaluated via a billable video visit.      How would you like to obtain your AVS? [employer]   For the video visit, send the invitation by: Text to cell phone: [phone]  Will anyone else be joining your video visit? No                             - Endocrinology Follow up -    Reason for visit/consult: hypothyroidism    Primary care provider: Kim Gore    Assessment and Plan  A 61 year old female with hypothyroidism     # Hypothyrodism  Post operative,   Previously was undercotrolled with Nature thryodi 32.5 mg.  Switched from Nature to LT4 50 mcg and has been doing well. Lab showed persistently good range of normal. TSH 2.57, free T4 1.1,    Total T3 lower side normal, since she has some mental stress condition, I suggested to try slight increase to LT4 75 mcg can be the option, however she prefers to figure out mental condition to establish new care with her psychologist first without changing LT4 dose, which I agree with.     - continue current LT4 50 mcg    - recheck TFTs in 6 month    # Mental health    # environmental stress    # hair loss      RTC with me in 1 year      30 minutes spent on the date of the encounter doing chart review, history and exam, documentation and further activities as noted above.    Mary Sawyer MD  Staff Physician  Endocrinology and Metabolism  [facility]  License: [place] [id number]  Pager: [phone]    Interval History as of 11/10/2021 : Patient has been doing well. Medication compliancegood to LT4 50 mcg   . New event includes : many environmental stress and worsening mental condition, could not access  her regular psychologist and seeking for a new one .  Interval History as of 11/2/2020 : Patient has been doing well. Switched from Nature to LT4 50 mcg and has been doing well. Lab showed good range of normal. TSH 2.7, free T4 1.1  HPI: A 60 yo female here for the evaluation for her hypothryodism.  Patient is a self-referral came here today.   Patient was diagnosed thyroid nodule in 2006 when she was in [place] she underwent left hemithyroidectomy at [facility] in [place].  Since then she has been on levothyroxine and she cannot recall the dose.   She was switched to the nature thyroid currently taking 32.5 mg daily for 5 years and she cannot recall why and where she started.   She moved to [place] in October 2018 and try to find a physician who can prescribe nature thyroid.  She also has bipolar disorder diagnosed 2006 and currently has been changing multiple medications.     Psychiatry medications has been changing, celexa, geodon, and hydroxyzine.   Dr. Geronimo in [facility].     She went to PMD, and was told not to Rx of Nature Thyroid, and found places potentially prescribe nature thyroid and found here.     Energy level: hard to say and not good, due to changing psychiatry medication  Dry skin: she used to have olanzapine, then recetnly changed to geodon, and currently dry skin.   Hair loss: no  Weight changes: gained weight with olanzapine, but not stopped olanzapine for 4 month  BM: regular last few days  Concentration: no  Family history of thyroid disease: mother+    Past Medical/Surgical History:  Past Medical History:   Diagnosis Date     Bipolar disorder (H)      Depression with anxiety      Hashimoto's thyroiditis      Hyperlipidemia      Mild intermittent asthma      S/P partial thyroidectomy      Superficial perivascular dermatitis      Past Surgical History:   Procedure Laterality Date     COLONOSCOPY N/A 4/14/2021    Procedure: COLONOSCOPY;  Surgeon: Guerita Shannon MD;  Location:  [facility]     COLONOSCOPY N/A 4/14/2021    Procedure: Colonoscopy, With Polypectomy And Biopsy;  Surgeon: Guerita Shannon MD;  Location: [facility]     partial thyroidectomy       TONSILLECTOMY       TUBAL LIGATION       Allergies:  Allergies   Allergen Reactions     Quetiapine Rash     Valproic Acid Rash     Drug rash     Current Medications   Current Outpatient Medications   Medication     albuterol (PROAIR HFA/PROVENTIL HFA/VENTOLIN HFA) 108 (90 Base) MCG/ACT inhaler     buPROPion (WELLBUTRIN XL) 300 MG 24 hr tablet     clonazePAM (KLONOPIN) 0.5 MG tablet     gabapentin (NEURONTIN) 300 MG capsule     levothyroxine (SYNTHROID/LEVOTHROID) 50 MCG tablet     melatonin ER 3 MG tablet     mirtazapine (REMERON) 15 MG tablet     multivitamin w/minerals (THERA-VIT-M) tablet     No current facility-administered medications for this visit.     Family History:  Family History   Problem Relation Age of Onset     Colon Polyps Mother      Eye Disorder Father      Factor V Leiden deficiency Father         pt tested negative     Hyperlipidemia Father      Social History:  Social History     Tobacco Use     Smoking status: Never Smoker     Smokeless tobacco: Never Used   Substance Use Topics     Alcohol use: Yes     Comment: occasional   lives with her son, Job: no job, on sliding scale insulin for mental     ROS:  Full review of systems taken with the help of the intake sheet. Otherwise a complete 14 point review of systems was taken and is negative unless stated in the history above.      Physical Exam:   Vitals: There were no vitals taken for this visit.  BMI= There is no height or weight on file to calculate BMI.   General: well appearing, no acute distress, pleasant and conversant,   Mental Status/neuro: alert and oriented  Face: symmetrical, normal facial color  Eyes: anicteric, no proptosis or lid lag  Resp: no acute distress      Labs : I reviewed data from epic and extract and summarize the pertinent data here.     Lab Results    Component Value Date    TSH 3.21 01/22/2020               Again, thank you for allowing me to participate in the care of your patient.      Sincerely,    Mary Sawyer MD

## 2021-11-10 NOTE — PROGRESS NOTES
Video-Visit Details    Type of service:  Video Visit    Video Start Time: 3:00 pm  End; 3:30 pm    Originating Location (pt. Location): Home    Distant Location (provider location):  [facility] ENDOCRINOLOGY [facility]IC [place]     [facility]     Video visit was performed due to COVID19 pandemic conditions.                                                                                - Endocrinology Follow up -    Reason for visit/consult: hypothyroidism    Primary care provider: Kim Gore      Assessment and Plan  A 61 year old female with hypothyroidism     # Hypothyrodism  Post operative,   Previously was undercotrolled with Nature thryodi 32.5 mg.  Switched from Nature to LT4 50 mcg and has been doing well. Lab showed persistently good range of normal. TSH 2.57, free T4 1.1,    Total T3 lower side normal, since she has some mental stress condition, I suggested to try slight increase to LT4 75 mcg can be the option, however she prefers to figure out mental condition to establish new care with her psychologist first without changing LT4 dose, which I agree with.     - continue current LT4 50 mcg    - recheck TFTs in 6 month    # Mental health    # environmental stress    # hair loss      RTC with me in 1 year      30 minutes spent on the date of the encounter doing chart review, history and exam, documentation and further activities as noted above.    Mary Sawyer MD  Staff Physician  Endocrinology and Metabolism  [facility]  License: [place] [id number]  Pager: [phone]    Interval History as of 11/10/2021 : Patient has been doing well. Medication compliancegood to LT4 50 mcg   . New event includes : many environmental stress and worsening mental condition, could not access her regular psychologist and seeking for a new one .  Interval History as of 11/2/2020 : Patient has been doing well. Switched from Nature to LT4 50 mcg and has been doing well. Lab  showed good range of normal. TSH 2.7, free T4 1.1  HPI: A 60 yo female here for the evaluation for her hypothryodism.  Patient is a self-referral came here today.   Patient was diagnosed thyroid nodule in 2006 when she was in [place] she underwent left hemithyroidectomy at [facility] in [place].  Since then she has been on levothyroxine and she cannot recall the dose.   She was switched to the nature thyroid currently taking 32.5 mg daily for 5 years and she cannot recall why and where she started.   She moved to [place] in October 2018 and try to find a physician who can prescribe nature thyroid.  She also has bipolar disorder diagnosed 2006 and currently has been changing multiple medications.     Psychiatry medications has been changing, celexa, geodon, and hydroxyzine.   Dr. Geronimo in [facility].     She went to PMD, and was told not to Rx of Nature Thyroid, and found places potentially prescribe nature thyroid and found here.     Energy level: hard to say and not good, due to changing psychiatry medication  Dry skin: she used to have olanzapine, then recetnly changed to geodon, and currently dry skin.   Hair loss: no  Weight changes: gained weight with olanzapine, but not stopped olanzapine for 4 month  BM: regular last few days  Concentration: no  Family history of thyroid disease: mother+      Past Medical/Surgical History:  Past Medical History:   Diagnosis Date     Bipolar disorder (H)      Depression with anxiety      Hashimoto's thyroiditis      Hyperlipidemia      Mild intermittent asthma      S/P partial thyroidectomy      Superficial perivascular dermatitis      Past Surgical History:   Procedure Laterality Date     COLONOSCOPY N/A 4/14/2021    Procedure: COLONOSCOPY;  Surgeon: Guerita Shannon MD;  Location: [facility]     COLONOSCOPY N/A 4/14/2021    Procedure: Colonoscopy, With Polypectomy And Biopsy;  Surgeon: Guerita Shannon MD;  Location: [facility]     partial thyroidectomy        TONSILLECTOMY       TUBAL LIGATION         Allergies:  Allergies   Allergen Reactions     Quetiapine Rash     Valproic Acid Rash     Drug rash       Current Medications   Current Outpatient Medications   Medication     albuterol (PROAIR HFA/PROVENTIL HFA/VENTOLIN HFA) 108 (90 Base) MCG/ACT inhaler     buPROPion (WELLBUTRIN XL) 300 MG 24 hr tablet     clonazePAM (KLONOPIN) 0.5 MG tablet     gabapentin (NEURONTIN) 300 MG capsule     levothyroxine (SYNTHROID/LEVOTHROID) 50 MCG tablet     melatonin ER 3 MG tablet     mirtazapine (REMERON) 15 MG tablet     multivitamin w/minerals (THERA-VIT-M) tablet     No current facility-administered medications for this visit.       Family History:  Family History   Problem Relation Age of Onset     Colon Polyps Mother      Eye Disorder Father      Factor V Leiden deficiency Father         pt tested negative     Hyperlipidemia Father        Social History:  Social History     Tobacco Use     Smoking status: Never Smoker     Smokeless tobacco: Never Used   Substance Use Topics     Alcohol use: Yes     Comment: occasional   lives with her son, Job: no job, on sliding scale insulin for mental     ROS:  Full review of systems taken with the help of the intake sheet. Otherwise a complete 14 point review of systems was taken and is negative unless stated in the history above.      Physical Exam:   Vitals: There were no vitals taken for this visit.  BMI= There is no height or weight on file to calculate BMI.   General: well appearing, no acute distress, pleasant and conversant,   Mental Status/neuro: alert and oriented  Face: symmetrical, normal facial color  Eyes: anicteric, no proptosis or lid lag  Resp: no acute distress      Labs : I reviewed data from epic and extract and summarize the pertinent data here.     Lab Results   Component Value Date    TSH 3.21 01/22/2020

## 2021-11-10 NOTE — PROGRESS NOTES
Katie Griffiths  is being evaluated via a billable video visit.      How would you like to obtain your AVS? [employer]   For the video visit, send the invitation by: Text to cell phone: [phone]  Will anyone else be joining your video visit? No

## 2021-11-11 ENCOUNTER — TELEPHONE (OUTPATIENT)
Dept: INTERNAL MEDICINE | Facility: CLINIC | Age: 61
End: 2021-11-11
Payer: MEDICARE

## 2021-11-11 NOTE — TELEPHONE ENCOUNTER
[facility] Call Center    Phone Message    May a detailed message be left on voicemail: yes     Reason for Call: Other: Patient is requesting a breakdown of what the visit notes mean for the appointment on 4/30/21. Please call to discuss. Thank you.      Action Taken: Message routed to:  [facility]    Travel Screening: Not Applicable

## 2021-11-11 NOTE — TELEPHONE ENCOUNTER
[facility] Call Center    Phone Message    May a detailed message be left on voicemail: yes     Reason for Call: Other: pt requesting a call back to discuss her 4/30/21 visit. Pt stating she would like a call back to discuss the aftervisit notes. Pt stating she didn't do a wellness visit but the visit notes show she did a wellness visit.     Action Taken: Message routed to:  [facility]    Travel Screening: Not Applicable

## 2021-11-11 NOTE — TELEPHONE ENCOUNTER
This matter is already being addressing via mychart encounter and a previous mychart encounter back in October. AVS from 4/30 appointment will not be able to be changed at this point, however the appointment coding/billing can be addressed by the billing and coding department. Rowan Sue LPN 11/11/2021 12:58 PM

## 2021-11-12 ENCOUNTER — MYC MEDICAL ADVICE (OUTPATIENT)
Dept: ENDOCRINOLOGY | Facility: CLINIC | Age: 61
End: 2021-11-12
Payer: MEDICARE

## 2021-11-12 DIAGNOSIS — E06.3 HASHIMOTO'S THYROIDITIS: Primary | ICD-10-CM

## 2021-11-15 ENCOUNTER — MYC MEDICAL ADVICE (OUTPATIENT)
Dept: ENDOCRINOLOGY | Facility: CLINIC | Age: 61
End: 2021-11-15
Payer: MEDICARE

## 2021-11-15 DIAGNOSIS — E06.3 HASHIMOTO'S THYROIDITIS: Primary | ICD-10-CM

## 2021-11-15 RX ORDER — LEVOTHYROXINE SODIUM 75 UG/1
75 TABLET ORAL DAILY
Qty: 90 TABLET | Refills: 1 | Status: SHIPPED | OUTPATIENT
Start: 2021-11-15 | End: 2022-07-11

## 2021-11-15 NOTE — TELEPHONE ENCOUNTER
Duplicate encounter. Issues is being addressed in mychart encounters. Rowan Sue LPN 11/14/2021 7:52 PM

## 2021-11-15 NOTE — TELEPHONE ENCOUNTER
Message was sent to coding and provider in a different encounter. Rowan Sue LPN 11/15/2021 7:57 AM

## 2021-11-26 ENCOUNTER — TELEPHONE (OUTPATIENT)
Dept: PSYCHIATRY | Facility: CLINIC | Age: 61
End: 2021-11-26
Payer: MEDICARE

## 2021-11-26 NOTE — TELEPHONE ENCOUNTER
----- Message from Yee Mcfarland CMA sent at 11/23/2021  4:16 PM CST -----  Regarding: FW: christophs pt  Contact: [phone]    ----- Message -----  From: Natalia Chan  Sent: 11/23/2021   2:47 PM CST  To: Faith Reynolds, To [place], Yee Mcfarland CMA  Subject: radha's pt                                         Hi team,    Pt is wondering if she can schedule with another provider.  Okay to [facility].    Thank you,  Natalia

## 2021-11-26 NOTE — TELEPHONE ENCOUNTER
Writer called this pt, she did answer and said she is not currently worried about finding a new provider just yet since she has refills to get her to January.  She wanted a call back after Mohan, writer agreed.  Azeb Mcfarland, CMA

## 2021-11-29 ENCOUNTER — PRE VISIT (OUTPATIENT)
Dept: OPHTHALMOLOGY | Facility: CLINIC | Age: 61
End: 2021-11-29

## 2021-11-29 ENCOUNTER — OFFICE VISIT (OUTPATIENT)
Dept: OPHTHALMOLOGY | Facility: CLINIC | Age: 61
End: 2021-11-29
Payer: MEDICARE

## 2021-11-29 VITALS — HEIGHT: 65 IN | BODY MASS INDEX: 20.83 KG/M2 | WEIGHT: 125 LBS

## 2021-11-29 DIAGNOSIS — H52.13 MYOPIA, BILATERAL: Primary | ICD-10-CM

## 2021-11-29 DIAGNOSIS — H01.006 BLEPHARITIS OF BOTH EYES, UNSPECIFIED EYELID, UNSPECIFIED TYPE: ICD-10-CM

## 2021-11-29 DIAGNOSIS — H25.13 NUCLEAR SCLEROTIC CATARACT OF BOTH EYES: ICD-10-CM

## 2021-11-29 DIAGNOSIS — H01.003 BLEPHARITIS OF BOTH EYES, UNSPECIFIED EYELID, UNSPECIFIED TYPE: ICD-10-CM

## 2021-11-29 PROCEDURE — 92004 COMPRE OPH EXAM NEW PT 1/>: CPT | Performed by: OPHTHALMOLOGY

## 2021-11-29 RX ORDER — GABAPENTIN 250 MG/5ML
SOLUTION ORAL
COMMUNITY
End: 2022-01-27

## 2021-11-29 ASSESSMENT — REFRACTION_WEARINGRX
OD_ADD: +2.25
OD_CYLINDER: +0.75
OS_ADD: +2.25
OS_SPHERE: -4.50
OD_AXIS: 065
SPECS_TYPE: PAL
OS_AXIS: 151
OS_CYLINDER: +1.75
OD_SPHERE: -4.75

## 2021-11-29 ASSESSMENT — CONF VISUAL FIELD
OD_NORMAL: 1
OS_NORMAL: 1

## 2021-11-29 ASSESSMENT — VISUAL ACUITY
OD_CC+: -1
OS_CC: J1
OD_CC: J1
OD_CC: 20/20
OS_CC+: -1
CORRECTION_TYPE: GLASSES
OS_CC: 20/30
METHOD: SNELLEN - LINEAR

## 2021-11-29 ASSESSMENT — SLIT LAMP EXAM - LIDS
COMMENTS: 1+ MEIBOMIAN GLAND DYSFUNCTION
COMMENTS: 1+ MEIBOMIAN GLAND DYSFUNCTION

## 2021-11-29 ASSESSMENT — REFRACTION_MANIFEST
OD_ADD: +2.50
OS_CYLINDER: +2.25
OS_ADD: +2.50
OD_SPHERE: -4.75
OS_AXIS: 155
OD_AXIS: 055
OS_SPHERE: -4.50
OD_CYLINDER: +0.75

## 2021-11-29 ASSESSMENT — TONOMETRY
OS_IOP_MMHG: 15
OD_IOP_MMHG: 16
IOP_METHOD: TONOPEN

## 2021-11-29 ASSESSMENT — CUP TO DISC RATIO
OS_RATIO: 0.5
OD_RATIO: 0.45

## 2021-11-29 ASSESSMENT — EXTERNAL EXAM - LEFT EYE: OS_EXAM: NORMAL

## 2021-11-29 ASSESSMENT — MIFFLIN-ST. JEOR: SCORE: 1132.88

## 2021-11-29 ASSESSMENT — EXTERNAL EXAM - RIGHT EYE: OD_EXAM: NORMAL

## 2021-11-29 NOTE — NURSING NOTE
Chief Complaints and History of Present Illnesses   Patient presents with     COMPREHENSIVE EYE EXAM     Family history of Glaucoma      Chief Complaint(s) and History of Present Illness(es)     COMPREHENSIVE EYE EXAM     Laterality: both eyes    Associated symptoms: dryness.  Negative for eye pain, redness and itching    Treatments tried: artificial tears    Pain scale: 0/10    Comments: Family history of Glaucoma               Comments     Feels she is seeing well with glasses Rx both distance and near each eye other than them needing adjustment due to slipping down on nose.     Occasional feeling like something is each eye and then and they hurt. Lubricant use does help with relief. On and off dryness the past year the past 6 years.     Jessica Driver, COT COT 8:48 AM November 29, 2021

## 2021-11-29 NOTE — PROGRESS NOTES
HPI  Katie Griffiths is a 61 year old female here for comprehensive eye exam.  Hasgood vision both eyes with current glasses.  Occasional burning eye pain and irritation, but overall less dry since she went off one of her medications.  She uses homeopathic eye drop occasionally.     PMH:   Past Medical History:   Diagnosis Date     Bipolar disorder (H)      Depression with anxiety      Hashimoto's thyroiditis      Hyperlipidemia      Mild intermittent asthma      S/P partial thyroidectomy      Superficial perivascular dermatitis       POH: Glasses for myopia, no surgery, no trauma  Oc Meds: none  FH: +glaucoma, grandfather with wet age related macular degeneration, no other known eye diseases         Assessment & Plan     (H52.13) Myopia, bilateral - Both Eyes (primary encounter diagnosis)  Comment: mild change   Plan: Refraction done but would recheck undilated after dry eye treatment before filling this prescription -some inconsistency left eye    H01.003,  H01.006) Blepharitis - Both Eyes  Comment:  Mild  Plan:   Warm compresses daily   Lid scrubs use diluted baby shampoo on warm washcloth    Artificial tears 4-6 times per day as needed for discomfort- Systane or Refresh instead of homeopathic      (H25.13) Nuclear sclerotic cataract of both eyes - Both Eyes  Comment: early, not visually significant  Plan: manifest refraction done, not much change and would confirm undilated after dry eye treatment before filling this for new glasses   -----------------------------------------------------------------------------------       Patient disposition:   Return in about 1 year (around 11/29/2022) for Comprehensive Exam (if calling sooner, Mrx, dry eye check only). Patient to call sooner as needed.      Complete documentation of historical and exam elements from today's encounter can be found in the full encounter summary report (not reduplicated in this progress note). I personally obtained the chief complaint(s) and  history of present illness.  I have confirmed and edited as necessary the CC, HPI, PMH/PSH, social history, FMH, ROS, and exam/neuro findings as obtained by the technician or others. I have examined this patient myself and I personally viewed the image(s) and studies listed above and the documentation reflects my findings and interpretation.  I formulated and edited as necessary the assessment and plan and discussed the findings and management plan with the patient and family.     Melany Peres MD

## 2021-12-17 ENCOUNTER — MYC MEDICAL ADVICE (OUTPATIENT)
Dept: INTERNAL MEDICINE | Facility: CLINIC | Age: 61
End: 2021-12-17
Payer: MEDICARE

## 2021-12-24 NOTE — CONFIDENTIAL NOTE
Apparently this patient has arranged a switch from my partner NP Kim Gore to me. I'm afraid I don't remember anything this. If Kim wanted the switch, I am willing to do this. (sorry if I forgot about this!)    Otherwise, the patient is best off staying with Kim.     We generally discourage shifting between PCPs in our practice, except when there is a new provider with lots of openings, or when a PCP requests the shift.

## 2021-12-27 ENCOUNTER — TELEPHONE (OUTPATIENT)
Dept: INTERNAL MEDICINE | Facility: CLINIC | Age: 61
End: 2021-12-27
Payer: MEDICARE

## 2021-12-27 NOTE — TELEPHONE ENCOUNTER
Patients appointment that is scheduled on 1/27/22 at 3:30pm was previously scheduled on 12/7/21. As that looks to be the day it was announced that Dr. Roque was no longer going to be accepting new pt. However Dr. Roque's template appears it was not updated to reflect that he is not accepting new patients. Will send a message to provider to advise. Rowan Sue LPN 12/27/2021 12:00 PM

## 2021-12-27 NOTE — TELEPHONE ENCOUNTER
[facility] Call Center    Phone Message    May a detailed message be left on voicemail: yes     Reason for Call: Other: Patient stating she was told Dr. Dr. Roque is not accepting new patients unless agreed upon by Kim Gore. Patient is requesting a call to advise on scheduling options with another privider. Please call to discuss. Thank you.      Action Taken: Message routed to:  [facility]    Travel Screening: Not Applicable

## 2021-12-29 DIAGNOSIS — F39 MOOD DISORDER (H): ICD-10-CM

## 2021-12-29 DIAGNOSIS — F41.9 ANXIETY: ICD-10-CM

## 2021-12-31 RX ORDER — MIRTAZAPINE 15 MG/1
TABLET, FILM COATED ORAL
Qty: 90 TABLET | Refills: 0 | OUTPATIENT
Start: 2021-12-31

## 2021-12-31 RX ORDER — GABAPENTIN 300 MG/1
CAPSULE ORAL
Qty: 540 CAPSULE | Refills: 0 | OUTPATIENT
Start: 2021-12-31

## 2022-01-10 ENCOUNTER — LAB (OUTPATIENT)
Dept: LAB | Facility: CLINIC | Age: 62
End: 2022-01-10
Payer: MEDICARE

## 2022-01-10 ENCOUNTER — TELEPHONE (OUTPATIENT)
Dept: ENDOCRINOLOGY | Facility: CLINIC | Age: 62
End: 2022-01-10

## 2022-01-10 DIAGNOSIS — E06.3 HASHIMOTO'S THYROIDITIS: ICD-10-CM

## 2022-01-10 LAB
T4 FREE SERPL-MCNC: 1.17 NG/DL (ref 0.76–1.46)
TSH SERPL DL<=0.005 MIU/L-ACNC: 0.6 MU/L (ref 0.4–4)

## 2022-01-10 PROCEDURE — 84443 ASSAY THYROID STIM HORMONE: CPT | Performed by: PATHOLOGY

## 2022-01-10 PROCEDURE — 84439 ASSAY OF FREE THYROXINE: CPT | Performed by: PATHOLOGY

## 2022-01-10 PROCEDURE — 36415 COLL VENOUS BLD VENIPUNCTURE: CPT | Performed by: PATHOLOGY

## 2022-01-10 NOTE — TELEPHONE ENCOUNTER
[facility] Call Center    Phone Message    May a detailed message be left on voicemail: yes     Reason for Call: Other: Patient would like to  know the difference between Free T3 and total T3 blook work ? Please advise.      Action Taken: Message routed to:  Other: ENDO     Travel Screening: Not Applicable

## 2022-01-11 ENCOUNTER — LAB (OUTPATIENT)
Dept: LAB | Facility: CLINIC | Age: 62
End: 2022-01-11
Payer: MEDICARE

## 2022-01-11 DIAGNOSIS — E06.3 HASHIMOTO'S THYROIDITIS: ICD-10-CM

## 2022-01-11 LAB — T3 SERPL-MCNC: 84 NG/DL (ref 60–181)

## 2022-01-11 PROCEDURE — 36415 COLL VENOUS BLD VENIPUNCTURE: CPT | Performed by: PATHOLOGY

## 2022-01-11 PROCEDURE — 84480 ASSAY TRIIODOTHYRONINE (T3): CPT | Performed by: INTERNAL MEDICINE

## 2022-01-12 DIAGNOSIS — E06.3 HYPOTHYROIDISM DUE TO HASHIMOTO'S THYROIDITIS: ICD-10-CM

## 2022-01-12 RX ORDER — LEVOTHYROXINE SODIUM 50 UG/1
TABLET ORAL
Qty: 45 TABLET | Refills: 1 | Status: SHIPPED | OUTPATIENT
Start: 2022-01-12 | End: 2022-08-01

## 2022-01-12 NOTE — TELEPHONE ENCOUNTER
Writer reached out to the pt to see if she still needs assistance, she indicated that she found Ebony TODD at [facility] and she will manage her psych meds. No further action is needed   Azeb Mcfarland CMA

## 2022-01-13 ASSESSMENT — ENCOUNTER SYMPTOMS
POOR WOUND HEALING: 0
DISTURBANCES IN COORDINATION: 0
LOSS OF CONSCIOUSNESS: 0
DECREASED CONCENTRATION: 1
TROUBLE SWALLOWING: 0
NECK PAIN: 1
SEIZURES: 0
ABDOMINAL PAIN: 0
NAUSEA: 0
DYSURIA: 0
TREMORS: 1
NECK MASS: 0
SPEECH CHANGE: 0
HEADACHES: 1
HOT FLASHES: 0
FLANK PAIN: 0
SORE THROAT: 0
EYE REDNESS: 1
DECREASED LIBIDO: 1
WEIGHT GAIN: 0
DIARRHEA: 0
CONSTIPATION: 1
BLOATING: 0
MUSCLE CRAMPS: 0
BLOOD IN STOOL: 0
WEIGHT LOSS: 1
CHILLS: 1
PARALYSIS: 0
SINUS CONGESTION: 1
HOARSE VOICE: 0
SINUS PAIN: 0
POLYDIPSIA: 0
JAUNDICE: 0
SKIN CHANGES: 0
EYE PAIN: 0
STIFFNESS: 1
NIGHT SWEATS: 0
NERVOUS/ANXIOUS: 1
DEPRESSION: 1
BOWEL INCONTINENCE: 0
HEARTBURN: 0
MUSCLE WEAKNESS: 0
INSOMNIA: 0
POLYPHAGIA: 0
HEMATURIA: 0
EYE WATERING: 0
DIFFICULTY URINATING: 1
TINGLING: 0
FEVER: 0
RECTAL PAIN: 0
HALLUCINATIONS: 0
NUMBNESS: 0
ALTERED TEMPERATURE REGULATION: 1
VOMITING: 0
ARTHRALGIAS: 1
MYALGIAS: 1
DOUBLE VISION: 0
TASTE DISTURBANCE: 0
DIZZINESS: 0
BACK PAIN: 0
INCREASED ENERGY: 0
WEAKNESS: 0
DECREASED APPETITE: 1
EYE IRRITATION: 1
SMELL DISTURBANCE: 0
MEMORY LOSS: 1
NAIL CHANGES: 1
PANIC: 1
FATIGUE: 1
JOINT SWELLING: 0

## 2022-01-26 ENCOUNTER — TELEPHONE (OUTPATIENT)
Dept: INTERNAL MEDICINE | Facility: CLINIC | Age: 62
End: 2022-01-26

## 2022-01-26 NOTE — TELEPHONE ENCOUNTER
[facility] Call Center    Phone Message    May a detailed message be left on voicemail: yes     Reason for Call: Other: Patient reporting that she called medical records and was told that her DEXA scan for bone density and x rays should be able to be access in her records. Patient said they are from [place], [place]. Patient said the codes are also with the scans.     Action Taken: Message routed to:  [facility]    Travel Screening: Not Applicable

## 2022-01-26 NOTE — TELEPHONE ENCOUNTER
[facility] Call Center    Phone Message    May a detailed message be left on voicemail: yes     Reason for Call: Other: Patient calling to provide her current meds that she is taking for her 01/27/2022 visit. Patient had issues entering in meds for her e-check in.      buPROPion (WELLBUTRIN XL) 150 MG 24 hr tablet  melatonin ER 5 MG time release  Vitamin C 500 MG   Magnesium 250 MG  Fish Oil 500 MG  Citracal  650 MG  Biotin 1,000 MCGs  Collagen 2000 MGs  Multivitamin Smart[employer] Pants Women s Formula     Patient noted that she wants to find a cheaper alternative to her current multivitamin.    Action Taken: Message routed to:  [facility]    Travel Screening: Not Applicable

## 2022-01-27 ENCOUNTER — OFFICE VISIT (OUTPATIENT)
Dept: INTERNAL MEDICINE | Facility: CLINIC | Age: 62
End: 2022-01-27
Payer: MEDICARE

## 2022-01-27 VITALS
RESPIRATION RATE: 16 BRPM | DIASTOLIC BLOOD PRESSURE: 78 MMHG | TEMPERATURE: 98.1 F | WEIGHT: 125 LBS | HEIGHT: 65 IN | HEART RATE: 88 BPM | SYSTOLIC BLOOD PRESSURE: 116 MMHG | BODY MASS INDEX: 20.83 KG/M2 | OXYGEN SATURATION: 94 %

## 2022-01-27 DIAGNOSIS — M85.80 OSTEOPENIA, UNSPECIFIED LOCATION: Primary | ICD-10-CM

## 2022-01-27 DIAGNOSIS — E06.3 HASHIMOTO'S THYROIDITIS: ICD-10-CM

## 2022-01-27 DIAGNOSIS — Z13.220 LIPID SCREENING: ICD-10-CM

## 2022-01-27 DIAGNOSIS — M81.0 AGE-RELATED OSTEOPOROSIS WITHOUT CURRENT PATHOLOGICAL FRACTURE: ICD-10-CM

## 2022-01-27 PROCEDURE — 99213 OFFICE O/P EST LOW 20 MIN: CPT | Performed by: PEDIATRICS

## 2022-01-27 RX ORDER — BIOTIN 1 MG
TABLET ORAL
COMMUNITY
Start: 2021-07-21 | End: 2023-03-29

## 2022-01-27 RX ORDER — OMEGA-3/DHA/EPA/FISH OIL 60 MG-90MG
CAPSULE ORAL
COMMUNITY
Start: 2021-07-20

## 2022-01-27 ASSESSMENT — ENCOUNTER SYMPTOMS
WEAKNESS: 0
JOINT SWELLING: 0
NUMBNESS: 0
FATIGUE: 1
TREMORS: 1
SINUS PAIN: 0
CONSTIPATION: 1
RECTAL PAIN: 0
EYE PAIN: 0
NECK PAIN: 1
BLOOD IN STOOL: 0
DECREASED CONCENTRATION: 1
NERVOUS/ANXIOUS: 1
SEIZURES: 0
EYE REDNESS: 1
HEMATURIA: 0
MYALGIAS: 1
BACK PAIN: 0
POLYPHAGIA: 0
VOMITING: 0
DIZZINESS: 0
DIARRHEA: 0
DYSURIA: 0
ABDOMINAL PAIN: 0
TROUBLE SWALLOWING: 0
CHILLS: 1
FLANK PAIN: 0
HALLUCINATIONS: 0
SORE THROAT: 0
HEADACHES: 1
ARTHRALGIAS: 1
NAUSEA: 0
DIFFICULTY URINATING: 1
FEVER: 0
POLYDIPSIA: 0

## 2022-01-27 ASSESSMENT — PAIN SCALES - GENERAL: PAINLEVEL: NO PAIN (0)

## 2022-01-27 ASSESSMENT — MIFFLIN-ST. JEOR: SCORE: 1132.88

## 2022-01-27 NOTE — PROGRESS NOTES
"Dear patient. Thank you for visiting with me. I want you to feel respected, understood, and empowered. \"Respect\" is valuing you as much as I would a close family member. \"Empowerment\" happens when you are fully informed, and can make the best possible decision for you.  Please ask me questions!  Challenge anything that is not clear.    Medical records are primarily used as memory aids for me and my colleagues. Things to know about my documentation style:  - The 'problem list' includes current symptoms or diagnoses, and some problems that are resolved but may return. I use the past medical history for problems not expected to return.  - I use single quotation marks for things that you or I said, when I want to clarify who was speaking.  - I use double quotation marks when copying a term from elsewhere in your records. Italics (besides here) may also denote a quotation.  If you have questions or concerns, please contact me; I will reply as soon as time allows.    Context    Katie Griffiths is a 61 year old woman, with concerns including:  Chief Complaint   Patient presents with     Establish Care     Patient comes in to establish care.     PCP: Rocky oRque   Visit type: overview of problems, with several issues identified by patient discussed within limits of available time    /78   Pulse 88   Temp 98.1  F (36.7  C) (Oral)   Resp 16   Ht 1.651 m (5' 5\")   Wt 56.7 kg (125 lb)   SpO2 94%   BMI 20.80 kg/m      Problems and progress      Disposition comment: this is my first visit with Ms. Griffiths, a complex patient who last saw my partner Kim Gore and on 4/30/21. Today we did an overview discussion of several issues. She is planning to return visit for more focused work.      Osteopenia  She is due for a bone density - some information from [facility] was reportedly forwarded, although I haven't yet reviewed it.  ADDENDUM: DEXA 9/6/2017 (misfiled by our HIM) interpretation from " outside provider, limited data, states lumbar spine T-1.9, Z-0.7, somewhat worsened (-14.3%) since 2013 baseline, and femur neck T-2.3, Z-1.1, somewhat worsened (-10.9%) since 2013.       ADDENDUM ASSESSMENT & PLAN: Osteopenia, needs updated DXA, although treatment previously recommended based on above data, I'm assuming continued treatment will be appropriate.  Insurance is now allowing DXA based on osteopenia diagnosis, will have to research this.       Lipids: elevated 5/1/21. Discussed about updating levels and meds.          Thyroid  Saw Dr. Sawyer  Recent change in levothyroxine. Alarm goes off at 0515, but there are some confusions about every other day dosing.      Pap 4/30/21 was normal (had abnormal pap many years ago). Negative HPV 1/15/2019      Vitamin D 21 at [facility]       Insomnia: Sleep Dr. Sukhi Shay @ [facility], with sleep study       Exercise limited. She was doing some light weights.       KHOI Ruiz @ [facility]      Need to plan for focused visits.    Diabetes screening 1/15/2019        Other comments    Review of Systems   Constitutional: Positive for chills and fatigue. Negative for fever.   HENT: Negative for ear discharge, ear pain, hearing loss, mouth sores, nosebleeds, sinus pain, sore throat, tinnitus and trouble swallowing.    Eyes: Positive for redness. Negative for pain.   Gastrointestinal: Positive for constipation. Negative for abdominal pain, blood in stool, diarrhea, nausea, rectal pain and vomiting.   Endocrine: Negative for polydipsia and polyphagia.   Genitourinary: Positive for difficulty urinating, dyspareunia and urgency. Negative for dysuria, flank pain, genital sores, hematuria and vaginal discharge.   Musculoskeletal: Positive for arthralgias, myalgias and neck pain. Negative for back pain and joint swelling.   Skin: Negative for rash.   Neurological: Positive for tremors and headaches. Negative for dizziness, seizures, weakness and numbness.    Psychiatric/Behavioral: Positive for decreased concentration. Negative for hallucinations. The patient is nervous/anxious.    Answers for HPI/ROS submitted by the patient on 1/13/2022  General Symptoms: Yes  Skin Symptoms: Yes  HENT Symptoms: Yes  EYE SYMPTOMS: Yes  HEART SYMPTOMS: No  LUNG SYMPTOMS: No  INTESTINAL SYMPTOMS: Yes  URINARY SYMPTOMS: Yes  GYNECOLOGIC SYMPTOMS: Yes  BREAST SYMPTOMS: No  SKELETAL SYMPTOMS: Yes  BLOOD SYMPTOMS: No  NERVOUS SYSTEM SYMPTOMS: Yes  MENTAL HEALTH SYMPTOMS: Yes  Congestion: Yes   Voice hoarseness: No  Tooth pain: No  Gum tenderness: No  Bleeding gums: No  Change in taste: No  Change in sense of smell: No  Dry mouth: Yes  Hearing aid used: No  Neck lump: No  Loss of appetite: Yes  Weight loss: Yes  Weight gain: No  Night sweats: No  Increased stress: Yes  Feeling hot or cold when others believe the temperature is normal: Yes  Loss of height: No  Post-operative complications: No  Change in or Loss of Energy: No  Hyperactivity: No  Confusion: Yes  Changes in hair: Yes  Changes in moles/birth marks: No  Itching: No  Changes in nails: Yes  Acne: Yes  Hair in places you don't want it: Yes  Change in facial hair: Yes  Warts: No  Non-healing sores: No  Scarring: Yes  Flaking of skin: Yes  Color changes of hands/feet in cold : No  Sun sensitivity: Yes  Skin thickening: No  Vision loss: No  Dry eyes: Yes  Watery eyes: No  Eye bulging: No  Double vision: No  Flashing of lights: No  Spots: No  Floaters: No  Crossed eyes: No  Tunnel Vision: No  Yellowing of eyes: No  Eye irritation: Yes  Heart burn or indigestion: No  Bloating: No  Black stools: No  Fecal incontinence: No  Yellowing of skin or eyes: No  Vomit with blood: No  Change in stools: No  Trouble holding urine or incontinence: No  Increased frequency of urination: No  Decreased frequency of urination: No  Frequent nighttime urination: No  Bleeding or spotting between periods: No  Heavy or painful periods: No  Irregular periods:  No  Hot flashes: No  Vaginal dryness: Yes  Reduced libido: Yes  Difficulty with sexual arousal: Yes  Post-menopausal bleeding: No  Bone pain: Yes  Muscle cramps: No  Muscle weakness: No  Joint stiffness: Yes  Bone fracture: No  Trouble with coordination: No  Fainting or black-out spells: No  Memory loss: Yes  Speech problems: No  Tingling: No  Difficulty walking: No  Paralysis: No  Depression: Yes  Trouble sleeping: No  Mood changes: Yes  Panic attacks: Yes          About this visit:  Time note (e3, 20'): The total time (on the date of service) for this service was 20 minutes, including discussion/face-to-face, chart review, interpretation not otherwise reported, documentation, and updating of the computerized record.

## 2022-01-27 NOTE — NURSING NOTE
Chief Complaint   Patient presents with     Establish Care     Patient comes in to establish care.         Man Stahl MA on 1/27/2022 at 3:29 PM

## 2022-01-28 ENCOUNTER — TELEPHONE (OUTPATIENT)
Dept: INTERNAL MEDICINE | Facility: CLINIC | Age: 62
End: 2022-01-28
Payer: MEDICARE

## 2022-01-28 NOTE — TELEPHONE ENCOUNTER
[facility] Call Center    Phone Message    May a detailed message be left on voicemail: yes     Reason for Call: Other: Patient calling regarding appointment scheduled on 4/30/2021 stating she did not have a physical on that date and only a pap and pelvic exam. Patient states she wants that re coded to not state a physical. Patient states she has spoken to billing and they told her to speak with the provider to change the appointment.  Please call to discuss thank you.      Action Taken: Message routed to:  [facility]    Travel Screening: Not Applicable

## 2022-01-31 NOTE — TELEPHONE ENCOUNTER
Per patient's MyChart message, issue has been resolved. MyChart reflects that physical has been done and must be changed. [facility] team has been contacted about the issue to fix.    Man Stahl MA on 1/31/2022 at 9:49 AM

## 2022-02-16 ENCOUNTER — ANCILLARY PROCEDURE (OUTPATIENT)
Dept: MAMMOGRAPHY | Facility: CLINIC | Age: 62
End: 2022-02-16
Payer: MEDICARE

## 2022-02-16 DIAGNOSIS — Z12.31 VISIT FOR SCREENING MAMMOGRAM: ICD-10-CM

## 2022-02-16 PROCEDURE — 77067 SCR MAMMO BI INCL CAD: CPT | Mod: GC | Performed by: RADIOLOGY

## 2022-02-16 PROCEDURE — 77063 BREAST TOMOSYNTHESIS BI: CPT | Mod: GC | Performed by: RADIOLOGY

## 2022-03-04 ENCOUNTER — MYC MEDICAL ADVICE (OUTPATIENT)
Dept: INTERNAL MEDICINE | Facility: CLINIC | Age: 62
End: 2022-03-04
Payer: MEDICARE

## 2022-03-04 DIAGNOSIS — E55.9 VITAMIN D DEFICIENCY: Primary | ICD-10-CM

## 2022-03-04 DIAGNOSIS — M85.80 OSTEOPENIA: ICD-10-CM

## 2022-03-31 NOTE — TELEPHONE ENCOUNTER
[facility]    Phone Message    May a detailed message be left on voicemail: yes     Reason for Call: Other: [place] [facility] [place] to speak with Enzo regarding med management for the pt.      Action Taken: Other: [facility] pool    Travel Screening: Not Applicable                                                                       [facility]  Radu Harrington PA-C  [facility] [address]  [phone]      INTERVAL HPI/OVERNIGHT EVENTS:  Pt s/e  Pt reports he is still having dark bloody stool  Denies further GI complaints  Hgb noted    MEDICATIONS  (STANDING):  amLODIPine   Tablet 10 milliGRAM(s) Oral daily  atorvastatin 10 milliGRAM(s) Oral at bedtime  bisacodyl 10 milliGRAM(s) Oral every 4 hours  budesonide 160 MICROgram(s)/formoterol 4.5 MICROgram(s) Inhaler 2 Puff(s) Inhalation two times a day  carvedilol 6.25 milliGRAM(s) Oral every 12 hours  cloNIDine 0.2 milliGRAM(s) Oral three times a day  cyanocobalamin 1000 MICROGram(s) Oral daily  dextrose 5%. 1000 milliLiter(s) (100 mL/Hr) IV Continuous <Continuous>  dextrose 5%. 1000 milliLiter(s) (50 mL/Hr) IV Continuous <Continuous>  dextrose 50% Injectable 25 Gram(s) IV Push once  dextrose 50% Injectable 12.5 Gram(s) IV Push once  dextrose 50% Injectable 25 Gram(s) IV Push once  doxazosin 4 milliGRAM(s) Oral <User Schedule>  folic acid 1 milliGRAM(s) Oral daily  furosemide    Tablet 60 milliGRAM(s) Oral two times a day  glucagon  Injectable 1 milliGRAM(s) IntraMuscular once  hydrALAZINE 100 milliGRAM(s) Oral three times a day  insulin lispro (ADMELOG) corrective regimen sliding scale   SubCutaneous three times a day before meals  insulin lispro (ADMELOG) corrective regimen sliding scale   SubCutaneous at bedtime  isosorbide   mononitrate ER Tablet (IMDUR) 90 milliGRAM(s) Oral daily  lamoTRIgine 100 milliGRAM(s) Oral daily  oxybutynin XL 15 milliGRAM(s) Oral daily  pantoprazole  Injectable 40 milliGRAM(s) IV Push daily  polyethylene glycol 3350 17 Gram(s) Oral daily  polyethylene glycol/electrolyte Solution 1000 milliLiter(s) Oral every 4 hours  potassium chloride    Tablet ER 40 milliEquivalent(s) Oral every 4 hours  senna 2 Tablet(s) Oral at bedtime  venlafaxine XR. 150 milliGRAM(s) Oral daily    MEDICATIONS  (PRN):  acetaminophen     Tablet .. 650 milliGRAM(s) Oral every 6 hours PRN Temp greater or equal to 38C (100.4F), Mild Pain (1 - 3)  aluminum hydroxide/magnesium hydroxide/simethicone Suspension 30 milliLiter(s) Oral every 4 hours PRN Dyspepsia  dextrose Oral Gel 15 Gram(s) Oral once PRN Blood Glucose LESS THAN 70 milliGRAM(s)/deciliter  melatonin 3 milliGRAM(s) Oral at bedtime PRN Insomnia  ondansetron Injectable 4 milliGRAM(s) IV Push every 8 hours PRN Nausea and/or Vomiting      Allergies:  No Known Allergies    PHYSICAL EXAM:   Vital Signs:  Vital Signs Last 24 Hrs  T(C): 36.5 (31 Mar 2022 05:05), Max: 36.9 (30 Mar 2022 21:20)  T(F): 97.7 (31 Mar 2022 05:05), Max: 98.5 (30 Mar 2022 21:20)  HR: 76 (31 Mar 2022 05:05) (63 - 78)  BP: 124/72 (31 Mar 2022 05:05) (124/72 - 160/82)  BP(mean): --  RR: 18 (31 Mar 2022 05:05) (16 - 18)  SpO2: 94% (31 Mar 2022 05:05) (94% - 99%)  Daily     Daily     GENERAL:  Appears stated age  ABDOMEN:  Soft, non-tender, non-distended  NEURO:  Alert      LABS:                        7.1    5.14  )-----------( 200      ( 31 Mar 2022 08:09 )             21.5     03-31    140  |  105  |  46<H>  ----------------------------<  120<H>  3.3<L>   |  26  |  2.30<H>    Ca    8.9      31 Mar 2022 08:09    TPro  5.5<L>  /  Alb  2.9<L>  /  TBili  0.2  /  DBili  x   /  AST  10<L>  /  ALT  13  /  AlkPhos  51  03-31    PT/INR - ( 30 Mar 2022 10:49 )   PT: 23.7 sec;   INR: 2.01 ratio         PTT - ( 30 Mar 2022 10:49 )  PTT:41.9 sec  Urinalysis Basic - ( 31 Mar 2022 05:27 )    Color: Pale Yellow / Appearance: Clear / S.015 / pH: x  Gluc: x / Ketone: Negative  / Bili: Negative / Urobili: Negative   Blood: x / Protein: 30 mg/dL / Nitrite: Negative   Leuk Esterase: Trace / RBC: 0-2 /HPF / WBC 3-5   Sq Epi: x / Non Sq Epi: Occasional / Bacteria: x       [Change in Weight] : change in weight [Appetite Changes] : appetite changes [PO Intolerance] : PO intolerance [Diarrhea] : diarrhea [Negative] : Genitourinary

## 2022-04-03 ENCOUNTER — MYC MEDICAL ADVICE (OUTPATIENT)
Dept: INTERNAL MEDICINE | Facility: CLINIC | Age: 62
End: 2022-04-03
Payer: MEDICARE

## 2022-04-03 DIAGNOSIS — Z13.1 DIABETES MELLITUS SCREENING: ICD-10-CM

## 2022-04-03 DIAGNOSIS — Z13.220 LIPID SCREENING: Primary | ICD-10-CM

## 2022-04-03 NOTE — ASSESSMENT & PLAN NOTE
Osteopenia  She is due for a bone density - some information from [facility] was reportedly forwarded, although I haven't yet reviewed it.  ADDENDUM: DEXA 9/6/2017 (misfiled by our HIM) interpretation from outside provider, limited data, states lumbar spine T-1.9, Z-0.7, somewhat worsened (-14.3%) since 2013 baseline, and femur neck T-2.3, Z-1.1, somewhat worsened (-10.9%) since 2013.       ADDENDUM ASSESSMENT & PLAN: Osteopenia, needs updated DXA, although treatment previously recommended based on above data, I'm assuming continued treatment will be appropriate.  Insurance is now allowing DXA based on osteopenia diagnosis, will have to research this.

## 2022-04-03 NOTE — CONFIDENTIAL NOTE
Coordinators, can you please:  -- contact patient and help her schedule the DEXA  -- add all pending and ordered blood tests to the scheduled draw. Sometimes the lab people have difficulty when tests are ordered on different days.  -- remind patient to be fasting for the blood draw.    Thanks!

## 2022-04-04 NOTE — TELEPHONE ENCOUNTER
BASSAM w/ imaging number for pt to schedule DEXA scan per pcp request prior to her appt w/ Dr. Roque 4/20/22

## 2022-04-06 ENCOUNTER — LAB (OUTPATIENT)
Dept: LAB | Facility: CLINIC | Age: 62
End: 2022-04-06
Attending: PEDIATRICS
Payer: MEDICARE

## 2022-04-06 ENCOUNTER — ANCILLARY PROCEDURE (OUTPATIENT)
Dept: BONE DENSITY | Facility: CLINIC | Age: 62
End: 2022-04-06
Attending: PEDIATRICS
Payer: MEDICARE

## 2022-04-06 DIAGNOSIS — E55.9 VITAMIN D DEFICIENCY: ICD-10-CM

## 2022-04-06 DIAGNOSIS — Z13.220 LIPID SCREENING: ICD-10-CM

## 2022-04-06 DIAGNOSIS — Z13.1 DIABETES MELLITUS SCREENING: ICD-10-CM

## 2022-04-06 DIAGNOSIS — M81.0 AGE-RELATED OSTEOPOROSIS WITHOUT CURRENT PATHOLOGICAL FRACTURE: ICD-10-CM

## 2022-04-06 DIAGNOSIS — E06.3 HYPOTHYROIDISM DUE TO HASHIMOTO'S THYROIDITIS: ICD-10-CM

## 2022-04-06 DIAGNOSIS — M85.80 OSTEOPENIA, UNSPECIFIED LOCATION: ICD-10-CM

## 2022-04-06 DIAGNOSIS — M85.80 OSTEOPENIA: ICD-10-CM

## 2022-04-06 PROBLEM — I34.1 MITRAL VALVE PROLAPSE: Status: ACTIVE | Noted: 2018-09-04

## 2022-04-06 PROBLEM — R73.01 IMPAIRED FASTING GLUCOSE: Status: ACTIVE | Noted: 2022-04-06

## 2022-04-06 PROBLEM — G25.81 RLS (RESTLESS LEGS SYNDROME): Status: ACTIVE | Noted: 2021-09-20

## 2022-04-06 PROBLEM — G47.00 INSOMNIA: Status: ACTIVE | Noted: 2021-03-29

## 2022-04-06 LAB
CHOLEST SERPL-MCNC: 234 MG/DL
DEPRECATED CALCIDIOL+CALCIFEROL SERPL-MC: 41 UG/L (ref 20–75)
FASTING STATUS PATIENT QL REPORTED: YES
FASTING STATUS PATIENT QL REPORTED: YES
GLUCOSE BLD-MCNC: 105 MG/DL (ref 70–99)
HDLC SERPL-MCNC: 76 MG/DL
LDLC SERPL CALC-MCNC: 138 MG/DL
NONHDLC SERPL-MCNC: 158 MG/DL
T3 SERPL-MCNC: 87 NG/DL (ref 60–181)
T4 FREE SERPL-MCNC: 1.14 NG/DL (ref 0.76–1.46)
TRIGL SERPL-MCNC: 98 MG/DL
TSH SERPL DL<=0.005 MIU/L-ACNC: 2.76 MU/L (ref 0.4–4)

## 2022-04-06 PROCEDURE — 82306 VITAMIN D 25 HYDROXY: CPT | Performed by: PEDIATRICS

## 2022-04-06 PROCEDURE — 84439 ASSAY OF FREE THYROXINE: CPT | Performed by: PATHOLOGY

## 2022-04-06 PROCEDURE — 77080 DXA BONE DENSITY AXIAL: CPT | Performed by: INTERNAL MEDICINE

## 2022-04-06 PROCEDURE — 84443 ASSAY THYROID STIM HORMONE: CPT | Performed by: PATHOLOGY

## 2022-04-06 PROCEDURE — 84480 ASSAY TRIIODOTHYRONINE (T3): CPT | Performed by: INTERNAL MEDICINE

## 2022-04-06 PROCEDURE — 36415 COLL VENOUS BLD VENIPUNCTURE: CPT | Performed by: PATHOLOGY

## 2022-04-06 PROCEDURE — 80061 LIPID PANEL: CPT | Performed by: PATHOLOGY

## 2022-04-06 PROCEDURE — 82947 ASSAY GLUCOSE BLOOD QUANT: CPT | Performed by: PATHOLOGY

## 2022-04-11 ENCOUNTER — MYC MEDICAL ADVICE (OUTPATIENT)
Dept: INTERNAL MEDICINE | Facility: CLINIC | Age: 62
End: 2022-04-11
Payer: MEDICARE

## 2022-04-11 ENCOUNTER — TELEPHONE (OUTPATIENT)
Dept: INTERNAL MEDICINE | Facility: CLINIC | Age: 62
End: 2022-04-11
Payer: MEDICARE

## 2022-04-11 DIAGNOSIS — M85.89 OSTEOPENIA OF MULTIPLE SITES: ICD-10-CM

## 2022-04-11 PROBLEM — M85.80 OSTEOPENIA: Status: ACTIVE | Noted: 2019-12-18

## 2022-04-11 NOTE — TELEPHONE ENCOUNTER
[facility]    Phone Message    May a detailed message be left on voicemail: yes     Reason for Call: Other: Patient is wanting to schedule an appointment with [place] [facility] [place] a 2nd booster shot. Patient's first three COVID shots were Pfizer. Patient is scheduled for Pharmacy apt this Wednesday 04/13/2022 at noon for Pfizer shot. Patient was told she can call back to change it to Moderna if she wants. Wondering if it would be more beneficial to have Pfizer or Moderna. Patient requesting a call back to discuss.     Action Taken: Message routed to:  [facility] ([facility]    Travel Screening: Not Applicable

## 2022-04-11 NOTE — TELEPHONE ENCOUNTER
RN called patient and let her know the [facility] had been called, and pharmacist advised that either vaccine would be beneficial to have.  Patient relayed she will ask for the Moderna, and she thinks this may be more beneficial.    Richa Aguilar RN on 4/11/2022 at 1:01 PM

## 2022-04-11 NOTE — CONFIDENTIAL NOTE
Problem   Osteopenia    4/11/2022 No treatment indicated. Due for another DXA 2024-April.  Results   Lumbar spine T-score -2.0 ., BMD is 0.938 g/cm2.   Left femoral neck T-score -1.7   Right femoral neck T-score -1.9   Left Total femur T-score -2.1 , BMD is 0.748 g/cm2.  Right total femur T-score -2.2, BMD is 0.731 g/cm2.  ...  Fracture risk  The estimated 10-year risk for a major osteoporotic fracture is 8.7% and for a hip fracture is 1.2%.  ...  Repeat Recommend repeat DXA in 2 years.

## 2022-04-13 ENCOUNTER — TRANSFERRED RECORDS (OUTPATIENT)
Dept: HEALTH INFORMATION MANAGEMENT | Facility: CLINIC | Age: 62
End: 2022-04-13

## 2022-04-18 ENCOUNTER — TELEPHONE (OUTPATIENT)
Dept: INTERNAL MEDICINE | Facility: CLINIC | Age: 62
End: 2022-04-18
Payer: MEDICARE

## 2022-04-18 NOTE — TELEPHONE ENCOUNTER
Flu vaccine in 2021 was successfully obtained from [facility].    Jose Lima CMA (AAMA) at 2:56 PM on 4/18/2022

## 2022-04-18 NOTE — TELEPHONE ENCOUNTER
[facility]    Phone Message    May a detailed message be left on voicemail: yes     Reason for Call: Other: The patient would like a call from the care team because she needs to get a copy of her Flu Vaccine records however, she got the flu vaccine at [facility] pharmacy on September 2021 and they told her that the only way shell be able to get it is if the care team access the [place] immunization Records Website under [facility] [place] [facility], please review and follow up with the patient if and when we are able to get a copy of this information when she comes in for her 4/20 appointment in [facility]. Please call patient to address any questions nor concerns thank you.     Action Taken: Message routed to:  [facility]    Travel Screening: Not Applicable

## 2022-04-20 ENCOUNTER — TRANSFERRED RECORDS (OUTPATIENT)
Dept: INTERNAL MEDICINE | Facility: CLINIC | Age: 62
End: 2022-04-20

## 2022-04-20 ENCOUNTER — OFFICE VISIT (OUTPATIENT)
Dept: INTERNAL MEDICINE | Facility: CLINIC | Age: 62
End: 2022-04-20
Payer: MEDICARE

## 2022-04-20 VITALS
DIASTOLIC BLOOD PRESSURE: 73 MMHG | HEART RATE: 86 BPM | OXYGEN SATURATION: 98 % | SYSTOLIC BLOOD PRESSURE: 113 MMHG | HEIGHT: 65 IN | WEIGHT: 120 LBS | BODY MASS INDEX: 19.99 KG/M2

## 2022-04-20 DIAGNOSIS — R51.9 NONINTRACTABLE EPISODIC HEADACHE, UNSPECIFIED HEADACHE TYPE: ICD-10-CM

## 2022-04-20 DIAGNOSIS — M47.812 CERVICAL ARTHRITIS: Primary | ICD-10-CM

## 2022-04-20 DIAGNOSIS — R73.01 IMPAIRED FASTING GLUCOSE: ICD-10-CM

## 2022-04-20 DIAGNOSIS — F31.9 BIPOLAR AFFECTIVE DISORDER, REMISSION STATUS UNSPECIFIED (H): ICD-10-CM

## 2022-04-20 DIAGNOSIS — M85.89 OSTEOPENIA OF MULTIPLE SITES: ICD-10-CM

## 2022-04-20 DIAGNOSIS — E78.5 HYPERLIPIDEMIA, UNSPECIFIED HYPERLIPIDEMIA TYPE: ICD-10-CM

## 2022-04-20 PROCEDURE — 99214 OFFICE O/P EST MOD 30 MIN: CPT | Performed by: PEDIATRICS

## 2022-04-20 RX ORDER — BUPROPION HYDROCHLORIDE 150 MG/1
150 TABLET ORAL EVERY MORNING
COMMUNITY

## 2022-04-20 NOTE — PROGRESS NOTES
"Dear patient. Thank you for visiting with me. I want you to feel respected, understood, and empowered. \"Respect\" is valuing you as much as I would a close family member. \"Empowerment\" happens when you are fully informed, and can make the best possible decision for you.  Please ask me questions!  Challenge anything that is not clear.    Medical records are primarily used as memory aids for me and my colleagues. Things to know about my documentation style:  - The 'problem list' includes current symptoms or diagnoses, and some problems that are resolved but may return. I use the past medical history for problems not expected to return.  - I use single quotation marks for things that you or I said, when I want to clarify who was speaking.  - I use double quotation marks when copying a term from elsewhere in your records. Italics (besides here) may also denote a quotation.  If you have questions or concerns, please contact me; I will reply as soon as time allows.    Context    Katie Griffiths is a 61 year old woman, with concerns including:  Chief Complaint   Patient presents with     RECHECK     PCP: Rocky Roque   Visit type: overview of problems, with several issues identified by patient discussed within limits of available time    /73 (BP Location: Right arm, Patient Position: Sitting, Cuff Size: Adult Regular)   Pulse 86   Ht 1.651 m (5' 5\")   Wt 54.4 kg (120 lb)   SpO2 98%   BMI 19.97 kg/m      Problems and progress    Osteopenia  Reviewed DEXA.  Vit D and calcium. No current indication for bisphos treatment. Needs follow-up DEXA in 2 years.    Problem List as of 4/20/2022 Reviewed: 12/18/2019 10:22 AM by Kim Gore APRN CNP          Noted       Endocrine    1. Impaired fasting glucose 4/6/2022     Overview Signed 4/6/2022  8:17 AM by Rocky Roque MD      105 (4/6/2022), no meds needed            Last Assessment & Plan 4/20/2022 Office Visit Written 4/20/2022 11:32 AM " by Rocky Roque MD      Impaired fasting glucose   This was present on fasting glucose, but remains stable.                   Neck pain  Pain upper neck, back of head, radiates down to upper lumbar area. The current problem has been slowly progressively worse over months.   Often associated with headache (which doesn't occur otherwise). No associated vision problems, GI problems, dizziness. Wondering about recurrence of previous problem. She recalls history of neuro-evaluation, had Rx a pill to 'loosen stuff up' in her upper neck, relax the muscles.   She speculates could be anxiety-related also.   Eases a little with gabapentin at night.  She doesn't like to take medications, so hasn't.  She was active in the past at different sites, but doesn't relate this to a difference over time (had more trouble when had psych worsening during the recent med changes, see below).       OBJECTIVE: bilateral hypertonicity.       ASSESSMENT & PLAN: neck and upper back pain, sounds most consistent with muscular spasm. Will XR and likely have her seen by PT.   OK in theory to have NSAIDs and/or muscle relaxers - although I understand she would prefer to avoid if she can.  Wants to go back to a previous PT - will get information for me.        Headaches notable with neck/back pain (see above). Sometimes headache might be present if she doesn't get enough fluids, but otherwise not a major problem for her. If worsens will need more discussion/evaluation.        Hyperlipidemia  Advised would like her on a statin because of the risk for cardiovascular (stroke). She brought out the formulary book on paper  I recommended rosuvastatin, but she found that was tier 2. Will try atorvastatin, this is tier 1 for her.      Bipolar disorder  Working on mood stabilizer approach, with medicine changes. Was doing ok on depakote but then needed to stop because of itchy rash that was eventually blamed on depakote.  Had mental worsening when  this was changed over to a series of other meds. 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        About this visit:  Time note (e4, 30'): The total time (on the date of service) for this service was 31 minutes, including discussion/face-to-face, chart review, interpretation not otherwise reported, documentation, and updating of the computerized record.

## 2022-04-20 NOTE — NURSING NOTE
Katie Griffiths is a 61 year old female patient that presents today in clinic for the following:    Chief Complaint   Patient presents with     RECHECK     The patient's allergies and medications were reviewed as noted. A set of vitals were recorded as noted without incident. The patient does not have any other questions for the provider.    Markos Funk, EMT at 11:17 AM on 4/20/2022

## 2022-04-22 ENCOUNTER — ANCILLARY PROCEDURE (OUTPATIENT)
Dept: GENERAL RADIOLOGY | Facility: CLINIC | Age: 62
End: 2022-04-22
Attending: PEDIATRICS
Payer: MEDICARE

## 2022-04-22 ENCOUNTER — TELEPHONE (OUTPATIENT)
Dept: INTERNAL MEDICINE | Facility: CLINIC | Age: 62
End: 2022-04-22

## 2022-04-22 DIAGNOSIS — M54.2 NECK PAIN: Primary | ICD-10-CM

## 2022-04-22 DIAGNOSIS — M54.2 NECK PAIN: ICD-10-CM

## 2022-04-22 PROCEDURE — 72040 X-RAY EXAM NECK SPINE 2-3 VW: CPT | Mod: GC | Performed by: STUDENT IN AN ORGANIZED HEALTH CARE EDUCATION/TRAINING PROGRAM

## 2022-04-22 NOTE — TELEPHONE ENCOUNTER
[facility]    Phone Message    May a detailed message be left on voicemail: yes     Reason for Call: Other: Patient said that if she needs PT, she preferes [address] [place] [address] location. Unless Dr. Roque recommends someplace else. She is waiting to hear back from [facility] as well.     Action Taken: Message routed to:  [facility]    Travel Screening: Not Applicable

## 2022-04-22 NOTE — TELEPHONE ENCOUNTER
Patient would like PT for her neck.  She prefers to see a physical therapist at [address] [place] [address].        Jose Lima CMA ([facility]) at 1:26 PM on 4/22/2022

## 2022-04-24 ENCOUNTER — MYC MEDICAL ADVICE (OUTPATIENT)
Dept: INTERNAL MEDICINE | Facility: CLINIC | Age: 62
End: 2022-04-24
Payer: MEDICARE

## 2022-04-24 DIAGNOSIS — M47.812 CERVICAL ARTHRITIS: ICD-10-CM

## 2022-04-25 PROBLEM — M47.812 CERVICAL ARTHRITIS: Status: ACTIVE | Noted: 2022-04-25

## 2022-05-11 ENCOUNTER — MEDICAL CORRESPONDENCE (OUTPATIENT)
Dept: HEALTH INFORMATION MANAGEMENT | Facility: CLINIC | Age: 62
End: 2022-05-11

## 2022-05-11 ENCOUNTER — THERAPY VISIT (OUTPATIENT)
Dept: PHYSICAL THERAPY | Facility: CLINIC | Age: 62
End: 2022-05-11
Attending: PEDIATRICS
Payer: MEDICARE

## 2022-05-11 DIAGNOSIS — M54.2 NECK PAIN: Primary | ICD-10-CM

## 2022-05-11 PROCEDURE — 97161 PT EVAL LOW COMPLEX 20 MIN: CPT | Mod: GP | Performed by: PHYSICAL THERAPIST

## 2022-05-11 PROCEDURE — 97110 THERAPEUTIC EXERCISES: CPT | Mod: GP | Performed by: PHYSICAL THERAPIST

## 2022-05-11 PROCEDURE — 97112 NEUROMUSCULAR REEDUCATION: CPT | Mod: GP | Performed by: PHYSICAL THERAPIST

## 2022-05-12 PROBLEM — M54.2 NECK PAIN: Status: ACTIVE | Noted: 2022-05-12

## 2022-05-15 NOTE — PROGRESS NOTES
[facility]    OUTPATIENT Physical Therapy ORTHOPEDIC EVALUATION  PLAN OF TREATMENT FOR OUTPATIENT REHABILITATION  (COMPLETE FOR INITIAL CLAIMS ONLY)  Patient's Last Name, First Name, M.I.  YOB: 1960  TundeKatie MCKEON    Provider s Name:  [facility]   Medical Record No.  [id number]   Start of Care Date:  05/11/22   Onset Date:    (exacerbation last few months.  MD referral 4-.)   Type:     _X__PT   ___OT Medical Diagnosis:    Encounter Diagnosis   Name Primary?    Neck pain Yes        Treatment Diagnosis:  Neck pain        Goals:     05/11/22 0500   Body Part   Goals listed below are for neck   Goal #1   Goal #1 driving/transportation   Previous Functional Level No restrictions   Current Functional Level Unable to rotate neck to look over shoulders   Performance Level Rot R & L mod decr w/ pain neck and UB   STG Target Performance Able to look over either shoulder    Performance Level w/ only 25% restrictions and min pain   Rationale for safe driving   Due date 06/08/22   LTG Target Performance Able to look over either shoulder    Performance Level w/o rotation restriction or pain   Rationale for safe driving   Due date 07/06/22   Goal #2   Goal #2 reading/computer work   Previous Functional Level No restrictions   Current Functional Level Minutes patient can read   Performance level on phone 5-10 min then pain increases   STG Target Performance Minutes patient will be able to read   Performance level up to 15 min w/o increased pain   Rationale to perform home related tasks ;to return to normal leisure activities   Due date 06/08/22   LTG Target Performance Minutes patient will be able to read   Performance Level 25 min w/o incr pain   Rationale to perform home related tasks;to return to normal leisure activities   Due date 07/06/22       Therapy Frequency:   1 x/week  Predicted Duration of Therapy Intervention:  8 weeks    Evie Sinclair, PT                 I CERTIFY THE NEED FOR THESE SERVICES FURNISHED UNDER        THIS PLAN OF TREATMENT AND WHILE UNDER MY CARE     (Physician attestation of this document indicates review and certification of the therapy plan).                     Certification Date From:  05/11/22   Certification Date To:  07/09/22    Referring Provider:  Rocky Roque    Initial Assessment        See Epic Evaluation SOC Date: 05/11/22

## 2022-05-18 ENCOUNTER — TRANSFERRED RECORDS (OUTPATIENT)
Dept: HEALTH INFORMATION MANAGEMENT | Facility: CLINIC | Age: 62
End: 2022-05-18
Payer: MEDICARE

## 2022-05-26 NOTE — TELEPHONE ENCOUNTER
Action June 7, 2022. Now MT   Action Taken CSS called patient and pt gave VBOK to update MR.      DIAGNOSIS: cervical arthritis   APPOINTMENT DATE: 6.7.22   NOTES STATUS DETAILS   OFFICE NOTE from other specialist Care Everywhere/ Internal 04/20/2022 - [facility]    01/31/2019, 01/29/2019, 01/22/2019 - Shanelle Russo PT - [facility]   MEDICATION LIST EPIC    LABS     CBC/DIFF Internal Most Recent: 05/01/2021   DEXA PACS 04/06/2022 - Hip/Pel/Spine   XRAYS (IMAGES & REPORTS) PACS 04/22/2022 - Cervical Spine

## 2022-05-31 ASSESSMENT — ENCOUNTER SYMPTOMS
EYE IRRITATION: 0
POSTURAL DYSPNEA: 0
FATIGUE: 1
HYPOTENSION: 0
DEPRESSION: 1
SLEEP DISTURBANCES DUE TO BREATHING: 0
EYE WATERING: 1
NAIL CHANGES: 1
SINUS CONGESTION: 0
DECREASED APPETITE: 1
MUSCLE WEAKNESS: 1
DOUBLE VISION: 0
MYALGIAS: 1
EXERCISE INTOLERANCE: 0
LEG PAIN: 0
POOR WOUND HEALING: 0
SHORTNESS OF BREATH: 0
WHEEZING: 0
FLANK PAIN: 0
DYSPNEA ON EXERTION: 1
HOT FLASHES: 0
NECK MASS: 0
HOARSE VOICE: 0
HEMATURIA: 0
ORTHOPNEA: 0
EYE REDNESS: 0
TASTE DISTURBANCE: 0
SYNCOPE: 0
POLYDIPSIA: 0
COUGH DISTURBING SLEEP: 0
POLYPHAGIA: 0
CHILLS: 0
WEIGHT GAIN: 0
HEMOPTYSIS: 0
NERVOUS/ANXIOUS: 1
FEVER: 0
DECREASED LIBIDO: 1
INSOMNIA: 0
INCREASED ENERGY: 1
SKIN CHANGES: 0
PANIC: 1
PALPITATIONS: 0
DECREASED CONCENTRATION: 1
SORE THROAT: 0
EYE PAIN: 0
MUSCLE CRAMPS: 0
SINUS PAIN: 0
SNORES LOUDLY: 0
DIFFICULTY URINATING: 0
HALLUCINATIONS: 0
WEIGHT LOSS: 0
ALTERED TEMPERATURE REGULATION: 1
JOINT SWELLING: 0
NIGHT SWEATS: 0
BACK PAIN: 1
DYSURIA: 0
HYPERTENSION: 0
STIFFNESS: 1
COUGH: 0
SMELL DISTURBANCE: 0
SPUTUM PRODUCTION: 0
ARTHRALGIAS: 1
TROUBLE SWALLOWING: 0
LIGHT-HEADEDNESS: 1
NECK PAIN: 1

## 2022-05-31 ASSESSMENT — PATIENT HEALTH QUESTIONNAIRE - PHQ9
SUM OF ALL RESPONSES TO PHQ QUESTIONS 1-9: 11
10. IF YOU CHECKED OFF ANY PROBLEMS, HOW DIFFICULT HAVE THESE PROBLEMS MADE IT FOR YOU TO DO YOUR WORK, TAKE CARE OF THINGS AT HOME, OR GET ALONG WITH OTHER PEOPLE: VERY DIFFICULT
SUM OF ALL RESPONSES TO PHQ QUESTIONS 1-9: 11

## 2022-06-01 ENCOUNTER — VIRTUAL VISIT (OUTPATIENT)
Dept: ENDOCRINOLOGY | Facility: CLINIC | Age: 62
End: 2022-06-01
Payer: MEDICARE

## 2022-06-01 DIAGNOSIS — L65.9 HAIR LOSS: ICD-10-CM

## 2022-06-01 DIAGNOSIS — E03.9 HYPOTHYROIDISM, UNSPECIFIED TYPE: Primary | ICD-10-CM

## 2022-06-01 PROCEDURE — 99214 OFFICE O/P EST MOD 30 MIN: CPT | Mod: 95 | Performed by: INTERNAL MEDICINE

## 2022-06-01 NOTE — LETTER
6/1/2022       RE: Katie Griffiths  [address]  [facility] [address]     Dear Colleague,    Thank you for referring your patient, Katie Griffiths, to the [facility] ENDOCRINOLOGY [facility] [place] [facility]. Please see a copy of my visit note below.    Katie Griffiths  is being evaluated via a billable video visit.      How would you like to obtain your AVS? [email or web address]  For the video visit, send the invitation by: Send to e-mail at: [email or web address]  Will anyone else be joining your video visit? No        Video-Visit Details    Type of service:  Video Visit    Video Start Time: 7:30 am  End; 8:00 am    Originating Location (pt. Location): Home    Distant Location (provider location):  [facility] ENDOCRINOLOG[facility]     Platform used for Video Visit: AmWell                                                                                    - Endocrinology Follow up -    Reason for visit/consult: hypothyroidism    Primary care provider: Kim Gore      Assessment and Plan  A 61 year old female with hypothyroidism     # Hypothyrodism  Post operative,   Previously was undercotrolled with Nature thyroid 32.5 mg.  Switched from Nature to LT4 50 mcg and has been doing well. Lab showed persistently good range of normal. TSH 2.57, free T4 1.1,    - continue current LT4 50 / 75 mcg alternate for the next 3 month    - recheck TFTs in 3 month    - as an option, after next blood work, adding on T3 5 mcg daily, could be an option, she will think about and contact us if she wants to try.     # Mental health    # environmental stress    # hair loss  Her hair loss got worse with increasing dose of LT4 from 50 to 75, therefore we did 50/75 mcg alternate until present.       RTC with me in 1 year      30 minutes spent on the date of the encounter doing chart review, history and exam, documentation and further activities  as noted above.    Mary Sawyer MD  Staff Physician  Endocrinology and Metabolism  [facility]  License: [id number]  Pager: [phone]    Interval History as of 6/1/2022 : Patient has been doing well-fair. Medication compliance excellent   . New event includes: Hair loss temporal and eye brows got worse with LT4 75 mcg daily but now alternating dose and stable. Her hair was much better when she was taking Nature thyroid.   Interval History as of 11/10/2021 : Patient has been doing well. Medication compliancegood to LT4 50 mcg   . New event includes : many environmental stress and worsening mental condition, could not access her regular psychologist and seeking for a new one .  Interval History as of 11/2/2020 : Patient has been doing well. Switched from Nature to LT4 50 mcg and has been doing well. Lab showed good range of normal. TSH 2.7, free T4 1.1  HPI: A 60 yo female here for the evaluation for her hypothryodism.  Patient is a self-referral came here today.   Patient was diagnosed thyroid nodule in 2006 when she was in [place] she underwent left hemithyroidectomy at [facility] in [place].  Since then she has been on levothyroxine and she cannot recall the dose.   She was switched to the nature thyroid currently taking 32.5 mg daily for 5 years and she cannot recall why and where she started.   She moved to [place] in October 2018 and try to find a physician who can prescribe nature thyroid.  She also has bipolar disorder diagnosed 2006 and currently has been changing multiple medications.     Psychiatry medications has been changing, celexa, geodon, and hydroxyzine.   Dr. Geronimo in [facility].     She went to PMD, and was told not to Rx of Nature Thyroid, and found places potentially prescribe nature thyroid and found here.     Energy level: hard to say and not good, due to changing psychiatry medication  Dry skin: she used to have olanzapine, then recetnly changed to geodon,  and currently dry skin.   Hair loss: no  Weight changes: gained weight with olanzapine, but not stopped olanzapine for 4 month  BM: regular last few days  Concentration: no  Family history of thyroid disease: mother+      Past Medical/Surgical History:  Past Medical History:   Diagnosis Date     Bipolar disorder (H)      Depression with anxiety      Hashimoto's thyroiditis      Hyperlipidemia      Mild intermittent asthma      S/P partial thyroidectomy      Superficial perivascular dermatitis      Past Surgical History:   Procedure Laterality Date     COLONOSCOPY N/A 4/14/2021    Procedure: COLONOSCOPY;  Surgeon: Guerita Shannon MD;  Location: [facility]     COLONOSCOPY N/A 4/14/2021    Procedure: Colonoscopy, With Polypectomy And Biopsy;  Surgeon: Guerita Shannon MD;  Location: [facility]     partial thyroidectomy       TONSILLECTOMY       TUBAL LIGATION         Allergies:  Allergies   Allergen Reactions     Quetiapine Rash     Valproic Acid Rash     Drug rash       Current Medications   Current Outpatient Medications   Medication     albuterol (PROAIR HFA/PROVENTIL HFA/VENTOLIN HFA) 108 (90 Base) MCG/ACT inhaler     biotin 1000 MCG TABS tablet     buPROPion (WELLBUTRIN XL) 150 MG 24 hr tablet     calcium citrate-vitamin D (CITRACAL) 315-200 MG-UNIT TABS per tablet     clonazePAM (KLONOPIN) 0.5 MG tablet     fish oil-omega-3 fatty acids 500 MG capsule     gabapentin (NEURONTIN) 300 MG capsule     levothyroxine (SYNTHROID/LEVOTHROID) 50 MCG tablet     levothyroxine (SYNTHROID/LEVOTHROID) 75 MCG tablet     melatonin ER 3 MG tablet     mirtazapine (REMERON) 15 MG tablet     No current facility-administered medications for this visit.       Family History:  Family History   Problem Relation Age of Onset     Colon Polyps Mother      Glaucoma Mother      Eye Disorder Father      Factor V Leiden deficiency Father         pt tested negative     Hyperlipidemia Father      Glaucoma Father      Macular Degeneration Paternal  Grandmother        Social History:  Social History     Tobacco Use     Smoking status: Never Smoker     Smokeless tobacco: Never Used   Substance Use Topics     Alcohol use: Yes     Comment: occasional   lives with her son, Job: no job, on sliding scale insulin for mental     ROS:  Full review of systems taken with the help of the intake sheet. Otherwise a complete 14 point review of systems was taken and is negative unless stated in the history above.      Physical Exam:   Vitals: There were no vitals taken for this visit.  BMI= There is no height or weight on file to calculate BMI.   General: well appearing, no acute distress, pleasant and conversant,   Mental Status/neuro: alert and oriented  Face: symmetrical, normal facial color  Eyes: anicteric, no proptosis or lid lag  Resp: no acute distress      Labs : I reviewed data from epic and extract and summarize the pertinent data here.     Lab Results   Component Value Date    TSH 3.21 01/22/2020

## 2022-06-01 NOTE — NURSING NOTE
Patient denies any changes since echeck-in regarding medication and allergies and states all information entered during echeck-in remains accurate.  Maureen Bansal on 6/1/2022 at 7:13 AM

## 2022-06-01 NOTE — PROGRESS NOTES
Katie Griffiths  is being evaluated via a billable video visit.      How would you like to obtain your AVS? [employer]  For the video visit, send the invitation by: Send to e-mail at: [email or web address]  Will anyone else be joining your video visit? No        Video-Visit Details    Type of service:  Video Visit    Video Start Time: 7:30 am  End; 8:00 am    Originating Location (pt. Location): Home    Distant Location (provider location):  [facility] ENDOCRINOLOGY [facility]IC [place]     Platform used for Video Visit: [facility]                                                                                    - Endocrinology Follow up -    Reason for visit/consult: hypothyroidism    Primary care provider: Kim Gore      Assessment and Plan  A 61 year old female with hypothyroidism     # Hypothyrodism  Post operative,   Previously was undercotrolled with Nature thyroid 32.5 mg.  Switched from Nature to LT4 50 mcg and has been doing well. Lab showed persistently good range of normal. TSH 2.57, free T4 1.1,    - continue current LT4 50 / 75 mcg alternate for the next 3 month    - recheck TFTs in 3 month    - as an option, after next blood work, adding on T3 5 mcg daily, could be an option, she will think about and contact us if she wants to try.     # Mental health    # environmental stress    # hair loss  Her hair loss got worse with increasing dose of LT4 from 50 to 75, therefore we did 50/75 mcg alternate until present.       RTC with me in 1 year      30 minutes spent on the date of the encounter doing chart review, history and exam, documentation and further activities as noted above.    Mary Sawyer MD  Staff Physician  Endocrinology and Metabolism  [facility]  License: [place] [id number]  Pager: [phone]    Interval History as of 6/1/2022 : Patient has been doing well-fair. Medication compliance excellent   . New event includes: Hair loss temporal and eye brows got worse with  LT4 75 mcg daily but now alternating dose and stable. Her hair was much better when she was taking Nature thyroid.   Interval History as of 11/10/2021 : Patient has been doing well. Medication compliancegood to LT4 50 mcg   . New event includes : many environmental stress and worsening mental condition, could not access her regular psychologist and seeking for a new one .  Interval History as of 11/2/2020 : Patient has been doing well. Switched from Nature to LT4 50 mcg and has been doing well. Lab showed good range of normal. TSH 2.7, free T4 1.1  HPI: A 60 yo female here for the evaluation for her hypothryodism.  Patient is a self-referral came here today.   Patient was diagnosed thyroid nodule in 2006 when she was in [place] she underwent left hemithyroidectomy at [facility] in [place].  Since then she has been on levothyroxine and she cannot recall the dose.   She was switched to the nature thyroid currently taking 32.5 mg daily for 5 years and she cannot recall why and where she started.   She moved to [place] in October 2018 and try to find a physician who can prescribe nature thyroid.  She also has bipolar disorder diagnosed 2006 and currently has been changing multiple medications.     Psychiatry medications has been changing, celexa, geodon, and hydroxyzine.   Dr. Geronimo in [facility].     She went to PMD, and was told not to Rx of Nature Thyroid, and found places potentially prescribe nature thyroid and found here.     Energy level: hard to say and not good, due to changing psychiatry medication  Dry skin: she used to have olanzapine, then recetnly changed to geodon, and currently dry skin.   Hair loss: no  Weight changes: gained weight with olanzapine, but not stopped olanzapine for 4 month  BM: regular last few days  Concentration: no  Family history of thyroid disease: mother+      Past Medical/Surgical History:  Past Medical History:   Diagnosis Date     Bipolar disorder (H)       Depression with anxiety      Hashimoto's thyroiditis      Hyperlipidemia      Mild intermittent asthma      S/P partial thyroidectomy      Superficial perivascular dermatitis      Past Surgical History:   Procedure Laterality Date     COLONOSCOPY N/A 4/14/2021    Procedure: COLONOSCOPY;  Surgeon: Guerita Shannon MD;  Location: [facility]     COLONOSCOPY N/A 4/14/2021    Procedure: Colonoscopy, With Polypectomy And Biopsy;  Surgeon: Guerita Shannon MD;  Location: [facility]     partial thyroidectomy       TONSILLECTOMY       TUBAL LIGATION         Allergies:  Allergies   Allergen Reactions     Quetiapine Rash     Valproic Acid Rash     Drug rash       Current Medications   Current Outpatient Medications   Medication     albuterol (PROAIR HFA/PROVENTIL HFA/VENTOLIN HFA) 108 (90 Base) MCG/ACT inhaler     biotin 1000 MCG TABS tablet     buPROPion (WELLBUTRIN XL) 150 MG 24 hr tablet     calcium citrate-vitamin D (CITRACAL) 315-200 MG-UNIT TABS per tablet     clonazePAM (KLONOPIN) 0.5 MG tablet     fish oil-omega-3 fatty acids 500 MG capsule     gabapentin (NEURONTIN) 300 MG capsule     levothyroxine (SYNTHROID/LEVOTHROID) 50 MCG tablet     levothyroxine (SYNTHROID/LEVOTHROID) 75 MCG tablet     melatonin ER 3 MG tablet     mirtazapine (REMERON) 15 MG tablet     No current facility-administered medications for this visit.       Family History:  Family History   Problem Relation Age of Onset     Colon Polyps Mother      Glaucoma Mother      Eye Disorder Father      Factor V Leiden deficiency Father         pt tested negative     Hyperlipidemia Father      Glaucoma Father      Macular Degeneration Paternal Grandmother        Social History:  Social History     Tobacco Use     Smoking status: Never Smoker     Smokeless tobacco: Never Used   Substance Use Topics     Alcohol use: Yes     Comment: occasional   lives with her son, Job: no job, on sliding scale insulin for mental     ROS:  Full review of systems taken with the help of  the intake sheet. Otherwise a complete 14 point review of systems was taken and is negative unless stated in the history above.      Physical Exam:   Vitals: There were no vitals taken for this visit.  BMI= There is no height or weight on file to calculate BMI.   General: well appearing, no acute distress, pleasant and conversant,   Mental Status/neuro: alert and oriented  Face: symmetrical, normal facial color  Eyes: anicteric, no proptosis or lid lag  Resp: no acute distress      Labs : I reviewed data from epic and extract and summarize the pertinent data here.     Lab Results   Component Value Date    TSH 3.21 01/22/2020

## 2022-06-07 ENCOUNTER — PRE VISIT (OUTPATIENT)
Dept: ORTHOPEDICS | Facility: CLINIC | Age: 62
End: 2022-06-07
Payer: MEDICARE

## 2022-06-07 ENCOUNTER — OFFICE VISIT (OUTPATIENT)
Dept: ORTHOPEDICS | Facility: CLINIC | Age: 62
End: 2022-06-07
Attending: PEDIATRICS
Payer: MEDICARE

## 2022-06-07 ENCOUNTER — ANCILLARY PROCEDURE (OUTPATIENT)
Dept: GENERAL RADIOLOGY | Facility: CLINIC | Age: 62
End: 2022-06-07
Attending: PHYSICIAN ASSISTANT
Payer: MEDICARE

## 2022-06-07 DIAGNOSIS — G89.29 CHRONIC MIDLINE POSTERIOR NECK PAIN: ICD-10-CM

## 2022-06-07 DIAGNOSIS — M47.812 CERVICAL SPONDYLOSIS: ICD-10-CM

## 2022-06-07 DIAGNOSIS — Z78.0 OSTEOPENIA AFTER MENOPAUSE: ICD-10-CM

## 2022-06-07 DIAGNOSIS — M54.2 NECK PAIN: ICD-10-CM

## 2022-06-07 DIAGNOSIS — M85.80 OSTEOPENIA AFTER MENOPAUSE: Primary | ICD-10-CM

## 2022-06-07 DIAGNOSIS — M47.812 CERVICAL ARTHRITIS: ICD-10-CM

## 2022-06-07 DIAGNOSIS — M54.2 CHRONIC MIDLINE POSTERIOR NECK PAIN: ICD-10-CM

## 2022-06-07 DIAGNOSIS — M54.2 NECK PAIN: Primary | ICD-10-CM

## 2022-06-07 DIAGNOSIS — M85.80 OSTEOPENIA AFTER MENOPAUSE: ICD-10-CM

## 2022-06-07 DIAGNOSIS — Z78.0 OSTEOPENIA AFTER MENOPAUSE: Primary | ICD-10-CM

## 2022-06-07 PROCEDURE — 72040 X-RAY EXAM NECK SPINE 2-3 VW: CPT | Performed by: RADIOLOGY

## 2022-06-07 PROCEDURE — 99204 OFFICE O/P NEW MOD 45 MIN: CPT | Performed by: PHYSICIAN ASSISTANT

## 2022-06-07 PROCEDURE — 72082 X-RAY EXAM ENTIRE SPI 2/3 VW: CPT | Performed by: STUDENT IN AN ORGANIZED HEALTH CARE EDUCATION/TRAINING PROGRAM

## 2022-06-07 PROCEDURE — 77073 BONE LENGTH STUDIES: CPT | Performed by: STUDENT IN AN ORGANIZED HEALTH CARE EDUCATION/TRAINING PROGRAM

## 2022-06-07 NOTE — PROGRESS NOTES
Katie Griffiths complains of    Chief Complaint   Patient presents with     Consult     Cervical arthritis        I have reviewed and updated the patient's Past Medical History, Social History, Family History and Medication List.    ALLERGIES  Quetiapine and Valproic acid    Spine Surgery Consultation    REFERRING PHYSICIAN: Rocky Roque   PRIMARY CARE PHYSICIAN: Rocky Roque           Chief Complaint:   Consult (Cervical arthritis )      History of Present Illness:  Symptom Profile Including: location of symptoms, onset, severity, exacerbating/alleviating factors, previous treatments:        Katie Griffiths is a 61 year old female who presents today for evaluation of neck pain that has been present for the past 7 months.  Patient did not have any injury or trauma.  She says that pain started gradually and has become severe.  Pain is rated at a 6/10 constantly with increases of pain with range of motion or increased movements.  Pain is localized to midline from mid cervical to mid thoracic back and radiating into the bilateral shoulders.  She has limited neck range of motion due to pain but has been working on this with physical therapy.  Denies pain, numbness, tingling, weakness into bilateral upper extremities.  Denies myelopathic symptoms including balance instability, hand clumsiness, dropping things.  Admits to headaches associated with neck pain.  She  has tried Advil, Tylenol, Aleve without relief.  Currently enrolled in physical therapy and they are working on neck and parascapular muscle training/stretching, neck range of motion, etc. which she believes has been somewhat helpful for spasms but not relieving pain.    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   Skin - No suspicious rashes or lesions.   Psychiatric - Normal mood and affect.   Cardiovascular - Extremities warm and well perfused.   Eyes - Visual acuity is normal to the written word.   ENT - Hearing intact to the spoken word.   GI - No abdominal distention.   Musculoskeletal - Non-antalgic gait without use of assistive devices.        Cervical spine:    Appearance - Normal     Palpation - non-tender to palpation midline, paraspinals    ROM - Full, but slow & painful neck ROM    Motor -     UPPER EXTREMITY Left Right    strength 5/5 5/5   Finger ab/adduction 4/5 4/5   Wrist flexion 5/5 5/5   Wrist extension 5/5 5/5   Elbow flexion 5/5 5/5   Elbow extension 5/5 5/5   Shoulder abduction 5/5 5/5         Special Tests -      Lhermitte's Test - negative     Spurling's Test - negative     Tandem Gait - no instability     Rhomberg's - mild instability          Thoracic Spine:    Appearance - Normal     Palpation - Nontender to palpation    Strength/ROM - deferred              Neurologic - Sensation intact to light touch bilaterally.      REFLEXES Left Right   Biceps 1+ 1+   Triceps 1+ 1+   Brachioradialis 1+ 1+   Hoffmans negative negative                        Imaging:     I independently reviewed & provided my own interpretation of new radiographs and/ or advanced  imaging at this clinic visit. The results were discussed with the patient.  Findings include:    4/22/22 XR cervical spine AP/lateral/ odontoid views: multilevel degenerative changes, most significant C5-7. Trace retrolisthesis C5-6 and C6-7.     4/6/22 DEXA:   Impression  Based on BMD diagnosis is consistent with low bone density based on WHO criteria Ref. 1    Results   Lumbar spine   T-score -2.0 ., BMD is 0.938 g/cm2.     Left femoral neck  T-score -1.7     Right femoral neck  T-score -1.9     Left Total femur  T-score -2.1 , BMD is 0.748 g/cm2.    Right total femur  T-score -2.2, BMD is 0.731 g/cm2.      6/7/22 XR cervical spine flexion/extension views: no significant motion between flexion/ extension views.     6/7/22 EOS full spine AP/lateral view XR: neutral coronal and sagittal alignment. No significant arthritic changes in thoracic or lumbar spine. Re-demonstration of spondylosis C5-7.             Assessment and Plan:   Assessment:  61 year old female with   1. chronic neck pain without radiculopathy or myelopathic symptoms; cervical spondylosis, most significant C5-7  2. Osteopenia (2022 T-score -2.2)     Plan:  Reviewed XR images with patient today. Recommend getting advanced imaging of cervical spine to assess for spinal canal or foraminal stenosis that could explain her symptoms. Patient also very concerned about upper thoracic pain, so we will get EOS XR on her way out today to assess. Due to her high anxiety over issues in this area, I will also order MRI of thoracic region. I would recommend she continue physical therapy in the meantime. I also recommend she try a muscle relaxer since a lot of her pain seems to be due to periscapular & para spinalmuscle spasms. We will have her follow up after MRIs are completed to review findings & discuss next steps in treatment.    - continue PT  - offered prescription for muscle relaxer - patient wants to check coupons & insurance coverage prior to selecting  muscle relaxer  - EOS full spine AP/lateral views  - Ordered MRI cervical/thoracic w/o contrast.   - Follow up virtual after MRI to discuss results.        Respectfully,  Kiara Carvajal (sharon Amado), NAKITA  Orthopaedic Spine Surgery  Dept Orthopaedic Surgery, [facility]  [facility] Phone: [phone]      50 minutes spent on the date of the encounter doing chart review, review of outside records, review of test results, my own interpretation of tests, documentation, patient history, physical exam & discussion of plan with patient and family.    Dictation Disclaimer: Some of this Note has been completed with voice-recognition dictation software. Although errors are generally corrected real-time, there is the potential for a rare error to be present in the completed chart.

## 2022-06-07 NOTE — LETTER
6/7/2022         RE: Katie Griffiths  [address]  [facility] [address]        Dear Colleague,    Thank you for referring your patient, Katie Griffiths, to the [facility] [place]. Please see a copy of my visit note below.    Katie Griffiths complains of    Chief Complaint   Patient presents with     Consult     Cervical arthritis        I have reviewed and updated the patient's Past Medical History, Social History, Family History and Medication List.    ALLERGIES  Quetiapine and Valproic acid    Spine Surgery Consultation    REFERRING PHYSICIAN: Rocky Roque   PRIMARY CARE PHYSICIAN: Rocky Roque           Chief Complaint:   Consult (Cervical arthritis )      History of Present Illness:  Symptom Profile Including: location of symptoms, onset, severity, exacerbating/alleviating factors, previous treatments:        Katie Griffiths is a 61 year old female who presents today for evaluation of neck pain that has been present for the past 7 months.  Patient did not have any injury or trauma.  She says that pain started gradually and has become severe.  Pain is rated at a 6/10 constantly with increases of pain with range of motion or increased movements.  Pain is localized to midline from mid cervical to mid thoracic back and radiating into the bilateral shoulders.  She has limited neck range of motion due to pain but has been working on this with physical therapy.  Denies pain, numbness, tingling, weakness into bilateral upper extremities.  Denies myelopathic symptoms including balance instability, hand clumsiness, dropping things.  Admits to headaches associated with neck pain.  She  has tried Advil, Tylenol, Aleve without relief.  Currently enrolled in physical therapy and they are working on neck and parascapular muscle training/stretching, neck range of motion, etc. which she believes has been somewhat helpful for spasms but not relieving  pain.    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   Skin - No suspicious rashes or lesions.   Psychiatric - Normal mood and affect.   Cardiovascular - Extremities warm and well perfused.   Eyes - Visual acuity is normal to the written word.   ENT - Hearing intact to the spoken word.   GI - No abdominal distention.   Musculoskeletal - Non-antalgic gait without use of assistive devices.        Cervical spine:    Appearance - Normal     Palpation - non-tender to palpation midline, paraspinals    ROM - Full, but slow & painful neck ROM    Motor -     UPPER EXTREMITY Left Right    strength 5/5 5/5   Finger ab/adduction 4/5 4/5   Wrist flexion 5/5 5/5   Wrist extension 5/5 5/5   Elbow flexion 5/5 5/5   Elbow extension 5/5 5/5   Shoulder abduction 5/5 5/5         Special Tests -      Lhermitte's Test - negative     Spurling's Test - negative     Tandem Gait - no instability     Rhomberg's - mild instability          Thoracic Spine:    Appearance - Normal     Palpation - Nontender to palpation    Strength/ROM - deferred              Neurologic - Sensation  intact to light touch bilaterally.      REFLEXES Left Right   Biceps 1+ 1+   Triceps 1+ 1+   Brachioradialis 1+ 1+   Hoffmans negative negative                        Imaging:     I independently reviewed & provided my own interpretation of new radiographs and/ or advanced imaging at this clinic visit. The results were discussed with the patient.  Findings include:    4/22/22 XR cervical spine AP/lateral/ odontoid views: multilevel degenerative changes, most significant C5-7. Trace retrolisthesis C5-6 and C6-7.     4/6/22 DEXA:   Impression  Based on BMD diagnosis is consistent with low bone density based on WHO criteria Ref. 1    Results   Lumbar spine   T-score -2.0 ., BMD is 0.938 g/cm2.     Left femoral neck  T-score -1.7     Right femoral neck  T-score -1.9     Left Total femur  T-score -2.1 , BMD is 0.748 g/cm2.    Right total femur  T-score -2.2, BMD is 0.731 g/cm2.      6/7/22 XR cervical spine flexion/extension views: no significant motion between flexion/ extension views.     6/7/22 EOS full spine AP/lateral view XR: neutral coronal and sagittal alignment. No significant arthritic changes in thoracic or lumbar spine. Re-demonstration of spondylosis C5-7.             Assessment and Plan:   Assessment:  61 year old female with   1. chronic neck pain without radiculopathy or myelopathic symptoms; cervical spondylosis, most significant C5-7  2. Osteopenia (2022 T-score -2.2)     Plan:  Reviewed XR images with patient today. Recommend getting advanced imaging of cervical spine to assess for spinal canal or foraminal stenosis that could explain her symptoms. Patient also very concerned about upper thoracic pain, so we will get EOS XR on her way out today to assess. Due to her high anxiety over issues in this area, I will also order MRI of thoracic region. I would recommend she continue physical therapy in the meantime. I also recommend she try a muscle relaxer since a lot of her pain seems to be due to  periscapular & para spinalmuscle spasms. We will have her follow up after MRIs are completed to review findings & discuss next steps in treatment.    - continue PT  - offered prescription for muscle relaxer - patient wants to check coupons & insurance coverage prior to selecting muscle relaxer  - EOS full spine AP/lateral views  - Ordered MRI cervical/thoracic w/o contrast.   - Follow up virtual after MRI to discuss results.        Respectfully,  Kiara Carvajal (deneenkcintia Amado), PAMARIE  Orthopaedic Spine Surgery  Dept Orthopaedic Surgery, [facility]  [facility] [place] Phone: [phone]      50 minutes spent on the date of the encounter doing chart review, review of outside records, review of test results, my own interpretation of tests, documentation, patient history, physical exam & discussion of plan with patient and family.    Dictation Disclaimer: Some of this Note has been completed with voice-recognition dictation software. Although errors are generally corrected real-time, there is the potential for a rare error to be present in the completed chart.

## 2022-06-15 ENCOUNTER — TELEPHONE (OUTPATIENT)
Dept: OTHER | Age: 62
End: 2022-06-15
Payer: MEDICARE

## 2022-06-15 ENCOUNTER — MYC MEDICAL ADVICE (OUTPATIENT)
Dept: ORTHOPEDICS | Facility: CLINIC | Age: 62
End: 2022-06-15
Payer: MEDICARE

## 2022-06-15 DIAGNOSIS — M47.812 CERVICAL ARTHRITIS: Primary | ICD-10-CM

## 2022-06-15 RX ORDER — TIZANIDINE 2 MG/1
2 TABLET ORAL 3 TIMES DAILY PRN
Qty: 21 TABLET | Refills: 0 | Status: SHIPPED | OUTPATIENT
Start: 2022-06-15 | End: 2022-06-24

## 2022-06-15 NOTE — TELEPHONE ENCOUNTER
Sent a script of tizanidine. Patient is not tolerating robaxin    Bishop LUZ ELENA Quintana PA-C

## 2022-06-15 NOTE — TELEPHONE ENCOUNTER
See phone message from pt concerned that Robaxin is affecting her Depression & mental health & see separate My Chart encounter today 6-15-22 that Bishop WESTFALL called her & switched to Tizanidine.    I called pt & made RTN appt with Kiara ramirez for after 2 MRis scheduled per dictation.  Call back prn.  Pt agreed.  Sarika Ortiz RN.

## 2022-06-15 NOTE — TELEPHONE ENCOUNTER
Hello,I'm with ortho jackie.  Pt saw Kiara Carvajal for her neck.  She was prescribed methocarbomol 4x a day.  She said this is affecting her depression and mental health.  She is asking if she should continue taking it 4x a day.  Can you help?

## 2022-06-18 PROBLEM — R51.9 HEADACHE: Status: ACTIVE | Noted: 2022-06-18

## 2022-06-18 RX ORDER — ATORVASTATIN CALCIUM 20 MG/1
20 TABLET, FILM COATED ORAL DAILY
Qty: 90 TABLET | Refills: 3 | Status: SHIPPED | OUTPATIENT
Start: 2022-06-18 | End: 2022-06-25

## 2022-06-19 NOTE — ASSESSMENT & PLAN NOTE
Neck pain  Pain upper neck, back of head, radiates down to upper lumbar area. The current problem has been slowly progressively worse over months.   Often associated with headache (which doesn't occur otherwise). No associated vision problems, GI problems, dizziness. Wondering about recurrence of previous problem. She recalls history of neuro-evaluation, had Rx a pill to 'loosen stuff up' in her upper neck, relax the muscles.   She speculates could be anxiety-related also.   Eases a little with gabapentin at night.  She doesn't like to take medications, so hasn't.  She was active in the past at different sites, but doesn't relate this to a difference over time (had more trouble when had psych worsening during the recent med changes, see below).       OBJECTIVE: bilateral hypertonicity.       ASSESSMENT & PLAN: neck and upper back pain, sounds most consistent with muscular spasm. Will XR and likely have her seen by PT.   OK in theory to have NSAIDs and/or muscle relaxers - although I understand she would prefer to avoid if she can.  Wants to go back to a previous PT - will get information for me.

## 2022-06-19 NOTE — ASSESSMENT & PLAN NOTE
Hyperlipidemia  Advised would like her on a statin because of the risk for cardiovascular (stroke). She brought out the formulary book on paper  I recommended rosuvastatin, but she found that was tier 2. Will try atorvastatin, this is tier 1 for her.

## 2022-06-19 NOTE — ASSESSMENT & PLAN NOTE
Osteopenia  Reviewed DEXA.  Vit D and calcium. No current indication for bisphos treatment. Needs follow-up DEXA in 2 years.

## 2022-06-19 NOTE — ASSESSMENT & PLAN NOTE
Bipolar disorder  Working on mood stabilizer approach, with medicine changes. Was doing ok on depakote but then needed to stop because of itchy rash that was eventually blamed on depakote.  Had mental worsening when this was changed over to a series of other meds. 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

## 2022-06-19 NOTE — ASSESSMENT & PLAN NOTE
Headaches notable with neck/back pain (see above). Sometimes headache might be present if she doesn't get enough fluids, but otherwise not a major problem for her. If worsens will need more discussion/evaluation.

## 2022-06-20 ENCOUNTER — MYC MEDICAL ADVICE (OUTPATIENT)
Dept: INTERNAL MEDICINE | Facility: CLINIC | Age: 62
End: 2022-06-20

## 2022-06-20 DIAGNOSIS — E78.5 HYPERLIPIDEMIA: Primary | ICD-10-CM

## 2022-06-24 RX ORDER — ATORVASTATIN CALCIUM 10 MG/1
10 TABLET, FILM COATED ORAL DAILY
Qty: 90 TABLET | Refills: 1 | Status: SHIPPED | OUTPATIENT
Start: 2022-06-24 | End: 2022-11-30

## 2022-06-24 NOTE — TELEPHONE ENCOUNTER
[facility] Call Center    Phone Message    May a detailed message be left on voicemail: yes     Reason for Call: Other: The patient was calling back to check if the care team reviewed her cholesterol medications, please review and follow up asap thank you.     Action Taken: Message routed to:  [facility] pcc    Travel Screening: Not Applicable

## 2022-06-25 ENCOUNTER — ANCILLARY PROCEDURE (OUTPATIENT)
Dept: MRI IMAGING | Facility: CLINIC | Age: 62
End: 2022-06-25
Attending: PHYSICIAN ASSISTANT
Payer: MEDICARE

## 2022-06-25 DIAGNOSIS — M47.812 CERVICAL ARTHRITIS: ICD-10-CM

## 2022-06-25 DIAGNOSIS — M47.812 CERVICAL SPONDYLOSIS: ICD-10-CM

## 2022-06-25 DIAGNOSIS — G89.29 CHRONIC MIDLINE POSTERIOR NECK PAIN: ICD-10-CM

## 2022-06-25 DIAGNOSIS — Z78.0 OSTEOPENIA AFTER MENOPAUSE: ICD-10-CM

## 2022-06-25 DIAGNOSIS — M85.80 OSTEOPENIA AFTER MENOPAUSE: ICD-10-CM

## 2022-06-25 DIAGNOSIS — M54.2 CHRONIC MIDLINE POSTERIOR NECK PAIN: ICD-10-CM

## 2022-06-25 PROCEDURE — 72141 MRI NECK SPINE W/O DYE: CPT | Performed by: RADIOLOGY

## 2022-07-05 DIAGNOSIS — E06.3 HASHIMOTO'S THYROIDITIS: ICD-10-CM

## 2022-07-09 NOTE — TELEPHONE ENCOUNTER
levothyroxine (SYNTHROID/LEVOTHROID) 75 MCG tablet  Last Written Prescription Date:  11/15/21  Last Fill Quantity: 90,   # refills: 1  Last Office Visit : 6/1/22  Future Office visit:  6/7/23    Routing refill request to provider for review/approval because: 75 mcg order has daily dosing.  50 mcg order has atlernating qod 50 mcg /75mcg. Please clarify 75 mcg order request. Thanks.

## 2022-07-11 RX ORDER — LEVOTHYROXINE SODIUM 75 UG/1
75 TABLET ORAL DAILY
Qty: 90 TABLET | Refills: 0 | Status: SHIPPED | OUTPATIENT
Start: 2022-07-11 | End: 2022-07-17

## 2022-07-13 ENCOUNTER — TRANSFERRED RECORDS (OUTPATIENT)
Dept: HEALTH INFORMATION MANAGEMENT | Facility: CLINIC | Age: 62
End: 2022-07-13

## 2022-07-16 ENCOUNTER — HEALTH MAINTENANCE LETTER (OUTPATIENT)
Age: 62
End: 2022-07-16

## 2022-07-25 NOTE — PROGRESS NOTES
"Katie Griffiths is a 61 year old female who is being evaluated via a billable video visit.        I have reviewed and updated the patient's Past Medical History, Social History, Family History and Medication List.    ALLERGIES  Quetiapine and Valproic acid    Spine Surgery Follow Up    REFERRING PHYSICIAN: Referred Self   PRIMARY CARE PHYSICIAN: Rocky Roque           Chief Complaint:   No chief complaint on file.      History of Present Illness:  Symptom Profile Including: location of symptoms, onset, severity, exacerbating/alleviating factors, previous treatments:        Katie Griffiths is a 61 year old female who presents today for routine  follow up on neck pain concerns. Patient Previously seen in clinic 6/7/2022 with complaints of midline neck pain with radiation into upper thoracic region and bilateral shoulders.  Pain did not radiate into her arms.  She did not have any radicular or myelopathic symptoms at that time.  Recommended continued physical therapy, MRI imaging, and follow-up to review.   Today, patient says that she continues to have neck pain.  Pain is rated at a 4/10 today.  She says that her pain has centralized and is now mainly located midline between around C7-T2 region of her neck.  She says things seem slightly better than previous, and pain is at a \"livable\" level.  She continues to work with physical therapy and notes that her neck range of motion has improved.  She says that physical therapist has done cervical traction which she thinks has been helpful.  She denies new pain, numbness, tingling, weakness in bilateral upper extremities.  Denies other new symptoms compared to previous visit.      Neck Disability Index (NDI) Questionnaire    Neck Disability Index (NDI) 7/25/2022   Neck Disability Index: Count 7   NDI: Total Score = SUM (points for all 10 findings) 17   Neck Disability in Percent = (Total Score) / 50 * 100 48.57 (%)            PROMIS-10 Scores:  Global Mental " Health Score: (P) 6  Global Physical Health Score: (P) 12  PROMIS TOTAL - SUBSCORES: (P) 18         Physical Exam:   This was a video visit, so very limited exam could be performed.  On video, patient appeared & sounded alert, oriented x 3, cooperative, with coherent speech, and not in cardiorespiratory distress.  Able to respond to questions appropriately and follow instructions.    Localizes her pain to midline from about C7 to T3 from what I can tell on video call.           Imaging:   I  independently reviewed imaging that was obtained prior to today's video visit. The results were discussed with the patient.  Findings include:    6/25/22 MRI cervical & thoracic spine without contrast:  No spinal canal or foraminal stenosis in thoracic spine.  T3 hemangioma.  Disc degenerative changes most notable at C5-7.  No significant spinal canal stenosis in cervical spine.  Mild right foraminal stenosis at C3-4, C4-5, C5-6.  multilevel facet arthropathy           Assessment and Plan:   Assessment:  61 year old female with   1. chronic neck pain without radiculopathy or myelopathic symptoms; cervical spondylosis  2. Osteopenia (2022 T-score -2.2)        Plan:  Reviewed patient's recent MRI imaging with her today.  She does have some arthritic changes in cervical spine which could explain her neck pain.  There is no significant nerve or spinal canal stenosis that would require urgent surgery.  Patient has had good success with nonoperative treatment so far including PT and medications.  Since she reports success with traction, I would recommend trial of home cervical traction unit.  Additionally, discussed that she could explore other nonoperative treatment options with one of our pain management or medical spine specialists.  Recommend continuing with physical therapy and exercises at home.  If pain worsens again despite continued nonoperative treatment options or if she develops radicular myelopathic symptoms, recommend  following up to discuss if surgical intervention would be appropriate at that time. Patient asked many intelligent questions and was agreeable with the plan.    - Pain management/ PM&R referral - evaluate & treat, discuss potential facet blocks, trigger point injections, other non-operative treatment options  - Ordered home cervical traction unit.   - Follow up PRN.    Respectfully,  Kiara Carvajal (sharon Amado) , PAMARIE  Orthopaedic Spine Surgery  Dept Orthopaedic Surgery, [facility]  [facility] [place] Phone: [phone]    Dictation Disclaimer: Some of this Note has been completed with voice-recognition dictation software. Although errors are generally corrected real-time, there is the potential for a rare error to be present in the completed chart.    VIDEO VISIT:  Start time of video visit: 3:58pm  End time of video visit: 4:24pm  Total video visit duration: 26 minutes

## 2022-07-26 ENCOUNTER — VIRTUAL VISIT (OUTPATIENT)
Dept: ORTHOPEDICS | Facility: CLINIC | Age: 62
End: 2022-07-26
Payer: MEDICARE

## 2022-07-26 DIAGNOSIS — G89.29 CHRONIC MIDLINE POSTERIOR NECK PAIN: ICD-10-CM

## 2022-07-26 DIAGNOSIS — M54.2 CHRONIC MIDLINE POSTERIOR NECK PAIN: ICD-10-CM

## 2022-07-26 DIAGNOSIS — M47.812 CERVICAL SPONDYLOSIS: Primary | ICD-10-CM

## 2022-07-26 PROCEDURE — 99214 OFFICE O/P EST MOD 30 MIN: CPT | Mod: 95 | Performed by: PHYSICIAN ASSISTANT

## 2022-07-26 NOTE — LETTER
"    7/26/2022         RE: Katie Griffiths  [address]  [facility] [address]        Dear Colleague,    Thank you for referring your patient, Katie Griffiths, to the [facility] [place]. Please see a copy of my visit note below.    Katie Griffiths is a 61 year old female who is being evaluated via a billable video visit.        I have reviewed and updated the patient's Past Medical History, Social History, Family History and Medication List.    ALLERGIES  Quetiapine and Valproic acid    Spine Surgery Follow Up    REFERRING PHYSICIAN: Referred Self   PRIMARY CARE PHYSICIAN: Rocky Roque           Chief Complaint:   No chief complaint on file.      History of Present Illness:  Symptom Profile Including: location of symptoms, onset, severity, exacerbating/alleviating factors, previous treatments:        Katie Griffiths is a 61 year old female who presents today for routine  follow up on neck pain concerns. Patient Previously seen in clinic 6/7/2022 with complaints of midline neck pain with radiation into upper thoracic region and bilateral shoulders.  Pain did not radiate into her arms.  She did not have any radicular or myelopathic symptoms at that time.  Recommended continued physical therapy, MRI imaging, and follow-up to review.   Today, patient says that she continues to have neck pain.  Pain is rated at a 4/10 today.  She says that her pain has centralized and is now mainly located midline between around C7-T2 region of her neck.  She says things seem slightly better than previous, and pain is at a \"livable\" level.  She continues to work with physical therapy and notes that her neck range of motion has improved.  She says that physical therapist has done cervical traction which she thinks has been helpful.  She denies new pain, numbness, tingling, weakness in bilateral upper extremities.  Denies other new symptoms compared to previous visit.      Neck " Disability Index (NDI) Questionnaire    Neck Disability Index (NDI) 7/25/2022   Neck Disability Index: Count 7   NDI: Total Score = SUM (points for all 10 findings) 17   Neck Disability in Percent = (Total Score) / 50 * 100 48.57 (%)            PROMIS-10 Scores:  Global Mental Health Score: (P) 6  Global Physical Health Score: (P) 12  PROMIS TOTAL - SUBSCORES: (P) 18         Physical Exam:   This was a video visit, so very limited exam could be performed.  On video, patient appeared & sounded alert, oriented x 3, cooperative, with coherent speech, and not in cardiorespiratory distress.  Able to respond to questions appropriately and follow instructions.    Localizes her pain to midline from about C7 to T3 from what I can tell on video call.           Imaging:   I ordered and independently reviewed new radiographs at this clinic visit. The results were discussed with the patient.  Findings include:    6/25/22 MRI cervical & thoracic spine without contrast:  No spinal canal or foraminal stenosis in thoracic spine.  T3 hemangioma.  Disc degenerative changes most notable at C5-7.  No significant spinal canal stenosis in cervical spine.  Mild right foraminal stenosis at C3-4, C4-5, C5-6.  multilevel facet arthropathy           Assessment and Plan:   Assessment:  61 year old female with   1. chronic neck pain without radiculopathy or myelopathic symptoms; cervical spondylosis  2. Osteopenia (2022 T-score -2.2)        Plan:  Reviewed patient's recent MRI imaging with her today.  She does have some arthritic changes in cervical spine which could explain her neck pain.  There is no significant nerve or spinal canal stenosis that would require urgent surgery.  Patient has had good success with nonoperative treatment so far including PT and medications.  Since she reports success with traction, I would recommend trial of home cervical traction unit.  Additionally, discussed that she could explore other nonoperative treatment  options with one of our pain management or medical spine specialists.  Recommend continuing with physical therapy and exercises at home.  If pain worsens again despite continued nonoperative treatment options or if she develops radicular myelopathic symptoms, recommend following up to discuss if surgical intervention would be appropriate at that time. Patient asked many intelligent questions and was agreeable with the plan.    - Pain management/ PM&R referral - evaluate & treat, discuss potential facet blocks, trigger point injections, other non-operative treatment options  - Ordered home cervical traction unit.   - Follow up PRN.    Respectfully,  Kiara Carvajal (sharon Amado) , PASteveC  Orthopaedic Spine Surgery  Dept Orthopaedic Surgery, [facility]  [facility] [place] Phone: [phone]    Dictation Disclaimer: Some of this Note has been completed with voice-recognition dictation software. Although errors are generally corrected real-time, there is the potential for a rare error to be present in the completed chart.    VIDEO VISIT:  Start time of video visit: 3:58pm  End time of video visit: 4:24pm  Total video visit duration: 26 minutes

## 2022-07-31 DIAGNOSIS — E06.3 HYPOTHYROIDISM DUE TO HASHIMOTO'S THYROIDITIS: ICD-10-CM

## 2022-08-01 ENCOUNTER — TELEPHONE (OUTPATIENT)
Dept: ENDOCRINOLOGY | Facility: CLINIC | Age: 62
End: 2022-08-01

## 2022-08-01 DIAGNOSIS — E06.3 HYPOTHYROIDISM DUE TO HASHIMOTO'S THYROIDITIS: ICD-10-CM

## 2022-08-01 RX ORDER — LEVOTHYROXINE SODIUM 50 UG/1
50 TABLET ORAL EVERY OTHER DAY
Qty: 45 TABLET | Refills: 0 | Status: SHIPPED | OUTPATIENT
Start: 2022-08-01 | End: 2022-08-01

## 2022-08-01 RX ORDER — LEVOTHYROXINE SODIUM 50 UG/1
TABLET ORAL
Qty: 45 TABLET | Refills: 0 | Status: SHIPPED | OUTPATIENT
Start: 2022-08-01 | End: 2022-10-21

## 2022-08-01 NOTE — TELEPHONE ENCOUNTER
[facility] Call Center    Phone Message    May a detailed message be left on voicemail: no     Reason for Call: Medication Question or concern regarding medication   Prescription Clarification  Name of Medication: levothyroxine (SYNTHROID/LEVOTHROID) 50 MCG tablet     Prescribing Provider: Silverio    Pharmacy: [facility] [address] [place] [address] N. (Pharmacy    What on the order needs clarification? Pharmacy stated they received above script with two different set of directions. Please resend with correct directions. Thank you      Action Taken: Message routed to:  Other: endo    Travel Screening: Not Applicable

## 2022-08-31 ENCOUNTER — TRANSFERRED RECORDS (OUTPATIENT)
Dept: HEALTH INFORMATION MANAGEMENT | Facility: CLINIC | Age: 62
End: 2022-08-31

## 2022-09-02 ENCOUNTER — DOCUMENTATION ONLY (OUTPATIENT)
Dept: INTERNAL MEDICINE | Facility: CLINIC | Age: 62
End: 2022-09-02

## 2022-09-02 NOTE — PROGRESS NOTES
Type of Form Received: STEP THERAPIES    Form Received (Date) 9/1/22   Form Filled out Yes 9/13/22   Placed in provider folder Yes

## 2022-09-09 ASSESSMENT — ENCOUNTER SYMPTOMS
FEVER: 0
SINUS PAIN: 0
ABDOMINAL PAIN: 0
DOUBLE VISION: 0
MUSCLE CRAMPS: 0
HALLUCINATIONS: 0
INCREASED ENERGY: 1
DISTURBANCES IN COORDINATION: 0
SKIN CHANGES: 1
NECK PAIN: 1
DEPRESSION: 1
NAIL CHANGES: 1
NECK MASS: 0
HEADACHES: 1
HOT FLASHES: 0
SLEEP DISTURBANCES DUE TO BREATHING: 0
POSTURAL DYSPNEA: 0
PANIC: 1
BACK PAIN: 1
LOSS OF CONSCIOUSNESS: 0
WHEEZING: 0
CONSTIPATION: 0
PALPITATIONS: 0
BLOOD IN STOOL: 0
HEARTBURN: 0
EYE REDNESS: 0
TROUBLE SWALLOWING: 1
SHORTNESS OF BREATH: 0
WEIGHT GAIN: 0
DIARRHEA: 0
ARTHRALGIAS: 1
CHILLS: 1
WEAKNESS: 1
NUMBNESS: 0
NIGHT SWEATS: 1
POLYPHAGIA: 0
MUSCLE WEAKNESS: 1
RECTAL PAIN: 0
NAUSEA: 0
MEMORY LOSS: 1
JAUNDICE: 0
SEIZURES: 0
HOARSE VOICE: 0
LIGHT-HEADEDNESS: 0
DECREASED APPETITE: 0
BOWEL INCONTINENCE: 0
EXERCISE INTOLERANCE: 1
SINUS CONGESTION: 1
SPEECH CHANGE: 0
MYALGIAS: 1
COUGH DISTURBING SLEEP: 0
DECREASED LIBIDO: 1
TASTE DISTURBANCE: 0
SPUTUM PRODUCTION: 0
ORTHOPNEA: 0
SORE THROAT: 0
FATIGUE: 1
POLYDIPSIA: 1
COUGH: 0
STIFFNESS: 1
NERVOUS/ANXIOUS: 1
HYPOTENSION: 0
SMELL DISTURBANCE: 0
EYE PAIN: 0
DIFFICULTY URINATING: 0
JOINT SWELLING: 0
EYE IRRITATION: 1
POOR WOUND HEALING: 0
DIZZINESS: 0
LEG PAIN: 1
ALTERED TEMPERATURE REGULATION: 1
PARALYSIS: 0
BLOATING: 0
TINGLING: 0
FLANK PAIN: 0
WEIGHT LOSS: 0
HEMATURIA: 0
DYSPNEA ON EXERTION: 1
EYE WATERING: 0
INSOMNIA: 1
HYPERTENSION: 0
DECREASED CONCENTRATION: 1
TREMORS: 0
HEMOPTYSIS: 0
DYSURIA: 0
SYNCOPE: 0
SNORES LOUDLY: 0
VOMITING: 0

## 2022-09-09 ASSESSMENT — PAIN SCALES - PAIN ENJOYMENT GENERAL ACTIVITY SCALE (PEG)
INTERFERED_GENERAL_ACTIVITY: 5
INTERFERED_ENJOYMENT_LIFE: 5
INTERFERED_GENERAL_ACTIVITY: 5
AVG_PAIN_PASTWEEK: 5
AVG_PAIN_PASTWEEK: 5
INTERFERED_ENJOYMENT_LIFE: 5
PEG_TOTALSCORE: 5
PEG_TOTALSCORE: 5

## 2022-09-09 ASSESSMENT — ANXIETY QUESTIONNAIRES
IF YOU CHECKED OFF ANY PROBLEMS ON THIS QUESTIONNAIRE, HOW DIFFICULT HAVE THESE PROBLEMS MADE IT FOR YOU TO DO YOUR WORK, TAKE CARE OF THINGS AT HOME, OR GET ALONG WITH OTHER PEOPLE: EXTREMELY DIFFICULT
7. FEELING AFRAID AS IF SOMETHING AWFUL MIGHT HAPPEN: NEARLY EVERY DAY
2. NOT BEING ABLE TO STOP OR CONTROL WORRYING: NEARLY EVERY DAY
4. TROUBLE RELAXING: NEARLY EVERY DAY
1. FEELING NERVOUS, ANXIOUS, OR ON EDGE: NEARLY EVERY DAY
5. BEING SO RESTLESS THAT IT IS HARD TO SIT STILL: SEVERAL DAYS
6. BECOMING EASILY ANNOYED OR IRRITABLE: SEVERAL DAYS
7. FEELING AFRAID AS IF SOMETHING AWFUL MIGHT HAPPEN: NEARLY EVERY DAY
3. WORRYING TOO MUCH ABOUT DIFFERENT THINGS: NEARLY EVERY DAY
GAD7 TOTAL SCORE: 17
GAD7 TOTAL SCORE: 17
8. IF YOU CHECKED OFF ANY PROBLEMS, HOW DIFFICULT HAVE THESE MADE IT FOR YOU TO DO YOUR WORK, TAKE CARE OF THINGS AT HOME, OR GET ALONG WITH OTHER PEOPLE?: EXTREMELY DIFFICULT

## 2022-09-12 ENCOUNTER — OFFICE VISIT (OUTPATIENT)
Dept: ANESTHESIOLOGY | Facility: CLINIC | Age: 62
End: 2022-09-12
Attending: PHYSICIAN ASSISTANT
Payer: MEDICARE

## 2022-09-12 ENCOUNTER — LAB (OUTPATIENT)
Dept: LAB | Facility: CLINIC | Age: 62
End: 2022-09-12
Payer: MEDICARE

## 2022-09-12 VITALS — SYSTOLIC BLOOD PRESSURE: 112 MMHG | DIASTOLIC BLOOD PRESSURE: 68 MMHG | HEART RATE: 74 BPM | OXYGEN SATURATION: 98 %

## 2022-09-12 DIAGNOSIS — G89.29 CHRONIC MIDLINE POSTERIOR NECK PAIN: ICD-10-CM

## 2022-09-12 DIAGNOSIS — M47.812 CERVICAL SPONDYLOSIS: ICD-10-CM

## 2022-09-12 DIAGNOSIS — E03.9 HYPOTHYROIDISM, UNSPECIFIED TYPE: ICD-10-CM

## 2022-09-12 DIAGNOSIS — M54.2 CHRONIC MIDLINE POSTERIOR NECK PAIN: ICD-10-CM

## 2022-09-12 LAB
T3 SERPL-MCNC: 83 NG/DL (ref 85–202)
T4 FREE SERPL-MCNC: 1.64 NG/DL (ref 0.9–1.7)
TSH SERPL DL<=0.005 MIU/L-ACNC: 1.34 UIU/ML (ref 0.3–4.2)

## 2022-09-12 PROCEDURE — 84443 ASSAY THYROID STIM HORMONE: CPT | Performed by: INTERNAL MEDICINE

## 2022-09-12 PROCEDURE — 99203 OFFICE O/P NEW LOW 30 MIN: CPT | Mod: GC | Performed by: ANESTHESIOLOGY

## 2022-09-12 PROCEDURE — 84480 ASSAY TRIIODOTHYRONINE (T3): CPT | Performed by: INTERNAL MEDICINE

## 2022-09-12 PROCEDURE — 36415 COLL VENOUS BLD VENIPUNCTURE: CPT | Performed by: PATHOLOGY

## 2022-09-12 PROCEDURE — 84439 ASSAY OF FREE THYROXINE: CPT | Performed by: INTERNAL MEDICINE

## 2022-09-12 ASSESSMENT — ENCOUNTER SYMPTOMS
EYE REDNESS: 0
SINUS PAIN: 0
EYE PAIN: 0
NERVOUS/ANXIOUS: 1
CHILLS: 1
NUMBNESS: 0
FEVER: 0
DIFFICULTY URINATING: 0
SEIZURES: 0
DYSURIA: 0
NAUSEA: 0
LIGHT-HEADEDNESS: 0
NECK PAIN: 1
JOINT SWELLING: 0
FATIGUE: 1
DIARRHEA: 0
TREMORS: 0
POLYDIPSIA: 1
VOMITING: 0
SORE THROAT: 0
HEMATURIA: 0
ABDOMINAL PAIN: 0
TROUBLE SWALLOWING: 1
SHORTNESS OF BREATH: 0
HALLUCINATIONS: 0
DECREASED CONCENTRATION: 1
CONSTIPATION: 0
MYALGIAS: 1
ARTHRALGIAS: 1
BACK PAIN: 1
COUGH: 0
POLYPHAGIA: 0
WEAKNESS: 1
RECTAL PAIN: 0
DIZZINESS: 0
HEADACHES: 1
HEARTBURN: 0
PALPITATIONS: 0
FLANK PAIN: 0
WHEEZING: 0

## 2022-09-12 ASSESSMENT — PAIN SCALES - GENERAL: PAINLEVEL: MODERATE PAIN (5)

## 2022-09-12 NOTE — PATIENT INSTRUCTIONS
Referrals:    Chronic Pain Program at [facility]. Please call to schedule.      Recommended Follow up:      Follow up as needed.         To speak with a nurse, schedule/reschedule/cancel a clinic appointment, or request a medication refill call: [phone]    You can also reach us by [employer]: [email or web address]

## 2022-09-12 NOTE — LETTER
9/12/2022       RE: Katie Griffiths  [address]  [facility] [address]     Dear Colleague,    Thank you for referring your patient, Katie Griffiths, to the [facility] FOR COMPREHENSIVE PAIN MANAGEMENT [place] at [facility]. Please see a copy of my visit note below.      Pain Clinic New Patient Consult Note:    Referring Provider: Alena   Primary care provider: Rocky Roque.    Katie Griffiths is a 62 year old y.o. old female who PMH anxiety and Bipolar disorder presents to the pain clinic with midline neck and occipital pain.  Pain started about 1 year ago, inially involved larger area from occiput to midthoracic that extended laterally to B/L shoulder.  The nature of the pain is constant, burning, dull and achy.  Without Cervical radiculopathy, paraesthesia, or sensation changes  She was proscribed Methocarbamol and tizanidine for short period of the that help to decrease the involved area, however patient was concerned that these meds have interaction with her anxiety and bipolar medication.  She can not recall any injuries or triggers prior having neck pain      HPI:  Patient Supplied Answers To the [facility] Pain Questionnaire  [facility] Pain -  Patient Entered Questionnaire/Answers 9/9/2022   What number best describes your pain right now:  0 = No pain  to  10 = Worst pain imaginable 5   How would you describe the pain? burning   Which of the following worsen your pain? lying down, standing, sitting, walking, exercise, relaxation, thinking about something else   Which of the following improve or reduce your pain? nothing relieves the pain   What number best describes your average pain for the past week:  0 = No pain  to  10 = Worst pain imaginable 5   What number best describes your LOWEST pain in past 24 hours:  0 = No pain  to  10 = Worst pain imaginable 5   What number best describes your WORST pain in past 24 hours:  0 = No  pain  to  10 = Worst pain imaginable 7   When is your pain worst? Constant   What non-medicine treatments have you already had for your pain? physical therapy           Pain treatments:    Herbal medicines: No  Physical therapy: Yes  Chiropractor: (She is interested, but she states her insurance won't cover Chiropractor )  Acupuncture: She is interested, but she states her insurance won't cover Acupuncture  Pain physician:No  Surgery: No  Biofeedback: No      Tests/Imaging reviewed with the patient:  MR CERVICAL SPINE W/O CONTRAST 6/25/2022     Technique: Sagittal T1-weighted, sagittal T2-weighted, sagittal STIR,  sagittal gradient echo, sagittal diffusion-weighted (with ADC map),  axial T2-weighted, and axial T2* gradient echo images of the cervical  spine were obtained without intravenous contrast.     Findings:  The cervical vertebrae are normally aligned.  There is moderate disc  height narrowing at C5-6, mild at C6-7.  There is normal signal within  and normal contour of the cervical spinal cord.  The findings on a  level by level basis are as follows:     C2-3:  No spinal canal or neural foraminal stenosis.     C3-4:  Mild right neural foraminal narrowing. No significant spinal  canal or left neural foraminal narrowing.     C4-5:  Mild right neural foraminal narrowing. No significant spinal  canal or left neural foraminal narrowing.     C5-6:  Mild right neural foraminal narrowing. No significant spinal  canal or left neural foraminal narrowing.     C6-7:  No spinal canal or neural foraminal stenosis.     C7-T1:  No spinal canal or neural foraminal stenosis.     No abnormality of the paraspinous soft tissues.     Impression: Mild right neural foraminal narrowing C3-C6.      MR THORACIC SPINE W/O CONTRAST 6/25/2022  Comparison: Radiographs 6/7/2022      Technique: Sagittal T1-weighted, sagittal T2-weighted, sagittal STIR,  sagittal diffusion-weighted (with ADC map), and axial T2-weighted  images of the thoracic  spine were obtained without the administration  of intravenous contrast.     Findings: The thoracic vertebral column is in normal alignment. There  is minimal loss of disc height. The spinal cord contour and signal  pattern are within normal limits. Focal fat or benign hemangioma in  the T3 vertebral body. 2 rounded T2 hyperintense foci are seen in the  dome of the liver, measuring 0.9 cm and 3.0 cm in maximum diameter.                                                                      Impression:   1. Normal thoracic spine MRI.  2. Likely benign hepatic cysts    Significant Medical History:   Past Medical History:   Diagnosis Date     Bipolar disorder (H)      Depression with anxiety      Hashimoto's thyroiditis      Hyperlipidemia      Mild intermittent asthma      S/P partial thyroidectomy      Superficial perivascular dermatitis           Past Surgical History:  Past Surgical History:   Procedure Laterality Date     COLONOSCOPY N/A 4/14/2021    Procedure: COLONOSCOPY;  Surgeon: Guerita Shannon MD;  Location: [facility]     COLONOSCOPY N/A 4/14/2021    Procedure: Colonoscopy, With Polypectomy And Biopsy;  Surgeon: Guerita Shannon MD;  Location: [facility]     partial thyroidectomy       TONSILLECTOMY       TUBAL LIGATION            Family History:  Family History   Problem Relation Age of Onset     Colon Polyps Mother      Glaucoma Mother      Eye Disorder Father      Factor V Leiden deficiency Father         pt tested negative     Hyperlipidemia Father      Glaucoma Father      Macular Degeneration Paternal Grandmother           Social History:  Social History     Socioeconomic History     Marital status:      Spouse name: Not on file     Number of children: Not on file     Years of education: Not on file     Highest education level: Not on file   Occupational History     Not on file   Tobacco Use     Smoking status: Never Smoker     Smokeless tobacco: Never Used   Substance and Sexual Activity     Alcohol  use: Yes     Comment: occasional     Drug use: Never     Sexual activity: Not Currently     Partners: Male   Other Topics Concern     Parent/sibling w/ CABG, MI or angioplasty before 65F 55M? Not Asked   Social History Narrative    2 children, lives with oldest son in [place].    Youngest son in [employer] in [place] with 2 granddaughters.     Social Determinants of Health     Financial Resource Strain: Not on file   Food Insecurity: Not on file   Transportation Needs: Not on file   Physical Activity: Not on file   Stress: Not on file   Social Connections: Not on file   Intimate Partner Violence: Not on file   Housing Stability: Not on file     Social History     Social History Narrative    2 children, lives with oldest son in [place].    Youngest son in [employer] in [place] with 2 granddaughters.          Allergies:  Allergies   Allergen Reactions     Quetiapine Rash     Valproic Acid Rash     Drug rash       Current Medications:   Current Outpatient Medications   Medication Sig Dispense Refill     albuterol (PROAIR HFA/PROVENTIL HFA/VENTOLIN HFA) 108 (90 Base) MCG/ACT inhaler Inhale 2 puffs into the lungs every 4 hours as needed for shortness of breath / dyspnea or wheezing 18 g 1     atorvastatin (LIPITOR) 10 MG tablet Take 1 tablet (10 mg) by mouth daily 90 tablet 1     biotin 1000 MCG TABS tablet        buPROPion (WELLBUTRIN XL) 150 MG 24 hr tablet Take 150 mg by mouth every morning       calcium citrate-vitamin D (CITRACAL) 315-200 MG-UNIT TABS per tablet        clonazePAM (KLONOPIN) 0.5 MG tablet Take 0.5 mg by mouth At Bedtime       fish oil-omega-3 fatty acids 500 MG capsule        gabapentin (NEURONTIN) 300 MG capsule Take 1 capsule (300mg) in morning and 1 capsule (300mg) in afternoon and 4 capsules (1200mg) near bedtime 540 capsule 0     levothyroxine (SYNTHROID/LEVOTHROID) 50 MCG tablet TAKE ONE TABLET BY MOUTH  Every Other day 45 tablet 0     levothyroxine (SYNTHROID/LEVOTHROID) 75 MCG  tablet Take 1 tablet po daily on alternate day. 45 tablet 3     melatonin ER 3 MG tablet Take 2 tablets (6 mg) by mouth At Bedtime 60 tablet 0     mirtazapine (REMERON) 15 MG tablet Take 1 tablet (15 mg) by mouth At Bedtime (Patient taking differently: Take 15 mg by mouth At Bedtime CUTS IN HALF) 90 tablet 0     tiZANidine (ZANAFLEX) 2 MG tablet Take 1 tablet (2 mg) by mouth 3 times daily as needed for muscle spasms (Patient not taking: Reported on 9/12/2022) 21 tablet 0          Current Pain Medications:  Gabapentin [phone]  Tylenol PRN      Past Pain Medications:  Methocarbamol  Tizanidine  She was proscribed Methocarbamol and tizanidine for short period of the that help to decrease the involved area, however patient was concerned that these meds have interaction with her anxiety and bipolar medication.    Blood thinner:  None    Psychosocial History:     History of treatment for behavioral disorder: Bipolar, Anxiety  History of suicidal ideation or suicidal attempt: No  Physical Exam:     Vitals:    09/12/22 1417   BP: 112/68   BP Location: Right arm   Patient Position: Chair   Cuff Size: Adult Regular   Pulse: 74   SpO2: 98%         Review of Systems   Constitutional: Positive for chills and fatigue. Negative for fever.   HENT: Positive for trouble swallowing. Negative for ear discharge, ear pain, hearing loss, mouth sores, nosebleeds, sinus pain, sore throat and tinnitus.    Eyes: Negative for pain and redness.   Respiratory: Negative for cough, shortness of breath and wheezing.    Cardiovascular: Negative for chest pain and palpitations.   Gastrointestinal: Negative for abdominal pain, constipation, diarrhea, heartburn, nausea, rectal pain and vomiting.   Endocrine: Positive for polydipsia. Negative for polyphagia.   Genitourinary: Positive for urgency. Negative for difficulty urinating, dyspareunia, dysuria, flank pain, genital sores, hematuria and vaginal discharge.   Musculoskeletal: Positive for  arthralgias, back pain, myalgias and neck pain. Negative for joint swelling.   Skin: Negative for rash.   Neurological: Positive for weakness and headaches. Negative for dizziness, tremors, seizures, light-headedness and numbness.   Psychiatric/Behavioral: Positive for decreased concentration. Negative for hallucinations. The patient is nervous/anxious.           Physical Exam  Musculoskeletal:      Right shoulder: No effusion, tenderness or bony tenderness. Normal range of motion. Normal strength.      Left shoulder: No effusion, tenderness or bony tenderness. Normal range of motion. Normal strength.      Right hand: Normal strength. Normal sensation.      Left hand: Normal strength. Normal sensation.      Cervical back: Full passive range of motion without pain and normal range of motion. No edema or rigidity. No pain with movement, spinous process tenderness or muscular tenderness. Normal range of motion.   Lymphadenopathy:      Cervical: No cervical adenopathy.                        Laboratory results:  Recent Labs   Lab Test 04/06/22  0721 05/01/21  0806 07/26/20  0919   NA  --  138 141   POTASSIUM  --  4.0 4.4   CHLORIDE  --  107 111*   CO2  --  30 25   ANIONGAP  --  2* 5   * 104* 116*   BUN  --  14 11   CR  --  0.88 0.71   TERESA  --  9.2 9.5       CBC RESULTS:   Recent Labs   Lab Test 05/01/21  0806   WBC 5.3   RBC 4.88   HGB 15.0   HCT 45.7   MCV 94   MCH 30.7   MCHC 32.8   RDW 13.4            Imaging:       ASSESSMENT AND PLAN:     Encounter Diagnosis:  Chronic pain neck area  Chronic midline posterior neck pain  Bipolar disorder  Anxiety  Hypothyroidism    Katie Griffiths is a 62 year old y.o. old female who presents to the pain clinic with 1 year of posterior chronic midline pain    I have summarized the patient s past medical history, discussed their clinical findings and the potential differential diagnosis with the patient. Significant past medical history pertinent to the patient s  current condition includes Asthma, Bipolar disorder, anxiety, hypothyroidism. The clinical findings reveal Normal cervical sensation, ROM, without tenderness or palpable trigger points. The differential diagnosis discussed with the patient are listed above. I have discussed anatomy and possible sources of the pain using models and/or pictures (diagrams). I have discussed multi- disciplinary pain management options withthe patient as pertaining to their case as detailed above. The pain management options we discussed included, but were not limited to the recommendations below.  I also discussed with patient the risks, benefits and alternatives to each pain management option.  All of the patient s questions and concerns were answered to the best of my ability.    RECOMMENDATIONS:       1.Refferal to Chronic Pain Program at [facility]. Please call to schedule.    2. Physical therapy: I have refered the patient for outpatient physical therapy for stretching, strengthening and home exercise program for chronic posterior neck pain. The patient will also discuss spine care and posture. Pool therapy and stretches can be considered if available.    3. Follow up: As needed    ATTENDING ATTESTATION  I saw the patient along with the pain medicine trainee Dr. Quynh Ocasio. Please see her note above for full details. I was involved in gathering history, physical examination and development of the plan of care. We discussed that given the history, physical examination and imaging there were no cervical spine interventional targets at this time. The patient may benefit from maximizing the non interventional approaches and may find the chronic pain program at [facility] helpful in managing chronic pain. We briefly touched on the differences and goals of acute versus chronic pain management.                                         Answers for HPI/ROS submitted by the patient on 9/9/2022  BELA 7 TOTAL SCORE: 17  General  Symptoms: Yes  Skin Symptoms: Yes  HENT Symptoms: Yes  EYE SYMPTOMS: Yes  HEART SYMPTOMS: Yes  LUNG SYMPTOMS: Yes  INTESTINAL SYMPTOMS: Yes  URINARY SYMPTOMS: Yes  GYNECOLOGIC SYMPTOMS: Yes  BREAST SYMPTOMS: No  SKELETAL SYMPTOMS: Yes  BLOOD SYMPTOMS: No  NERVOUS SYSTEM SYMPTOMS: Yes  MENTAL HEALTH SYMPTOMS: Yes  Congestion: Yes  Trouble swallowing: Yes   Voice hoarseness: No  Mouth sores: No  Tooth pain: No  Gum tenderness: Yes  Bleeding gums: Yes  Change in taste: No  Change in sense of smell: No  Dry mouth: Yes  Hearing aid used: No  Neck lump: No  Loss of appetite: No  Weight gain: No  Fatigue: Yes  Night sweats: Yes  Increased stress: Yes  Excessive hunger: No  Feeling hot or cold when others believe the temperature is normal: Yes  Loss of height: No  Post-operative complications: No  Surgical site pain: No  Change in or Loss of Energy: Yes  Hyperactivity: No  Confusion: Yes  Changes in hair: Yes  Changes in moles/birth marks: Yes  Changes in nails: Yes  Acne: Yes  Hair in places you don't want it: No  Change in facial hair: Yes  Warts: No  Non-healing sores: No  Scarring: No  Flaking of skin: No  Color changes of hands/feet in cold : No  Sun sensitivity: Yes  Skin thickening: No  Vision loss: No  Dry eyes: Yes  Watery eyes: No  Eye bulging: No  Flashing of lights: No  Spots: No  Floaters: No  Crossed eyes: No  Tunnel Vision: No  Yellowing of eyes: No  Eye irritation: Yes  Pain in legs with walking: Yes  Fingers or toes appear blue: No  High blood pressure: No  Low blood pressure: No  Fainting: No  Murmurs: No  Pacemaker: No  Varicose veins: Yes  Edema or swelling: No  Wake up at night with shortness of breath: No  Light-headedness: No  Exercise intolerance: Yes  Snoring: No  Difficulty breathing on exertion: Yes  Nighttime Cough: No  Difficulty breathing when lying flat: No  Bloating: No  Rectal or Anal pain: No  Fecal incontinence: No  Yellowing of skin or eyes: No  Vomit with blood: No  Change in stools:  Yes  Trouble holding urine or incontinence: Yes  Increased frequency of urination: Yes  Decreased frequency of urination: No  Frequent nighttime urination: No  Difficulty emptying bladder: No  Bleeding or spotting between periods: No  Heavy or painful periods: No  Irregular periods: No  Vaginal discharge: No  Hot flashes: No  Vaginal dryness: Yes  Genital ulcers: No  Reduced libido: Yes  Painful intercourse: No  Difficulty with sexual arousal: Yes  Post-menopausal bleeding: No  Swollen joints: No  Joint pain: Yes  Bone pain: Yes  Muscle cramps: No  Muscle weakness: Yes  Joint stiffness: Yes  Bone fracture: No  Trouble with coordination: No  Difficulty walking: No  Paralysis: No  Numbness: No  Trouble thinking or concentrating: Yes  Mood changes: Yes  Panic attacks: Yes          Sincerely,    Almita Andrea MD

## 2022-09-12 NOTE — PROGRESS NOTES
Pain Clinic New Patient Consult Note:    Referring Provider: Alena   Primary care provider: Rocky Roque.    Katie Griffiths is a 62 year old y.o. old female who PMH anxiety and Bipolar disorder presents to the pain clinic with midline neck and occipital pain.  Pain started about 1 year ago, inially involved larger area from occiput to midthoracic that extended laterally to B/L shoulder.  The nature of the pain is constant, burning, dull and achy.  Without Cervical radiculopathy, paraesthesia, or sensation changes  She was proscribed Methocarbamol and tizanidine for short period of the that help to decrease the involved area, however patient was concerned that these meds have interaction with her anxiety and bipolar medication.  She can not recall any injuries or triggers prior having neck pain      HPI:  Patient Supplied Answers To the [facility] Pain Questionnaire  [facility] -  Patient Entered Questionnaire/Answers 9/9/2022   What number best describes your pain right now:  0 = No pain  to  10 = Worst pain imaginable 5   How would you describe the pain? burning   Which of the following worsen your pain? lying down, standing, sitting, walking, exercise, relaxation, thinking about something else   Which of the following improve or reduce your pain? nothing relieves the pain   What number best describes your average pain for the past week:  0 = No pain  to  10 = Worst pain imaginable 5   What number best describes your LOWEST pain in past 24 hours:  0 = No pain  to  10 = Worst pain imaginable 5   What number best describes your WORST pain in past 24 hours:  0 = No pain  to  10 = Worst pain imaginable 7   When is your pain worst? Constant   What non-medicine treatments have you already had for your pain? physical therapy           Pain treatments:    Herbal medicines: No  Physical therapy: Yes  Chiropractor: (She is interested, but she states her insurance won't cover Chiropractor )  Acupuncture: She is  interested, but she states her insurance won't cover Acupuncture  Pain physician:No  Surgery: No  Biofeedback: No      Tests/Imaging reviewed with the patient:  MR CERVICAL SPINE W/O CONTRAST 6/25/2022     Technique: Sagittal T1-weighted, sagittal T2-weighted, sagittal STIR,  sagittal gradient echo, sagittal diffusion-weighted (with ADC map),  axial T2-weighted, and axial T2* gradient echo images of the cervical  spine were obtained without intravenous contrast.     Findings:  The cervical vertebrae are normally aligned.  There is moderate disc  height narrowing at C5-6, mild at C6-7.  There is normal signal within  and normal contour of the cervical spinal cord.  The findings on a  level by level basis are as follows:     C2-3:  No spinal canal or neural foraminal stenosis.     C3-4:  Mild right neural foraminal narrowing. No significant spinal  canal or left neural foraminal narrowing.     C4-5:  Mild right neural foraminal narrowing. No significant spinal  canal or left neural foraminal narrowing.     C5-6:  Mild right neural foraminal narrowing. No significant spinal  canal or left neural foraminal narrowing.     C6-7:  No spinal canal or neural foraminal stenosis.     C7-T1:  No spinal canal or neural foraminal stenosis.     No abnormality of the paraspinous soft tissues.     Impression: Mild right neural foraminal narrowing C3-C6.      MR THORACIC SPINE W/O CONTRAST 6/25/2022  Comparison: Radiographs 6/7/2022      Technique: Sagittal T1-weighted, sagittal T2-weighted, sagittal STIR,  sagittal diffusion-weighted (with ADC map), and axial T2-weighted  images of the thoracic spine were obtained without the administration  of intravenous contrast.     Findings: The thoracic vertebral column is in normal alignment. There  is minimal loss of disc height. The spinal cord contour and signal  pattern are within normal limits. Focal fat or benign hemangioma in  the T3 vertebral body. 2 rounded T2 hyperintense foci are  seen in the  dome of the liver, measuring 0.9 cm and 3.0 cm in maximum diameter.                                                                      Impression:   1. Normal thoracic spine MRI.  2. Likely benign hepatic cysts    Significant Medical History:   Past Medical History:   Diagnosis Date     Bipolar disorder (H)      Depression with anxiety      Hashimoto's thyroiditis      Hyperlipidemia      Mild intermittent asthma      S/P partial thyroidectomy      Superficial perivascular dermatitis           Past Surgical History:  Past Surgical History:   Procedure Laterality Date     COLONOSCOPY N/A 4/14/2021    Procedure: COLONOSCOPY;  Surgeon: Guerita Shannon MD;  Location: [facility]     COLONOSCOPY N/A 4/14/2021    Procedure: Colonoscopy, With Polypectomy And Biopsy;  Surgeon: Guerita Shannon MD;  Location: [facility]     partial thyroidectomy       TONSILLECTOMY       TUBAL LIGATION            Family History:  Family History   Problem Relation Age of Onset     Colon Polyps Mother      Glaucoma Mother      Eye Disorder Father      Factor V Leiden deficiency Father         pt tested negative     Hyperlipidemia Father      Glaucoma Father      Macular Degeneration Paternal Grandmother           Social History:  Social History     Socioeconomic History     Marital status:      Spouse name: Not on file     Number of children: Not on file     Years of education: Not on file     Highest education level: Not on file   Occupational History     Not on file   Tobacco Use     Smoking status: Never Smoker     Smokeless tobacco: Never Used   Substance and Sexual Activity     Alcohol use: Yes     Comment: occasional     Drug use: Never     Sexual activity: Not Currently     Partners: Male   Other Topics Concern     Parent/sibling w/ CABG, MI or angioplasty before 65F 55M? Not Asked   Social History Narrative    2 children, lives with oldest son in [place].    Youngest son in [employer] in [place] with 2  granddaughters.     Social Determinants of Health     Financial Resource Strain: Not on file   Food Insecurity: Not on file   Transportation Needs: Not on file   Physical Activity: Not on file   Stress: Not on file   Social Connections: Not on file   Intimate Partner Violence: Not on file   Housing Stability: Not on file     Social History     Social History Narrative    2 children, lives with oldest son in [place].    Youngest son in [employer] in [place] with 2 granddaughters.          Allergies:  Allergies   Allergen Reactions     Quetiapine Rash     Valproic Acid Rash     Drug rash       Current Medications:   Current Outpatient Medications   Medication Sig Dispense Refill     albuterol (PROAIR HFA/PROVENTIL HFA/VENTOLIN HFA) 108 (90 Base) MCG/ACT inhaler Inhale 2 puffs into the lungs every 4 hours as needed for shortness of breath / dyspnea or wheezing 18 g 1     atorvastatin (LIPITOR) 10 MG tablet Take 1 tablet (10 mg) by mouth daily 90 tablet 1     biotin 1000 MCG TABS tablet        buPROPion (WELLBUTRIN XL) 150 MG 24 hr tablet Take 150 mg by mouth every morning       calcium citrate-vitamin D (CITRACAL) 315-200 MG-UNIT TABS per tablet        clonazePAM (KLONOPIN) 0.5 MG tablet Take 0.5 mg by mouth At Bedtime       fish oil-omega-3 fatty acids 500 MG capsule        gabapentin (NEURONTIN) 300 MG capsule Take 1 capsule (300mg) in morning and 1 capsule (300mg) in afternoon and 4 capsules (1200mg) near bedtime 540 capsule 0     levothyroxine (SYNTHROID/LEVOTHROID) 50 MCG tablet TAKE ONE TABLET BY MOUTH  Every Other day 45 tablet 0     levothyroxine (SYNTHROID/LEVOTHROID) 75 MCG tablet Take 1 tablet po daily on alternate day. 45 tablet 3     melatonin ER 3 MG tablet Take 2 tablets (6 mg) by mouth At Bedtime 60 tablet 0     mirtazapine (REMERON) 15 MG tablet Take 1 tablet (15 mg) by mouth At Bedtime (Patient taking differently: Take 15 mg by mouth At Bedtime CUTS IN HALF) 90 tablet 0     tiZANidine  (ZANAFLEX) 2 MG tablet Take 1 tablet (2 mg) by mouth 3 times daily as needed for muscle spasms (Patient not taking: Reported on 9/12/2022) 21 tablet 0          Current Pain Medications:  Gabapentin [phone]  Tylenol PRN      Past Pain Medications:  Methocarbamol  Tizanidine  She was proscribed Methocarbamol and tizanidine for short period of the that help to decrease the involved area, however patient was concerned that these meds have interaction with her anxiety and bipolar medication.    Blood thinner:  None    Psychosocial History:     History of treatment for behavioral disorder: Bipolar, Anxiety  History of suicidal ideation or suicidal attempt: No  Physical Exam:     Vitals:    09/12/22 1417   BP: 112/68   BP Location: Right arm   Patient Position: Chair   Cuff Size: Adult Regular   Pulse: 74   SpO2: 98%         Review of Systems   Constitutional: Positive for chills and fatigue. Negative for fever.   HENT: Positive for trouble swallowing. Negative for ear discharge, ear pain, hearing loss, mouth sores, nosebleeds, sinus pain, sore throat and tinnitus.    Eyes: Negative for pain and redness.   Respiratory: Negative for cough, shortness of breath and wheezing.    Cardiovascular: Negative for chest pain and palpitations.   Gastrointestinal: Negative for abdominal pain, constipation, diarrhea, heartburn, nausea, rectal pain and vomiting.   Endocrine: Positive for polydipsia. Negative for polyphagia.   Genitourinary: Positive for urgency. Negative for difficulty urinating, dyspareunia, dysuria, flank pain, genital sores, hematuria and vaginal discharge.   Musculoskeletal: Positive for arthralgias, back pain, myalgias and neck pain. Negative for joint swelling.   Skin: Negative for rash.   Neurological: Positive for weakness and headaches. Negative for dizziness, tremors, seizures, light-headedness and numbness.   Psychiatric/Behavioral: Positive for decreased concentration. Negative for hallucinations. The  patient is nervous/anxious.           Physical Exam  Musculoskeletal:      Right shoulder: No effusion, tenderness or bony tenderness. Normal range of motion. Normal strength.      Left shoulder: No effusion, tenderness or bony tenderness. Normal range of motion. Normal strength.      Right hand: Normal strength. Normal sensation.      Left hand: Normal strength. Normal sensation.      Cervical back: Full passive range of motion without pain and normal range of motion. No edema or rigidity. No pain with movement, spinous process tenderness or muscular tenderness. Normal range of motion.   Lymphadenopathy:      Cervical: No cervical adenopathy.                        Laboratory results:  Recent Labs   Lab Test 04/06/22  0721 05/01/21  0806 07/26/20  0919   NA  --  138 141   POTASSIUM  --  4.0 4.4   CHLORIDE  --  107 111*   CO2  --  30 25   ANIONGAP  --  2* 5   * 104* 116*   BUN  --  14 11   CR  --  0.88 0.71   TERESA  --  9.2 9.5       CBC RESULTS:   Recent Labs   Lab Test 05/01/21  0806   WBC 5.3   RBC 4.88   HGB 15.0   HCT 45.7   MCV 94   MCH 30.7   MCHC 32.8   RDW 13.4            Imaging:       ASSESSMENT AND PLAN:     Encounter Diagnosis:  Chronic pain neck area  Chronic midline posterior neck pain  Bipolar disorder  Anxiety  Hypothyroidism    Katie Griffiths is a 62 year old y.o. old female who presents to the pain clinic with 1 year of posterior chronic midline pain    I have summarized the patient s past medical history, discussed their clinical findings and the potential differential diagnosis with the patient. Significant past medical history pertinent to the patient s current condition includes Asthma, Bipolar disorder, anxiety, hypothyroidism. The clinical findings reveal Normal cervical sensation, ROM, without tenderness or palpable trigger points. The differential diagnosis discussed with the patient are listed above. I have discussed anatomy and possible sources of the pain using models  and/or pictures (diagrams). I have discussed multi- disciplinary pain management options withthe patient as pertaining to their case as detailed above. The pain management options we discussed included, but were not limited to the recommendations below.  I also discussed with patient the risks, benefits and alternatives to each pain management option.  All of the patient s questions and concerns were answered to the best of my ability.    RECOMMENDATIONS:       1.Refferal to [facility] Pain Program at [facility]. Please call to schedule.    2. Physical therapy: I have refered the patient for outpatient physical therapy for stretching, strengthening and home exercise program for chronic posterior neck pain. The patient will also discuss spine care and posture. Pool therapy and stretches can be considered if available.    3. Follow up: As needed    ATTENDING ATTESTATION  I saw the patient along with the pain medicine trainee Dr. Quynh Ocasio. Please see her note above for full details. I was involved in gathering history, physical examination and development of the plan of care. We discussed that given the history, physical examination and imaging there were no cervical spine interventional targets at this time. The patient may benefit from maximizing the non interventional approaches and may find the chronic pain program at [facility] helpful in managing chronic pain. We briefly touched on the differences and goals of acute versus chronic pain management.                                         Answers for HPI/ROS submitted by the patient on 9/9/2022  BELA 7 TOTAL SCORE: 17  General Symptoms: Yes  Skin Symptoms: Yes  HENT Symptoms: Yes  EYE SYMPTOMS: Yes  HEART SYMPTOMS: Yes  LUNG SYMPTOMS: Yes  INTESTINAL SYMPTOMS: Yes  URINARY SYMPTOMS: Yes  GYNECOLOGIC SYMPTOMS: Yes  BREAST SYMPTOMS: No  SKELETAL SYMPTOMS: Yes  BLOOD SYMPTOMS: No  NERVOUS SYSTEM SYMPTOMS: Yes  MENTAL HEALTH SYMPTOMS: Yes  Congestion:  Yes  Trouble swallowing: Yes   Voice hoarseness: No  Mouth sores: No  Tooth pain: No  Gum tenderness: Yes  Bleeding gums: Yes  Change in taste: No  Change in sense of smell: No  Dry mouth: Yes  Hearing aid used: No  Neck lump: No  Loss of appetite: No  Weight gain: No  Fatigue: Yes  Night sweats: Yes  Increased stress: Yes  Excessive hunger: No  Feeling hot or cold when others believe the temperature is normal: Yes  Loss of height: No  Post-operative complications: No  Surgical site pain: No  Change in or Loss of Energy: Yes  Hyperactivity: No  Confusion: Yes  Changes in hair: Yes  Changes in moles/birth marks: Yes  Changes in nails: Yes  Acne: Yes  Hair in places you don't want it: No  Change in facial hair: Yes  Warts: No  Non-healing sores: No  Scarring: No  Flaking of skin: No  Color changes of hands/feet in cold : No  Sun sensitivity: Yes  Skin thickening: No  Vision loss: No  Dry eyes: Yes  Watery eyes: No  Eye bulging: No  Flashing of lights: No  Spots: No  Floaters: No  Crossed eyes: No  Tunnel Vision: No  Yellowing of eyes: No  Eye irritation: Yes  Pain in legs with walking: Yes  Fingers or toes appear blue: No  High blood pressure: No  Low blood pressure: No  Fainting: No  Murmurs: No  Pacemaker: No  Varicose veins: Yes  Edema or swelling: No  Wake up at night with shortness of breath: No  Light-headedness: No  Exercise intolerance: Yes  Snoring: No  Difficulty breathing on exertion: Yes  Nighttime Cough: No  Difficulty breathing when lying flat: No  Bloating: No  Rectal or Anal pain: No  Fecal incontinence: No  Yellowing of skin or eyes: No  Vomit with blood: No  Change in stools: Yes  Trouble holding urine or incontinence: Yes  Increased frequency of urination: Yes  Decreased frequency of urination: No  Frequent nighttime urination: No  Difficulty emptying bladder: No  Bleeding or spotting between periods: No  Heavy or painful periods: No  Irregular periods: No  Vaginal discharge: No  Hot flashes:  No  Vaginal dryness: Yes  Genital ulcers: No  Reduced libido: Yes  Painful intercourse: No  Difficulty with sexual arousal: Yes  Post-menopausal bleeding: No  Swollen joints: No  Joint pain: Yes  Bone pain: Yes  Muscle cramps: No  Muscle weakness: Yes  Joint stiffness: Yes  Bone fracture: No  Trouble with coordination: No  Difficulty walking: No  Paralysis: No  Numbness: No  Trouble thinking or concentrating: Yes  Mood changes: Yes  Panic attacks: Yes

## 2022-09-12 NOTE — NURSING NOTE
RN reviewed AVS with patient. Patient to contact clinic if any questions/concerns. Patient verbalized understanding.    Hayley Smith RN

## 2022-09-12 NOTE — NURSING NOTE
Patient presents with:  Consult: New Consult Neck and Back pain level 5/10      Moderate Pain (5)         What medications are you using for pain? Nothing    (New patients only) Have you been seen by another pain clinic/ provider? no    (Return Patients only) What refills are you needing today? No    Expectations Not Sure

## 2022-09-14 ENCOUNTER — MYC MEDICAL ADVICE (OUTPATIENT)
Dept: ORTHOPEDICS | Facility: CLINIC | Age: 62
End: 2022-09-14

## 2022-09-14 DIAGNOSIS — M47.812 CERVICAL SPONDYLOSIS: Primary | ICD-10-CM

## 2022-09-14 DIAGNOSIS — S13.100D SUBLUXATION OF CERVICAL VERTEBRA, SUBSEQUENT ENCOUNTER: ICD-10-CM

## 2022-09-14 DIAGNOSIS — M54.2 CHRONIC MIDLINE POSTERIOR NECK PAIN: ICD-10-CM

## 2022-09-14 DIAGNOSIS — G89.29 CHRONIC MIDLINE POSTERIOR NECK PAIN: ICD-10-CM

## 2022-09-18 ENCOUNTER — HEALTH MAINTENANCE LETTER (OUTPATIENT)
Age: 62
End: 2022-09-18

## 2022-09-19 ENCOUNTER — MEDICAL CORRESPONDENCE (OUTPATIENT)
Dept: HEALTH INFORMATION MANAGEMENT | Facility: CLINIC | Age: 62
End: 2022-09-19

## 2022-09-19 NOTE — TELEPHONE ENCOUNTER
See multiple My Chart messages from pt. See Cervical Traction order written 7-26-22.  I called pt.  She trialed Cs Traction & it is helping the pain.    She checked Insurance website & needs New order for Cervical traction to include all the requirements she listed in her message in order for insurance to consider approving it.  I entered new order  With all those requirements-see order, & postal mailed copy to pt,.    I told pt the ph# for supply store she provided in her message was  incorrect & I tired several other ph#s & could not obtain correct fax# so I postal mailed to her so she can take it to supply store she wants & she agreed.    She stated she does not need any  medication orders from us because her Psych. Provider is taking care of pain meds & she is already doing [facility] because she cant tolerate pool therapy.  She stated she is still not sure if Insurance will approve Chiropractor care so I told her I will postal mail her order & she can call her Insurance.  She agreed.    Call back prn. Pt agreed.   Arielle WESTFALL/Sarika Ortiz RN.

## 2022-09-21 ENCOUNTER — TELEPHONE (OUTPATIENT)
Dept: ORTHOPEDICS | Facility: CLINIC | Age: 62
End: 2022-09-21

## 2022-09-21 NOTE — TELEPHONE ENCOUNTER
See multiple multiple My Chart messages from pt.    I called her back again & told her I documented in the New  Home cervical traction  order all those things she wanted documented that she stated Insurance would require to approve unit & I also faxed the dictation from Kiara explaining Diagnosis & medical necessity/why ordered traction to supply store.    I called [facility] store ph [phone] & verified they received my fax & they stated they told pt. She has  to have an appt first to get the traction unit & then they bill Insurance,  so they cant tell her in advance if she will be billed.  They gave her ph# for their billing office [phone] if she had more questions.    I also reminded pt that I postal mailed her copy of order & dictation so she can also submit to insurance if she wants.    I advised pt she make appt & get unit since the trial helped her pain & she agreed.  Call back prn. Pt agreed.  Sarika Ortiz RN.

## 2022-09-21 NOTE — TELEPHONE ENCOUNTER
[facility] Call Center    Phone Message    May a detailed message be left on voicemail: yes     Reason for Call: Other: Patient is requesting calling her AFTER 2 PM tomorrow if you're not able to call her back today.     Action Taken: Message routed to:  [facility]    Travel Screening: Not Applicable

## 2022-09-21 NOTE — TELEPHONE ENCOUNTER
See multiple my Chart messages from pt & latest long message & my response from 9-19-22 when I called pt.    I called pt  back again today 9-21-22 & asked her if she had called the medical supply store to see if they had received new cervical traction & she stated No.  i  told her I had faxed to 2 different fax#s on Tues 9-20-22 & postal mailed copies to her , so she should call her supply store & if they did not get fax then she should wait for order in postal mail & take it to store.  She agreed.  Call back prn.  Sarika Ortiz RN.

## 2022-09-21 NOTE — TELEPHONE ENCOUNTER
[facility] Call Center    Phone Message    May a detailed message be left on voicemail: yes     Reason for Call: Other: Patient is requesting a call back to discuss info that was missing on a prescription.      Patient said which pieces were missing in MyChart.    Please call patient back to discuss.    Action Taken: Message routed to:  [facility]o    Travel Screening: Not Applicable

## 2022-09-22 NOTE — TELEPHONE ENCOUNTER
See 2 phone messages from 1523 yesterday 9-21-22.  See separate My chart messages when I documented when I called her back again last night 9-21-22 at 1600.  Sarika Ortiz RN.

## 2022-10-07 ENCOUNTER — DOCUMENTATION ONLY (OUTPATIENT)
Dept: ORTHOPEDICS | Facility: CLINIC | Age: 62
End: 2022-10-07

## 2022-10-07 NOTE — PROGRESS NOTES
Were Records Sent? Yes   If Yes: Where: [facility]   Fax Number: [phone]  Date: 10/7/22     Action 10/11/22 MMT   Action Taken  CSS received return request for documentation from a different visit.     CSS sent response that Cervical traction PT was not done at [place] and to contact [facility] for records.

## 2022-10-12 ENCOUNTER — TRANSFERRED RECORDS (OUTPATIENT)
Dept: INTERNAL MEDICINE | Facility: CLINIC | Age: 62
End: 2022-10-12

## 2022-10-13 ENCOUNTER — DOCUMENTATION ONLY (OUTPATIENT)
Dept: INTERNAL MEDICINE | Facility: CLINIC | Age: 62
End: 2022-10-13

## 2022-10-13 NOTE — PROGRESS NOTES
Type of Form Received: PT RECERTIFICATION NOTE    Form Received (Date) 10/13/22   Form Filled out Yes 10/17/22   Placed in provider folder Yes

## 2022-10-18 DIAGNOSIS — E06.3 HYPOTHYROIDISM DUE TO HASHIMOTO'S THYROIDITIS: ICD-10-CM

## 2022-10-21 RX ORDER — LEVOTHYROXINE SODIUM 50 UG/1
50 TABLET ORAL EVERY OTHER DAY
Qty: 45 TABLET | Refills: 2 | Status: SHIPPED | OUTPATIENT
Start: 2022-10-21 | End: 2023-08-18

## 2022-10-21 NOTE — TELEPHONE ENCOUNTER
Last Clinic Visit: 6/1/2022  [facility] [place]    Lab Test 09/12/22  1251   TSH 1.34

## 2022-11-25 ENCOUNTER — MYC MEDICAL ADVICE (OUTPATIENT)
Dept: INTERNAL MEDICINE | Facility: CLINIC | Age: 62
End: 2022-11-25

## 2022-11-26 DIAGNOSIS — E78.5 HYPERLIPIDEMIA: ICD-10-CM

## 2022-11-30 RX ORDER — ATORVASTATIN CALCIUM 10 MG/1
10 TABLET, FILM COATED ORAL DAILY
Qty: 90 TABLET | Refills: 0 | Status: SHIPPED | OUTPATIENT
Start: 2022-11-30 | End: 2023-02-09

## 2022-11-30 NOTE — TELEPHONE ENCOUNTER
atorvastatin (LIPITOR) 10 MG tablet    Statins Protocol Passed   4/20/2022  [facility] Internal Medicine Rocky Garcia MD  Internal Medicine   stated

## 2022-12-01 NOTE — TELEPHONE ENCOUNTER
Coordinators, please contact patient and arrange for twice-yearly follow-up, 30 minutes, in-person at least once per year.    If I'm not available, she may need to switch to another PCP at [facility].

## 2022-12-20 ENCOUNTER — TRANSFERRED RECORDS (OUTPATIENT)
Dept: HEALTH INFORMATION MANAGEMENT | Facility: CLINIC | Age: 62
End: 2022-12-20

## 2023-01-03 ENCOUNTER — DOCUMENTATION ONLY (OUTPATIENT)
Dept: INTERNAL MEDICINE | Facility: CLINIC | Age: 63
End: 2023-01-03

## 2023-01-03 NOTE — PROGRESS NOTES
Type of Form Received: PT recert note    Form Received (Date) 1/3/23   Form Filled out Yes 1/13/23   Placed in provider folder Yes

## 2023-02-07 DIAGNOSIS — E78.5 HYPERLIPIDEMIA: ICD-10-CM

## 2023-02-09 RX ORDER — ATORVASTATIN CALCIUM 10 MG/1
10 TABLET, FILM COATED ORAL DAILY
Qty: 90 TABLET | Refills: 0 | Status: SHIPPED | OUTPATIENT
Start: 2023-02-09 | End: 2023-05-08

## 2023-02-09 NOTE — TELEPHONE ENCOUNTER
atorvastatin (LIPITOR) 10 MG tablet  Passed protocol    Office Visit  4/20/2022  [facility] Internal Medicine Rocky Garcia MD  Internal Medicine     Due for annual appt 4/20/23. Refilled for 90 days, pt to call and schedule follow up.

## 2023-03-28 ENCOUNTER — ANCILLARY PROCEDURE (OUTPATIENT)
Dept: GENERAL RADIOLOGY | Facility: CLINIC | Age: 63
End: 2023-03-28
Attending: INTERNAL MEDICINE
Payer: MEDICARE

## 2023-03-28 ENCOUNTER — OFFICE VISIT (OUTPATIENT)
Dept: INTERNAL MEDICINE | Facility: CLINIC | Age: 63
End: 2023-03-28
Payer: MEDICARE

## 2023-03-28 VITALS
OXYGEN SATURATION: 97 % | HEIGHT: 65 IN | HEART RATE: 73 BPM | DIASTOLIC BLOOD PRESSURE: 80 MMHG | TEMPERATURE: 98.1 F | WEIGHT: 112.3 LBS | SYSTOLIC BLOOD PRESSURE: 121 MMHG | BODY MASS INDEX: 18.71 KG/M2

## 2023-03-28 DIAGNOSIS — J45.41 MODERATE PERSISTENT REACTIVE AIRWAY DISEASE WITH ACUTE EXACERBATION: Primary | ICD-10-CM

## 2023-03-28 DIAGNOSIS — J45.41 MODERATE PERSISTENT REACTIVE AIRWAY DISEASE WITH ACUTE EXACERBATION: ICD-10-CM

## 2023-03-28 PROCEDURE — 71046 X-RAY EXAM CHEST 2 VIEWS: CPT | Performed by: RADIOLOGY

## 2023-03-28 PROCEDURE — 99213 OFFICE O/P EST LOW 20 MIN: CPT | Mod: GC

## 2023-03-28 RX ORDER — MONTELUKAST SODIUM 10 MG/1
10 TABLET ORAL AT BEDTIME
Qty: 30 TABLET | Refills: 0 | Status: SHIPPED | OUTPATIENT
Start: 2023-03-28 | End: 2023-05-02

## 2023-03-28 NOTE — PROGRESS NOTES
"  PRIMARY CARE CENTER         HPI:       Katie Griffiths is a 62 year old female with PMH of reactive airway disease who presents for upper respiratory complaints. Over the last 10 days, the patient has been having a persistent dry cough that is constant throughout the day, getting worse at night and does not improve with her albuterol inhaler. The patient also has chest tightness on the left parasternal area, 0-4/10 in intensity that comes and goes. The patient is unable to give any further detail regarding duration, things that make it worse, relationship to positioning and breathing describing it as \"not consistent\". Over the last week the patient checked her temperature once (99.8F), reports having chills and feeling warm all the time. She denies any sick contact, did not have any recent travel. Her reactive airway disease/asthma is mostly elicited by exposure to bleach and chemicals, but denies any new or recent exposure that extends beyond her norm.. Headaches chronic, worse with the cough. Had a diarrhea the previous week but got better. No limitation in her overall activity, just getting tired more easily. Denies any new joint pain. Had diarrhea the past week that has now resolved. No change in her urinary habits.         Review of Systems:     ROS  I have personally reviewed and updated the complete ROS on the day of the visit.             Past Medical History:     Past Medical History:   Diagnosis Date     Bipolar disorder (H)      Depression with anxiety      Hashimoto's thyroiditis      Hyperlipidemia      Mild intermittent asthma      S/P partial thyroidectomy      Superficial perivascular dermatitis             Past Surgical History:     Past Surgical History:   Procedure Laterality Date     COLONOSCOPY N/A 4/14/2021    Procedure: COLONOSCOPY;  Surgeon: Guerita Shannon MD;  Location:  GI     COLONOSCOPY N/A 4/14/2021    Procedure: Colonoscopy, With Polypectomy And Biopsy;  Surgeon: bob Rush, " MD Guerita;  Location:  GI     partial thyroidectomy       TONSILLECTOMY       TUBAL LIGATION         Current Outpatient Medications   Medication Sig Dispense Refill     albuterol (PROAIR HFA/PROVENTIL HFA/VENTOLIN HFA) 108 (90 Base) MCG/ACT inhaler Inhale 2 puffs into the lungs every 4 hours as needed for shortness of breath / dyspnea or wheezing 18 g 1     atorvastatin (LIPITOR) 10 MG tablet Take 1 tablet (10 mg) by mouth daily *Due for annual office visit 4/20/23, please call clinic to schedule* 90 tablet 0     biotin 1000 MCG TABS tablet        buPROPion (WELLBUTRIN XL) 150 MG 24 hr tablet Take 150 mg by mouth every morning       calcium citrate-vitamin D (CITRACAL) 315-200 MG-UNIT TABS per tablet        clonazePAM (KLONOPIN) 0.5 MG tablet Take 0.5 mg by mouth At Bedtime       fish oil-omega-3 fatty acids 500 MG capsule        gabapentin (NEURONTIN) 300 MG capsule Take 1 capsule (300mg) in morning and 1 capsule (300mg) in afternoon and 4 capsules (1200mg) near bedtime 540 capsule 0     levothyroxine (SYNTHROID/LEVOTHROID) 50 MCG tablet Take 1 tablet (50 mcg) by mouth every other day 45 tablet 2     levothyroxine (SYNTHROID/LEVOTHROID) 75 MCG tablet Take 1 tablet po daily on alternate day. 45 tablet 3     melatonin ER 3 MG tablet Take 2 tablets (6 mg) by mouth At Bedtime 60 tablet 0     mirtazapine (REMERON) 15 MG tablet Take 1 tablet (15 mg) by mouth At Bedtime (Patient taking differently: Take 15 mg by mouth At Bedtime CUTS IN HALF) 90 tablet 0     tiZANidine (ZANAFLEX) 2 MG tablet Take 1 tablet (2 mg) by mouth 3 times daily as needed for muscle spasms (Patient not taking: Reported on 9/12/2022) 21 tablet 0          Allergies   Allergen Reactions     Quetiapine Rash     Valproic Acid Rash     Drug rash            Family History:     Family History   Problem Relation Age of Onset     Colon Polyps Mother      Glaucoma Mother      Eye Disorder Father      Factor V Leiden deficiency Father         pt tested  "negative     Hyperlipidemia Father      Glaucoma Father      Macular Degeneration Paternal Grandmother             Social History:     Social History     Socioeconomic History     Marital status:      Spouse name: Not on file     Number of children: Not on file     Years of education: Not on file     Highest education level: Not on file   Occupational History     Not on file   Tobacco Use     Smoking status: Never     Smokeless tobacco: Never   Substance and Sexual Activity     Alcohol use: Yes     Comment: occasional     Drug use: Never     Sexual activity: Not Currently     Partners: Male   Other Topics Concern     Parent/sibling w/ CABG, MI or angioplasty before 65F 55M? Not Asked   Social History Narrative    2 children, lives with oldest son in [place].    Youngest son in [employer] in [place] with 2 granddaughters.     Social Determinants of Health     Financial Resource Strain: Not on file   Food Insecurity: Not on file   Transportation Needs: Not on file   Physical Activity: Not on file   Stress: Not on file   Social Connections: Not on file   Intimate Partner Violence: Not on file   Housing Stability: Not on file            Physical Exam:   /80 (BP Location: Right arm, Patient Position: Sitting, Cuff Size: Adult Small)   Pulse 73   Temp 98.1  F (36.7  C) (Oral)   Ht 1.651 m (5' 5\")   Wt 50.9 kg (112 lb 4.8 oz)   SpO2 97%   BMI 18.69 kg/m    Body mass index is 18.69 kg/m .  Vitals were reviewed       GENERAL APPEARANCE: healthy, alert and no distress     HEENT: EOMI, PERRL, np, op mucosae moist and clear     RESP: lungs clear to auscultation - no rales, rhonchi or wheezes     CV: S1, S2, rrr, no mrg, no RASHAD, JVP at the base of the neck     ABDOMEN:  soft, nontender, no HSM or masses and bowel sounds normal     MS: extremities normal- no gross deformities noted, no evidence of inflammation in joints, FROM in all extremities.     SKIN: no suspicious lesions or rashes     PSYCH: " mentation appears normal. and affect normal/bright     LYMPHATICS: No cervical adenopathy    Assessment and Plan   Ms. Peter is a very pleasant 63 yo F with PMH s/f Reactive Airway Disease/Asthma who presents with acute cough.    #Reactive airway disease exacerbation  Given the previous week symptoms, most likely exacerbated by post-viral bronchitis. Reassurance provided to the patient, will treat conservatively with albuterol and a one month course of montelukast. Walking pneumonia less likely but given the history of chills and the lack of improvement with albuterol, will obtain CXR to rule it out. If evidence of it, will treat with a course of doxycycline.  - Continue albuterol  - Montelukast for one month  - Ordered two view CXR        Chonic issues    #Neck pain  - Continue gabapentin  #Bipolar disorder  - Continue bupropion  - Continue clonazepam  #Hypothyroidism  - Continue levothyroxin  #Hyperlipidemia  - Continue atorvastatin    Conditions managed by PCP, Dr. Nix, next visit at May 24, 2023    Options for treatment and follow-up care were reviewed with the patient. Katie MCKEON Tunde engaged in the decision making process and verbalized understanding of the options discussed and agreed with the final plan.    Sumaya Hurtado MD  Internal Medicine, PGY-2  [facility]  [phone]   Mar 28, 2023      Pt was seen and plan of care discussed with Dr. Mann.

## 2023-03-28 NOTE — RESULT ENCOUNTER NOTE
Katie Mcintosh! No evidence of infection from the chest X-ray, we are not going to need antibiotics.    Best,  Sumaya Hurtado

## 2023-05-02 ENCOUNTER — OFFICE VISIT (OUTPATIENT)
Dept: INTERNAL MEDICINE | Facility: CLINIC | Age: 63
End: 2023-05-02
Payer: MEDICARE

## 2023-05-02 VITALS
BODY MASS INDEX: 18.21 KG/M2 | OXYGEN SATURATION: 96 % | HEART RATE: 95 BPM | HEIGHT: 65 IN | SYSTOLIC BLOOD PRESSURE: 116 MMHG | DIASTOLIC BLOOD PRESSURE: 78 MMHG | WEIGHT: 109.3 LBS

## 2023-05-02 DIAGNOSIS — J45.41 MODERATE PERSISTENT REACTIVE AIRWAY DISEASE WITH ACUTE EXACERBATION: ICD-10-CM

## 2023-05-02 PROCEDURE — 99214 OFFICE O/P EST MOD 30 MIN: CPT | Performed by: NURSE PRACTITIONER

## 2023-05-02 RX ORDER — MONTELUKAST SODIUM 10 MG/1
5 TABLET ORAL AT BEDTIME
Qty: 30 TABLET | Refills: 1 | Status: SHIPPED | OUTPATIENT
Start: 2023-05-02 | End: 2024-07-31

## 2023-05-02 RX ORDER — MONTELUKAST SODIUM 10 MG/1
5 TABLET ORAL AT BEDTIME
Qty: 30 TABLET | Refills: 0 | Status: SHIPPED | OUTPATIENT
Start: 2023-05-02 | End: 2023-05-02

## 2023-05-02 NOTE — NURSING NOTE
"Katie Griffiths is a 62 year old female patient that presents today in clinic for the following:    Chief Complaint   Patient presents with     Cough     Pt here for lingering cough since early March of this year that has gotten a little better since late April.     The patient's allergies and medications were reviewed as noted. A set of vitals were recorded as noted without incident: /78 (BP Location: Right arm, Patient Position: Sitting, Cuff Size: Adult Small)   Pulse 95   Ht 1.651 m (5' 5\")   Wt 49.6 kg (109 lb 4.8 oz)   SpO2 96%   BMI 18.19 kg/m  . The patient does not have any other questions for the provider.    Fanny Metcalf, EMT at 1:22 PM on 5/2/2023  "

## 2023-05-04 DIAGNOSIS — E78.5 HYPERLIPIDEMIA: Primary | ICD-10-CM

## 2023-05-05 NOTE — PROGRESS NOTES
"S: Katie Griffiths is a 62 year old female here for a cough she has experienced since end of February. She describes a tightness in her chest.  She is using essential oils. Cough non-productive but  is slightly better the past 3 days.She tried the inhaler but wasn't convinced it helped.  She is taking 0.5 tablet of Montelukast because a whole tablet affected her \"mentally.\" She is travelling to [facility] and would like to take an inhaler with her.          Patient Active Problem List   Diagnosis     Bipolar disorder (H)     Hashimoto's thyroiditis     Incontinence of urine in female     Osteopenia     Reactive airway disease     Vitamin D deficiency     Overweight     Dizziness     Lipid screening     Right shoulder pain, unspecified chronicity     RLS (restless legs syndrome)     Mitral valve prolapse     Insomnia     Impaired fasting glucose     Cervical arthritis     Upper back pain     Nonintractable episodic headache, unspecified headache type     Hyperlipidemia            Past Medical History:   Diagnosis Date     Bipolar disorder (H)      Depression with anxiety      Hashimoto's thyroiditis      Hyperlipidemia      Mild intermittent asthma      S/P partial thyroidectomy      Superficial perivascular dermatitis             Past Surgical History:   Procedure Laterality Date     COLONOSCOPY N/A 4/14/2021    Procedure: COLONOSCOPY;  Surgeon: Guerita Shannon MD;  Location: [facility]     COLONOSCOPY N/A 4/14/2021    Procedure: Colonoscopy, With Polypectomy And Biopsy;  Surgeon: Guerita Shannon MD;  Location: [facility]     partial thyroidectomy       TONSILLECTOMY       TUBAL LIGATION              Social History     Tobacco Use     Smoking status: Never     Smokeless tobacco: Never   Vaping Use     Vaping status: Not on file   Substance Use Topics     Alcohol use: Not Currently            Family History   Problem Relation Age of Onset     Colon Polyps Mother      Glaucoma Mother      Eye Disorder Father      " "Factor V Leiden deficiency Father         pt tested negative     Hyperlipidemia Father      Glaucoma Father      Macular Degeneration Paternal Grandmother                Allergies   Allergen Reactions     Quetiapine Rash     Valproic Acid Rash     Drug rash            Current Outpatient Medications   Medication Sig Dispense Refill     atorvastatin (LIPITOR) 10 MG tablet Take 1 tablet (10 mg) by mouth daily *Due for annual office visit 4/20/23, please call clinic to schedule* 90 tablet 0     buPROPion (WELLBUTRIN XL) 150 MG 24 hr tablet Take 150 mg by mouth every morning       clonazePAM (KLONOPIN) 0.5 MG tablet Take 0.5 mg by mouth At Bedtime       fish oil-omega-3 fatty acids 500 MG capsule        gabapentin (NEURONTIN) 300 MG capsule Take 1 capsule (300mg) in morning and 1 capsule (300mg) in afternoon and 4 capsules (1200mg) near bedtime 540 capsule 0     levothyroxine (SYNTHROID/LEVOTHROID) 50 MCG tablet Take 1 tablet (50 mcg) by mouth every other day 45 tablet 2     levothyroxine (SYNTHROID/LEVOTHROID) 75 MCG tablet Take 1 tablet po daily on alternate day. 45 tablet 3     melatonin ER 3 MG tablet Take 2 tablets (6 mg) by mouth At Bedtime 60 tablet 0     montelukast (SINGULAIR) 10 MG tablet Take 0.5 tablets (5 mg) by mouth At Bedtime 30 tablet 1     albuterol (PROAIR HFA/PROVENTIL HFA/VENTOLIN HFA) 108 (90 Base) MCG/ACT inhaler Inhale 2 puffs into the lungs every 4 hours as needed for shortness of breath / dyspnea or wheezing (Patient not taking: Reported on 5/2/2023) 18 g 1       REVIEW OF SYSTEMS:  See above.    O:   /78 (BP Location: Right arm, Patient Position: Sitting, Cuff Size: Adult Small)   Pulse 95   Ht 1.651 m (5' 5\")   Wt 49.6 kg (109 lb 4.8 oz)   SpO2 96%   BMI 18.19 kg/m    GENERAL APPEARANCE: healthy, alert and no distress  EYES: sclera white, conjunctiva normal.  HENT: ear canals and TM's normal and mouth without ulcers or lesions  RESP: lungs clear to auscultation - no rales, rhonchi " or wheezes  CV: regular rates and rhythm, normal S1 S2, no S3 or S4 and no murmur, click or rub   NEURO: alert and oriented.   MUSK: ambulatory.  SKIN:warm and dry.  LYMPH: neg axillary, cervical, supra and infraclavicular nodes.  EXT: warm.  Edema: none    A/P:  Katie was seen today for cough.    Diagnoses and all orders for this visit:  Katie was seen today for cough.    Moderate persistent reactive airway disease with acute exacerbation  -     montelukast (SINGULAIR) 10 MG tablet; Take 0.5 tablets (5 mg) by mouth At Bedtime      The patient voiced understanding of the information discussed and all questions were answered.     I spent a total of  30  minutes in the care of this pt during today's office visit. This time includes reviewing the patient's chart and prior history, obtaining a history, performing an examination and evaluation and counseling the patient. This time also includes ordering medications or tests necessary in addition to communication to other member's of the patient's health care team. Time spent in documentation and care coordination is included.     Bridgette SAHU, CNP

## 2023-05-08 RX ORDER — ATORVASTATIN CALCIUM 10 MG/1
10 TABLET, FILM COATED ORAL DAILY
Qty: 90 TABLET | Refills: 0 | Status: SHIPPED | OUTPATIENT
Start: 2023-05-08 | End: 2023-08-09

## 2023-06-06 ASSESSMENT — ENCOUNTER SYMPTOMS
SORE THROAT: 0
SEIZURES: 0
DISTURBANCES IN COORDINATION: 0
INCREASED ENERGY: 0
PALPITATIONS: 0
COUGH DISTURBING SLEEP: 0
NECK PAIN: 1
EYE REDNESS: 0
TASTE DISTURBANCE: 0
LOSS OF CONSCIOUSNESS: 0
DIZZINESS: 0
SINUS CONGESTION: 1
SNORES LOUDLY: 0
WEAKNESS: 1
TREMORS: 0
SINUS PAIN: 0
MEMORY LOSS: 1
DECREASED CONCENTRATION: 1
ALTERED TEMPERATURE REGULATION: 1
WEIGHT GAIN: 0
ORTHOPNEA: 0
INSOMNIA: 1
DECREASED LIBIDO: 1
ARTHRALGIAS: 0
DOUBLE VISION: 0
SYNCOPE: 0
POSTURAL DYSPNEA: 0
HEADACHES: 1
HYPERTENSION: 0
EYE PAIN: 0
BACK PAIN: 1
SPEECH CHANGE: 0
NIGHT SWEATS: 0
POLYDIPSIA: 0
SPUTUM PRODUCTION: 0
WHEEZING: 0
MUSCLE CRAMPS: 1
DYSPNEA ON EXERTION: 1
EYE WATERING: 0
COUGH: 0
MYALGIAS: 1
FLANK PAIN: 0
HYPOTENSION: 0
DECREASED APPETITE: 1
FATIGUE: 1
WEIGHT LOSS: 1
LIGHT-HEADEDNESS: 1
NECK MASS: 0
CHILLS: 0
PARALYSIS: 0
TINGLING: 1
FEVER: 0
SHORTNESS OF BREATH: 0
HEMOPTYSIS: 0
JOINT SWELLING: 0
HEMATURIA: 0
SKIN CHANGES: 0
DIFFICULTY URINATING: 1
DYSURIA: 0
EYE IRRITATION: 1
EXERCISE INTOLERANCE: 1
LEG PAIN: 1
DEPRESSION: 1
NUMBNESS: 0
SMELL DISTURBANCE: 0
HOT FLASHES: 0
POOR WOUND HEALING: 0
MUSCLE WEAKNESS: 1
POLYPHAGIA: 0
TROUBLE SWALLOWING: 0
STIFFNESS: 0
NERVOUS/ANXIOUS: 1
HALLUCINATIONS: 0
HOARSE VOICE: 0
PANIC: 1
SLEEP DISTURBANCES DUE TO BREATHING: 0
NAIL CHANGES: 1

## 2023-06-07 ENCOUNTER — MYC MEDICAL ADVICE (OUTPATIENT)
Dept: ENDOCRINOLOGY | Facility: CLINIC | Age: 63
End: 2023-06-07

## 2023-06-07 ENCOUNTER — VIRTUAL VISIT (OUTPATIENT)
Dept: ENDOCRINOLOGY | Facility: CLINIC | Age: 63
End: 2023-06-07
Payer: MEDICARE

## 2023-06-07 DIAGNOSIS — E06.3 HASHIMOTO'S THYROIDITIS: Primary | ICD-10-CM

## 2023-06-07 DIAGNOSIS — E03.9 HYPOTHYROIDISM, UNSPECIFIED TYPE: Primary | ICD-10-CM

## 2023-06-07 PROCEDURE — 99214 OFFICE O/P EST MOD 30 MIN: CPT | Mod: VID | Performed by: INTERNAL MEDICINE

## 2023-06-07 NOTE — LETTER
6/7/2023       RE: Katie Griffiths  [address]  [place] [address]     Dear Colleague,    Thank you for referring your patient, Katie Griffiths, to the [facility] [place] [facility]. Please see a copy of my visit note below.    Katie Griffiths  is being evaluated via a billable video visit.      How would you like to obtain your AVS? [employer]  For the video visit, send the invitation by: Send to e-mail at: [email or web address]  Will anyone else be joining your video visit? No        Video-Visit Details    Type of service:  Video Visit    Video Start Time: 7:30 am  End; 8:00 am    Originating Location (pt. Location): Home    Distant Location (provider location):  [facility] END[facility]     Platform used for Video Visit: AmWell                                                                                    - Endocrinology Follow up -    Reason for visit/consult: hypothyroidism    Primary care provider: Kim Gore      Assessment and Plan  A 62 year old female with hypothyroidism     # Hypothyrodism  Post operative,   Previously was undercotrolled with Nature thyroid 32.5 mg.  Switched from Nature to LT4 50 mcg and has been doing well. Lab showed persistently good range of normal. TSH 2.57, free T4 1.1,    - continue current LT4 50 / 75 mcg alternate for the next 3 month    - recheck TFTs in 1 month    # Mental health  Bipolar    # environmental stress    # hair loss    # Feeling hot sometimes    # not feeling great    These ambiguus symptoms may due to post menopausal, today we opened discussion about very small dose of HRT if she is interested in.           RTC with me in 1 year      30 minutes spent on the date of the encounter doing chart review, history and exam, documentation and further activities as noted above.    Mary Sawyer MD  Staff Physician  Endocrinology  and Metabolism  [facility]  License: [id number]  Pager: [phone]    Interval History as of 6/7/2023 : Patient has been doing well. Last seen . Medication compliance   . New event includes: not feeling great, dry skin, hair loss, brittle nails, occasionally feeling hot  .  Interval History as of 6/1/2022 : Patient has been doing well-fair. Medication compliance excellent   . New event includes: Hair loss temporal and eye brows got worse with LT4 75 mcg daily but now alternating dose and stable. Her hair was much better when she was taking Nature thyroid.   Interval History as of 11/10/2021 : Patient has been doing well. Medication compliancegood to LT4 50 mcg   . New event includes : many environmental stress and worsening mental condition, could not access her regular psychologist and seeking for a new one .  Interval History as of 11/2/2020 : Patient has been doing well. Switched from Nature to LT4 50 mcg and has been doing well. Lab showed good range of normal. TSH 2.7, free T4 1.1  HPI: A 60 yo female here for the evaluation for her hypothryodism.  Patient is a self-referral came here today.   Patient was diagnosed thyroid nodule in 2006 when she was in [place] she underwent left hemithyroidectomy at [facility] in [place].  Since then she has been on levothyroxine and she cannot recall the dose.   She was switched to the nature thyroid currently taking 32.5 mg daily for 5 years and she cannot recall why and where she started.   She moved to [place] in October 2018 and try to find a physician who can prescribe nature thyroid.  She also has bipolar disorder diagnosed 2006 and currently has been changing multiple medications.     Psychiatry medications has been changing, celexa, geodon, and hydroxyzine.   Dr. Geronimo in [facility].     She went to PMD, and was told not to Rx of Nature Thyroid, and found places potentially prescribe nature thyroid and found here.     Energy level:  hard to say and not good, due to changing psychiatry medication  Dry skin: she used to have olanzapine, then recetnly changed to geodon, and currently dry skin.   Hair loss: no  Weight changes: gained weight with olanzapine, but not stopped olanzapine for 4 month  BM: regular last few days  Concentration: no  Family history of thyroid disease: mother+      Past Medical/Surgical History:  Past Medical History:   Diagnosis Date    Bipolar disorder (H)     Depression with anxiety     Hashimoto's thyroiditis     Hyperlipidemia     Mild intermittent asthma     S/P partial thyroidectomy     Superficial perivascular dermatitis      Past Surgical History:   Procedure Laterality Date    COLONOSCOPY N/A 4/14/2021    Procedure: COLONOSCOPY;  Surgeon: Guerita Shannon MD;  Location: [facility]    COLONOSCOPY N/A 4/14/2021    Procedure: Colonoscopy, With Polypectomy And Biopsy;  Surgeon: Guerita Shannon MD;  Location: [facility]    partial thyroidectomy      TONSILLECTOMY      TUBAL LIGATION         Allergies:  Allergies   Allergen Reactions    Quetiapine Rash    Valproic Acid Rash     Drug rash       Current Medications   Current Outpatient Medications   Medication    albuterol (PROAIR HFA/PROVENTIL HFA/VENTOLIN HFA) 108 (90 Base) MCG/ACT inhaler    atorvastatin (LIPITOR) 10 MG tablet    buPROPion (WELLBUTRIN XL) 150 MG 24 hr tablet    clonazePAM (KLONOPIN) 0.5 MG tablet    fish oil-omega-3 fatty acids 500 MG capsule    gabapentin (NEURONTIN) 300 MG capsule    levothyroxine (SYNTHROID/LEVOTHROID) 50 MCG tablet    levothyroxine (SYNTHROID/LEVOTHROID) 75 MCG tablet    melatonin ER 3 MG tablet    montelukast (SINGULAIR) 10 MG tablet     No current facility-administered medications for this visit.       Family History:  Family History   Problem Relation Age of Onset    Colon Polyps Mother     Glaucoma Mother     Eye Disorder Father     Factor V Leiden deficiency Father         pt tested negative    Hyperlipidemia Father     Glaucoma  Father     Macular Degeneration Paternal Grandmother        Social History:  Social History     Tobacco Use    Smoking status: Never    Smokeless tobacco: Never   Vaping Use    Vaping status: Not on file   Substance Use Topics    Alcohol use: Not Currently   lives with her son, Job: no job, on sliding scale insulin for mental     ROS:  Full review of systems taken with the help of the intake sheet. Otherwise a complete 14 point review of systems was taken and is negative unless stated in the history above.      Physical Exam:   Vitals: There were no vitals taken for this visit.  BMI= There is no height or weight on file to calculate BMI.   General: well appearing, no acute distress, pleasant and conversant,   Mental Status/neuro: alert and oriented  Face: symmetrical, normal facial color  Eyes: anicteric, no proptosis or lid lag  Resp: no acute distress      Labs : I reviewed data from epic and extract and summarize the pertinent data here.     Lab Results   Component Value Date    TSH 3.21 01/22/2020

## 2023-06-07 NOTE — NURSING NOTE
Is the patient currently in the state of [place]? YES    Visit mode:VIDEO    If the visit is dropped, the patient can be reconnected by: VIDEO VISIT: Text to cell phone: [phone]    Will anyone else be joining the visit? NO      How would you like to obtain your AVS? MyChart    Are changes needed to the allergy or medication list? NO    Reason for visit: RECHECK and Video Visit

## 2023-06-07 NOTE — PROGRESS NOTES
Katie Griffiths  is being evaluated via a billable video visit.      How would you like to obtain your AVS? [employer]  For the video visit, send the invitation by: Send to e-mail at: [email or web address]  Will anyone else be joining your video visit? No        Video-Visit Details    Type of service:  Video Visit    Video Start Time: 7:30 am  End; 8:00 am    Originating Location (pt. Location): Home    Distant Location (provider location):  [facility] [place]     Platform used for Video Visit: [facility]                                                                                    - Endocrinology Follow up -    Reason for visit/consult: hypothyroidism    Primary care provider: Kim Gore      Assessment and Plan  A 62 year old female with hypothyroidism     # Hypothyrodism  Post operative,   Previously was undercotrolled with Nature thyroid 32.5 mg.  Switched from Nature to LT4 50 mcg and has been doing well. Lab showed persistently good range of normal. TSH 2.57, free T4 1.1,    - continue current LT4 50 / 75 mcg alternate for the next 3 month    - recheck TFTs in 1 month    # Mental health  Bipolar    # environmental stress    # hair loss    # Feeling hot sometimes    # not feeling great    These ambiguus symptoms may due to post menopausal, today we opened discussion about very small dose of HRT if she is interested in.           RTC with me in 1 year      30 minutes spent on the date of the encounter doing chart review, history and exam, documentation and further activities as noted above.    Mary Sawyer MD  Staff Physician  Endocrinology and Metabolism  [facility]  License: [id number]  Pager: [phone]    Interval History as of 6/7/2023 : Patient has been doing well. Last seen . Medication compliance   . New event includes: not feeling great, dry skin, hair loss, brittle nails, occasionally feeling hot  .  Interval History as of 6/1/2022 :  Patient has been doing well-fair. Medication compliance excellent   . New event includes: Hair loss temporal and eye brows got worse with LT4 75 mcg daily but now alternating dose and stable. Her hair was much better when she was taking Nature thyroid.   Interval History as of 11/10/2021 : Patient has been doing well. Medication compliancegood to LT4 50 mcg   . New event includes : many environmental stress and worsening mental condition, could not access her regular psychologist and seeking for a new one .  Interval History as of 11/2/2020 : Patient has been doing well. Switched from Nature to LT4 50 mcg and has been doing well. Lab showed good range of normal. TSH 2.7, free T4 1.1  HPI: A 60 yo female here for the evaluation for her hypothryodism.  Patient is a self-referral came here today.   Patient was diagnosed thyroid nodule in 2006 when she was in [place] she underwent left hemithyroidectomy at [facility] in [place].  Since then she has been on levothyroxine and she cannot recall the dose.   She was switched to the nature thyroid currently taking 32.5 mg daily for 5 years and she cannot recall why and where she started.   She moved to [place] in October 2018 and try to find a physician who can prescribe nature thyroid.  She also has bipolar disorder diagnosed 2006 and currently has been changing multiple medications.     Psychiatry medications has been changing, celexa, geodon, and hydroxyzine.   Dr. Geronimo in [facility].     She went to PMD, and was told not to Rx of Nature Thyroid, and found places potentially prescribe nature thyroid and found here.     Energy level: hard to say and not good, due to changing psychiatry medication  Dry skin: she used to have olanzapine, then recetnly changed to geodon, and currently dry skin.   Hair loss: no  Weight changes: gained weight with olanzapine, but not stopped olanzapine for 4 month  BM: regular last few days  Concentration: no  Family history of  thyroid disease: mother+      Past Medical/Surgical History:  Past Medical History:   Diagnosis Date     Bipolar disorder (H)      Depression with anxiety      Hashimoto's thyroiditis      Hyperlipidemia      Mild intermittent asthma      S/P partial thyroidectomy      Superficial perivascular dermatitis      Past Surgical History:   Procedure Laterality Date     COLONOSCOPY N/A 4/14/2021    Procedure: COLONOSCOPY;  Surgeon: Guerita Shannon MD;  Location: [facility]     COLONOSCOPY N/A 4/14/2021    Procedure: Colonoscopy, With Polypectomy And Biopsy;  Surgeon: Guerita Shannon MD;  Location: [facility]     partial thyroidectomy       TONSILLECTOMY       TUBAL LIGATION         Allergies:  Allergies   Allergen Reactions     Quetiapine Rash     Valproic Acid Rash     Drug rash       Current Medications   Current Outpatient Medications   Medication     albuterol (PROAIR HFA/PROVENTIL HFA/VENTOLIN HFA) 108 (90 Base) MCG/ACT inhaler     atorvastatin (LIPITOR) 10 MG tablet     buPROPion (WELLBUTRIN XL) 150 MG 24 hr tablet     clonazePAM (KLONOPIN) 0.5 MG tablet     fish oil-omega-3 fatty acids 500 MG capsule     gabapentin (NEURONTIN) 300 MG capsule     levothyroxine (SYNTHROID/LEVOTHROID) 50 MCG tablet     levothyroxine (SYNTHROID/LEVOTHROID) 75 MCG tablet     melatonin ER 3 MG tablet     montelukast (SINGULAIR) 10 MG tablet     No current facility-administered medications for this visit.       Family History:  Family History   Problem Relation Age of Onset     Colon Polyps Mother      Glaucoma Mother      Eye Disorder Father      Factor V Leiden deficiency Father         pt tested negative     Hyperlipidemia Father      Glaucoma Father      Macular Degeneration Paternal Grandmother        Social History:  Social History     Tobacco Use     Smoking status: Never     Smokeless tobacco: Never   Vaping Use     Vaping status: Not on file   Substance Use Topics     Alcohol use: Not Currently   lives with her son, Job: no job, on  sliding scale insulin for mental     ROS:  Full review of systems taken with the help of the intake sheet. Otherwise a complete 14 point review of systems was taken and is negative unless stated in the history above.      Physical Exam:   Vitals: There were no vitals taken for this visit.  BMI= There is no height or weight on file to calculate BMI.   General: well appearing, no acute distress, pleasant and conversant,   Mental Status/neuro: alert and oriented  Face: symmetrical, normal facial color  Eyes: anicteric, no proptosis or lid lag  Resp: no acute distress      Labs : I reviewed data from epic and extract and summarize the pertinent data here.     Lab Results   Component Value Date    TSH 3.21 01/22/2020

## 2023-06-27 ENCOUNTER — NURSE TRIAGE (OUTPATIENT)
Dept: NURSING | Facility: CLINIC | Age: 63
End: 2023-06-27
Payer: MEDICARE

## 2023-06-27 NOTE — TELEPHONE ENCOUNTER
Son being admitted to [facility] and patient needs to cancel her appointments for 6-28-23 including mammogram. Not able to cancel after hours but patient will call early tomorrow. Phone numbers for MA and PCC clinic given.    Elvira Chi RN  [facility]

## 2023-07-30 ENCOUNTER — HEALTH MAINTENANCE LETTER (OUTPATIENT)
Age: 63
End: 2023-07-30

## 2023-08-07 DIAGNOSIS — E78.5 HYPERLIPIDEMIA: ICD-10-CM

## 2023-08-09 RX ORDER — ATORVASTATIN CALCIUM 10 MG/1
10 TABLET, FILM COATED ORAL DAILY
Qty: 90 TABLET | Refills: 0 | Status: SHIPPED | OUTPATIENT
Start: 2023-08-09 | End: 2023-09-03

## 2023-08-09 NOTE — TELEPHONE ENCOUNTER
atorvastatin (LIPITOR) 10 MG tablet      Last Written Prescription Date:  5/8/23  Last Fill Quantity: 90,   # refills: 0  Last Office Visit : 5/8/23  Future Office visit:  none       Overdue for lab: LDL  90d lázaro refill sent, routed to clinic staff for follow up

## 2023-08-15 DIAGNOSIS — E06.3 HASHIMOTO'S THYROIDITIS: ICD-10-CM

## 2023-08-15 DIAGNOSIS — E06.3 HYPOTHYROIDISM DUE TO HASHIMOTO'S THYROIDITIS: ICD-10-CM

## 2023-08-18 RX ORDER — LEVOTHYROXINE SODIUM 75 UG/1
TABLET ORAL
Qty: 45 TABLET | Refills: 3 | Status: SHIPPED | OUTPATIENT
Start: 2023-08-18 | End: 2023-11-15

## 2023-08-18 RX ORDER — LEVOTHYROXINE SODIUM 50 UG/1
50 TABLET ORAL EVERY OTHER DAY
Qty: 45 TABLET | Refills: 3 | Status: SHIPPED | OUTPATIENT
Start: 2023-08-18 | End: 2023-11-15

## 2023-08-23 ENCOUNTER — LAB (OUTPATIENT)
Dept: LAB | Facility: CLINIC | Age: 63
End: 2023-08-23
Payer: MEDICARE

## 2023-08-23 ENCOUNTER — ANCILLARY PROCEDURE (OUTPATIENT)
Dept: MAMMOGRAPHY | Facility: CLINIC | Age: 63
End: 2023-08-23
Attending: PEDIATRICS
Payer: MEDICARE

## 2023-08-23 DIAGNOSIS — E78.5 HYPERLIPIDEMIA: ICD-10-CM

## 2023-08-23 DIAGNOSIS — Z12.31 VISIT FOR SCREENING MAMMOGRAM: ICD-10-CM

## 2023-08-23 DIAGNOSIS — E06.3 HASHIMOTO'S THYROIDITIS: ICD-10-CM

## 2023-08-23 DIAGNOSIS — E03.9 HYPOTHYROIDISM, UNSPECIFIED TYPE: ICD-10-CM

## 2023-08-23 LAB
CHOLEST SERPL-MCNC: 165 MG/DL
HDLC SERPL-MCNC: 74 MG/DL
LDLC SERPL CALC-MCNC: 76 MG/DL
NONHDLC SERPL-MCNC: 91 MG/DL
T3 SERPL-MCNC: 69 NG/DL (ref 85–202)
T4 FREE SERPL-MCNC: 1.69 NG/DL (ref 0.9–1.7)
TRIGL SERPL-MCNC: 76 MG/DL
TSH SERPL DL<=0.005 MIU/L-ACNC: 2.08 UIU/ML (ref 0.3–4.2)

## 2023-08-23 PROCEDURE — 84480 ASSAY TRIIODOTHYRONINE (T3): CPT | Performed by: INTERNAL MEDICINE

## 2023-08-23 PROCEDURE — 36415 COLL VENOUS BLD VENIPUNCTURE: CPT | Performed by: PATHOLOGY

## 2023-08-23 PROCEDURE — 80061 LIPID PANEL: CPT | Performed by: PATHOLOGY

## 2023-08-23 PROCEDURE — 77063 BREAST TOMOSYNTHESIS BI: CPT | Performed by: RADIOLOGY

## 2023-08-23 PROCEDURE — 84443 ASSAY THYROID STIM HORMONE: CPT | Performed by: PATHOLOGY

## 2023-08-23 PROCEDURE — 84439 ASSAY OF FREE THYROXINE: CPT | Performed by: PATHOLOGY

## 2023-08-23 PROCEDURE — 99000 SPECIMEN HANDLING OFFICE-LAB: CPT | Performed by: PATHOLOGY

## 2023-08-23 PROCEDURE — 77067 SCR MAMMO BI INCL CAD: CPT | Performed by: RADIOLOGY

## 2023-08-26 RX ORDER — LEVOTHYROXINE SODIUM 75 UG/1
75 TABLET ORAL DAILY
Qty: 90 TABLET | Refills: 1 | Status: SHIPPED | OUTPATIENT
Start: 2023-08-26 | End: 2023-08-26

## 2023-09-03 ENCOUNTER — MYC MEDICAL ADVICE (OUTPATIENT)
Dept: INTERNAL MEDICINE | Facility: CLINIC | Age: 63
End: 2023-09-03
Payer: MEDICARE

## 2023-09-03 DIAGNOSIS — Z79.899 ON STATIN THERAPY DUE TO RISK OF FUTURE CARDIOVASCULAR EVENT: ICD-10-CM

## 2023-09-03 DIAGNOSIS — E78.5 HYPERLIPIDEMIA: Primary | ICD-10-CM

## 2023-09-03 RX ORDER — ATORVASTATIN CALCIUM 10 MG/1
10 TABLET, FILM COATED ORAL DAILY
Qty: 90 TABLET | Refills: 3 | Status: SHIPPED | OUTPATIENT
Start: 2023-09-03 | End: 2024-09-13

## 2023-09-03 NOTE — ASSESSMENT & PLAN NOTE
MyC:  Hello,  I am writing with some comments about your recent lipid tests.    LIPID profile was done.  My understanding is that you had fasted for at least 8-12 hours before this blood draw.  Your results looked good, on the atorvastatin. Please continue taking it, as it is reducing your long-term risk of heart disease and stroke.    I sent another refill, good for a year.

## 2023-10-10 ENCOUNTER — MYC MEDICAL ADVICE (OUTPATIENT)
Dept: ENDOCRINOLOGY | Facility: CLINIC | Age: 63
End: 2023-10-10
Payer: MEDICARE

## 2023-10-10 DIAGNOSIS — E78.5 DYSLIPIDEMIA: Primary | ICD-10-CM

## 2024-01-17 ENCOUNTER — TELEPHONE (OUTPATIENT)
Dept: ENDOCRINOLOGY | Facility: CLINIC | Age: 64
End: 2024-01-17
Payer: MEDICARE

## 2024-01-17 NOTE — TELEPHONE ENCOUNTER
Patient call:     Appointment type: return endocrine   Provider: [facility]   Return date: reschedule 6/5 appt   Speciality phone number: [phone]  Additional appointment(s) needed:   Additional notes: LVM and sent debbie Rodrigez on 1/17/2024 at 11:16 AM

## 2024-03-11 ENCOUNTER — TELEPHONE (OUTPATIENT)
Dept: ENDOCRINOLOGY | Facility: CLINIC | Age: 64
End: 2024-03-11
Payer: MEDICARE

## 2024-03-11 NOTE — TELEPHONE ENCOUNTER
Katie declines to do the 50 mcg daily  with the 50 mg tablet. She is going to use the 75 mcg and the 50 mcg on hand  alternating  1/2 tabs of 75 mcg  with 50 mcg and see what her levels do .Carrie Herrera RN on 3/11/2024 at 3:57 PM

## 2024-05-08 ENCOUNTER — MEDICAL CORRESPONDENCE (OUTPATIENT)
Dept: HEALTH INFORMATION MANAGEMENT | Facility: CLINIC | Age: 64
End: 2024-05-08
Payer: MEDICARE

## 2024-05-08 ENCOUNTER — TRANSFERRED RECORDS (OUTPATIENT)
Dept: HEALTH INFORMATION MANAGEMENT | Facility: CLINIC | Age: 64
End: 2024-05-08
Payer: MEDICARE

## 2024-05-09 ENCOUNTER — TRANSCRIBE ORDERS (OUTPATIENT)
Dept: OTHER | Age: 64
End: 2024-05-09

## 2024-05-09 DIAGNOSIS — I95.9 HYPOTENSION: Primary | ICD-10-CM

## 2024-05-09 DIAGNOSIS — R00.1 BRADYCARDIA: ICD-10-CM

## 2024-05-24 ENCOUNTER — TRANSFERRED RECORDS (OUTPATIENT)
Dept: HEALTH INFORMATION MANAGEMENT | Facility: CLINIC | Age: 64
End: 2024-05-24

## 2024-07-25 NOTE — TELEPHONE ENCOUNTER
Action 7/25/24 [facility]  Fax #[phone]   Action Taken Requested referring provider's note    Sent note to scanning 7/26      Action 7/25/24   Fax #[phone]   Action Taken Requested EKG 5-26-24    Sent EKG to scanning 7/26

## 2024-07-31 ENCOUNTER — OFFICE VISIT (OUTPATIENT)
Dept: CARDIOLOGY | Facility: CLINIC | Age: 64
End: 2024-07-31
Attending: NURSE PRACTITIONER
Payer: MEDICARE

## 2024-07-31 ENCOUNTER — PRE VISIT (OUTPATIENT)
Dept: CARDIOLOGY | Facility: CLINIC | Age: 64
End: 2024-07-31

## 2024-07-31 ENCOUNTER — LAB (OUTPATIENT)
Dept: LAB | Facility: CLINIC | Age: 64
End: 2024-07-31
Payer: MEDICARE

## 2024-07-31 VITALS
SYSTOLIC BLOOD PRESSURE: 105 MMHG | OXYGEN SATURATION: 96 % | BODY MASS INDEX: 20.62 KG/M2 | HEART RATE: 73 BPM | WEIGHT: 123.9 LBS | DIASTOLIC BLOOD PRESSURE: 70 MMHG

## 2024-07-31 DIAGNOSIS — R06.09 DYSPNEA ON EXERTION: ICD-10-CM

## 2024-07-31 DIAGNOSIS — I95.0 IDIOPATHIC HYPOTENSION: ICD-10-CM

## 2024-07-31 DIAGNOSIS — R00.1 BRADYCARDIA: ICD-10-CM

## 2024-07-31 DIAGNOSIS — E06.3 HYPOTHYROIDISM DUE TO HASHIMOTO'S THYROIDITIS: ICD-10-CM

## 2024-07-31 DIAGNOSIS — F31.9 BIPOLAR AFFECTIVE DISORDER, REMISSION STATUS UNSPECIFIED (H): ICD-10-CM

## 2024-07-31 DIAGNOSIS — R00.0 TACHYCARDIA: Primary | ICD-10-CM

## 2024-07-31 DIAGNOSIS — E78.5 DYSLIPIDEMIA: ICD-10-CM

## 2024-07-31 LAB
CHOLEST SERPL-MCNC: 209 MG/DL
FASTING STATUS PATIENT QL REPORTED: YES
HDLC SERPL-MCNC: 96 MG/DL
LDLC SERPL CALC-MCNC: 94 MG/DL
NONHDLC SERPL-MCNC: 113 MG/DL
T3 SERPL-MCNC: 72 NG/DL (ref 85–202)
T4 FREE SERPL-MCNC: 1.03 NG/DL (ref 0.9–1.7)
TRIGL SERPL-MCNC: 93 MG/DL
TSH SERPL DL<=0.005 MIU/L-ACNC: 1.79 UIU/ML (ref 0.3–4.2)

## 2024-07-31 PROCEDURE — G0463 HOSPITAL OUTPT CLINIC VISIT: HCPCS | Performed by: INTERNAL MEDICINE

## 2024-07-31 PROCEDURE — 80061 LIPID PANEL: CPT | Performed by: PATHOLOGY

## 2024-07-31 PROCEDURE — 36415 COLL VENOUS BLD VENIPUNCTURE: CPT | Performed by: PATHOLOGY

## 2024-07-31 PROCEDURE — 93005 ELECTROCARDIOGRAM TRACING: CPT

## 2024-07-31 PROCEDURE — 93010 ELECTROCARDIOGRAM REPORT: CPT | Mod: GC | Performed by: INTERNAL MEDICINE

## 2024-07-31 PROCEDURE — 99000 SPECIMEN HANDLING OFFICE-LAB: CPT | Performed by: PATHOLOGY

## 2024-07-31 PROCEDURE — 84439 ASSAY OF FREE THYROXINE: CPT | Performed by: PATHOLOGY

## 2024-07-31 PROCEDURE — 84443 ASSAY THYROID STIM HORMONE: CPT | Performed by: PATHOLOGY

## 2024-07-31 PROCEDURE — 84480 ASSAY TRIIODOTHYRONINE (T3): CPT | Performed by: INTERNAL MEDICINE

## 2024-07-31 PROCEDURE — 99204 OFFICE O/P NEW MOD 45 MIN: CPT | Performed by: INTERNAL MEDICINE

## 2024-07-31 RX ORDER — CITALOPRAM HYDROBROMIDE 20 MG/1
1 TABLET ORAL
COMMUNITY
Start: 2024-07-02

## 2024-07-31 RX ORDER — LORAZEPAM 1 MG/1
0.5 TABLET ORAL 3 TIMES DAILY
COMMUNITY
Start: 2024-07-02

## 2024-07-31 RX ORDER — OLANZAPINE 15 MG/1
7.5 TABLET ORAL AT BEDTIME
COMMUNITY
Start: 2024-07-02

## 2024-07-31 ASSESSMENT — PAIN SCALES - GENERAL: PAINLEVEL: NO PAIN (0)

## 2024-07-31 NOTE — NURSING NOTE
Katie Griffiths arrived here on 7/31/2024 9:35 AM for 3-7 Days  Zio monitor placement per ordering provider Dr. Mendoza for the diagnosis Tachycardia.  Patient s skin was prepped per protocol. Dr. Velez is the supervising MD.  Zio monitor was placed.  Instructions were reviewed with and given to the patient.  Patient verbalized understanding of wear, troubleshooting and monitor return instructions.  Jaciel Marquez, Visit Facilitator

## 2024-07-31 NOTE — NURSING NOTE
Chief Complaint   Patient presents with    New Patient     new - [facility] by: Malini Owens, ISAAC  [facility]          Vitals were taken, medications reconciled.    Renato Winkler, Clinic Assistant   8:23 AM

## 2024-07-31 NOTE — PROGRESS NOTES
I am delighted to see Katie Griffiths in consultation.The primary encounter diagnosis was Dyspnea on exertion. Diagnoses of Hypotension, Bradycardia, Tachycardia, and Bipolar affective disorder, remission status unspecified (H) were also pertinent to this visit.   As you know, the patient is a 63 year old  female. She   has a past medical history of Bipolar disorder (H), Depression with anxiety, Dyspnea on exertion (7/31/2024), Hashimoto's thyroiditis, Hyperlipidemia, Mild intermittent asthma, S/P partial thyroidectomy, and Superficial perivascular dermatitis..    On this visit, the patient states that she was recently admitted for bipolar disorder. She was tachycardic then, but this has resolved.  She also complains of dyspnea for several years.  The patient denies chest pressure/discomfort, palpitations, near-syncope, syncope, orthopnea, paroxysmal nocturnal dyspnea, and lower extermity edema.    The patient's cardiovascular risk factors include high cholesterol.    The following portions of the patient's history were reviewed and updated as appropriate: allergies, current medications, past family history, past medical history, past social history, past surgical history, and the problem list.    PMH: The patient's past medical history includes:    Past Medical History:   Diagnosis Date    Bipolar disorder (H)     Depression with anxiety     Dyspnea on exertion 7/31/2024    Hashimoto's thyroiditis     Hyperlipidemia     Mild intermittent asthma     S/P partial thyroidectomy     Superficial perivascular dermatitis       Past Surgical History:   Procedure Laterality Date    COLONOSCOPY N/A 4/14/2021    Procedure: COLONOSCOPY;  Surgeon: Guerita Shannon MD;  Location: [facility]    COLONOSCOPY N/A 4/14/2021    Procedure: Colonoscopy, With Polypectomy And Biopsy;  Surgeon: Guerita Shannon MD;  Location: [facility]    partial thyroidectomy      TONSILLECTOMY      TUBAL LIGATION         The patient's medications as  of the current encounter are:     Current Outpatient Medications   Medication Sig Dispense Refill    albuterol (PROAIR HFA/PROVENTIL HFA/VENTOLIN HFA) 108 (90 Base) MCG/ACT inhaler Inhale 2 puffs into the lungs every 4 hours as needed for shortness of breath / dyspnea or wheezing 18 g 1    atorvastatin (LIPITOR) 10 MG tablet Take 1 tablet (10 mg) by mouth daily 90 tablet 3    buPROPion (WELLBUTRIN XL) 150 MG 24 hr tablet Take 150 mg by mouth every morning      citalopram (CELEXA) 20 MG tablet Take 1 tablet by mouth daily at 2 pm      fish oil-omega-3 fatty acids 500 MG capsule       gabapentin (NEURONTIN) 300 MG capsule Take 1 capsule (300mg) in morning and 1 capsule (300mg) in afternoon and 4 capsules (1200mg) near bedtime (Patient taking differently: 200 mg 3 times daily Take 1 capsule (300mg) in morning and 1 capsule (300mg) in afternoon and 4 capsules (1200mg) near bedtime) 540 capsule 0    levothyroxine (SYNTHROID/LEVOTHROID) 50 MCG tablet Take 1 tablet (50 mcg) by mouth daily 90 tablet 3    LORazepam (ATIVAN) 1 MG tablet Take 0.5 mg by mouth 3 times daily      melatonin ER 3 MG tablet Take 2 tablets (6 mg) by mouth At Bedtime 60 tablet 0    OLANZapine (ZYPREXA) 15 MG tablet Take 7.5 mg by mouth at bedtime         Labs:     Lab on 08/23/2023   Component Date Value Ref Range Status    TSH 08/23/2023 2.08  0.30 - 4.20 uIU/mL Final    Free T4 08/23/2023 1.69  0.90 - 1.70 ng/dL Final    T3 Total 08/23/2023 69 (L)  85 - 202 ng/dL Final    Cholesterol 08/23/2023 165  <200 mg/dL Final    Triglycerides 08/23/2023 76  <150 mg/dL Final    Direct Measure HDL 08/23/2023 74  >=50 mg/dL Final    LDL Cholesterol Calculated 08/23/2023 76  <=100 mg/dL Final    Non HDL Cholesterol 08/23/2023 91  <130 mg/dL Final       Allergies:    Allergies   Allergen Reactions    Montelukast Other (See Comments)     Mental jack    Quetiapine Rash    Valproic Acid Rash     Drug rash       Family History:   Family History   Problem Relation Age  of Onset    Colon Polyps Mother     Glaucoma Mother     Eye Disorder Father     Factor V Leiden deficiency Father         pt tested negative    Hyperlipidemia Father     Glaucoma Father     Macular Degeneration Paternal Grandmother     Diabetes Sister     No Known Problems Sister     No Known Problems Sister     Cancer Son     Arrhythmia Son     No Known Problems Son        Psychosocial history:  reports that she has never smoked. She has never used smokeless tobacco. She reports that she does not currently use alcohol. She reports that she does not use drugs.    Review of systems: negative for, exertional chest pain or pressure, paroxysmal nocturnal dyspnea, orthopnea, lower extremity edema, and syncope or near-syncope    In addition,   General: No change in weight, sleep or appetite.  Normal energy.  No fever or chills  Eyes: Negative for vision changes or eye problems  ENT: No problems with ears, nose or throat.  No difficulty swallowing.  Resp: No coughing, wheezing or shortness of breath  GI: No nausea, vomiting,  heartburn, abdominal pain, diarrhea, constipation or change in bowel habits  : No urinary frequency or dysuria, bladder or kidney problems  Musculoskeletal: No significant muscle or joint pains  Neurologic: h/o headaches  Psychiatric: recent admission for bipolar  Heme/immune/allergy: No history of bleeding or clotting problems or anemia.  No allergies or immune system problems  Endocrine: h/o Hashimoto  Skin: No rashes,worrisome lesions or skin problems  Vascular:  No claudication, lifestyle limiting or otherwise; no ischemic rest pain; no non-healing ulcers. No weakness, No loss of sensation        Physical examination  Vitals: /70 (BP Location: Left arm, Patient Position: Chair, Cuff Size: Adult Regular)   Pulse 73   Wt 56.2 kg (123 lb 14.4 oz)   SpO2 96%   BMI 20.62 kg/m    BMI= Body mass index is 20.62 kg/m .    In general, the patient is a pleasant female in no apparent distress.     HEENT: Normiocephalic and atraumatic.  PERRLA.  EOMI.  Sclerae white, not injected.    Neck: No adenopathy.  No thyromegaly. Carotids +2/2 bilaterally without bruits.  No jugular venous distension.   Heart:  The PMI is in the 5th ICS in the midclavicular line. There is no heave. Regular rate and rhythm. Normal S1, S2 splits physiologically. No murmur, rub, click, or gallop.    Lungs: Clear to asculation.  No ronchi, wheezes, rales.  No dullness to percussion.   Abdomen: Soft, nontender, nondistended. No organomegaly. No AAA.  No bruits.   Extremities: No clubbing, cyanosis, or edema. The pulses were intact bilaterally.   Neurological: The neurological examination reveal a patient who was oriented to person, place, and time.  The remainder of the examination was nonfocal.    Cardiac tests include:    5/24/24 - ECG - sinus tach, IRBBB, RAD    Assessment and Plan    Dyspnea - limited exercise tolerance  - plan dobutamine echo  2. Tachycardia - check 3 day zio  - recent TSH normal  3. HL - on statin, fish oil      The patient is to return  prn. The patient understood the treatment plan as outlined above.  There were no barriers to learning.      Vinicius Mendoza MD

## 2024-07-31 NOTE — PATIENT INSTRUCTIONS
Ziopatch monitor placed today in clinic, to be worn for 3 days.  Please mail as soon as completed.     After monitor has come off :    Complete a Dobutamine Stress Echocardiogram (ultrasound of heart)    As soon as results are compiled and reviewed, you will be notified.  Follow up based on results.     Dobutamine Stress Echocardiography (Echo)    This type of echocardiogram involves using dobutamine, a medicine that stimulates your heart similar to the way exercise does. It increases your heart rate and the squeezing function of your heart. Your healthcare provider gives you dobutamine through an IV. They then take ultrasound pictures of your heart, using sound waves through a device placed on your chest wall (echocardiography). The pictures are taken before and after you get dobutamine. This lets your healthcare provider see if blood is flowing properly through the heart and if your heart is pumping normally.    Before your test :  No alcohol, smoking, or caffeine for 12 hours before the test.   Don't eat for at least 3 hours before the test.  Make sure to wear a two-piece outfit. You may need to undress from the waist up and put on a short hospital gown.    During your test :  Your healthcare provider places small pads (electrodes) on your chest to record the electrical activity of your heart.  An intravenous (IV) line will be placed in your arm.  A painless device (transducer) coated with gel is moved firmly over your chest. This device creates ultrasonic, inaudible sound waves that make images of your heart on a screen.  You will slowly be given dobutamine through the IV, in small doses about every 3 minutes. It is normal to feel your heart pound for a few minutes, while the medicine is being given.  Images will be obtained before the infusion of the drug, during the infusion, and after your pulse returns to normal.  Your pulse and blood pressure  will be monitored during and after the test.    After your test  :  When the test is over, you may return to your normal routine.

## 2024-08-01 LAB
ATRIAL RATE - MUSE: 64 BPM
DIASTOLIC BLOOD PRESSURE - MUSE: NORMAL MMHG
INTERPRETATION ECG - MUSE: NORMAL
P AXIS - MUSE: 73 DEGREES
PR INTERVAL - MUSE: 176 MS
QRS DURATION - MUSE: 108 MS
QT - MUSE: 428 MS
QTC - MUSE: 441 MS
R AXIS - MUSE: 87 DEGREES
SYSTOLIC BLOOD PRESSURE - MUSE: NORMAL MMHG
T AXIS - MUSE: 57 DEGREES
VENTRICULAR RATE- MUSE: 64 BPM

## 2024-08-07 ENCOUNTER — VIRTUAL VISIT (OUTPATIENT)
Dept: ENDOCRINOLOGY | Facility: CLINIC | Age: 64
End: 2024-08-07
Payer: MEDICARE

## 2024-08-07 DIAGNOSIS — E06.3 HYPOTHYROIDISM DUE TO HASHIMOTO'S THYROIDITIS: Primary | ICD-10-CM

## 2024-08-07 DIAGNOSIS — F31.70 BIPOLAR AFFECTIVE DISORDER IN REMISSION (H): ICD-10-CM

## 2024-08-07 PROCEDURE — 99214 OFFICE O/P EST MOD 30 MIN: CPT | Mod: 95 | Performed by: INTERNAL MEDICINE

## 2024-08-07 RX ORDER — LEVOTHYROXINE SODIUM 50 UG/1
50 TABLET ORAL DAILY
Qty: 90 TABLET | Refills: 3 | Status: SHIPPED | OUTPATIENT
Start: 2024-08-07

## 2024-08-07 NOTE — LETTER
8/7/2024       RE: Katie Griffiths  [address]  [facility] [address]     Dear Colleague,    Thank you for referring your patient, Katie Griffiths, to the [facility] [place] at [facility]. Please see a copy of my visit note below.    Katie Griffiths  is being evaluated via a billable video visit.      How would you like to obtain your AVS? Kleermailhar  For the video visit, send the invitation by: Send to e-mail at: [email or web address]  Will anyone else be joining your video visit? No        Video-Visit Details    Type of service:  Video Visit    Video Start Time: 7:38 am  End  7:52  am    Originating Location (pt. Location): Home    Distant Location (provider location):  [facility] [place]     Platform used for Video Visit: AmWell                                                                                    - Endocrinology Follow up -    Reason for visit/consult: hypothyroidism    Primary care provider: Kim Gore      Assessment and Plan  A 63 year old female with hypothyroidism, bipolar disorder,    # Hypothyrodism  Post operative,   Previously was undercotrolled with Nature thyroid 32.5 mg.  Switched from Nature to LT4 50 mcg and has been doing well. Lab showed persistently good range of normal. TSH 2.57, free T4 1.1,    - continue current LT4 50  mcg daily, recent lab 7/2024 and clinically appears euthyroid.     - recheck TFTs in 1 month    # Mental health  Bipolar 1, she was recently (spring 2024) hospitalized at [facility], at that time dose of levothryoxine was changed to LT4 50 mcg daily.     # environmental stress    # hair loss    # Feeling hot sometimes  She did not complaint this time        RTC with me in 1 year      30 minutes spent on the date of the encounter doing chart review, history and exam, documentation and further activities as noted  above.    Mary Sawyer MD  Staff Physician  Endocrinology and Metabolism  [facility]  License: [place] [id number]  Pager: [phone]    Interval History as of 8/7/2024 : Patient has been doing ok, she had recent hospitalization to psych unit at [facility] for bipolar. . Last seen . Medication compliance  excellent and compliant.   Interval History as of 6/7/2023 : Patient has been doing well. Last seen . Medication compliance   . New event includes: not feeling great, dry skin, hair loss, brittle nails, occasionally feeling hot  .  Interval History as of 6/1/2022 : Patient has been doing well-fair. Medication compliance excellent   . New event includes: Hair loss temporal and eye brows got worse with LT4 75 mcg daily but now alternating dose and stable. Her hair was much better when she was taking Nature thyroid.   Interval History as of 11/10/2021 : Patient has been doing well. Medication compliancegood to LT4 50 mcg   . New event includes : many environmental stress and worsening mental condition, could not access her regular psychologist and seeking for a new one .  Interval History as of 11/2/2020 : Patient has been doing well. Switched from Nature to LT4 50 mcg and has been doing well. Lab showed good range of normal. TSH 2.7, free T4 1.1  HPI: A 60 yo female here for the evaluation for her hypothryodism.  Patient is a self-referral came here today.   Patient was diagnosed thyroid nodule in 2006 when she was in [place] she underwent left hemithyroidectomy at [facility] in [place].  Since then she has been on levothyroxine and she cannot recall the dose.   She was switched to the nature thyroid currently taking 32.5 mg daily for 5 years and she cannot recall why and where she started.   She moved to [place] in October 2018 and try to find a physician who can prescribe nature thyroid.  She also has bipolar disorder diagnosed 2006 and currently has been changing multiple medications.      Psychiatry medications has been changing, celexa, geodon, and hydroxyzine.   Dr. Geronimo in [facility].     She went to PMD, and was told not to Rx of Nature Thyroid, and found places potentially prescribe nature thyroid and found here.     Energy level: hard to say and not good, due to changing psychiatry medication  Dry skin: she used to have olanzapine, then recetnly changed to geodon, and currently dry skin.   Hair loss: no  Weight changes: gained weight with olanzapine, but not stopped olanzapine for 4 month  BM: regular last few days  Concentration: no  Family history of thyroid disease: mother+      Past Medical/Surgical History:  Past Medical History:   Diagnosis Date     Bipolar disorder (H)      Depression with anxiety      Dyspnea on exertion 7/31/2024     Hashimoto's thyroiditis      Hyperlipidemia      Mild intermittent asthma      S/P partial thyroidectomy      Superficial perivascular dermatitis      Past Surgical History:   Procedure Laterality Date     COLONOSCOPY N/A 4/14/2021    Procedure: COLONOSCOPY;  Surgeon: Guerita Shannon MD;  [facility]     COLONOSCOPY N/A 4/14/2021    Procedure: Colonoscopy, With Polypectomy And Biopsy;  Surgeon: Guerita Shannon MD;  [facility]     partial thyroidectomy       TONSILLECTOMY       TUBAL LIGATION         Allergies:  Allergies   Allergen Reactions     Montelukast Other (See Comments)     Mental jack     Quetiapine Rash     Valproic Acid Rash     Drug rash       Current Medications   Current Outpatient Medications   Medication Sig Dispense Refill     albuterol (PROAIR HFA/PROVENTIL HFA/VENTOLIN HFA) 108 (90 Base) MCG/ACT inhaler Inhale 2 puffs into the lungs every 4 hours as needed for shortness of breath / dyspnea or wheezing 18 g 1     atorvastatin (LIPITOR) 10 MG tablet Take 1 tablet (10 mg) by mouth daily 90 tablet 3     buPROPion (WELLBUTRIN XL) 150 MG 24 hr tablet Take 150 mg by mouth every morning       citalopram (CELEXA) 20 MG  tablet Take 1 tablet by mouth daily at 2 pm       fish oil-omega-3 fatty acids 500 MG capsule        gabapentin (NEURONTIN) 300 MG capsule Take 1 capsule (300mg) in morning and 1 capsule (300mg) in afternoon and 4 capsules (1200mg) near bedtime (Patient taking differently: 200 mg 3 times daily Take 1 capsule (300mg) in morning and 1 capsule (300mg) in afternoon and 4 capsules (1200mg) near bedtime) 540 capsule 0     levothyroxine (SYNTHROID/LEVOTHROID) 50 MCG tablet Take 1 tablet (50 mcg) by mouth daily 90 tablet 3     LORazepam (ATIVAN) 1 MG tablet Take 0.5 mg by mouth 3 times daily       melatonin ER 3 MG tablet Take 2 tablets (6 mg) by mouth At Bedtime 60 tablet 0     OLANZapine (ZYPREXA) 15 MG tablet Take 7.5 mg by mouth at bedtime       No current facility-administered medications for this visit.       Family History:  Family History   Problem Relation Age of Onset     Colon Polyps Mother      Glaucoma Mother      Eye Disorder Father      Factor V Leiden deficiency Father         pt tested negative     Hyperlipidemia Father      Glaucoma Father      Macular Degeneration Paternal Grandmother      Diabetes Sister      No Known Problems Sister      No Known Problems Sister      Cancer Son      Arrhythmia Son      No Known Problems Son        Social History:  Social History     Tobacco Use     Smoking status: Never     Smokeless tobacco: Never   Substance Use Topics     Alcohol use: Not Currently   lives with her son, Job: no job, on sliding scale insulin for mental     ROS:  Full review of systems taken with the help of the intake sheet. Otherwise a complete 14 point review of systems was taken and is negative unless stated in the history above.      Physical Exam:   Vitals: There were no vitals taken for this visit.  BMI= There is no height or weight on file to calculate BMI.   General: well appearing, no acute distress, pleasant and conversant,   Mental Status/neuro: alert and oriented  Face: symmetrical, normal  facial color  Eyes: anicteric, no proptosis or lid lag  Resp: no acute distress      Labs : I reviewed data from epic and extract and summarize the pertinent data here.     Lab Results   Component Value Date    TSH 3.21 01/22/2020         Again, thank you for allowing me to participate in the care of your patient.      Sincerely,    Mary Sawyer MD

## 2024-08-07 NOTE — NURSING NOTE
Reported another allergy but cannot remember the name    Current patient location: [place]    Is the patient currently in the state of [place]? YES    Visit mode:VIDEO    If the visit is dropped, the patient can be reconnected by: VIDEO VISIT: Send to e-mail at: [email or web address]    Will anyone else be joining the visit? NO  (If patient encounters technical issues they should call [phone])    How would you like to obtain your AVS? MyChart    Are changes needed to the allergy or medication list? Patient reported having another allergy but cannot remember the name. Katie reported no changes that needed to be updated as only recent change was for her gabapentin, which is already listed in the chart.    Are refills needed on medications prescribed by this physician? Unknown    Rooming Documentation:  Patient declined to complete questionnaire(s)      Reason for visit: RECHECK    Idalia SOLIS

## 2024-08-07 NOTE — PROGRESS NOTES
Katie Griffiths  is being evaluated via a billable video visit.      How would you like to obtain your AVS? [employer]  For the video visit, send the invitation by: Send to e-mail at: [email or web address]  Will anyone else be joining your video visit? No        Video-Visit Details    Type of service:  Video Visit    Video Start Time: 7:38 am  End  7:52  am    Originating Location (pt. Location): Home    Distant Location (provider location):  [facility] [place]     Platform used for Video Visit: [facility]                                                                                    - Endocrinology Follow up -    Reason for visit/consult: hypothyroidism    Primary care provider: Kim Gore      Assessment and Plan  A 63 year old female with hypothyroidism, bipolar disorder,    # Hypothyrodism  Post operative,   Previously was undercotrolled with Nature thyroid 32.5 mg.  Switched from Nature to LT4 50 mcg and has been doing well. Lab showed persistently good range of normal. TSH 2.57, free T4 1.1,    - continue current LT4 50  mcg daily, recent lab 7/2024 and clinically appears euthyroid.     - recheck TFTs in 1 month    # Mental health  Bipolar 1, she was recently (spring 2024) hospitalized at [facility], at that time dose of levothryoxine was changed to LT4 50 mcg daily.     # environmental stress    # hair loss    # Feeling hot sometimes  She did not complaint this time        RTC with me in 1 year      30 minutes spent on the date of the encounter doing chart review, history and exam, documentation and further activities as noted above.    Mary Sawyer MD  Staff Physician  Endocrinology and Metabolism  [facility]  License: [place] [id number]  Pager: [phone]    Interval History as of 8/7/2024 : Patient has been doing ok, she had recent hospitalization to psych unit at [facility] for bipolar. . Last seen . Medication compliance  excellent and  compliant.   Interval History as of 6/7/2023 : Patient has been doing well. Last seen . Medication compliance   . New event includes: not feeling great, dry skin, hair loss, brittle nails, occasionally feeling hot  .  Interval History as of 6/1/2022 : Patient has been doing well-fair. Medication compliance excellent   . New event includes: Hair loss temporal and eye brows got worse with LT4 75 mcg daily but now alternating dose and stable. Her hair was much better when she was taking Nature thyroid.   Interval History as of 11/10/2021 : Patient has been doing well. Medication compliancegood to LT4 50 mcg   . New event includes : many environmental stress and worsening mental condition, could not access her regular psychologist and seeking for a new one .  Interval History as of 11/2/2020 : Patient has been doing well. Switched from Nature to LT4 50 mcg and has been doing well. Lab showed good range of normal. TSH 2.7, free T4 1.1  HPI: A 60 yo female here for the evaluation for her hypothryodism.  Patient is a self-referral came here today.   Patient was diagnosed thyroid nodule in 2006 when she was in [place] she underwent left hemithyroidectomy at [facility] in [place].  Since then she has been on levothyroxine and she cannot recall the dose.   She was switched to the nature thyroid currently taking 32.5 mg daily for 5 years and she cannot recall why and where she started.   She moved to [place] in October 2018 and try to find a physician who can prescribe nature thyroid.  She also has bipolar disorder diagnosed 2006 and currently has been changing multiple medications.     Psychiatry medications has been changing, celexa, geodon, and hydroxyzine.   Dr. Geronimo in [facility] [place].     She went to PMD, and was told not to Rx of Nature Thyroid, and found places potentially prescribe nature thyroid and found here.     Energy level: hard to say and not good, due to changing psychiatry medication  Dry skin:  she used to have olanzapine, then recetnly changed to geodon, and currently dry skin.   Hair loss: no  Weight changes: gained weight with olanzapine, but not stopped olanzapine for 4 month  BM: regular last few days  Concentration: no  Family history of thyroid disease: mother+      Past Medical/Surgical History:  Past Medical History:   Diagnosis Date    Bipolar disorder (H)     Depression with anxiety     Dyspnea on exertion 7/31/2024    Hashimoto's thyroiditis     Hyperlipidemia     Mild intermittent asthma     S/P partial thyroidectomy     Superficial perivascular dermatitis      Past Surgical History:   Procedure Laterality Date    COLONOSCOPY N/A 4/14/2021    Procedure: COLONOSCOPY;  Surgeon: Guerita Shannon MD;  [facility]    COLONOSCOPY N/A 4/14/2021    Procedure: Colonoscopy, With Polypectomy And Biopsy;  Surgeon: Guerita Shannon MD;  [facility]    partial thyroidectomy      TONSILLECTOMY      TUBAL LIGATION         Allergies:  Allergies   Allergen Reactions    Montelukast Other (See Comments)     Mental jack    Quetiapine Rash    Valproic Acid Rash     Drug rash       Current Medications   Current Outpatient Medications   Medication Sig Dispense Refill    albuterol (PROAIR HFA/PROVENTIL HFA/VENTOLIN HFA) 108 (90 Base) MCG/ACT inhaler Inhale 2 puffs into the lungs every 4 hours as needed for shortness of breath / dyspnea or wheezing 18 g 1    atorvastatin (LIPITOR) 10 MG tablet Take 1 tablet (10 mg) by mouth daily 90 tablet 3    buPROPion (WELLBUTRIN XL) 150 MG 24 hr tablet Take 150 mg by mouth every morning      citalopram (CELEXA) 20 MG tablet Take 1 tablet by mouth daily at 2 pm      fish oil-omega-3 fatty acids 500 MG capsule       gabapentin (NEURONTIN) 300 MG capsule Take 1 capsule (300mg) in morning and 1 capsule (300mg) in afternoon and 4 capsules (1200mg) near bedtime (Patient taking differently: 200 mg 3 times daily Take 1 capsule (300mg) in morning and 1 capsule (300mg) in  afternoon and 4 capsules (1200mg) near bedtime) 540 capsule 0    levothyroxine (SYNTHROID/LEVOTHROID) 50 MCG tablet Take 1 tablet (50 mcg) by mouth daily 90 tablet 3    LORazepam (ATIVAN) 1 MG tablet Take 0.5 mg by mouth 3 times daily      melatonin ER 3 MG tablet Take 2 tablets (6 mg) by mouth At Bedtime 60 tablet 0    OLANZapine (ZYPREXA) 15 MG tablet Take 7.5 mg by mouth at bedtime       No current facility-administered medications for this visit.       Family History:  Family History   Problem Relation Age of Onset    Colon Polyps Mother     Glaucoma Mother     Eye Disorder Father     Factor V Leiden deficiency Father         pt tested negative    Hyperlipidemia Father     Glaucoma Father     Macular Degeneration Paternal Grandmother     Diabetes Sister     No Known Problems Sister     No Known Problems Sister     Cancer Son     Arrhythmia Son     No Known Problems Son        Social History:  Social History     Tobacco Use    Smoking status: Never    Smokeless tobacco: Never   Substance Use Topics    Alcohol use: Not Currently   lives with her son, Job: no job, on sliding scale insulin for mental     ROS:  Full review of systems taken with the help of the intake sheet. Otherwise a complete 14 point review of systems was taken and is negative unless stated in the history above.      Physical Exam:   Vitals: There were no vitals taken for this visit.  BMI= There is no height or weight on file to calculate BMI.   General: well appearing, no acute distress, pleasant and conversant,   Mental Status/neuro: alert and oriented  Face: symmetrical, normal facial color  Eyes: anicteric, no proptosis or lid lag  Resp: no acute distress      Labs : I reviewed data from epic and extract and summarize the pertinent data here.     Lab Results   Component Value Date    TSH 3.21 01/22/2020

## 2024-08-08 ENCOUNTER — HOSPITAL ENCOUNTER (OUTPATIENT)
Dept: CARDIOLOGY | Facility: CLINIC | Age: 64
Discharge: HOME | End: 2024-08-08
Attending: INTERNAL MEDICINE | Admitting: INTERNAL MEDICINE
Payer: MEDICARE

## 2024-08-08 DIAGNOSIS — R06.09 DYSPNEA ON EXERTION: ICD-10-CM

## 2024-08-08 LAB
CV STRESS CURRENT BP HE: NORMAL
CV STRESS CURRENT HR HE: 107
CV STRESS CURRENT HR HE: 110
CV STRESS CURRENT HR HE: 114
CV STRESS CURRENT HR HE: 123
CV STRESS CURRENT HR HE: 125
CV STRESS CURRENT HR HE: 126
CV STRESS CURRENT HR HE: 127
CV STRESS CURRENT HR HE: 131
CV STRESS CURRENT HR HE: 133
CV STRESS CURRENT HR HE: 62
CV STRESS CURRENT HR HE: 62
CV STRESS CURRENT HR HE: 63
CV STRESS CURRENT HR HE: 63
CV STRESS CURRENT HR HE: 64
CV STRESS CURRENT HR HE: 65
CV STRESS CURRENT HR HE: 68
CV STRESS CURRENT HR HE: 69
CV STRESS CURRENT HR HE: 78
CV STRESS CURRENT HR HE: 80
CV STRESS CURRENT HR HE: 85
CV STRESS CURRENT HR HE: 92
CV STRESS CURRENT HR HE: 95
CV STRESS CURRENT HR HE: 97
CV STRESS DEVIATION TIME HE: NORMAL
CV STRESS ECHO PERCENT HR HE: NORMAL
CV STRESS EXERCISE STAGE HE: NORMAL
CV STRESS EXERCISE STAGE REACHED HE: NORMAL
CV STRESS FINAL RESTING BP HE: NORMAL
CV STRESS FINAL RESTING HR HE: 80
CV STRESS MAX HR HE: 136
CV STRESS MAX TREADMILL GRADE HE: 0
CV STRESS MAX TREADMILL SPEED HE: 0
CV STRESS PEAK DIA BP HE: NORMAL
CV STRESS PEAK SYS BP HE: NORMAL
CV STRESS PHASE HE: NORMAL
CV STRESS PROTOCOL HE: NORMAL
CV STRESS REASON STOPPED HE: NORMAL
CV STRESS ST DEVIATION AMOUNT HE: NORMAL
CV STRESS ST DEVIATION ELEVATION HE: NORMAL
CV STRESS ST EVELATION AMOUNT HE: NORMAL
CV STRESS SYMPTOMS HE: NORMAL
CV STRESS TEST TYPE HE: NORMAL
CV STRESS TOTAL STAGE TIME MIN 1 HE: NORMAL
STRESS ECHO BASELINE DIASTOLIC HE: 55
STRESS ECHO BASELINE HR: 66
STRESS ECHO BASELINE SYSTOLIC BP: 105
STRESS ECHO LAST STRESS DIASTOLIC BP: 46
STRESS ECHO LAST STRESS HR: 131
STRESS ECHO LAST STRESS SYSTOLIC BP: 102
STRESS ECHO POST ESTIMATED WORKLOAD: 1
STRESS ECHO POST EXERCISE DUR MIN: 11
STRESS ECHO POST EXERCISE DUR SEC: 14
STRESS ECHO TARGET HR: 133

## 2024-08-08 PROCEDURE — 258N000003 HC RX IP 258 OP 636: Performed by: INTERNAL MEDICINE

## 2024-08-08 PROCEDURE — 93350 STRESS TTE ONLY: CPT | Mod: 26 | Performed by: INTERNAL MEDICINE

## 2024-08-08 PROCEDURE — 93325 DOPPLER ECHO COLOR FLOW MAPG: CPT | Mod: 26 | Performed by: INTERNAL MEDICINE

## 2024-08-08 PROCEDURE — 93321 DOPPLER ECHO F-UP/LMTD STD: CPT | Mod: 26 | Performed by: INTERNAL MEDICINE

## 2024-08-08 PROCEDURE — 93016 CV STRESS TEST SUPVJ ONLY: CPT | Performed by: INTERNAL MEDICINE

## 2024-08-08 PROCEDURE — 250N000011 HC RX IP 250 OP 636: Performed by: INTERNAL MEDICINE

## 2024-08-08 PROCEDURE — 255N000002 HC RX 255 OP 636: Performed by: INTERNAL MEDICINE

## 2024-08-08 PROCEDURE — 250N000009 HC RX 250: Performed by: INTERNAL MEDICINE

## 2024-08-08 PROCEDURE — 93018 CV STRESS TEST I&R ONLY: CPT | Performed by: INTERNAL MEDICINE

## 2024-08-08 RX ORDER — ATROPINE SULFATE 0.4 MG/ML
.2-.4 AMPUL (ML) INJECTION
Status: DISCONTINUED | OUTPATIENT
Start: 2024-08-08 | End: 2024-08-09 | Stop reason: HOSPADM

## 2024-08-08 RX ORDER — DOBUTAMINE HYDROCHLORIDE 200 MG/100ML
10-50 INJECTION INTRAVENOUS CONTINUOUS
Status: ACTIVE | OUTPATIENT
Start: 2024-08-08 | End: 2024-08-08

## 2024-08-08 RX ORDER — METOPROLOL TARTRATE 1 MG/ML
1-10 INJECTION, SOLUTION INTRAVENOUS
Status: ACTIVE | OUTPATIENT
Start: 2024-08-08 | End: 2024-08-08

## 2024-08-08 RX ORDER — LIDOCAINE 40 MG/G
CREAM TOPICAL
Status: DISCONTINUED | OUTPATIENT
Start: 2024-08-08 | End: 2024-08-09 | Stop reason: HOSPADM

## 2024-08-08 RX ADMIN — PERFLUTREN 6 ML: 6.52 INJECTION, SUSPENSION INTRAVENOUS at 08:42

## 2024-08-08 RX ADMIN — METOPROLOL TARTRATE 1 MG: 5 INJECTION INTRAVENOUS at 08:42

## 2024-08-08 RX ADMIN — DEXTROSE MONOHYDRATE 10 MCG/KG/MIN: 50 INJECTION, SOLUTION INTRAVENOUS at 08:27

## 2024-08-08 NOTE — PROGRESS NOTES
Pt here for dobutamine stress test. Test, meds and side effects reviewed with patient. Achieved target HR at 50 mcg Dobutamine and a total of 0 mg IV atropine. Gave a total of 1 mg IV Metoprolol to bring HR back to baseline. Post monitoring complete and VSS. Pt escorted out to the gold waiting room.

## 2024-08-09 PROCEDURE — 93244 EXT ECG>48HR<7D REV&INTERPJ: CPT | Performed by: INTERNAL MEDICINE

## 2024-08-26 ENCOUNTER — TELEPHONE (OUTPATIENT)
Dept: ENDOCRINOLOGY | Facility: CLINIC | Age: 64
End: 2024-08-26
Payer: MEDICARE

## 2024-08-26 NOTE — TELEPHONE ENCOUNTER
Patient confirmed scheduled appointment:  Date: 8/6   Time: 7:30   Visit type: return endocrine   Provider: [facility]io  Location: [facility]  Testing/imaging: na   Additional notes: Spoke to pt and scheduled per below COI notes   COI Notes from visit on 8/7: Return in about 1 year (around 8/7/2025).     Madai Fan on 8/26/2024 at 3:07 PM

## 2024-09-09 ENCOUNTER — ANCILLARY PROCEDURE (OUTPATIENT)
Dept: MAMMOGRAPHY | Facility: CLINIC | Age: 64
End: 2024-09-09
Attending: PEDIATRICS
Payer: MEDICARE

## 2024-09-09 DIAGNOSIS — Z12.31 VISIT FOR SCREENING MAMMOGRAM: ICD-10-CM

## 2024-09-09 PROCEDURE — 77067 SCR MAMMO BI INCL CAD: CPT | Mod: GC | Performed by: RADIOLOGY

## 2024-09-09 PROCEDURE — 77063 BREAST TOMOSYNTHESIS BI: CPT | Mod: GC | Performed by: RADIOLOGY

## 2024-09-13 ENCOUNTER — MYC REFILL (OUTPATIENT)
Dept: INTERNAL MEDICINE | Facility: CLINIC | Age: 64
End: 2024-09-13
Payer: MEDICARE

## 2024-09-13 DIAGNOSIS — E78.5 HYPERLIPIDEMIA: ICD-10-CM

## 2024-09-18 RX ORDER — ATORVASTATIN CALCIUM 10 MG/1
10 TABLET, FILM COATED ORAL DAILY
Qty: 90 TABLET | Refills: 0 | Status: SHIPPED | OUTPATIENT
Start: 2024-09-18

## 2024-09-22 ENCOUNTER — HEALTH MAINTENANCE LETTER (OUTPATIENT)
Age: 64
End: 2024-09-22

## 2024-12-09 DIAGNOSIS — E78.5 HYPERLIPIDEMIA: ICD-10-CM

## 2024-12-11 NOTE — TELEPHONE ENCOUNTER
atorvastatin (LIPITOR) 10 MG tablet       Last Written Prescription Date:  9/18/24  Last Fill Quantity: 90,   # refills: 0  Last Office Visit : 5/2/23  Future Office visit:  None  Lipid profile ordered by bryn 7/31/24  Routing refill request to provider for review/approval because:Overdue office visit  Please review lipid panel    Tanja CARROLL RN  [facility]/Red Flag Triage & Med Refill Team

## 2024-12-12 RX ORDER — ATORVASTATIN CALCIUM 10 MG/1
10 TABLET, FILM COATED ORAL DAILY
Qty: 30 TABLET | Refills: 0 | Status: SHIPPED | OUTPATIENT
Start: 2024-12-12

## 2024-12-12 NOTE — CONFIDENTIAL NOTE
From the 9/13/24 refill encounter (when we refilled the last time)    Although the refill team did sign for me then.  It appears that she has established with new PCP Dr. Gallagher.  Chris Gallagher MD   [address] [place] [address]   Phone: [phone]   Fax: [phone]       Added to #30 refill sig

## 2025-03-26 ENCOUNTER — ANCILLARY PROCEDURE (OUTPATIENT)
Dept: BONE DENSITY | Facility: CLINIC | Age: 65
End: 2025-03-26
Attending: NURSE PRACTITIONER
Payer: MEDICARE

## 2025-03-26 DIAGNOSIS — Z78.0 ASYMPTOMATIC MENOPAUSAL STATE: ICD-10-CM

## 2025-03-26 DIAGNOSIS — M85.80 OTHER SPECIFIED DISORDERS OF BONE DENSITY AND STRUCTURE, UNSPECIFIED SITE: ICD-10-CM

## 2025-03-26 PROCEDURE — 77080 DXA BONE DENSITY AXIAL: CPT | Performed by: INTERNAL MEDICINE

## 2025-08-06 ENCOUNTER — VIRTUAL VISIT (OUTPATIENT)
Dept: ENDOCRINOLOGY | Facility: CLINIC | Age: 65
End: 2025-08-06
Payer: MEDICARE

## 2025-08-06 DIAGNOSIS — E06.3 HASHIMOTO'S THYROIDITIS: Primary | ICD-10-CM

## 2025-08-06 PROCEDURE — 98006 SYNCH AUDIO-VIDEO EST MOD 30: CPT | Performed by: INTERNAL MEDICINE

## 2025-08-06 PROCEDURE — 1126F AMNT PAIN NOTED NONE PRSNT: CPT | Mod: 95 | Performed by: INTERNAL MEDICINE

## (undated) RX ORDER — FENTANYL CITRATE 50 UG/ML
INJECTION, SOLUTION INTRAMUSCULAR; INTRAVENOUS
Status: DISPENSED
Start: 2021-04-14

## (undated) RX ORDER — DOBUTAMINE HYDROCHLORIDE 200 MG/100ML
INJECTION INTRAVENOUS
Status: DISPENSED
Start: 2024-08-08

## (undated) RX ORDER — METOPROLOL TARTRATE 1 MG/ML
INJECTION, SOLUTION INTRAVENOUS
Status: DISPENSED
Start: 2024-08-08

## (undated) RX ORDER — SIMETHICONE 40MG/0.6ML
SUSPENSION, DROPS(FINAL DOSAGE FORM)(ML) ORAL
Status: DISPENSED
Start: 2021-04-14

## (undated) RX ORDER — ATROPINE SULFATE 0.4 MG/ML
AMPUL (ML) INJECTION
Status: DISPENSED
Start: 2024-08-08